# Patient Record
Sex: MALE | Race: WHITE | NOT HISPANIC OR LATINO | URBAN - METROPOLITAN AREA
[De-identification: names, ages, dates, MRNs, and addresses within clinical notes are randomized per-mention and may not be internally consistent; named-entity substitution may affect disease eponyms.]

---

## 2017-02-28 ENCOUNTER — INPATIENT (INPATIENT)
Facility: HOSPITAL | Age: 82
LOS: 11 days | Discharge: HOME HEALTH | DRG: 234 | End: 2017-03-12
Attending: THORACIC SURGERY (CARDIOTHORACIC VASCULAR SURGERY) | Admitting: THORACIC SURGERY (CARDIOTHORACIC VASCULAR SURGERY)
Payer: MEDICARE

## 2017-02-28 VITALS
HEART RATE: 82 BPM | OXYGEN SATURATION: 92 % | RESPIRATION RATE: 20 BRPM | DIASTOLIC BLOOD PRESSURE: 74 MMHG | TEMPERATURE: 98 F | WEIGHT: 199.96 LBS | SYSTOLIC BLOOD PRESSURE: 135 MMHG

## 2017-02-28 DIAGNOSIS — Z96.659 PRESENCE OF UNSPECIFIED ARTIFICIAL KNEE JOINT: Chronic | ICD-10-CM

## 2017-02-28 DIAGNOSIS — N18.3 CHRONIC KIDNEY DISEASE, STAGE 3 (MODERATE): ICD-10-CM

## 2017-02-28 DIAGNOSIS — J44.9 CHRONIC OBSTRUCTIVE PULMONARY DISEASE, UNSPECIFIED: ICD-10-CM

## 2017-02-28 DIAGNOSIS — I10 ESSENTIAL (PRIMARY) HYPERTENSION: ICD-10-CM

## 2017-02-28 DIAGNOSIS — E03.9 HYPOTHYROIDISM, UNSPECIFIED: ICD-10-CM

## 2017-02-28 DIAGNOSIS — Z98.49 CATARACT EXTRACTION STATUS, UNSPECIFIED EYE: Chronic | ICD-10-CM

## 2017-02-28 DIAGNOSIS — R55 SYNCOPE AND COLLAPSE: ICD-10-CM

## 2017-02-28 DIAGNOSIS — R07.9 CHEST PAIN, UNSPECIFIED: ICD-10-CM

## 2017-02-28 DIAGNOSIS — E11.9 TYPE 2 DIABETES MELLITUS WITHOUT COMPLICATIONS: ICD-10-CM

## 2017-02-28 LAB
ALBUMIN SERPL ELPH-MCNC: 3.3 G/DL — LOW (ref 3.4–5)
ALP SERPL-CCNC: 63 U/L — SIGNIFICANT CHANGE UP (ref 40–120)
ALT FLD-CCNC: 19 U/L — SIGNIFICANT CHANGE UP (ref 12–42)
ANION GAP SERPL CALC-SCNC: 8 MMOL/L — LOW (ref 9–16)
APPEARANCE UR: CLEAR — SIGNIFICANT CHANGE UP
AST SERPL-CCNC: 21 U/L — SIGNIFICANT CHANGE UP (ref 15–37)
BACTERIA # UR AUTO: SIGNIFICANT CHANGE UP /HPF
BASOPHILS NFR BLD AUTO: 0.6 % — SIGNIFICANT CHANGE UP (ref 0–2)
BILIRUB SERPL-MCNC: 0.6 MG/DL — SIGNIFICANT CHANGE UP (ref 0.2–1.2)
BILIRUB UR-MCNC: NEGATIVE — SIGNIFICANT CHANGE UP
BUN SERPL-MCNC: 20 MG/DL — SIGNIFICANT CHANGE UP (ref 7–23)
CALCIUM SERPL-MCNC: 8.1 MG/DL — LOW (ref 8.5–10.5)
CHLORIDE SERPL-SCNC: 106 MMOL/L — SIGNIFICANT CHANGE UP (ref 96–108)
CK MB CFR SERPL CALC: 1.3 NG/ML — SIGNIFICANT CHANGE UP (ref 0.5–3.6)
CK MB CFR SERPL CALC: 1.4 NG/ML — SIGNIFICANT CHANGE UP (ref 0.5–3.6)
CK MB CFR SERPL CALC: 1.8 NG/ML — SIGNIFICANT CHANGE UP (ref 0.5–3.6)
CK SERPL-CCNC: 80 U/L — SIGNIFICANT CHANGE UP (ref 39–308)
CK SERPL-CCNC: 81 U/L — SIGNIFICANT CHANGE UP (ref 39–308)
CO2 SERPL-SCNC: 24 MMOL/L — SIGNIFICANT CHANGE UP (ref 22–31)
COLOR SPEC: YELLOW — SIGNIFICANT CHANGE UP
CREAT SERPL-MCNC: 1.28 MG/DL — SIGNIFICANT CHANGE UP (ref 0.5–1.3)
D DIMER BLD IA.RAPID-MCNC: 266 NG/ML DDU — HIGH
DIFF PNL FLD: NEGATIVE — SIGNIFICANT CHANGE UP
EOSINOPHIL NFR BLD AUTO: 0.6 % — SIGNIFICANT CHANGE UP (ref 0–6)
EPI CELLS # UR: SIGNIFICANT CHANGE UP /HPF
GLUCOSE SERPL-MCNC: 134 MG/DL — HIGH (ref 70–99)
GLUCOSE UR QL: NEGATIVE — SIGNIFICANT CHANGE UP
HCT VFR BLD CALC: 39.6 % — SIGNIFICANT CHANGE UP (ref 39–50)
HGB BLD-MCNC: 13.3 G/DL — SIGNIFICANT CHANGE UP (ref 13–17)
KETONES UR-MCNC: NEGATIVE — SIGNIFICANT CHANGE UP
LEUKOCYTE ESTERASE UR-ACNC: NEGATIVE — SIGNIFICANT CHANGE UP
LYMPHOCYTES # BLD AUTO: 25.3 % — SIGNIFICANT CHANGE UP (ref 13–44)
MCHC RBC-ENTMCNC: 32.7 PG — SIGNIFICANT CHANGE UP (ref 27–34)
MCHC RBC-ENTMCNC: 33.6 G/DL — SIGNIFICANT CHANGE UP (ref 32–36)
MCV RBC AUTO: 97.3 FL — SIGNIFICANT CHANGE UP (ref 80–100)
MONOCYTES NFR BLD AUTO: 10.7 % — SIGNIFICANT CHANGE UP (ref 2–14)
NEUTROPHILS NFR BLD AUTO: 62.8 % — SIGNIFICANT CHANGE UP (ref 43–77)
NITRITE UR-MCNC: NEGATIVE — SIGNIFICANT CHANGE UP
PH UR: 6 — SIGNIFICANT CHANGE UP (ref 4–8)
PLATELET # BLD AUTO: 275 K/UL — SIGNIFICANT CHANGE UP (ref 150–400)
POTASSIUM SERPL-MCNC: 4 MMOL/L — SIGNIFICANT CHANGE UP (ref 3.5–5.3)
POTASSIUM SERPL-SCNC: 4 MMOL/L — SIGNIFICANT CHANGE UP (ref 3.5–5.3)
PROT SERPL-MCNC: 7.5 G/DL — SIGNIFICANT CHANGE UP (ref 6.4–8.2)
PROT UR-MCNC: (no result) MG/DL
RBC # BLD: 4.07 M/UL — LOW (ref 4.2–5.8)
RBC # FLD: 13.8 % — SIGNIFICANT CHANGE UP (ref 10.3–16.9)
RBC CASTS # UR COMP ASSIST: < 5 /HPF — SIGNIFICANT CHANGE UP
SODIUM SERPL-SCNC: 138 MMOL/L — SIGNIFICANT CHANGE UP (ref 135–145)
SP GR SPEC: 1.02 — SIGNIFICANT CHANGE UP (ref 1–1.03)
TROPONIN I SERPL-MCNC: <0.015 NG/ML — SIGNIFICANT CHANGE UP (ref 0.01–0.04)
UROBILINOGEN FLD QL: 1 E.U./DL — SIGNIFICANT CHANGE UP
WBC # BLD: 8.4 K/UL — SIGNIFICANT CHANGE UP (ref 3.8–10.5)
WBC # FLD AUTO: 8.4 K/UL — SIGNIFICANT CHANGE UP (ref 3.8–10.5)
WBC UR QL: < 5 /HPF — SIGNIFICANT CHANGE UP

## 2017-02-28 PROCEDURE — 33518 CABG ARTERY-VEIN TWO: CPT | Mod: AS

## 2017-02-28 PROCEDURE — 99285 EMERGENCY DEPT VISIT HI MDM: CPT | Mod: 25

## 2017-02-28 PROCEDURE — 93010 ELECTROCARDIOGRAM REPORT: CPT

## 2017-02-28 PROCEDURE — 99223 1ST HOSP IP/OBS HIGH 75: CPT

## 2017-02-28 PROCEDURE — 71020: CPT | Mod: 26

## 2017-02-28 PROCEDURE — 33533 CABG ARTERIAL SINGLE: CPT | Mod: AS

## 2017-02-28 PROCEDURE — 76998 US GUIDE INTRAOP: CPT | Mod: 26,AS,59

## 2017-02-28 RX ORDER — FLUTICASONE PROPIONATE AND SALMETEROL 50; 250 UG/1; UG/1
1 POWDER ORAL; RESPIRATORY (INHALATION)
Qty: 0 | Refills: 0 | Status: DISCONTINUED | OUTPATIENT
Start: 2017-02-28 | End: 2017-03-06

## 2017-02-28 RX ORDER — HEPARIN SODIUM 5000 [USP'U]/ML
5000 INJECTION INTRAVENOUS; SUBCUTANEOUS EVERY 8 HOURS
Qty: 0 | Refills: 0 | Status: DISCONTINUED | OUTPATIENT
Start: 2017-02-28 | End: 2017-03-06

## 2017-02-28 RX ORDER — ACETAMINOPHEN 500 MG
650 TABLET ORAL ONCE
Qty: 0 | Refills: 0 | Status: COMPLETED | OUTPATIENT
Start: 2017-02-28 | End: 2017-02-28

## 2017-02-28 RX ORDER — LISINOPRIL 2.5 MG/1
40 TABLET ORAL DAILY
Qty: 0 | Refills: 0 | Status: DISCONTINUED | OUTPATIENT
Start: 2017-02-28 | End: 2017-03-05

## 2017-02-28 RX ORDER — SIMVASTATIN 20 MG/1
40 TABLET, FILM COATED ORAL AT BEDTIME
Qty: 0 | Refills: 0 | Status: DISCONTINUED | OUTPATIENT
Start: 2017-02-28 | End: 2017-03-06

## 2017-02-28 RX ORDER — ASPIRIN/CALCIUM CARB/MAGNESIUM 324 MG
81 TABLET ORAL DAILY
Qty: 0 | Refills: 0 | Status: DISCONTINUED | OUTPATIENT
Start: 2017-02-28 | End: 2017-03-06

## 2017-02-28 RX ORDER — LEVALBUTEROL 1.25 MG/.5ML
0.63 SOLUTION, CONCENTRATE RESPIRATORY (INHALATION) EVERY 6 HOURS
Qty: 0 | Refills: 0 | Status: DISCONTINUED | OUTPATIENT
Start: 2017-02-28 | End: 2017-03-06

## 2017-02-28 RX ORDER — SODIUM CHLORIDE 9 MG/ML
1000 INJECTION, SOLUTION INTRAVENOUS
Qty: 0 | Refills: 0 | Status: DISCONTINUED | OUTPATIENT
Start: 2017-02-28 | End: 2017-03-06

## 2017-02-28 RX ORDER — DEXTROSE 50 % IN WATER 50 %
25 SYRINGE (ML) INTRAVENOUS ONCE
Qty: 0 | Refills: 0 | Status: DISCONTINUED | OUTPATIENT
Start: 2017-02-28 | End: 2017-03-06

## 2017-02-28 RX ORDER — LEVOTHYROXINE SODIUM 125 MCG
50 TABLET ORAL DAILY
Qty: 0 | Refills: 0 | Status: DISCONTINUED | OUTPATIENT
Start: 2017-02-28 | End: 2017-03-06

## 2017-02-28 RX ORDER — DEXTROSE 50 % IN WATER 50 %
1 SYRINGE (ML) INTRAVENOUS ONCE
Qty: 0 | Refills: 0 | Status: DISCONTINUED | OUTPATIENT
Start: 2017-02-28 | End: 2017-03-06

## 2017-02-28 RX ORDER — FLUTICASONE PROPIONATE AND SALMETEROL 50; 250 UG/1; UG/1
2 POWDER ORAL; RESPIRATORY (INHALATION)
Qty: 0 | Refills: 0 | Status: DISCONTINUED | OUTPATIENT
Start: 2017-02-28 | End: 2017-02-28

## 2017-02-28 RX ORDER — GLUCAGON INJECTION, SOLUTION 0.5 MG/.1ML
1 INJECTION, SOLUTION SUBCUTANEOUS ONCE
Qty: 0 | Refills: 0 | Status: DISCONTINUED | OUTPATIENT
Start: 2017-02-28 | End: 2017-03-06

## 2017-02-28 RX ORDER — INSULIN LISPRO 100/ML
VIAL (ML) SUBCUTANEOUS
Qty: 0 | Refills: 0 | Status: DISCONTINUED | OUTPATIENT
Start: 2017-02-28 | End: 2017-03-06

## 2017-02-28 RX ORDER — DEXTROSE 50 % IN WATER 50 %
12.5 SYRINGE (ML) INTRAVENOUS ONCE
Qty: 0 | Refills: 0 | Status: DISCONTINUED | OUTPATIENT
Start: 2017-02-28 | End: 2017-03-06

## 2017-02-28 RX ADMIN — Medication 650 MILLIGRAM(S): at 17:22

## 2017-02-28 RX ADMIN — HEPARIN SODIUM 5000 UNIT(S): 5000 INJECTION INTRAVENOUS; SUBCUTANEOUS at 22:41

## 2017-02-28 RX ADMIN — Medication 81 MILLIGRAM(S): at 19:38

## 2017-02-28 RX ADMIN — LISINOPRIL 40 MILLIGRAM(S): 2.5 TABLET ORAL at 19:38

## 2017-02-28 RX ADMIN — SIMVASTATIN 40 MILLIGRAM(S): 20 TABLET, FILM COATED ORAL at 22:41

## 2017-02-28 NOTE — ED PROVIDER NOTE - MEDICAL DECISION MAKING DETAILS
82 y/o m hx HTN, DM, HLD, COPD with two episodes of dizziness, diaphoresis, nausea in the past week associated with exertion.  Had echo in 2015 which showed mild MR and AS with EF 55-60%.  Concern for possible anginal equivalent, ?run of PVCs (noted frequent PVCs on EKG in ED today), worsening AS.  Will admit to cardiology for further evaluation.

## 2017-02-28 NOTE — ED CLERICAL - NS ED CLERK NOTE PRE-ARRIVAL INFORMATION; ADDITIONAL PRE-ARRIVAL INFORMATION
80 Y/O M GINGER COLLINS BEING SENT IN BY DR CARLI CEE FROM [facility] FOR NEAR SYNCOPE NAUSEA HX OF HYPERTENSION DIABETES HYPERLIPODEMIA R/OMPNEUMONIA AND MI (LL)

## 2017-02-28 NOTE — H&P ADULT - PROBLEM SELECTOR PLAN 7
Cr. 1.28. Cont. ACEI. Monitor Cr.     DVT PPX: Heparin Sub Cut    Case discussed w/ Dr. Vieyra    Dispo: f/u Echo, NPO after MN. f/u EP recs. Monitor tele

## 2017-02-28 NOTE — ED ADULT NURSE NOTE - CHPI ED SYMPTOMS NEG
no syncope/no chills/no cough/no back pain/no diaphoresis/no shortness of breath/no fever/no vomiting

## 2017-02-28 NOTE — H&P ADULT - PROBLEM SELECTOR PLAN 2
+RBBB, +LAFB, +PVCs noted on tele. At bedside, 3 beats of consecutive bigeminy noted. Monitor Tele. F/u Echo. F/u CE.   HR currently . No BB at this time as per Dr. Vieyra due to h/o COPD and near syncope.   F/u Orthostatic VS. Neuro exam normal. F/u Mg level.   Consult EP in AM

## 2017-02-28 NOTE — ED PROVIDER NOTE - OBJECTIVE STATEMENT
80 y/o m hx HTN, DM, HLD, COPD presents sent from pmd office for evaluation after pt had 2 episodes in the past 2 weeks where he was walking, and became lightheaded, diaphoretic, nauseous and having substernal discomfort.  Pt stating last episode was last night around 2AM.  Pt stating in ED, feeling ok, but reporting both episodes very scary for him.  Pt denies SOB, leg swelling, vomiting, recent URI, cough, all other ROS negative.

## 2017-02-28 NOTE — H&P ADULT - NSHPREVIEWOFSYSTEMS_GEN_ALL_CORE
GEN	              Denies fever, chills,  Skin/Breast	Denies rash  HEENT	              Denies HA, vertigo, vision ?  Lung		+ Dry Cough, H/o wheeze, + Dyspnea. Denies hemoptysis, pleurisy  CV		+ Chest pain, + Palpitations, +CASON  GI	              Denies abdominal pain. Denies vomiting/diarrhea. +Nausea    Urinary		Denies dysuria, hematuria, hesitancy, dribbling, incontinence. 		  MSK 		+ Hip/Knee joint pains  Neurology              Denies fainting, seizures, weakness, numbness/tingling, tremor, speech ?  Psych	              +Depression and Anxiety GEN Denies fever, chills,  Skin/Breast Denies rash  HEENT Denies HA, vertigo, vision   Lung + Dry Cough, H/o wheeze, + Dyspnea. Denies hemoptysis, pleurisy  CV + Chest pain, + Palpitations, +CASON  GI Denies abdominal pain. Denies vomiting/diarrhea. +Nausea    Urinary Denies dysuria, hematuria, hesitancy, dribbling, incontinence. 		  MSK + Hip/Knee joint pains  Neurology  Denies fainting, seizures, weakness, numbness/tingling, tremor, speech   Psych +Depression and Anxiety

## 2017-02-28 NOTE — H&P ADULT - NSHPLABSRESULTS_GEN_ALL_CORE
13.3   8.4   )-----------( 275      ( 28 Feb 2017 14:41 )             39.6   28 Feb 2017 14:41    138    |  106    |  20     ----------------------------<  134    4.0     |  24     |  1.28     Ca    8.1        28 Feb 2017 14:41    TPro  7.5    /  Alb  3.3    /  TBili  0.6    /  DBili  x      /  AST  21     /  ALT  19     /  AlkPhos  63     28 Feb 2017 14:41    CARDIAC MARKERS ( 28 Feb 2017 14:41 )  <0.015 ng/mL / x     / 96 U/L / x     / 1.8 ng/mL

## 2017-02-28 NOTE — H&P ADULT - HISTORY OF PRESENT ILLNESS
82 y/o obese M [employer] HISTORIAN Ex-Smoker w/PMHX HTN, HLD, DM (Non-Compliant w/ Metformin), Aflutter s/p Ablation in , COPD (denies h/o intubation, last completed Prednisone Taper in 2017), Hypothyroidism, h/o Prostate CA s/p seed implant in  who originally presented to PMD office today c/o 2 episodes of near syncope in the setting of C/P within the past 2 weeks.  Pt reports dizziness, nausea, palpitations, fatigue and 5-6/10 chest tightness across the top of the chest. C/P did not radiate to the jaw/neck/back/arm. The last episode occurred at 2AM this morning while ambulating to the restroom. Pt presented from PMD office to [facility] for further evaluation. Pt reports h/o chronic CASON requiring him to stop twice when walking 1 block. Pt reports chronic ankle LE edema. Pt admits to 1 remote possible episode of syncope, timing unknown. Pt denies any new vision changes, vertigo, numbness/tingling in extremities, amaurosis fugax, focal extremity weakness. In ER /74, HR 82, RR 20, T 98.3, O2 92-93% on RA. Labs significant for CE neg. x 1. K 4.0. EKG shows Sinus Rhythm at 100bpm w/ PVCs, + RBBB, LAFB, 4 PVCs, no clear ST depressions, no TWI. In ED pt noted to have frequent PVCs. CXR showed clear lungs. Pt currently Assx. Echo from MD office 2016 showed basal anterior, mid anterior and mid anterolateral hypokinesis, EF 50-55%, LA mild dilated, grade 1 diastolic dysfunction, borderline AS. UA pending.  Pt reports h/o stress test 1 year ago, results unknown. Note, pts wife  within the last month and son  within the past 2 months. Pt denies fever/chills, dysuria, abdominal pain, diarrhea. + Dry cough.    Pt admitted to [facility] for further management. 80 y/o obese M [employer] HISTORIAN Ex-Smoker w/PMHX HTN, HLD, DM (Non-Compliant w/ Metformin), Aflutter s/p Ablation in  at [facility], COPD (denies h/o intubation, last completed Prednisone Taper in 2017), Hypothyroidism, h/o Prostate CA s/p seed implant in  who originally presented to PMD office today c/o 2 episodes of near syncope in the setting of C/P within the past 2 weeks.  Pt reports dizziness, nausea, palpitations, fatigue and 5-6/10 chest tightness across the top of the chest. C/P did not radiate to the jaw/neck/back/arm. The last episode occurred at 2AM this morning while ambulating to the restroom. Pt presented from PMD office to [facility] for further evaluation. Pt reports h/o chronic CASON requiring him to stop twice when walking 1 block. Pt reports chronic ankle LE edema. Pt admits to 1 remote possible episode of syncope, timing unknown. Pt denies any new vision changes, vertigo, numbness/tingling in extremities, amaurosis fugax, focal extremity weakness. In ER /74, HR 82, RR 20, T 98.3, O2 92-93% on RA. Labs significant for CE neg. x 1. K 4.0. EKG shows Sinus Rhythm at 100bpm w/ PVCs, + RBBB, LAFB, 4 PVCs, no clear ST depressions, no TWI. In ED pt noted to have frequent PVCs. CXR showed clear lungs. Pt currently Assx. Echo from MD office 2016 showed basal anterior, mid anterior and mid anterolateral hypokinesis, EF 50-55%, LA mild dilated, grade 1 diastolic dysfunction, borderline AS. UA pending.  Pt reports h/o stress test 1 year ago, results unknown. Note, pts wife  within the last month and son  within the past 2 months. Pt denies fever/chills, dysuria, abdominal pain, diarrhea. + Dry cough.    Pt admitted to [facility] for further management. 82 y/o obese M [employer] HISTORIAN Ex-Smoker w/PMHX HTN, HLD, DM (Non-Compliant w/ Metformin), Aflutter s/p Ablation in  at [facility], COPD (denies h/o intubation, last completed Prednisone Taper in 2017), Hypothyroidism, h/o Prostate CA s/p seed implant in  who originally presented to PMD office today c/o 2 episodes of near syncope in the setting of C/P within the past 2 weeks.  Pt reports dizziness, nausea, palpitations, fatigue and 5-6/10 chest tightness across the top of the chest. C/P did not radiate to the jaw/neck/back/arm. The last episode occurred at 2AM this morning while ambulating to the restroom. Pt presented from PMD office to [facility] for further evaluation. Pt reports h/o chronic CASON requiring him to stop twice when walking 1 block. Pt reports chronic ankle LE edema. Pt admits to 1 remote possible episode of syncope, timing unknown. Pt denies any new vision changes, vertigo, numbness/tingling in extremities, amaurosis fugax, focal extremity weakness. In ER /74, HR 82, RR 20, T 98.3, O2 92-93% on RA. Labs significant for CE neg. x 1. K 4.0. EKG shows Sinus Rhythm at 100bpm w/ 4 PVCs, + RBBB, LAFB, no clear ST depressions, no TWI. In ED pt noted to have frequent PVCs. CXR showed clear lungs. Pt currently Assx. Echo from MD office 2016 showed basal anterior, mid anterior and mid anterolateral hypokinesis, EF 50-55%, LA mild dilated, grade 1 diastolic dysfunction, borderline AS. UA pending.  Pt reports h/o stress test 1 year ago, results unknown. Note, pts wife  within the last month and son  within the past 2 months. Pt denies fever/chills, dysuria, abdominal pain, diarrhea. + Dry cough.    Pt admitted to [facility] for further management.

## 2017-02-28 NOTE — PATIENT PROFILE ADULT. - ABILITY TO HEAR (WITH HEARING AID OR HEARING APPLIANCE IF NORMALLY USED):
Mildly to Moderately Impaired: difficulty hearing in some environments or speaker may need to increase volume or speak distinctly/Lime R ear

## 2017-02-28 NOTE — ED ADULT NURSE NOTE - PMH
COPD (chronic obstructive pulmonary disease)    Diabetes    High cholesterol    Hypertension    Prostate cancer  in remission

## 2017-02-28 NOTE — PATIENT PROFILE ADULT. - VISION (WITH CORRECTIVE LENSES IF THE PATIENT USUALLY WEARS THEM):
glasses/Severely impaired: cannot locate objects without hearing or touching them or patient nonresponsive.

## 2017-02-28 NOTE — ED ADULT NURSE NOTE - OBJECTIVE STATEMENT
Pt came to ED complaining of fatigue, dizziness, nausea.  Pt reports strong episode of dizziness, nausea last night.  Pt denies chest pain, SOB, abd pain,  symptoms.  Positive PMS x4 extremities.  Pt is alert and oriented x3.  Lung sounds clear bilaterally, even chest rise.

## 2017-02-28 NOTE — H&P ADULT - NSHPPHYSICALEXAM_GEN_ALL_CORE
V/S 	BP:  150s/70s	HR: 94	  RR: 17	     T: 98	  O2: 94% on RA   	  GEN: NAD  PULM:  CTA B/L  CARD: No JVD B/L, RRR, S1 and S2   ABD: +BS, NT, soft/ND, obese	  EXT: Trace Edema B/L juan luis  GROIN: soft, no hematoma, no bleeding, no femoral bruit  WRIST: soft, no hematoma, no bleeding, radial pulse 2+  NEURO: A+Ox3, no focal deficit

## 2017-02-28 NOTE — ED PROVIDER NOTE - ATTENDING CONTRIBUTION TO CARE
Pt presents from PCP's office for evaluation as he has had 2 episodes of lightheadedness, diaphoresis, nausea and substernal CP in the past 2 weeks. No sxs currently. No other complaints. Pt AAO, NAD, RRR, CTA b/l, Abd: soft/NT. Labs/ studies noted. Pt admitted for cardiac w/up.

## 2017-02-28 NOTE — H&P ADULT - ASSESSMENT
82 y/o obese M [employer] HISTORIAN Ex-Smoker w/PMHX HTN, HLD, DM, Aflutter s/p Ablation, COPD, Hypothyroidism,  h/o Prostate CA who originally presented to PMD office today c/o 2 near syncope in the setting of C/P within the past 2 weeks.  Noted to have PVCs on tele/EKG. +DIEGO on exam.     Pt admitted to [facility] for further management.

## 2017-02-28 NOTE — H&P ADULT - PROBLEM SELECTOR PLAN 1
f/u CE at 6PM and 10PM. Cont. ASA, Zocor. F/u Lipid Panel. F/u Echo to evaluate for AS. If no AS on Echo and after EP consult consider Pharm NST per Dr. Vieyra. NPO after MN.   HR . Cont. Advair. O2 92-94% on RA. Pt has h/o COPD. f/u D-Dimer f/u CE at 6PM and 10PM. Cont. ASA, Zocor. F/u Lipid Panel. F/u Echo to evaluate for AS. If no AS on Echo and after EP consult consider Pharm NST per Dr. Vieyra. NPO after MN.   HR . O2 92-94% on RA. Pt has h/o COPD. f/u D-Dimer

## 2017-02-28 NOTE — ED ADULT TRIAGE NOTE - CHIEF COMPLAINT QUOTE
dizziness, nausea last night that lasted for 15 minutes.  Hx: HTN, DM, HLD, COPD.  Sent in by Dr. Loza for evaluation.  Denies chest pain / pressure / tightness, fever, chills, falls / injury.

## 2017-02-28 NOTE — H&P ADULT - PROBLEM SELECTOR PLAN 4
Stable. Change Albuterol inhaler to Xopenex neb due to intermittent Sinus Tach, HR . Cont. Advair. O2 92-94% on RA. Test O2 on ambulation. F/u D-Dimer.   In reference to intermittent Sinus Tach to . Pt denies infx sx. CXR clear. F/u UA. Stable. Change Albuterol inhaler to Xopenex neb due to intermittent Sinus Tach, HR . Cont. Advair. O2 92-94% on RA. Test O2 on ambulation.     In reference to intermittent Sinus Tach to . Pt denies infx sx. CXR clear. F/u UA.

## 2017-03-01 DIAGNOSIS — I24.9 ACUTE ISCHEMIC HEART DISEASE, UNSPECIFIED: ICD-10-CM

## 2017-03-01 DIAGNOSIS — I10 ESSENTIAL (PRIMARY) HYPERTENSION: ICD-10-CM

## 2017-03-01 DIAGNOSIS — I49.3 VENTRICULAR PREMATURE DEPOLARIZATION: ICD-10-CM

## 2017-03-01 DIAGNOSIS — E78.00 PURE HYPERCHOLESTEROLEMIA, UNSPECIFIED: ICD-10-CM

## 2017-03-01 LAB
ANION GAP SERPL CALC-SCNC: 8 MMOL/L — LOW (ref 9–16)
BUN SERPL-MCNC: 19 MG/DL — SIGNIFICANT CHANGE UP (ref 7–23)
CALCIUM SERPL-MCNC: 8.6 MG/DL — SIGNIFICANT CHANGE UP (ref 8.5–10.5)
CHLORIDE SERPL-SCNC: 109 MMOL/L — HIGH (ref 96–108)
CO2 SERPL-SCNC: 23 MMOL/L — SIGNIFICANT CHANGE UP (ref 22–31)
CREAT SERPL-MCNC: 1.02 MG/DL — SIGNIFICANT CHANGE UP (ref 0.5–1.3)
GLUCOSE SERPL-MCNC: 84 MG/DL — SIGNIFICANT CHANGE UP (ref 70–99)
HBA1C BLD-MCNC: 6.8 % — HIGH (ref 4.8–5.6)
HCT VFR BLD CALC: 37.5 % — LOW (ref 39–50)
HGB BLD-MCNC: 12.3 G/DL — LOW (ref 13–17)
MAGNESIUM SERPL-MCNC: 2.3 MG/DL — SIGNIFICANT CHANGE UP (ref 1.6–2.4)
MCHC RBC-ENTMCNC: 32 PG — SIGNIFICANT CHANGE UP (ref 27–34)
MCHC RBC-ENTMCNC: 32.8 G/DL — SIGNIFICANT CHANGE UP (ref 32–36)
MCV RBC AUTO: 97.7 FL — SIGNIFICANT CHANGE UP (ref 80–100)
PLATELET # BLD AUTO: 255 K/UL — SIGNIFICANT CHANGE UP (ref 150–400)
POTASSIUM SERPL-MCNC: 4 MMOL/L — SIGNIFICANT CHANGE UP (ref 3.5–5.3)
POTASSIUM SERPL-SCNC: 4 MMOL/L — SIGNIFICANT CHANGE UP (ref 3.5–5.3)
RBC # BLD: 3.84 M/UL — LOW (ref 4.2–5.8)
RBC # FLD: 14 % — SIGNIFICANT CHANGE UP (ref 10.3–16.9)
SODIUM SERPL-SCNC: 140 MMOL/L — SIGNIFICANT CHANGE UP (ref 135–145)
T3 SERPL-MCNC: 88 NG/DL — SIGNIFICANT CHANGE UP (ref 80–200)
T4 AB SER-ACNC: 5.47 UG/DL — SIGNIFICANT CHANGE UP (ref 3.17–11.72)
TSH SERPL-MCNC: 2.52 UIU/ML — SIGNIFICANT CHANGE UP (ref 0.35–4.94)
WBC # BLD: 7.5 K/UL — SIGNIFICANT CHANGE UP (ref 3.8–10.5)
WBC # FLD AUTO: 7.5 K/UL — SIGNIFICANT CHANGE UP (ref 3.8–10.5)

## 2017-03-01 PROCEDURE — 99222 1ST HOSP IP/OBS MODERATE 55: CPT

## 2017-03-01 PROCEDURE — 93970 EXTREMITY STUDY: CPT | Mod: 26

## 2017-03-01 RX ORDER — VERAPAMIL HCL 240 MG
180 CAPSULE, EXTENDED RELEASE PELLETS 24 HR ORAL DAILY
Qty: 0 | Refills: 0 | Status: DISCONTINUED | OUTPATIENT
Start: 2017-03-01 | End: 2017-03-06

## 2017-03-01 RX ADMIN — HEPARIN SODIUM 5000 UNIT(S): 5000 INJECTION INTRAVENOUS; SUBCUTANEOUS at 16:37

## 2017-03-01 RX ADMIN — Medication 81 MILLIGRAM(S): at 11:07

## 2017-03-01 RX ADMIN — FLUTICASONE PROPIONATE AND SALMETEROL 1 DOSE(S): 50; 250 POWDER ORAL; RESPIRATORY (INHALATION) at 06:25

## 2017-03-01 RX ADMIN — Medication 50 MICROGRAM(S): at 06:26

## 2017-03-01 RX ADMIN — Medication 180 MILLIGRAM(S): at 19:12

## 2017-03-01 RX ADMIN — LISINOPRIL 40 MILLIGRAM(S): 2.5 TABLET ORAL at 06:26

## 2017-03-01 RX ADMIN — FLUTICASONE PROPIONATE AND SALMETEROL 1 DOSE(S): 50; 250 POWDER ORAL; RESPIRATORY (INHALATION) at 17:22

## 2017-03-01 RX ADMIN — HEPARIN SODIUM 5000 UNIT(S): 5000 INJECTION INTRAVENOUS; SUBCUTANEOUS at 06:26

## 2017-03-01 RX ADMIN — HEPARIN SODIUM 5000 UNIT(S): 5000 INJECTION INTRAVENOUS; SUBCUTANEOUS at 22:37

## 2017-03-01 RX ADMIN — SIMVASTATIN 40 MILLIGRAM(S): 20 TABLET, FILM COATED ORAL at 22:37

## 2017-03-01 NOTE — PROGRESS NOTE ADULT - PROBLEM SELECTOR PLAN 2
- pt with frequent PVCs on tele, bigeminy, h/o aflutter and ablation  - EP consult for recommendations  - following EP recommendations will decide whether to pursue ischemic work up

## 2017-03-01 NOTE — CONSULT NOTE ADULT - SUBJECTIVE AND OBJECTIVE BOX
HPI:  80 y/o obese M POOR HISTORIAN Ex-Smoker w/ HTN, HLD, DM, Aflutter s/p Ablation in  at [facility], COPD (denies h/o intubation, last completed Prednisone Taper in 2017), Hypothyroidism, h/o Prostate CA s/p seed implant in  who originally presented to PMD office today c/o 2 episodes of near syncope in the setting of C/P within the past 2 weeks.  Pt reports dizziness, nausea, palpitations, fatigue and 5-6/10 chest tightness across the top of the chest. C/P did not radiate to the jaw/neck/back/arm. The last episode occurred at 2AM this morning while ambulating to the restroom. Pt presented from PMD office to [facility] for further evaluation. Pt reports h/o chronic CASON requiring him to stop twice when walking 1 block. Pt reports chronic ankle LE edema. Pt admits to 1 remote possible episode of syncope, timing unknown. Pt denies any new vision changes, vertigo, numbness/tingling in extremities, amaurosis fugax, focal extremity weakness. In ER /74, HR 82, RR 20, T 98.3, O2 92-93% on RA. Labs significant for CE neg. x 1. K 4.0. EKG shows Sinus Rhythm at 100bpm w/ 4 PVCs, + RBBB, LAFB, no clear ST depressions, no TWI. In ED pt noted to have frequent PVCs. CXR showed clear lungs. Pt currently Assx. Echo from MD office 2016 showed basal anterior, mid anterior and mid anterolateral hypokinesis, EF 50-55%, LA mild dilated, grade 1 diastolic dysfunction, borderline AS. UA pending.  Pt reports h/o stress test 1 year ago, results unknown. Note, pts wife  within the last month and son  within the past 2 months. Pt denies fever/chills, dysuria, abdominal pain, diarrhea. + Dry cough.      PAST MEDICAL & SURGICAL HISTORY:  High cholesterol  Diabetes  Prostate cancer: in remission  Hypertension  COPD (chronic obstructive pulmonary disease)  History of cataract surgery: b/l  History of knee replacement: b/l  aflutter s/p ablation    Family history of acute myocardial infarction (Father)      Social History:  Smoking  Drugs  ETOH    pertinent home medications:    Inpatient Medications:   insulin lispro (HumaLOG) corrective regimen sliding scale  SubCutaneous Before meals and at bedtime  dextrose 5%. 1000milliLiter(s) IV Continuous <Continuous>  dextrose Gel 1Dose(s) Oral once PRN  dextrose 50% Injectable 12.5Gram(s) IV Push once  dextrose 50% Injectable 25Gram(s) IV Push once  dextrose 50% Injectable 25Gram(s) IV Push once  glucagon  Injectable 1milliGRAM(s) IntraMuscular once PRN  heparin  Injectable 5000Unit(s) SubCutaneous every 8 hours  aspirin enteric coated 81milliGRAM(s) Oral daily  lisinopril 40milliGRAM(s) Oral daily  simvastatin 40milliGRAM(s) Oral at bedtime  levothyroxine 50MICROGram(s) Oral daily  levalbuterol Inhalation 0.63milliGRAM(s) Inhalation every 6 hours PRN  fluticasone / salmeterol 250-50 MICROgram(s) Diskus 1Dose(s) Inhalation two times a day      narcotic analgesics (Nausea; Vomiting)  No Known Allergies      ROS:   CONSTITUTIONAL: No fever, weight loss + fatigue  EYES: Pt denies  RESPIRATORY: No cough, wheezing, chills or hemoptysis; No Shortness of Breath  CARDIOVASCULAR: see HPI  GASTROINTESTINAL: Pt denies  NEUROLOGICAL: Pt denies  SKIN: Pt denies   PSYCHIATRIC: Pt denies  HEME/LYMPH: Pt denies    PHYSICAL:  T(C): 36.7, Max: 37.1 (02-28 @ 21:14)  HR: 87 (81 - 97)  BP: 133/79 (107/53 - 158/78)  RR: 16 (16 - 16)  SpO2: 96% (95% - 98%)  Wt(kg): --  Appearance: NAD  Cardiovascular: Normal S1 S2, No JVD, No murmurs, No edema  Respiratory: Lungs clear to auscultation	bilaterally.  No wheeze, rhonchi, rales  Gastrointestinal:  Soft, NT/ND, + BS	  Neurologic: A&O x 3, No deficit noted  Extremities: No edema, pulses intact      LABS:                        12.3   7.5   )-----------( 255      ( 01 Mar 2017 07:00 )             37.5     01 Mar 2017 07:00    140    |  109    |  19     ----------------------------<  84     4.0     |  23     |  1.02     Ca    8.6        01 Mar 2017 07:00  Mg     2.3       01 Mar 2017 07:00    TPro  7.5    /  Alb  3.3    /  TBili  0.6    /  DBili  x      /  AST  21     /  ALT  19     /  AlkPhos  63     2017 14:41      TSH  TroponinTroponin I, Serum: <0.015 ng/mL ( @ 23:08)  Troponin I, Serum: <0.015 ng/mL ( @ 18:30)  Troponin I, Serum: <0.015 ng/mL ( @ 14:41)     LIVER FUNCTIONS - ( 2017 14:41 )  Alb: 3.3 g/dL / Pro: 7.5 g/dL / ALK PHOS: 63 U/L / ALT: 19 U/L / AST: 21 U/L / GGT: x             EKG:    Telemetry:    ECHO:    Prior EP procedures:    Cath / stress / Cardiac CTa:    Physician Assistant Assessment Plan: HPI:  80 y/o obese M POOR HISTORIAN Ex-Smoker w/ HTN, HLD, DM, Aflutter s/p Ablation in 2003 at [facility], COPD, Hypothyroidism, h/o Prostate CA s/p seed implant in 2002 who presented to [facility] yesterday c/o 2 episodes of near syncope and chest pain, found to have PVCs - EP called for further recommendations.   Pt presented to his PMD complaining of nausea, palpitation, fatigue, dizziness and 6/10 chest non radiating tightness across the top of the chest. He was referred to [facility] ER.  Pt reports h/o chronic CASON requiring him to stop twice when walking 1 block. Pt reports chronic b/l ankle edema. Of note he has been extremely stressed out lately as his wife and son passed away within the past few months.    Echo from MD office 2/2016 showed basal anterior, mid anterior and mid anterolateral hypokinesis, EF 50-55%,      PAST MEDICAL & SURGICAL HISTORY:  High cholesterol  Diabetes  Prostate cancer: in remission  Hypertension  COPD (chronic obstructive pulmonary disease)  History of cataract surgery: b/l  History of knee replacement: b/l  aflutter s/p ablation    Family history of acute myocardial infarction (Father)    Social History: no sig drinking, former smoker, denies drugs      Inpatient Medications:   insulin lispro (HumaLOG) corrective regimen sliding scale  SubCutaneous Before meals and at bedtime  heparin  Injectable 5000Unit(s) SubCutaneous every 8 hours  aspirin enteric coated 81milliGRAM(s) Oral daily  lisinopril 40milliGRAM(s) Oral daily  simvastatin 40milliGRAM(s) Oral at bedtime  levothyroxine 50MICROGram(s) Oral daily  levalbuterol Inhalation 0.63milliGRAM(s) Inhalation every 6 hours PRN  fluticasone / salmeterol 250-50 MICROgram(s) Diskus 1Dose(s) Inhalation two times a day    ALL  narcotic analgesics (Nausea; Vomiting)  No Known Allergies      ROS:   CONSTITUTIONAL: No fever, weight loss + fatigue  EYES: Pt denies  RESPIRATORY: No cough, wheezing, chills or hemoptysis; No Shortness of Breath  CARDIOVASCULAR: see HPI  GASTROINTESTINAL: Pt denies  NEUROLOGICAL: Pt denies  SKIN: Pt denies   PSYCHIATRIC: Pt denies  HEME/LYMPH: Pt denies    PHYSICAL:  T(C): 36.7, Max: 37.1 (02-28 @ 21:14)  HR: 87 (81 - 97)  BP: 133/79 (107/53 - 158/78)  RR: 16 (16 - 16)  SpO2: 96% (95% - 98%)  Wt(kg): --  Appearance: NAD  Cardiovascular: Normal S1 S2, No JVD, No murmurs, No edema  Respiratory: Lungs clear to auscultation	bilaterally.  No wheeze, rhonchi, rales  Gastrointestinal:  Soft, NT/ND, + BS	  Neurologic: A&O x 3, No deficit noted  Extremities: No edema, pulses intact      LABS:                        12.3   7.5   )-----------( 255      ( 01 Mar 2017 07:00 )             37.5     01 Mar 2017 07:00    140    |  109    |  19     ----------------------------<  84     4.0     |  23     |  1.02     Ca    8.6        01 Mar 2017 07:00  Mg     2.3       01 Mar 2017 07:00    TPro  7.5    /  Alb  3.3    /  TBili  0.6    /  DBili  x      /  AST  21     /  ALT  19     /  AlkPhos  63     28 Feb 2017 14:41      TSH  TroponinTroponin I, Serum: <0.015 ng/mL (02-28 @ 23:08)  Troponin I, Serum: <0.015 ng/mL (02-28 @ 18:30)  Troponin I, Serum: <0.015 ng/mL (02-28 @ 14:41)     LIVER FUNCTIONS - ( 28 Feb 2017 14:41 )  Alb: 3.3 g/dL / Pro: 7.5 g/dL / ALK PHOS: 63 U/L / ALT: 19 U/L / AST: 21 U/L / GGT: x             EKG:    Telemetry:    ECHO:    Prior EP procedures:    Cath / stress / Cardiac CTa:    Physician Assistant Assessment Plan: HPI:  80 y/o M w/ HTN, HLD, DM, Aflutter s/p Ablation in 2003 at [facility], COPD, Hypothyroidism, h/o Prostate CA s/p seed implant in 2002 who presented to [facility] yesterday c/o 2 episodes of near syncope and chest pain, found to have PVCs - EP called for further recommendations.   Pt presented to his PMD complaining of nausea, fatigue, dizziness and 6/10 chest non radiating tightness across the top of the chest. He was referred to [facility] ER.  Pt reports h/o chronic CASON requiring him to stop twice when walking 1 block. Pt reports chronic b/l ankle edema.  He states he has worn a monitor and was told he had "extra beats" in the past but no intervention was needed.  He notes two episodes of near syncope both in the setting of getting up in the middle of the night to urinate.  No syncope.  He does not feel palpitations but states when he checks his pulse he feels "skipped beats".  On telemetry he has had PVCs and EP called for further reccs.  Of note he has been extremely stressed out lately as his wife and son passed away within the past few months.  Echo from MD office 2/2016 showed basal anterior, mid anterior and mid anterolateral hypokinesis, EF 50-55%,      PAST MEDICAL & SURGICAL HISTORY:  High cholesterol  Diabetes  Prostate cancer: in remission  Hypertension  COPD (chronic obstructive pulmonary disease)  History of cataract surgery: b/l  History of knee replacement: b/l  aflutter s/p ablation    Family history of acute myocardial infarction (Father)    Social History: no sig drinking, former smoker, denies drugs      Inpatient Medications:   insulin lispro (HumaLOG) corrective regimen sliding scale  SubCutaneous Before meals and at bedtime  heparin  Injectable 5000Unit(s) SubCutaneous every 8 hours  aspirin enteric coated 81milliGRAM(s) Oral daily  lisinopril 40milliGRAM(s) Oral daily  simvastatin 40milliGRAM(s) Oral at bedtime  levothyroxine 50MICROGram(s) Oral daily  levalbuterol Inhalation 0.63milliGRAM(s) Inhalation every 6 hours PRN  fluticasone / salmeterol 250-50 MICROgram(s) Diskus 1Dose(s) Inhalation two times a day    ALL  narcotic analgesics (Nausea; Vomiting)  No Known Allergies      ROS:   CONSTITUTIONAL: No fever, weight loss + fatigue  EYES: Pt denies  RESPIRATORY: No cough, wheezing, chills or hemoptysis; No Shortness of Breath  CARDIOVASCULAR: see HPI  GASTROINTESTINAL: Pt denies  NEUROLOGICAL: Pt denies  SKIN: Pt denies   PSYCHIATRIC: Pt denies  HEME/LYMPH: Pt denies    PHYSICAL:  T(C): 36.7, Max: 37.1 (02-28 @ 21:14)  HR: 87 (81 - 97)  BP: 133/79 (107/53 - 158/78)  RR: 16 (16 - 16)  SpO2: 96% (95% - 98%)     Appearance: NAD  Cardiovascular: Normal S1 S2, No JVD, No murmurs, No edema  Respiratory: Lungs clear to auscultation	bilaterally.  No wheeze, rhonchi, rales  Gastrointestinal:  Soft, NT/ND,  Neurologic: A&O x 3, No deficit noted         LABS:  01 Mar 2017 07:00                        12.3   7.5   )-----------( 255                    37.5     140    |  109    |  19     ----------------------------<  84     4.0     |  23     |  1.02     Ca    8.6        Mg     2.3        TPro  7.5    /  Alb  3.3    /  TBili  0.6    /  DBili  x      /  AST  21     /  ALT  19     /  AlkPhos  63        TSH 2.5  TroponinTroponin I, Serum: <0.015 ng/mL (02-28 @ 23:08)  Troponin I, Serum: <0.015 ng/mL (02-28 @ 18:30)  Troponin I, Serum: <0.015 ng/mL (02-28 @ 14:41)           EKG: NSR, PVC (likely outflow tract), RBBB /LAFB    Telemetry: NSR with PVC and bigeminy      ECHO: EF 55-60%. moderate AS,     Prior EP procedures:denies    Cath / stress / Cardiac CTa: denies    Physician Assistant Assessment Plan:  80 y/o obese M POOR HISTORIAN Ex-Smoker w/ HTN, HLD, DM, Aflutter s/p Ablation in 2003 at [facility], COPD, Hypothyroidism, h/o Prostate CA s/p seed implant in 2002 who presented to [facility] yesterday c/o 2 episodes of near syncope and chest pain, found to have PVCs - EP called for further recommendations. PVC likely outflow tract.  Patient asymptomatic from these.  Plan for R/LHC tomorrow to eval coronaries and AS.  We discussed reasons to treat PVCs such as depressed EF, PVC burden >20%  and symptoms.  He does not have any of these factors.  It is difficult to quantify his PVC burden on telmetry but less then 20% from my scan of his monitor.  WE will arrange a 24 hour holter monitor on discharge to better understand his PVC burden.  For now would start him on low dose Verapamil to help suppress PVC.  Will arrange monitor and continue to follow his telemetry while he is in house.  Please call [phone] with any questions or concerns.

## 2017-03-01 NOTE — PROGRESS NOTE ADULT - ASSESSMENT
80 yo M former smoker PMHx HTN, HLD, DM, aflutter s/p ablation (2003), COPD, hypothyroid, prostate ca, originally presented to PMD office c/o 2 episodes of near syncope with intermittent chest tightness and was sent to [facility] ED. Patient VSS, EKG revealing sinus with freq PVCs, RBBB, LAFB, no acute ischemic changes. Pt will be admitted to [facility] for r/o ACS and further cardiac work up.

## 2017-03-01 NOTE — PROGRESS NOTE ADULT - PROBLEM SELECTOR PLAN 1
- troponins negative x3, no active chest pain  - continue Aspirin 81 mg orally daily  - echocardiogram, f/u, consider pharm NST, eval for ?AS beforehand  - holding BB for possible stress test/COPD

## 2017-03-01 NOTE — PROGRESS NOTE ADULT - SUBJECTIVE AND OBJECTIVE BOX
Interventional Cardiology PA Adult Progress Note    Subjective Assessment: Pt seen and examined at bedside. Denies any complaints overnight. Denies headache, chest pain, dizziness, shortness of breath, LE edema, n/v/d.  	  MEDICATIONS:  lisinopril 40milliGRAM(s) Oral daily  levalbuterol Inhalation 0.63milliGRAM(s) Inhalation every 6 hours PRN  fluticasone / salmeterol 250-50 MICROgram(s) Diskus 1Dose(s) Inhalation two times a day  insulin lispro (HumaLOG) corrective regimen sliding scale  SubCutaneous Before meals and at bedtime  dextrose Gel 1Dose(s) Oral once PRN  dextrose 50% Injectable 12.5Gram(s) IV Push once  dextrose 50% Injectable 25Gram(s) IV Push once  dextrose 50% Injectable 25Gram(s) IV Push once  glucagon  Injectable 1milliGRAM(s) IntraMuscular once PRN  simvastatin 40milliGRAM(s) Oral at bedtime  levothyroxine 50MICROGram(s) Oral daily  dextrose 5%. 1000milliLiter(s) IV Continuous <Continuous>  heparin  Injectable 5000Unit(s) SubCutaneous every 8 hours  aspirin enteric coated 81milliGRAM(s) Oral daily  	    PHYSICAL EXAM:  TELEMETRY:  T(C): 36.4, Max: 37.1 (02-28 @ 21:14)  HR: 87 (81 - 97)  BP: 133/79 (107/53 - 158/78)  RR: 16 (16 - 20)  SpO2: 96% (92% - 98%)  Wt(kg): --  I&O's Summary  I & Os for 24h ending 01 Mar 2017 07:00  =============================================  IN: 400 ml / OUT: 0 ml / NET: 400 ml    I & Os for current day (as of 01 Mar 2017 12:37)  =============================================  IN: 120 ml / OUT: 500 ml / NET: -380 ml    Height (cm): 167.6 (02-28 @ 17:00)  Weight (kg): 90.7 (02-28 @ 14:11)  BMI (kg/m2): 32.3 (02-28 @ 17:00)  BSA (m2): 2 (02-28 @ 17:00)  Randle:  Central/PICC/Mid Line:                                         Appearance: Normal	  Neck: Supple, - JVD  Cardiovascular: Normal S1 S2, No JVD, + DIEGO, freq PVCs  Respiratory: Lungs clear to auscultation c/l no wheezes rales rhonchi  Gastrointestinal:  Soft, Non-tender	  Skin: No rashes, No ecchymoses, No cyanosis  Extremities: Normal range of motion, No clubbing, cyanosis or edema  Vascular: Peripheral pulses palpable 2+ bilaterally  Neurologic: Non-focal  Psychiatry: A & O x 3, Mood & affect appropriate      	    ECG:  	  Diagnosis Line Sinus rhythm with frequent premature ventricular complexes  Possible Left atrial enlargement  Right bundle branch block  Left anterior fascicular block  RADIOLOGY:   DIAGNOSTIC TESTING:  [ ] Echocardiogram: pending  [ ]  Catheterization:  [ ] Stress Test:  pending  [ ] MOHINI  OTHER: 	    LABS:	 	  CARDIAC MARKERS:  Troponin I, Serum: <0.015 ng/mL (02-28 @ 23:08)  Troponin I, Serum: <0.015 ng/mL (02-28 @ 18:30)  Troponin I, Serum: <0.015 ng/mL (02-28 @ 14:41)                          12.3   7.5   )-----------( 255      ( 01 Mar 2017 07:00 )             37.5     01 Mar 2017 07:00    140    |  109    |  19     ----------------------------<  84     4.0     |  23     |  1.02     Ca    8.6        01 Mar 2017 07:00  Mg     2.3       01 Mar 2017 07:00    TPro  7.5    /  Alb  3.3    /  TBili  0.6    /  DBili  x      /  AST  21     /  ALT  19     /  AlkPhos  63     28 Feb 2017 14:41  proBNP:   Lipid Profile:   HgA1c: Hemoglobin A1C, Whole Blood: 6.8 % (03-01 @ 07:00)  TSH: Thyroid Stimulating Hormone, Serum: 2.524 uIU/mL (03-01 @ 07:00)

## 2017-03-02 ENCOUNTER — TRANSCRIPTION ENCOUNTER (OUTPATIENT)
Age: 82
End: 2017-03-02

## 2017-03-02 PROBLEM — E11.9 TYPE 2 DIABETES MELLITUS WITHOUT COMPLICATIONS: Chronic | Status: ACTIVE | Noted: 2017-02-28

## 2017-03-02 PROBLEM — C61 MALIGNANT NEOPLASM OF PROSTATE: Chronic | Status: ACTIVE | Noted: 2017-02-28

## 2017-03-02 PROBLEM — I10 ESSENTIAL (PRIMARY) HYPERTENSION: Chronic | Status: ACTIVE | Noted: 2017-02-28

## 2017-03-02 PROBLEM — J44.9 CHRONIC OBSTRUCTIVE PULMONARY DISEASE, UNSPECIFIED: Chronic | Status: ACTIVE | Noted: 2017-02-28

## 2017-03-02 PROBLEM — E78.00 PURE HYPERCHOLESTEROLEMIA, UNSPECIFIED: Chronic | Status: ACTIVE | Noted: 2017-02-28

## 2017-03-02 LAB
ANION GAP SERPL CALC-SCNC: 8 MMOL/L — LOW (ref 9–16)
BUN SERPL-MCNC: 16 MG/DL — SIGNIFICANT CHANGE UP (ref 7–23)
CALCIUM SERPL-MCNC: 8.7 MG/DL — SIGNIFICANT CHANGE UP (ref 8.5–10.5)
CHLORIDE SERPL-SCNC: 106 MMOL/L — SIGNIFICANT CHANGE UP (ref 96–108)
CO2 SERPL-SCNC: 24 MMOL/L — SIGNIFICANT CHANGE UP (ref 22–31)
CREAT SERPL-MCNC: 0.84 MG/DL — SIGNIFICANT CHANGE UP (ref 0.5–1.3)
GLUCOSE SERPL-MCNC: 112 MG/DL — HIGH (ref 70–99)
HCT VFR BLD CALC: 36.5 % — LOW (ref 39–50)
HGB BLD-MCNC: 12.3 G/DL — LOW (ref 13–17)
MAGNESIUM SERPL-MCNC: 2.2 MG/DL — SIGNIFICANT CHANGE UP (ref 1.6–2.4)
MCHC RBC-ENTMCNC: 32.9 PG — SIGNIFICANT CHANGE UP (ref 27–34)
MCHC RBC-ENTMCNC: 33.7 G/DL — SIGNIFICANT CHANGE UP (ref 32–36)
MCV RBC AUTO: 97.6 FL — SIGNIFICANT CHANGE UP (ref 80–100)
PLATELET # BLD AUTO: 261 K/UL — SIGNIFICANT CHANGE UP (ref 150–400)
POTASSIUM SERPL-MCNC: 3.7 MMOL/L — SIGNIFICANT CHANGE UP (ref 3.5–5.3)
POTASSIUM SERPL-SCNC: 3.7 MMOL/L — SIGNIFICANT CHANGE UP (ref 3.5–5.3)
RBC # BLD: 3.74 M/UL — LOW (ref 4.2–5.8)
RBC # FLD: 14.3 % — SIGNIFICANT CHANGE UP (ref 10.3–16.9)
SODIUM SERPL-SCNC: 138 MMOL/L — SIGNIFICANT CHANGE UP (ref 135–145)
WBC # BLD: 9 K/UL — SIGNIFICANT CHANGE UP (ref 3.8–10.5)
WBC # FLD AUTO: 9 K/UL — SIGNIFICANT CHANGE UP (ref 3.8–10.5)

## 2017-03-02 PROCEDURE — 93460 R&L HRT ART/VENTRICLE ANGIO: CPT | Mod: 26

## 2017-03-02 RX ORDER — POTASSIUM CHLORIDE 20 MEQ
40 PACKET (EA) ORAL ONCE
Qty: 0 | Refills: 0 | Status: COMPLETED | OUTPATIENT
Start: 2017-03-02 | End: 2017-03-02

## 2017-03-02 RX ADMIN — FLUTICASONE PROPIONATE AND SALMETEROL 1 DOSE(S): 50; 250 POWDER ORAL; RESPIRATORY (INHALATION) at 05:45

## 2017-03-02 RX ADMIN — HEPARIN SODIUM 5000 UNIT(S): 5000 INJECTION INTRAVENOUS; SUBCUTANEOUS at 21:45

## 2017-03-02 RX ADMIN — FLUTICASONE PROPIONATE AND SALMETEROL 1 DOSE(S): 50; 250 POWDER ORAL; RESPIRATORY (INHALATION) at 19:37

## 2017-03-02 RX ADMIN — Medication 81 MILLIGRAM(S): at 11:14

## 2017-03-02 RX ADMIN — HEPARIN SODIUM 5000 UNIT(S): 5000 INJECTION INTRAVENOUS; SUBCUTANEOUS at 05:45

## 2017-03-02 RX ADMIN — LISINOPRIL 40 MILLIGRAM(S): 2.5 TABLET ORAL at 05:44

## 2017-03-02 RX ADMIN — Medication 50 MICROGRAM(S): at 05:44

## 2017-03-02 RX ADMIN — Medication 40 MILLIEQUIVALENT(S): at 16:36

## 2017-03-02 RX ADMIN — Medication 180 MILLIGRAM(S): at 05:44

## 2017-03-02 RX ADMIN — SIMVASTATIN 40 MILLIGRAM(S): 20 TABLET, FILM COATED ORAL at 21:45

## 2017-03-02 NOTE — PROGRESS NOTE ADULT - SUBJECTIVE AND OBJECTIVE BOX
82 y/o obese M POOR HISTORIAN Ex-Smoker w/ HTN, HLD, DM, Aflutter s/p Ablation in 2003 at [facility], COPD, Hypothyroidism, h/o Prostate CA s/p seed implant in 2002 who presented to [facility] yesterday c/o 2 episodes of near syncope and chest pain, found to have PVCs - EP called for further recommendations. PVC likely outflow tract.  Patient asymptomatic from these.    Today, PVC burden significantly less.  No further intervention.  Follow up as outpatient.  Appointment made.

## 2017-03-02 NOTE — DISCHARGE NOTE ADULT - ADDITIONAL INSTRUCTIONS
You are signing out AGAINST MEDICAL ADVICE. Please be aware that by doing so you are at risk for worsening of your condition. We wanted to monitor you post catheterization and evaluate you for your aortic stenosis. Risks of signing out are including but not limited to heart attack, bleeding, stroke, passing out, death. Please be aware of these risks and follow up with your cardiologist as soon as possible.    Please make sure you see Dr. Borrego in the office next week. Please make sure that you also follow up with Dr. Gonzales for the Holter/Heart rate monitor. Please return to the emergency room if experiencing chest pain/dizziness/passing out.

## 2017-03-02 NOTE — PROGRESS NOTE ADULT - PROBLEM SELECTOR PLAN 8
- moderate AS by echo RONAN 1.1 mean gradient 36 mmHg  - by right heart cath 3/02 RHC: RA: 9, RV: 32/6 (10), PAP:32/6 (19), PCWP: 11 CO/CI (Nam): 6.63/3.32   Ao/LV gradient: 23.1mm Hg , RONAN: 1.67cm2.   - will continue to follow, mild-moderate AS by cath

## 2017-03-02 NOTE — PROGRESS NOTE ADULT - PROBLEM SELECTOR PLAN 1
- Troponins negative x3, no active chest pain  - continue Aspirin 81 mg orally daily  - echo revealing moderate AS RONAN 1.1, mean gradient 36mm Hg, LV function normal EF 55-60%  - R&L heart cath 3/2 revealing LM: Distal 60% lesion, pLAD: calcified 75%, Ramus: 50%, LCx: Mild luminal irregularities, RCA: Dominant, ostial 95% lesion, proximal 80-90 calcified lesion, LVEF: 60%, LVEDP: 21, No significant MR.    -CABG recommended, Dr. Andino on Monday, pre-op work up ordered (CT head, chest, carotid, PFTs)

## 2017-03-02 NOTE — DISCHARGE NOTE ADULT - INSTRUCTIONS
Continue a heart healthy diet low in sodium, cholesterol, and fat.    You underwent a coronary/periphal angiogram and should wait 3 days before returning to ordinary activities. Do not drive for 2 days. Consult your doctor before returning to vigorous activity. You may return to work in 3-5 days. The catheter from your groin/wrist was removed and you should remove the dressing in 24 hours. You may shower once the dressing is removed, but avoid baths, hot tubs, or swimming for 5 days to prevent infection. If you notice bleeding from the site, hardening and pain at the site, drainage or redness from the site, coolness/paleness of the extremity, swelling, or fever, please call [phone]. Please continue your aspirin and plavix/brilinta/effient as prescribed unless otherwise indicated by your cardiologist. All of your prescriptions have been sent to the pharmacy. Please follow up with  __ in 1-2 weeks. All of your needed prescriptions have been sent electronically to your pharmacy.

## 2017-03-02 NOTE — CONSULT NOTE ADULT - SUBJECTIVE AND OBJECTIVE BOX
Surgeon: Dr. Andino     Requesting Physician: Dr. Vieyra    HISTORY OF PRESENT ILLNESS:  82 y/o obese M POOR HISTORIAN Ex-Smoker w/PMHX HTN, HLD, DM (Non-Compliant), Aflutter s/p Ablation in  at [facility], COPD, Hypothyroidism, h/o Prostate CA s/p seed implant in  who originally presented to PMD office on 17 c/o 2 episodes of near syncope in the setting of C/P within the past 2 weeks.  Pt reports dizziness, nausea, palpitations, fatigue and 5-6/10 chest tightness across the top of the chest. C/P did not radiate to the jaw/neck/back/arm.  Pt reports h/o chronic CASON requiring him to stop twice when walking 1 block and LE edema.  Pt denies any new vision changes, vertigo, numbness/tingling in extremities, amaurosis fugax, focal extremity weakness, fever/chills, dysuria, abdominal pain, diarrhea.. Echo from  2016 showed basal anterior, mid anterior and mid anterolateral hypokinesis, EF 50-55%, LA mild dilated, grade 1 diastolic dysfunction. Pt underwent cardiac cath on 3/2/17 which revealed LM: Distal 60% lesion, pLAD: calcified 75%, Ramus: 50%, LCx: Mild luminal irregularities, RCA: Dominant, ostial 95% lesion, proximal 80-90 calcified lesion, LVEF: 60%, LVEDP: 21, No significant MR.  RHC: RA: 9, RV: 32/6 (10), PAP:32/6 (19), PCWP: 11. CO/CI (Nam): 6.63/3.32   Ao/LV gradient: 23.1mm Hg , RONAN: 1.67cm2. CT Surgery was consulted for surgical evaluation for CABG.     PAST MEDICAL & SURGICAL HISTORY:  High cholesterol  Diabetes  Prostate cancer: in remission  Hypertension  COPD (chronic obstructive pulmonary disease)  History of cataract surgery: b/l  History of knee replacement: b/l      MEDICATIONS  (STANDING):  insulin lispro (HumaLOG) corrective regimen sliding scale  SubCutaneous Before meals and at bedtime  heparin  Injectable 5000Unit(s) SubCutaneous every 8 hours  aspirin enteric coated 81milliGRAM(s) Oral daily  lisinopril 40milliGRAM(s) Oral daily  simvastatin 40milliGRAM(s) Oral at bedtime  levothyroxine 50MICROGram(s) Oral daily  fluticasone / salmeterol 250-50 MICROgram(s) Diskus 1Dose(s) Inhalation two times a day  verapamil SR 180milliGRAM(s) Oral daily  potassium chloride    Tablet ER 40milliEquivalent(s) Oral once    MEDICATIONS  (PRN):  dextrose Gel 1Dose(s) Oral once PRN Blood Glucose LESS THAN 70 milliGRAM(s)/deciliter  glucagon  Injectable 1milliGRAM(s) IntraMuscular once PRN Glucose LESS THAN 70 milligrams/deciliter  levalbuterol Inhalation 0.63milliGRAM(s) Inhalation every 6 hours PRN shortness of breathe, wheezing      Allergies: No Known Allergies    Intolerances    narcotic analgesics (Nausea; Vomiting)      SOCIAL HISTORY:  Smoker:  Former Smoker quit 32 yrs ago   ETOH use:  YES socially Ilicit Drug use:  YES / NO  Denies ilicit drug use     FAMILY HISTORY:  Family history of acute myocardial infarction (Father)    Review of Systems  CONSTITUTIONAL:  +fatigue  NEURO:  +near syncope, +dizziness   EYES:   NEGATIVE  ENMT:   NEGATIVE  CV:  +Chest pain, +CASON,  +palpitations, +CASON  RESPIRATORY:  +SOB, +cough, +LE edema   GI:  +Nausea,   :   NEGATIVE  MUSKULOSKELETAL:   NEGATIVE  SKIN/BREAST:   NEGATIVE  PSYCH:  NEGATIVE  HEME/LYMPH:   NEGATIVE  ENDOCRINE:   NEGATIVE    PHYSICAL EXAM  Vital Signs Last 24 Hrs  T(C): 36.8, Max: 36.8 ( @ 14:46)  T(F): 98.2, Max: 98.2 ( @ 14:46)  HR: 72 (68 - 88)  BP: 117/58 (106/51 - 158/91)  BP(mean): --  RR: 16 (16 - 16)  SpO2: 94% (94% - 97%)    CONSTITUTIONAL:    NEURO:                        NEECK  CV:    RESPIRATORY:      GI:    : ALLEN + / -     MUSKULOSKELETAL:      SKIN / BREAST:                                                            LABS:                        12.3   9.0   )-----------( 261      ( 02 Mar 2017 07:44 )             36.5     02 Mar 2017 07:44    138    |  106    |  16     ----------------------------<  112    3.7     |  24     |  0.84     Ca    8.7        02 Mar 2017 07:44  Mg     2.2       02 Mar 2017 07:44        Urinalysis Basic - ( 2017 18:03 )    Color: Yellow / Appearance: Clear / S.025 / pH: x  Gluc: x / Ketone: NEGATIVE  / Bili: NEGATIVE / Urobili: 1.0 E.U./dL   Blood: x / Protein: Trace mg/dL / Nitrite: NEGATIVE   Leuk Esterase: NEGATIVE / RBC: < 5 /HPF / WBC < 5 /HPF   Sq Epi: x / Non Sq Epi: Rare /HPF / Bacteria: None Seen /HPF      CARDIAC MARKERS ( 2017 23:08 )  <0.015 ng/mL / x     / 81 U/L / x     / 1.3 ng/mL  CARDIAC MARKERS ( 2017 18:30 )  <0.015 ng/mL / x     / 80 U/L / x     / 1.4 ng/mL    RADIOLOGY & ADDITIONAL STUDIES:  CAROTID U/S: PENDING     CXR: 17: no acute pathology     CT Scan: non contrast CHEST CT PENDING  Head CT PENDING    EKG: Sinus rhythm with frequent premature ventricular complexes  Left atrial enlargement  Right bundle branch block  Left anterior fascicular block  *** Bifascicular block ***    TTE / MOHINI: PENDING    Cardiac Cath:  3/2/17 which revealed LM: Distal 60% lesion, pLAD: calcified 75%, Ramus: 50%, LCx: Mild luminal irregularities, RCA: Dominant, ostial 95% lesion, proximal 80-90 calcified lesion, LVEF: 60%, LVEDP: 21, No significant MR.  RHC: RA: 9, RV: 32/6 (10), PAP:32/6 (19), PCWP: 11      CO/CI (Nam): 6.63/3.32   Ao/LV gradient: 23.1mm Hg , RONAN: 1.67cm2.    Assessment: 82 y/o obese M POOR HISTORIAN Ex-Smoker w/PMHX HTN, HLD, DM (Non-Compliant), Aflutter s/p Ablation in  at [facility], COPD, Hypothyroidism, h/o Prostate CA s/p seed implant in  who originally presented to PMD office on 2/28/17 c/o 2 episodes of near syncope in the setting of C/P within the past 2 weeks. Cardiac cath was positive for CAD.   -Case discussed with Dr. Andino   -Please order: echo, non contrast chest CT, EKG, CXR, T&S, TSH, A1c, PFTs, non contrast head CT, and carotid U/S  -Continue medical management as per primary team   -Pre-op for CABG on Monday 3/6/17 Surgeon: Dr. Andino     Requesting Physician: Dr. Vieyra    HISTORY OF PRESENT ILLNESS:  80 y/o obese M POOR HISTORIAN Ex-Smoker w/PMHX HTN, HLD, DM (Non-Compliant), Aflutter s/p Ablation in  at [facility], COPD, Hypothyroidism, h/o Prostate CA s/p seed implant in  who originally presented to PMD office on 17 c/o 2 episodes of near syncope in the setting of C/P within the past 2 weeks.  Pt reports dizziness, nausea, palpitations, fatigue and 5-6/10 chest tightness across the top of the chest. C/P did not radiate to the jaw/neck/back/arm.  Pt reports h/o chronic CASON requiring him to stop twice when walking 1 block and LE edema.  Pt denies any new vision changes, vertigo, numbness/tingling in extremities, amaurosis fugax, focal extremity weakness, fever/chills, dysuria, abdominal pain, diarrhea.. Echo from  2016 showed basal anterior, mid anterior and mid anterolateral hypokinesis, EF 50-55%, LA mild dilated, grade 1 diastolic dysfunction. Pt underwent cardiac cath on 3/2/17 which revealed LM: Distal 60% lesion, pLAD: calcified 75%, Ramus: 50%, LCx: Mild luminal irregularities, RCA: Dominant, ostial 95% lesion, proximal 80-90 calcified lesion, LVEF: 60%, LVEDP: 21, No significant MR.  RHC: RA: 9, RV: 32/6 (10), PAP:32/6 (19), PCWP: 11. CO/CI (Nam): 6.63/3.32   Ao/LV gradient: 23.1mm Hg , RONAN: 1.67cm2. CT Surgery was consulted for surgical evaluation for CABG.     PAST MEDICAL & SURGICAL HISTORY:  High cholesterol  Diabetes  Prostate cancer: in remission  Hypertension  COPD (chronic obstructive pulmonary disease)  History of cataract surgery: b/l  History of knee replacement: b/l      MEDICATIONS  (STANDING):  insulin lispro (HumaLOG) corrective regimen sliding scale  SubCutaneous Before meals and at bedtime  heparin  Injectable 5000Unit(s) SubCutaneous every 8 hours  aspirin enteric coated 81milliGRAM(s) Oral daily  lisinopril 40milliGRAM(s) Oral daily  simvastatin 40milliGRAM(s) Oral at bedtime  levothyroxine 50MICROGram(s) Oral daily  fluticasone / salmeterol 250-50 MICROgram(s) Diskus 1Dose(s) Inhalation two times a day  verapamil SR 180milliGRAM(s) Oral daily  potassium chloride    Tablet ER 40milliEquivalent(s) Oral once    MEDICATIONS  (PRN):  dextrose Gel 1Dose(s) Oral once PRN Blood Glucose LESS THAN 70 milliGRAM(s)/deciliter  glucagon  Injectable 1milliGRAM(s) IntraMuscular once PRN Glucose LESS THAN 70 milligrams/deciliter  levalbuterol Inhalation 0.63milliGRAM(s) Inhalation every 6 hours PRN shortness of breathe, wheezing      Allergies: No Known Allergies    Intolerances    narcotic analgesics (Nausea; Vomiting)      SOCIAL HISTORY:  Smoker:  Former Smoker quit 32 yrs ago   ETOH use:  YES socially Ilicit Drug use:  YES / NO  Denies ilicit drug use     FAMILY HISTORY:  Family history of acute myocardial infarction (Father)    Review of Systems  CONSTITUTIONAL:  +fatigue  NEURO:  +near syncope, +dizziness   EYES:   NEGATIVE  ENMT:   NEGATIVE  CV:  +Chest pain, +CASON,  +palpitations, +CASON  RESPIRATORY:  +SOB, +cough, +LE edema   GI:  +Nausea,   :   NEGATIVE  MUSKULOSKELETAL:   NEGATIVE  SKIN/BREAST:   NEGATIVE  PSYCH:  NEGATIVE  HEME/LYMPH:   NEGATIVE  ENDOCRINE:   NEGATIVE    PHYSICAL EXAM  Vital Signs Last 24 Hrs  T(C): 36.8, Max: 36.8 ( @ 14:46)  T(F): 98.2, Max: 98.2 ( @ 14:46)  HR: 72 (68 - 88)  BP: 117/58 (106/51 - 158/91)  BP(mean): --  RR: 16 (16 - 16)  SpO2: 94% (94% - 97%)    CONSTITUTIONAL:    NEURO:                        NEECK  CV:    RESPIRATORY:      GI:    : ALLEN + / -     MUSKULOSKELETAL:      SKIN / BREAST:                                                            LABS:                        12.3   9.0   )-----------( 261      ( 02 Mar 2017 07:44 )             36.5     02 Mar 2017 07:44    138    |  106    |  16     ----------------------------<  112    3.7     |  24     |  0.84     Ca    8.7        02 Mar 2017 07:44  Mg     2.2       02 Mar 2017 07:44        Urinalysis Basic - ( 2017 18:03 )    Color: Yellow / Appearance: Clear / S.025 / pH: x  Gluc: x / Ketone: NEGATIVE  / Bili: NEGATIVE / Urobili: 1.0 E.U./dL   Blood: x / Protein: Trace mg/dL / Nitrite: NEGATIVE   Leuk Esterase: NEGATIVE / RBC: < 5 /HPF / WBC < 5 /HPF   Sq Epi: x / Non Sq Epi: Rare /HPF / Bacteria: None Seen /HPF      CARDIAC MARKERS ( 2017 23:08 )  <0.015 ng/mL / x     / 81 U/L / x     / 1.3 ng/mL  CARDIAC MARKERS ( 2017 18:30 )  <0.015 ng/mL / x     / 80 U/L / x     / 1.4 ng/mL    RADIOLOGY & ADDITIONAL STUDIES:  CAROTID U/S: PENDING     CXR: 17: no acute pathology     CT Scan: non contrast CHEST CT PENDING  Head CT PENDING    EKG: Sinus rhythm with frequent premature ventricular complexes  Left atrial enlargement  Right bundle branch block  Left anterior fascicular block  *** Bifascicular block ***    TTE:  left ventricular ejection fraction is estimated to be 55-60%The left atrial size is normal.Right atrial size is normal.The right ventricle is normal in size and function.Calcified aortic valve.No aortic regurgitation noted.There is Moderate aortic stenosis.The mean pressure gradient is 36 mmHg.The calculated aortic valve area using the continuity equation is 1.1 cm2.There is moderate mitral annular calcification.No mitral regurgitation noted.Structurally normal tricuspid valve.There is trace tricuspid regurgitation.The pulmonary artery systolic pressure is estimated to be 32 mmHg.Structurally normal pulmonic valve.No pulmonic regurgitation   noted.Borderline aortic root dilatation.The aortic root measures 3.8 cm at sinuses (normal less than 4 cm for men, less than 3.6 cm for women).  The inferior vena cava is normal in size (<2.1 cm) with normal inspiratory collapse (>50%) consistent with normal rightatrial pressure.  There is no pericardial effusion.    Cardiac Cath:  3/2/17 which revealed LM: Distal 60% lesion, pLAD: calcified 75%, Ramus: 50%, LCx: Mild luminal irregularities, RCA: Dominant, ostial 95% lesion, proximal 80-90 calcified lesion, LVEF: 60%, LVEDP: 21, No significant MR.  RHC: RA: 9, RV: 32/6 (10), PAP:32/6 (19), PCWP: 11      CO/CI (Nam): 6.63/3.32   Ao/LV gradient: 23.1mm Hg , RONAN: 1.67cm2.    Assessment: 80 y/o obese M POOR HISTORIAN Ex-Smoker w/PMHX HTN, HLD, DM (Non-Compliant), Aflutter s/p Ablation in  at [facility], COPD, Hypothyroidism, h/o Prostate CA s/p seed implant in  who originally presented to PMD office on 17 c/o 2 episodes of near syncope in the setting of C/P within the past 2 weeks. Cardiac cath was positive for CAD.   -Case discussed with Dr. Andino   -Please order: echo, non contrast chest CT, EKG, CXR, T&S, TSH, A1c, PFTs, non contrast head CT, and carotid U/S  -Continue medical management as per primary team   -Pre-op for CABG on Monday 3/6/17 Surgeon: Dr. Andino     Requesting Physician: Dr. Vieyra    HISTORY OF PRESENT ILLNESS:  82 y/o obese M POOR HISTORIAN Ex-Smoker w/PMHX HTN, HLD, DM (Non-Compliant), Aflutter s/p Ablation in  at [facility], COPD, Hypothyroidism, h/o Prostate CA s/p seed implant in  who originally presented to PMD office on 17 c/o 2 episodes of near syncope in the setting of C/P within the past 2 weeks.  Pt reports dizziness, nausea, palpitations, fatigue and 5-6/10 chest tightness across the top of the chest. C/P did not radiate to the jaw/neck/back/arm.  Pt reports h/o chronic CASON requiring him to stop twice when walking 1 block and LE edema.  Pt denies any new vision changes, vertigo, numbness/tingling in extremities, amaurosis fugax, focal extremity weakness, fever/chills, dysuria, abdominal pain, diarrhea.. Echo from  2016 showed basal anterior, mid anterior and mid anterolateral hypokinesis, EF 50-55%, LA mild dilated, grade 1 diastolic dysfunction. Pt underwent cardiac cath on 3/2/17 which revealed LM: Distal 60% lesion, pLAD: calcified 75%, Ramus: 50%, LCx: Mild luminal irregularities, RCA: Dominant, ostial 95% lesion, proximal 80-90 calcified lesion, LVEF: 60%, LVEDP: 21, No significant MR.  RHC: RA: 9, RV: 32/6 (10), PAP:32/6 (19), PCWP: 11. CO/CI (Nam): 6.63/3.32   Ao/LV gradient: 23.1mm Hg , RONAN: 1.67cm2. CT Surgery was consulted for surgical evaluation for CABG.     PAST MEDICAL & SURGICAL HISTORY:  High cholesterol  Diabetes  Prostate cancer: in remission  Hypertension  COPD (chronic obstructive pulmonary disease)  History of cataract surgery: b/l  History of knee replacement: b/l      MEDICATIONS  (STANDING):  insulin lispro (HumaLOG) corrective regimen sliding scale  SubCutaneous Before meals and at bedtime  heparin  Injectable 5000Unit(s) SubCutaneous every 8 hours  aspirin enteric coated 81milliGRAM(s) Oral daily  lisinopril 40milliGRAM(s) Oral daily  simvastatin 40milliGRAM(s) Oral at bedtime  levothyroxine 50MICROGram(s) Oral daily  fluticasone / salmeterol 250-50 MICROgram(s) Diskus 1Dose(s) Inhalation two times a day  verapamil SR 180milliGRAM(s) Oral daily  potassium chloride    Tablet ER 40milliEquivalent(s) Oral once    MEDICATIONS  (PRN):  dextrose Gel 1Dose(s) Oral once PRN Blood Glucose LESS THAN 70 milliGRAM(s)/deciliter  glucagon  Injectable 1milliGRAM(s) IntraMuscular once PRN Glucose LESS THAN 70 milligrams/deciliter  levalbuterol Inhalation 0.63milliGRAM(s) Inhalation every 6 hours PRN shortness of breathe, wheezing      Allergies: No Known Allergies    Intolerances    narcotic analgesics (Nausea; Vomiting)      SOCIAL HISTORY:  Smoker:  Former Smoker quit 32 yrs ago   ETOH use:  YES socially Ilicit Drug use:  YES / NO  Denies ilicit drug use     FAMILY HISTORY:  Family history of acute myocardial infarction (Father)    Review of Systems  CONSTITUTIONAL:  +fatigue  NEURO:  +near syncope, +dizziness   EYES:   NEGATIVE  ENMT:   NEGATIVE  CV:  +Chest pain, +CASON,  +palpitations, +CASON  RESPIRATORY:  +SOB, +cough, +LE edema   GI:  +Nausea,   :   NEGATIVE  MUSKULOSKELETAL:   NEGATIVE  SKIN/BREAST:   NEGATIVE  PSYCH:  NEGATIVE  HEME/LYMPH:   NEGATIVE  ENDOCRINE:   NEGATIVE    PHYSICAL EXAM  Vital Signs Last 24 Hrs  T(C): 36.8, Max: 36.8 ( @ 14:46)  T(F): 98.2, Max: 98.2 (- @ 14:46)  HR: 72 (68 - 88)  BP: 117/58 (106/51 - 158/91)  BP(mean): --  RR: 16 (16 - 16)  SpO2: 94% (94% - 97%)    CONSTITUTIONAL: NAD, sitting upright during exam    NEURO: moving all extremities, no focal deficits                       NEECK: no radiation of murmurs to carotids b/l   CV:  RRR, +murmur apprciated  RESPIRATORY:    CTA b/l   GI:  obese abdomen, NT-ND  : ALLEN -  Ext: +trace pitting edema at the ankles b/l, no calf tenderness. +varicose veins b/l. +2 DP pulses b/l. +radial band on right UE. +2 radial pulse on left extremity                                                              LABS:                        12.3   9.0   )-----------( 261      ( 02 Mar 2017 07:44 )             36.5     02 Mar 2017 07:44    138    |  106    |  16     ----------------------------<  112    3.7     |  24     |  0.84     Ca    8.7        02 Mar 2017 07:44  Mg     2.2       02 Mar 2017 07:44        Urinalysis Basic - ( 2017 18:03 )    Color: Yellow / Appearance: Clear / S.025 / pH: x  Gluc: x / Ketone: NEGATIVE  / Bili: NEGATIVE / Urobili: 1.0 E.U./dL   Blood: x / Protein: Trace mg/dL / Nitrite: NEGATIVE   Leuk Esterase: NEGATIVE / RBC: < 5 /HPF / WBC < 5 /HPF   Sq Epi: x / Non Sq Epi: Rare /HPF / Bacteria: None Seen /HPF      CARDIAC MARKERS ( 2017 23:08 )  <0.015 ng/mL / x     / 81 U/L / x     / 1.3 ng/mL  CARDIAC MARKERS ( 2017 18:30 )  <0.015 ng/mL / x     / 80 U/L / x     / 1.4 ng/mL    RADIOLOGY & ADDITIONAL STUDIES:  CAROTID U/S: PENDING     CXR: 17: no acute pathology     CT Scan: non contrast CHEST CT PENDING  Head CT PENDING    EKG: Sinus rhythm with frequent premature ventricular complexes  Left atrial enlargement  Right bundle branch block  Left anterior fascicular block  *** Bifascicular block ***    TTE:  left ventricular ejection fraction is estimated to be 55-60%The left atrial size is normal.Right atrial size is normal.The right ventricle is normal in size and function.Calcified aortic valve.No aortic regurgitation noted.There is Moderate aortic stenosis.The mean pressure gradient is 36 mmHg.The calculated aortic valve area using the continuity equation is 1.1 cm2.There is moderate mitral annular calcification.No mitral regurgitation noted.Structurally normal tricuspid valve.There is trace tricuspid regurgitation.The pulmonary artery systolic pressure is estimated to be 32 mmHg.Structurally normal pulmonic valve.No pulmonic regurgitation   noted.Borderline aortic root dilatation.The aortic root measures 3.8 cm at sinuses (normal less than 4 cm for men, less than 3.6 cm for women).  The inferior vena cava is normal in size (<2.1 cm) with normal inspiratory collapse (>50%) consistent with normal rightatrial pressure.  There is no pericardial effusion.    Cardiac Cath:  3/2/17 which revealed LM: Distal 60% lesion, pLAD: calcified 75%, Ramus: 50%, LCx: Mild luminal irregularities, RCA: Dominant, ostial 95% lesion, proximal 80-90 calcified lesion, LVEF: 60%, LVEDP: 21, No significant MR.  RHC: RA: 9, RV: 32/6 (10), PAP:32/6 (19), PCWP: 11      CO/CI (Nam): 6.63/3.32   Ao/LV gradient: 23.1mm Hg , RONAN: 1.67cm2.    Assessment: 82 y/o obese M POOR HISTORIAN Ex-Smoker w/PMHX HTN, HLD, DM (Non-Compliant), Aflutter s/p Ablation in  at [facility], COPD, Hypothyroidism, h/o Prostate CA s/p seed implant in  who originally presented to PMD office on 17 c/o 2 episodes of near syncope in the setting of C/P within the past 2 weeks. Cardiac cath was positive for CAD.   -Case discussed with Dr. Andino   -Please order: echo, non contrast chest CT, EKG, CXR, T&S, TSH, A1c, PFTs, non contrast head CT, and carotid U/S  -Continue medical management as per primary team   -Pre-op for CABG on Monday 3/6/17

## 2017-03-02 NOTE — DISCHARGE NOTE ADULT - SECONDARY DIAGNOSIS.
Aortic stenosis Diabetes High cholesterol COPD (chronic obstructive pulmonary disease) PVCs (premature ventricular contractions)

## 2017-03-02 NOTE — PROGRESS NOTE ADULT - ASSESSMENT
82 yo M former smoker PMHx HTN, HLD, DM, aflutter s/p ablation (2003), COPD, hypothyroid, prostate ca, originally presented to PMD office c/o 2 episodes of near syncope with intermittent chest tightness and was sent to [facility] ED. Patient VSS, EKG revealing sinus with freq PVCs, RBBB, LAFB, no acute ischemic changes. Pt will be admitted to [facility] for r/o ACS and further cardiac work up.

## 2017-03-02 NOTE — DISCHARGE NOTE ADULT - CARE PLAN
Principal Discharge DX:	Chest pain  Goal:	You were admitted to [facility] because you had chest pain/ lightheadedness. You were found to not be having an active heart attack, but you were recommended for further work up to make sure that you weren't at risk.  Instructions for follow-up, activity and diet:	You underwent a cardiac catheterization  Continue Aspirin 81 mg orally daily  Secondary Diagnosis:	Aortic stenosis  Goal:	You were found to have a heart murmur, confirmed by echocardiogram, called Aortic Stenosis. Yours is moderate  Instructions for follow-up, activity and diet:	With this type of murmur, you should avoid excessive fluids. You are also at risk for passing out. You should follow up with a cardiothoracic surgeon for possible surgery in the future. Please make sure that you follow up and return to the emergency room if you experience any chest pain/dizziness/passing out.  Secondary Diagnosis:	Diabetes  Secondary Diagnosis:	High cholesterol  Secondary Diagnosis:	COPD (chronic obstructive pulmonary disease)  Goal:	You have COPD and have recently had an exacerbation. Please continue all inhalers as prescribed and follow up with your primary care doctor.  Instructions for follow-up, activity and diet:	Please make sure that  Secondary Diagnosis:	PVCs (premature ventricular contractions) Principal Discharge DX:	Chest pain  Goal:	You were admitted to [facility] because you had chest pain/ lightheadedness. You were found to not be having an active heart attack, but you were recommended for further work up to make sure that you weren't at risk.  Instructions for follow-up, activity and diet:	You underwent a cardiac catheterization  Continue Aspirin 81 mg orally daily  Secondary Diagnosis:	Aortic stenosis  Goal:	You were found to have a heart murmur, confirmed by echocardiogram, called Aortic Stenosis. Yours is moderate  Instructions for follow-up, activity and diet:	With this type of murmur, you should avoid excessive fluids. You are also at risk for passing out. You should follow up with a cardiothoracic surgeon for possible surgery in the future. Please make sure that you follow up and return to the emergency room if you experience any chest pain/dizziness/passing out.  Secondary Diagnosis:	Diabetes  Secondary Diagnosis:	High cholesterol  Secondary Diagnosis:	COPD (chronic obstructive pulmonary disease)  Goal:	You have COPD and have recently had an exacerbation. Please continue all inhalers as prescribed and follow up with your primary care doctor.  Instructions for follow-up, activity and diet:	Please make sure that you follow up with your pulmonary doctor. Please make sure that you follow up with  Secondary Diagnosis:	PVCs (premature ventricular contractions)  Goal:	You were experiencing premature ventricular contractions or PVCs while on the monitor. Electrophysiology team saw you and  recommended verapamil 180 mg orally daily which can help control heart rates.  Instructions for follow-up, activity and diet:	Please make sure that you follow up with Dr. Gonzales electrophysiologist. They will be sending you a heart rate monitor or holter monitor to your house to better monitor your heart over 24 hours. Please follow up.

## 2017-03-02 NOTE — CHART NOTE - NSCHARTNOTEFT_GEN_A_CORE
PA EVENT NOTE    pt returns from cath lab--  LM: Distal 60% lesion, pLAD: calcified 75%, Ramus: 50%, LCx: Mild luminal irregularities, RCA: Dominant, ostial 95% lesion, proximal 80-90 calcified lesion, LVEF: 60%, LVEDP: 21, No significant MR.  RHC: RA: 9, RV: 32/6 (10), PAP:32/6 (19), PCWP: 11      CO/CI (Nam): 6.63/3.32   Ao/LV gradient: 23.1mm Hg , RONAN: 1.67cm2.   - CTS consult for CABG, plan Monday  - no heparin indicated at this time as discussed with Dr. Vieyra, will initiate if CP  - repeat echo in AM

## 2017-03-02 NOTE — CHART NOTE - NSCHARTNOTEFT_GEN_A_CORE
EVENT NOTE    prior to cath being performed, patient wanted to sign out AMA and was upset cath wasn't performed, explained to patient and had thorough conversation explaining risks of AMA. Dr. Vieyra alerted and spoke with patient and brought up to cath lab and now patient agreeable to procedure but states he will still sign out AMA post-cath.

## 2017-03-02 NOTE — DISCHARGE NOTE ADULT - HOSPITAL COURSE
82 y/o obese M POOR HISTORIAN Ex-Smoker w/PMHX HTN, HLD, DM (Non-Compliant w/ Metformin), Aflutter s/p Ablation in  at [facility], COPD (denies h/o intubation, last completed Prednisone Taper in 2017), Hypothyroidism, h/o Prostate CA s/p seed implant in  who originally presented to PMD office today c/o 2 episodes of near syncope in the setting of C/P within the past 2 weeks.  Pt reports dizziness, nausea, palpitations, fatigue and 5-6/10 chest tightness across the top of the chest. C/P did not radiate to the jaw/neck/back/arm. The last episode occurred at 2AM this morning while ambulating to the restroom. Pt presented from PMD office to [facility] for further evaluation. Pt reports h/o chronic CASON requiring him to stop twice when walking 1 block. Pt reports chronic ankle LE edema. Pt admits to 1 remote possible episode of syncope, timing unknown. Pt denies any new vision changes, vertigo, numbness/tingling in extremities, amaurosis fugax, focal extremity weakness. In ER /74, HR 82, RR 20, T 98.3, O2 92-93% on RA. Labs significant for CE neg. x 1. K 4.0. EKG shows Sinus Rhythm at 100bpm w/ 4 PVCs, + RBBB, LAFB, no clear ST depressions, no TWI. In ED pt noted to have frequent PVCs. CXR showed clear lungs. Pt currently Assx. Echo from MD office 2016 showed basal anterior, mid anterior and mid anterolateral hypokinesis, EF 50-55%, LA mild dilated, grade 1 diastolic dysfunction, borderline AS. UA pending.  Pt reports h/o stress test 1 year ago, results unknown. Note, pts wife  within the last month and son  within the past 2 months. Pt denies fever/chills, dysuria, abdominal pain, diarrhea. + Dry cough.    Upon admission to [facility], patient ruled out ACS. He was experiencing frequent PVCs on monitor so EP was consulted, initiated verapamil and will send a holter monitor to his home for follow up. Patient also underwent echocardiogram to assess murmur/ LV function. Echo revealed Normal left ventricular size and wall thickness. The left ventricular wall motion is normal. The left ventricular ejection fraction is estimated to be 55-60%The left atrial size is normal. Right atrial size is normal. The right ventricle is normal in size and function. Calcified aortic valve. No aortic regurgitation noted. There is Moderate aortic stenosis. The mean pressure gradient is 36 mmHg. The calculated aortic valve area using the continuity equation is 1.1 cm2.There is moderate mitral annular calcification. No mitral regurgitation noted. Structurally normal tricuspid valve. There is trace tricuspid regurgitation. The pulmonary artery systolic pressure is estimated to be 32 mmHg. Structurally normal pulmonic valve. No pulmonic regurgitation noted. Borderline aortic root dilatation. The aortic root measures 3.8 cm at sinuses (normal less than 4 cm for men, less than 3.6 cm for women).  The inferior vena cava is normal in size (<2.1 cm) with normal inspiratory collapse (>50%) consistent with normal right atrial pressure.  There is no pericardial effusion.    Given patient's RONAN 1.1 and gradient of 36 mmHg, recommended for right and left heart catheterization to evaluate the valve and also to evaluate the coronaries as further ischemic work up. (opted not to stress given patient's symptomatic AS and need for right heart evaluation as well). Patient underwent right and left heart catheterization 3/2/17 revealing

## 2017-03-02 NOTE — DISCHARGE NOTE ADULT - CARE PROVIDER_API CALL
Chente Borrego), Cardiovascular Disease; Internal Medicine  [address] [place] [address]  Phone: [phone]  Fax: [phone]    Claudio Vieyra (SHARON), Cardiovascular Disease; Internal Medicine  [address] [place] [address]  Phone: [phone]  Fax: [phone]

## 2017-03-02 NOTE — PROGRESS NOTE ADULT - SUBJECTIVE AND OBJECTIVE BOX
Interventional Cardiology [place] Adult Progress Note    Subjective Assessment: Pt seen and examined at bedside, denies chest pain, shortness of breath, n/v/d, fever, chills, LE edema  	  MEDICATIONS:  lisinopril 40milliGRAM(s) Oral daily  verapamil SR 180milliGRAM(s) Oral daily  levalbuterol Inhalation 0.63milliGRAM(s) Inhalation every 6 hours PRN  fluticasone / salmeterol 250-50 MICROgram(s) Diskus 1Dose(s) Inhalation two times a day  insulin lispro (HumaLOG) corrective regimen sliding scale  SubCutaneous Before meals and at bedtime  dextrose Gel 1Dose(s) Oral once PRN  dextrose 50% Injectable 12.5Gram(s) IV Push once  dextrose 50% Injectable 25Gram(s) IV Push once  dextrose 50% Injectable 25Gram(s) IV Push once  glucagon  Injectable 1milliGRAM(s) IntraMuscular once PRN  simvastatin 40milliGRAM(s) Oral at bedtime  levothyroxine 50MICROGram(s) Oral daily  dextrose 5%. 1000milliLiter(s) IV Continuous <Continuous>  heparin  Injectable 5000Unit(s) SubCutaneous every 8 hours  aspirin enteric coated 81milliGRAM(s) Oral daily      PHYSICAL EXAM:  TELEMETRY:  T(C): 36.8, Max: 36.8 (03-02 @ 14:46)  HR: 77 (68 - 88)  BP: 138/64 (106/51 - 138/64)  RR: 16 (16 - 16)  SpO2: 93% (93% - 97%)  Wt(kg): --  I&O's Summary  I & Os for 24h ending 02 Mar 2017 07:00  =============================================  IN: 480 ml / OUT: 870 ml / NET: -390 ml    I & Os for current day (as of 02 Mar 2017 17:47)  =============================================  IN: 120 ml / OUT: 0 ml / NET: 120 ml                                         Appearance: Normal	  HEENT:   Normal oral mucosa,  Neck: Supple,  - JVD; Carotid Bruit   Cardiovascular: Normal S1 S2, No JVD, No murmurs,   Respiratory: + expiratory wheezing	  Gastrointestinal:  Soft, Non-tender  Skin: No rashes, No ecchymoses, No cyanosis  Extremities: Normal range of motion, No clubbing, cyanosis or edema  Vascular: Peripheral pulses palpable 2+ bilaterally  Neurologic: Non-focal  Psychiatry: A & O x 3, Mood & affect appropriate      	    ECG:  	  RADIOLOGY:   DIAGNOSTIC TESTING:  [ ] Echocardiogram: Normal left ventricular size and wall thickness. The left ventricular wall motion is normal. The left ventricular ejection fraction is estimated to be 55-60%The left atrial size is normal. Right atrial size is normal. The right ventricle is normal in size and function. Calcified aortic valve. No aortic regurgitation noted. There is Moderate aortic stenosis. The mean pressure gradient is 36 mmHg. The calculated aortic valve area using the continuity equation is 1.1 cm2.There is moderate mitral annular calcification. No   mitral regurgitation noted. Structurally normal tricuspid valve .There is trace tricuspid regurgitation. The pulmonary artery systolic pressure is estimated to be 32 mmHg. Structurally normal pulmonic valve. No pulmonic regurgitation noted .Borderline aortic root dilatation. The aortic root measures 3.8 cm at sinuses (normal less than 4 cm for men, less than 3.6 cm for women).  The inferior vena cava is normal in size (<2.1 cm) with normal inspiratory collapse (>50%) consistent with normal right atrial pressure.  There is no pericardial effusion.  [ ]  Catheterization:  [ ] Stress Test:    [ ] MOHINI  OTHER: 	    LABS:	 	  CARDIAC MARKERS:                        12.3   9.0   )-----------( 261      ( 02 Mar 2017 07:44 )             36.5     02 Mar 2017 07:44    138    |  106    |  16     ----------------------------<  112    3.7     |  24     |  0.84     Ca    8.7        02 Mar 2017 07:44  Mg     2.2       02 Mar 2017 07:44      proBNP:   Lipid Profile:   HgA1c:   TSH:

## 2017-03-02 NOTE — DISCHARGE NOTE ADULT - PLAN OF CARE
You were admitted to [facility] because you had chest pain/ lightheadedness. You were found to not be having an active heart attack, but you were recommended for further work up to make sure that you weren't at risk. You underwent a cardiac catheterization  Continue Aspirin 81 mg orally daily You were found to have a heart murmur, confirmed by echocardiogram, called Aortic Stenosis. Yours is moderate With this type of murmur, you should avoid excessive fluids. You are also at risk for passing out. You should follow up with a cardiothoracic surgeon for possible surgery in the future. Please make sure that you follow up and return to the emergency room if you experience any chest pain/dizziness/passing out. You have COPD and have recently had an exacerbation. Please continue all inhalers as prescribed and follow up with your primary care doctor. Please make sure that You were experiencing premature ventricular contractions or PVCs while on the monitor. Electrophysiology team saw you and  recommended verapamil 180 mg orally daily which can help control heart rates. Please make sure that you follow up with Dr. Gonzales electrophysiologist. They will be sending you a heart rate monitor or holter monitor to your house to better monitor your heart over 24 hours. Please follow up. Please make sure that you follow up with your pulmonary doctor. Please make sure that you follow up with

## 2017-03-03 LAB
ANION GAP SERPL CALC-SCNC: 9 MMOL/L — SIGNIFICANT CHANGE UP (ref 9–16)
BUN SERPL-MCNC: 19 MG/DL — SIGNIFICANT CHANGE UP (ref 7–23)
CALCIUM SERPL-MCNC: 8.7 MG/DL — SIGNIFICANT CHANGE UP (ref 8.5–10.5)
CHLORIDE SERPL-SCNC: 105 MMOL/L — SIGNIFICANT CHANGE UP (ref 96–108)
CO2 SERPL-SCNC: 23 MMOL/L — SIGNIFICANT CHANGE UP (ref 22–31)
CREAT SERPL-MCNC: 0.94 MG/DL — SIGNIFICANT CHANGE UP (ref 0.5–1.3)
GLUCOSE SERPL-MCNC: 92 MG/DL — SIGNIFICANT CHANGE UP (ref 70–99)
HCT VFR BLD CALC: 37.1 % — LOW (ref 39–50)
HGB BLD-MCNC: 12.2 G/DL — LOW (ref 13–17)
MAGNESIUM SERPL-MCNC: 2.2 MG/DL — SIGNIFICANT CHANGE UP (ref 1.6–2.4)
MCHC RBC-ENTMCNC: 32.5 PG — SIGNIFICANT CHANGE UP (ref 27–34)
MCHC RBC-ENTMCNC: 32.9 G/DL — SIGNIFICANT CHANGE UP (ref 32–36)
MCV RBC AUTO: 98.9 FL — SIGNIFICANT CHANGE UP (ref 80–100)
PLATELET # BLD AUTO: 244 K/UL — SIGNIFICANT CHANGE UP (ref 150–400)
POTASSIUM SERPL-MCNC: 4.2 MMOL/L — SIGNIFICANT CHANGE UP (ref 3.5–5.3)
POTASSIUM SERPL-SCNC: 4.2 MMOL/L — SIGNIFICANT CHANGE UP (ref 3.5–5.3)
RBC # BLD: 3.75 M/UL — LOW (ref 4.2–5.8)
RBC # FLD: 14.2 % — SIGNIFICANT CHANGE UP (ref 10.3–16.9)
SODIUM SERPL-SCNC: 137 MMOL/L — SIGNIFICANT CHANGE UP (ref 135–145)
WBC # BLD: 7.4 K/UL — SIGNIFICANT CHANGE UP (ref 3.8–10.5)
WBC # FLD AUTO: 7.4 K/UL — SIGNIFICANT CHANGE UP (ref 3.8–10.5)

## 2017-03-03 PROCEDURE — 93880 EXTRACRANIAL BILAT STUDY: CPT | Mod: 26

## 2017-03-03 PROCEDURE — 71250 CT THORAX DX C-: CPT | Mod: 26

## 2017-03-03 PROCEDURE — 70450 CT HEAD/BRAIN W/O DYE: CPT | Mod: 26

## 2017-03-03 PROCEDURE — 94010 BREATHING CAPACITY TEST: CPT | Mod: 26

## 2017-03-03 PROCEDURE — 93306 TTE W/DOPPLER COMPLETE: CPT | Mod: 26

## 2017-03-03 RX ORDER — ACETAMINOPHEN 500 MG
650 TABLET ORAL ONCE
Qty: 0 | Refills: 0 | Status: COMPLETED | OUTPATIENT
Start: 2017-03-03 | End: 2017-03-03

## 2017-03-03 RX ORDER — ACETAMINOPHEN 500 MG
975 TABLET ORAL ONCE
Qty: 0 | Refills: 0 | Status: COMPLETED | OUTPATIENT
Start: 2017-03-03 | End: 2017-03-03

## 2017-03-03 RX ADMIN — HEPARIN SODIUM 5000 UNIT(S): 5000 INJECTION INTRAVENOUS; SUBCUTANEOUS at 15:55

## 2017-03-03 RX ADMIN — HEPARIN SODIUM 5000 UNIT(S): 5000 INJECTION INTRAVENOUS; SUBCUTANEOUS at 22:07

## 2017-03-03 RX ADMIN — Medication 650 MILLIGRAM(S): at 12:57

## 2017-03-03 RX ADMIN — SIMVASTATIN 40 MILLIGRAM(S): 20 TABLET, FILM COATED ORAL at 22:07

## 2017-03-03 RX ADMIN — Medication 180 MILLIGRAM(S): at 06:24

## 2017-03-03 RX ADMIN — FLUTICASONE PROPIONATE AND SALMETEROL 1 DOSE(S): 50; 250 POWDER ORAL; RESPIRATORY (INHALATION) at 06:24

## 2017-03-03 RX ADMIN — Medication 975 MILLIGRAM(S): at 22:37

## 2017-03-03 RX ADMIN — HEPARIN SODIUM 5000 UNIT(S): 5000 INJECTION INTRAVENOUS; SUBCUTANEOUS at 06:24

## 2017-03-03 RX ADMIN — LISINOPRIL 40 MILLIGRAM(S): 2.5 TABLET ORAL at 06:24

## 2017-03-03 RX ADMIN — Medication 50 MICROGRAM(S): at 06:24

## 2017-03-03 RX ADMIN — FLUTICASONE PROPIONATE AND SALMETEROL 1 DOSE(S): 50; 250 POWDER ORAL; RESPIRATORY (INHALATION) at 18:54

## 2017-03-03 RX ADMIN — Medication 650 MILLIGRAM(S): at 13:45

## 2017-03-03 RX ADMIN — Medication 81 MILLIGRAM(S): at 12:49

## 2017-03-03 RX ADMIN — Medication 975 MILLIGRAM(S): at 22:07

## 2017-03-03 NOTE — PROGRESS NOTE ADULT - SUBJECTIVE AND OBJECTIVE BOX
Interventional Cardiology [place] Adult Progress Note    Subjective Assessment: Pt seen and examined at bedside this AM, denies any complaints overnight. Denies chest pain, shortness of breath, n/v/d, LE edema.  Agreeable for CABG on Monday and pre-op testing.   	  MEDICATIONS:  lisinopril 40milliGRAM(s) Oral daily  verapamil SR 180milliGRAM(s) Oral daily  levalbuterol Inhalation 0.63milliGRAM(s) Inhalation every 6 hours PRN  fluticasone / salmeterol 250-50 MICROgram(s) Diskus 1Dose(s) Inhalation two times a day  insulin lispro (HumaLOG) corrective regimen sliding scale  SubCutaneous Before meals and at bedtime  dextrose Gel 1Dose(s) Oral once PRN  dextrose 50% Injectable 12.5Gram(s) IV Push once  dextrose 50% Injectable 25Gram(s) IV Push once  dextrose 50% Injectable 25Gram(s) IV Push once  glucagon  Injectable 1milliGRAM(s) IntraMuscular once PRN  simvastatin 40milliGRAM(s) Oral at bedtime  levothyroxine 50MICROGram(s) Oral daily  dextrose 5%. 1000milliLiter(s) IV Continuous <Continuous>  heparin  Injectable 5000Unit(s) SubCutaneous every 8 hours  aspirin enteric coated 81milliGRAM(s) Oral daily  	    PHYSICAL EXAM:  TELEMETRY:  T(C): 36.8, Max: 37.7 (03-03 @ 00:08)  HR: 78 (70 - 87)  BP: 101/52 (101/52 - 138/64)  RR: 16 (16 - 16)  SpO2: 96% (93% - 97%)  Wt(kg): --  I&O's Summary    I & Os for current day (as of 03 Mar 2017 11:47)  =============================================  IN: 120 ml / OUT: 0 ml / NET: 120 ml                                       Appearance: Normal		  Neck: Supple, - JVD; Carotid Bruit   Cardiovascular: +DIEGO  Respiratory: Lungs clear to auscultation  Gastrointestinal:  Soft, Non-tender	  Skin: No rashes, No ecchymoses, No cyanosis  Extremities: Normal range of motion, No clubbing, cyanosis or edema  Vascular: Peripheral pulses palpable 2+ bilaterally  Neurologic: Non-focal  Psychiatry: A & O x 3, Mood & affect appropriate  	    ECG:  	  RADIOLOGY:   DIAGNOSTIC TESTING:  [ ] Echocardiogram:  [ ]  Catheterization:  [ ] Stress Test:    [ ] MOHINI:  OTHER: 	    LABS:	 	  CARDIAC MARKERS:               12.2   7.4   )-----------( 244      ( 03 Mar 2017 06:21 )             37.1     03 Mar 2017 06:21    137    |  105    |  19     ----------------------------<  92     4.2     |  23     |  0.94     Ca    8.7        03 Mar 2017 06:21  Mg     2.2       03 Mar 2017 06:21      proBNP:   Lipid Profile:   HgA1c:   TSH:   PT/INR - ( 02 Mar 2017 18:02 )   PT: 12.7 sec;   INR: 1.14     PTT - ( 02 Mar 2017 18:02 )  PTT:28.7 sec Interventional Cardiology [place] Adult Progress Note    Subjective Assessment: Pt seen and examined at bedside this AM, denies any complaints overnight. Denies chest pain, shortness of breath, n/v/d, LE edema.     	  MEDICATIONS:  lisinopril 40milliGRAM(s) Oral daily  verapamil SR 180milliGRAM(s) Oral daily  levalbuterol Inhalation 0.63milliGRAM(s) Inhalation every 6 hours PRN  fluticasone / salmeterol 250-50 MICROgram(s) Diskus 1Dose(s) Inhalation two times a day  insulin lispro (HumaLOG) corrective regimen sliding scale  SubCutaneous Before meals and at bedtime  dextrose Gel 1Dose(s) Oral once PRN  dextrose 50% Injectable 12.5Gram(s) IV Push once  dextrose 50% Injectable 25Gram(s) IV Push once  dextrose 50% Injectable 25Gram(s) IV Push once  glucagon  Injectable 1milliGRAM(s) IntraMuscular once PRN  simvastatin 40milliGRAM(s) Oral at bedtime  levothyroxine 50MICROGram(s) Oral daily  dextrose 5%. 1000milliLiter(s) IV Continuous <Continuous>  heparin  Injectable 5000Unit(s) SubCutaneous every 8 hours  aspirin enteric coated 81milliGRAM(s) Oral daily  	    PHYSICAL EXAM:  TELEMETRY:  T(C): 36.8, Max: 37.7 (03-03 @ 00:08)  HR: 78 (70 - 87)  BP: 101/52 (101/52 - 138/64)  RR: 16 (16 - 16)  SpO2: 96% (93% - 97%)  Wt(kg): --  I&O's Summary    I & Os for current day (as of 03 Mar 2017 11:47)  =============================================  IN: 120 ml / OUT: 0 ml / NET: 120 ml                                       Appearance: Normal		  Neck: Supple, - JVD; Carotid Bruit   Cardiovascular: +DIEGO  Respiratory: Lungs clear to auscultation  Gastrointestinal:  Soft, Non-tender	  Skin: No rashes, No ecchymoses, No cyanosis  Extremities: Normal range of motion, No clubbing, cyanosis or edema  Vascular: Peripheral pulses palpable 2+ bilaterally  Neurologic: Non-focal  Psychiatry: A & O x 3, Mood & affect appropriate  	    ECG:  	  RADIOLOGY:   DIAGNOSTIC TESTING:  [ ] Echocardiogram:  [ ]  Catheterization:  [ ] Stress Test:    [ ] MOHINI:  OTHER: 	    LABS:	 	  CARDIAC MARKERS:               12.2   7.4   )-----------( 244      ( 03 Mar 2017 06:21 )             37.1     03 Mar 2017 06:21    137    |  105    |  19     ----------------------------<  92     4.2     |  23     |  0.94     Ca    8.7        03 Mar 2017 06:21  Mg     2.2       03 Mar 2017 06:21      proBNP:   Lipid Profile:   HgA1c:   TSH:   PT/INR - ( 02 Mar 2017 18:02 )   PT: 12.7 sec;   INR: 1.14     PTT - ( 02 Mar 2017 18:02 )  PTT:28.7 sec

## 2017-03-03 NOTE — PROGRESS NOTE ADULT - PROBLEM SELECTOR PLAN 1
- Troponins negative x3, no active chest pain  - continue Aspirin 81 mg orally daily  - echo revealing moderate AS RONAN 1.1, mean gradient 36mm Hg, LV function normal EF 55-60%  - R&L heart cath 3/2 revealing LM: Distal 60% lesion, pLAD: calcified 75%, Ramus: 50%, LCx: Mild luminal irregularities, RCA: Dominant, ostial 95% lesion, proximal 80-90 calcified lesion, LVEF: 60%, LVEDP: 21, No significant MR.    -CABG scheduled with Dr. Andino on Monday, awaiting pre-op work up ordered (CT head, chest, carotid, PFTs)  -if patient experiences chest pain, heparin gtt can be initiated.

## 2017-03-03 NOTE — PROGRESS NOTE ADULT - PROBLEM SELECTOR PLAN 8
- moderate AS by echo RONAN 1.1 mean gradient 36 mmHg  - by right heart cath 3/02 RHC: RA: 9, RV: 32/6 (10), PAP:32/6 (19), PCWP: 11 CO/CI (Nam): 6.63/3.32   Ao/LV gradient: 23.1mm Hg , RONAN: 1.67cm2.   - will continue to follow, mild-moderate AS by cath  -patient will likely need TAVR in the future, given follow up information.

## 2017-03-03 NOTE — DIETITIAN INITIAL EVALUATION ADULT. - NS AS NUTRI INTERV ED CONTENT3
Discussed ONS- encouraged pt to consume Glucerna >Ensure. Provided DM/DASH diet education. Encouraged pt to choose Healthier options @ takeout restaurants. pt stated understanding however question how well to follow upon d/c.

## 2017-03-03 NOTE — DIETITIAN INITIAL EVALUATION ADULT. - OTHER INFO
pt with hx of HLD, DM (noncompliant with metformin use), hx of CA in remission, COPD is now admitted for c/o of near syncope. pt is now s/p cardiac cath 3/2. noted CKD. pt is currently on DASH/CNSCHO no snack diet- pt reports consuming ~50-75% of meals @ this time & requesting ONS. RN is unsure of pt PO intake @ this time. pt reports UBW of 215 pounds- pt feels he has lost wt since admission 2/2 consuming less than he normally does. pt is unable to recall the last time he was this wt however feels it was at least 5 years ago- pt unable to recall more recent wt. pt is currently noted with wt of 199 pounds @ this time; this suggests wt loss of ~16 pounds x 5+ years (7% body wt loss). NKFA. Skin: no edema/pressure ulcers noted at this time.

## 2017-03-03 NOTE — PROGRESS NOTE ADULT - ASSESSMENT
82 yo M former smoker PMHx HTN, HLD, DM, aflutter s/p ablation (2003), COPD, hypothyroid, prostate ca, originally presented to PMD office c/o 2 episodes of near syncope with intermittent chest tightness and was sent to [facility] ED. Patient VSS, EKG revealing sinus with freq PVCs, RBBB, LAFB, no acute ischemic changes. Pt will be admitted to [facility] for r/o ACS and further cardiac work up.

## 2017-03-03 NOTE — DIETITIAN INITIAL EVALUATION ADULT. - ADHERENCE
pt reports he follows a regular diet for the most part. pt will consume [employer] bread most of the time. pt reports he does not check FS @ home. pt reports he will eat restaurant/takeout food everyday- pt will often get fried food. pt will drinks Ensure x2 per day.

## 2017-03-03 NOTE — DIETITIAN INITIAL EVALUATION ADULT. - ENERGY NEEDS
Height: 5 feet 6 inches, Weight (Current): 199 pounds,  pounds +/-10%, %%, BMI 32.3    IBW used to calculate energy needs due to pt's current body weight exceeding 120% of IBW  EER based on COPD, hx of CA, DM/CKD. fluids per team.

## 2017-03-04 LAB
ANION GAP SERPL CALC-SCNC: 9 MMOL/L — SIGNIFICANT CHANGE UP (ref 9–16)
BUN SERPL-MCNC: 25 MG/DL — HIGH (ref 7–23)
CALCIUM SERPL-MCNC: 8.7 MG/DL — SIGNIFICANT CHANGE UP (ref 8.5–10.5)
CHLORIDE SERPL-SCNC: 106 MMOL/L — SIGNIFICANT CHANGE UP (ref 96–108)
CO2 SERPL-SCNC: 24 MMOL/L — SIGNIFICANT CHANGE UP (ref 22–31)
CREAT SERPL-MCNC: 1.03 MG/DL — SIGNIFICANT CHANGE UP (ref 0.5–1.3)
GLUCOSE SERPL-MCNC: 87 MG/DL — SIGNIFICANT CHANGE UP (ref 70–99)
HCT VFR BLD CALC: 35.2 % — LOW (ref 39–50)
HGB BLD-MCNC: 11.8 G/DL — LOW (ref 13–17)
MAGNESIUM SERPL-MCNC: 2.3 MG/DL — SIGNIFICANT CHANGE UP (ref 1.6–2.4)
MCHC RBC-ENTMCNC: 32.8 PG — SIGNIFICANT CHANGE UP (ref 27–34)
MCHC RBC-ENTMCNC: 33.5 G/DL — SIGNIFICANT CHANGE UP (ref 32–36)
MCV RBC AUTO: 97.8 FL — SIGNIFICANT CHANGE UP (ref 80–100)
PLATELET # BLD AUTO: 253 K/UL — SIGNIFICANT CHANGE UP (ref 150–400)
POTASSIUM SERPL-MCNC: 4.1 MMOL/L — SIGNIFICANT CHANGE UP (ref 3.5–5.3)
POTASSIUM SERPL-SCNC: 4.1 MMOL/L — SIGNIFICANT CHANGE UP (ref 3.5–5.3)
RBC # BLD: 3.6 M/UL — LOW (ref 4.2–5.8)
RBC # FLD: 14.3 % — SIGNIFICANT CHANGE UP (ref 10.3–16.9)
SODIUM SERPL-SCNC: 139 MMOL/L — SIGNIFICANT CHANGE UP (ref 135–145)
WBC # BLD: 6.6 K/UL — SIGNIFICANT CHANGE UP (ref 3.8–10.5)
WBC # FLD AUTO: 6.6 K/UL — SIGNIFICANT CHANGE UP (ref 3.8–10.5)

## 2017-03-04 RX ADMIN — LISINOPRIL 40 MILLIGRAM(S): 2.5 TABLET ORAL at 07:44

## 2017-03-04 RX ADMIN — SIMVASTATIN 40 MILLIGRAM(S): 20 TABLET, FILM COATED ORAL at 22:11

## 2017-03-04 RX ADMIN — Medication 50 MICROGRAM(S): at 07:44

## 2017-03-04 RX ADMIN — HEPARIN SODIUM 5000 UNIT(S): 5000 INJECTION INTRAVENOUS; SUBCUTANEOUS at 15:00

## 2017-03-04 RX ADMIN — HEPARIN SODIUM 5000 UNIT(S): 5000 INJECTION INTRAVENOUS; SUBCUTANEOUS at 07:44

## 2017-03-04 RX ADMIN — HEPARIN SODIUM 5000 UNIT(S): 5000 INJECTION INTRAVENOUS; SUBCUTANEOUS at 22:11

## 2017-03-04 RX ADMIN — FLUTICASONE PROPIONATE AND SALMETEROL 1 DOSE(S): 50; 250 POWDER ORAL; RESPIRATORY (INHALATION) at 17:56

## 2017-03-04 RX ADMIN — FLUTICASONE PROPIONATE AND SALMETEROL 1 DOSE(S): 50; 250 POWDER ORAL; RESPIRATORY (INHALATION) at 07:44

## 2017-03-04 RX ADMIN — Medication 180 MILLIGRAM(S): at 07:44

## 2017-03-04 RX ADMIN — Medication 81 MILLIGRAM(S): at 12:03

## 2017-03-04 NOTE — PROGRESS NOTE ADULT - PROBLEM SELECTOR PLAN 1
- Troponins negative x3, no active chest pain  - continue Aspirin 81 mg orally daily  - echo revealing moderate AS RONAN 1.1, mean gradient 36mm Hg, LV function normal EF 55-60%  - R&L heart cath 3/2 revealing LM: Distal 60% lesion, pLAD: calcified 75%, Ramus: 50%, LCx: Mild luminal irregularities, RCA: Dominant, ostial 95% lesion, proximal 80-90 calcified lesion, LVEF: 60%, LVEDP: 21, No significant MR.    -CABG scheduled with Dr. Andino on Monday, awaiting pre-op work up ordered (CT head, chest, carotid, PFTs)  -if patient experiences chest pain, heparin gtt can be initiated. - CP free and HD stable.  R/O ACS with Trops negative x3.  Continue Aspirin 81 mg   orally daily  - ECHO 3/3: revealing moderate AS RONAN 1.1, mean gradient 36mm Hg, LV function     normal EF 55-60%  - R&L Heart Cath 3/2 revealing LM: Distal 60% lesion, pLAD: calcified 75%, Ramus:     50%, LCx: Mild luminal irregularities, RCA: Dominant, ostial 95% lesion, proximal     80-90 calcified lesion, LVEF: 60%, LVEDP: 21, No significant MR.    - CABG scheduled with Dr. Andino on Monday, awaiting pre-op work up ordered (CT     head, chest, carotid, PFTs).  If atient experiences chest pain, Heparin gtt can be     initiated.  -patient will likely need TAVR in the future, given follow up information.

## 2017-03-04 NOTE — PROGRESS NOTE ADULT - PROBLEM SELECTOR PLAN 6
- continue simvastatin 40 mg orally daily  - f/u lipid panel - continue simvastatin 40 mg orally daily

## 2017-03-04 NOTE — PROGRESS NOTE ADULT - PROBLEM SELECTOR PLAN 5
- TSH 2.524  - continue levothyroxine 50 mcg orally daily - Continue Advair  - nebs PRN  - holding BB 2/2 risk of bronchospasm  - PFTs ordered preoperatively.  F/u results

## 2017-03-04 NOTE — PROGRESS NOTE ADULT - PROBLEM SELECTOR PLAN 7
- Lisinopril 40 mg orally daily   - continue to monitor vitals - continue ALEXIA, finger sticks  - Restart Metformin upon discharge.    - Hgba1c 6.8

## 2017-03-04 NOTE — PROGRESS NOTE ADULT - PROBLEM SELECTOR PLAN 2
- pt with frequent PVCs on tele, bigeminy, h/o aflutter and ablation  - EP rec verapamil 180 -Controlled (SBP Low 100's).  Lisinopril 40 mg orally daily  -Continue to monitor vitals

## 2017-03-04 NOTE — PROGRESS NOTE ADULT - PROBLEM SELECTOR PLAN 4
- continue ALEXIA, finger sticks  - metformin at home, noncompliant  - Hgba1c 6.8 - pt with frequent PVCs on tele, bigeminy, h/o aflutter and ablation  - Continue Verapamil 180 daily

## 2017-03-04 NOTE — PROGRESS NOTE ADULT - SUBJECTIVE AND OBJECTIVE BOX
Interventional Cardiology [place] Adult Progress Note    Subjective Assessment:  Pt seen, examined and found NAD, VSS.  Pt denies c/o CP, SOB, Palpitations at this time.  	  MEDICATIONS:  lisinopril 40milliGRAM(s) Oral daily  verapamil SR 180milliGRAM(s) Oral daily  levalbuterol Inhalation 0.63milliGRAM(s) Inhalation every 6 hours PRN  fluticasone / salmeterol 250-50 MICROgram(s) Diskus 1Dose(s) Inhalation two times a day    insulin lispro (HumaLOG) corrective regimen sliding scale  SubCutaneous Before meals and at bedtime  dextrose Gel 1Dose(s) Oral once PRN  dextrose 50% Injectable 12.5Gram(s) IV Push once  dextrose 50% Injectable 25Gram(s) IV Push once  dextrose 50% Injectable 25Gram(s) IV Push once  glucagon  Injectable 1milliGRAM(s) IntraMuscular once PRN  simvastatin 40milliGRAM(s) Oral at bedtime  levothyroxine 50MICROGram(s) Oral daily    dextrose 5%. 1000milliLiter(s) IV Continuous <Continuous>  heparin  Injectable 5000Unit(s) SubCutaneous every 8 hours  aspirin enteric coated 81milliGRAM(s) Oral daily      	    [PHYSICAL EXAM:  TELEMETRY:  T(C): 36.6, Max: 37.2 (03-03 @ 17:03)  HR: 84 (69 - 85)  BP: 116/52 (100/57 - 118/57)  RR: 16 (16 - 16)  SpO2: 95% (95% - 98%)  Wt(kg): --  I&O's Summary    I & Os for current day (as of 04 Mar 2017 14:06)  =============================================  IN: 480 ml / OUT: 0 ml / NET: 480 ml    Randle: No  Central/PICC/Mid Line: No                                       GEN:  NAD  Neck: Supple, - JVD; Carotid Bruit   Cardiovascular: +DIEGO  Respiratory: Lungs clear to auscultation  Gastrointestinal:  Soft, Non-tender	  Skin: No rashes, No ecchymoses, No cyanosis  Extremities: Normal range of motion, No clubbing, cyanosis or edema  Vascular: Peripheral pulses palpable 2+ bilaterally  Neurologic: Non-focal  Psychiatry: A & O x 3, Mood & affect appropriate  	    LABS:	 	  CARDIAC MARKERS:                       11.8   6.6   )-----------( 253      ( 04 Mar 2017 07:07 )             35.2     04 Mar 2017 07:11    139    |  106    |  25     ----------------------------<  87     4.1     |  24     |  1.03     Ca    8.7        04 Mar 2017 07:11  Mg     2.3       04 Mar 2017 07:11      PT/INR - ( 02 Mar 2017 18:02 )   PT: 12.7 sec;   INR: 1.14        PTT - ( 02 Mar 2017 18:02 )  PTT:28.7 sec

## 2017-03-04 NOTE — PROGRESS NOTE ADULT - ASSESSMENT
82 yo M former smoker PMHx HTN, HLD, DM, aflutter s/p ablation (2003), COPD, hypothyroid, prostate ca, originally presented to PMD office c/o 2 episodes of near syncope with intermittent chest tightness and was sent to [facility] ED. Patient VSS, EKG revealing sinus with freq PVCs, RBBB, LAFB, no acute ischemic changes. Pt underwent Diagnostic Cardiac Cath 3/2 showing 3V CAD and is awaiting CABG on Monday.

## 2017-03-04 NOTE — PROGRESS NOTE ADULT - PROBLEM SELECTOR PLAN 3
- Continue Advair  - nebs PRN  - holding BB 2/2 risk of bronchospasm  - PFTs ordered preoperatively ECHO 3/3:  moderate AS, RONAN 1.1 mean gradient 36 mmHg

## 2017-03-05 LAB
ANION GAP SERPL CALC-SCNC: 7 MMOL/L — LOW (ref 9–16)
BLD GP AB SCN SERPL QL: NEGATIVE — SIGNIFICANT CHANGE UP
BUN SERPL-MCNC: 19 MG/DL — SIGNIFICANT CHANGE UP (ref 7–23)
CALCIUM SERPL-MCNC: 9.2 MG/DL — SIGNIFICANT CHANGE UP (ref 8.5–10.5)
CHLORIDE SERPL-SCNC: 104 MMOL/L — SIGNIFICANT CHANGE UP (ref 96–108)
CO2 SERPL-SCNC: 27 MMOL/L — SIGNIFICANT CHANGE UP (ref 22–31)
CREAT SERPL-MCNC: 0.99 MG/DL — SIGNIFICANT CHANGE UP (ref 0.5–1.3)
GLUCOSE SERPL-MCNC: 95 MG/DL — SIGNIFICANT CHANGE UP (ref 70–99)
HCT VFR BLD CALC: 36.1 % — LOW (ref 39–50)
HGB BLD-MCNC: 12.1 G/DL — LOW (ref 13–17)
MAGNESIUM SERPL-MCNC: 2.2 MG/DL — SIGNIFICANT CHANGE UP (ref 1.6–2.4)
MCHC RBC-ENTMCNC: 32.5 PG — SIGNIFICANT CHANGE UP (ref 27–34)
MCHC RBC-ENTMCNC: 33.5 G/DL — SIGNIFICANT CHANGE UP (ref 32–36)
MCV RBC AUTO: 97 FL — SIGNIFICANT CHANGE UP (ref 80–100)
PLATELET # BLD AUTO: 244 K/UL — SIGNIFICANT CHANGE UP (ref 150–400)
POTASSIUM SERPL-MCNC: 4.1 MMOL/L — SIGNIFICANT CHANGE UP (ref 3.5–5.3)
POTASSIUM SERPL-SCNC: 4.1 MMOL/L — SIGNIFICANT CHANGE UP (ref 3.5–5.3)
RBC # BLD: 3.72 M/UL — LOW (ref 4.2–5.8)
RBC # FLD: 13.8 % — SIGNIFICANT CHANGE UP (ref 10.3–16.9)
RH IG SCN BLD-IMP: NEGATIVE — SIGNIFICANT CHANGE UP
SODIUM SERPL-SCNC: 138 MMOL/L — SIGNIFICANT CHANGE UP (ref 135–145)
WBC # BLD: 6.4 K/UL — SIGNIFICANT CHANGE UP (ref 3.8–10.5)
WBC # FLD AUTO: 6.4 K/UL — SIGNIFICANT CHANGE UP (ref 3.8–10.5)

## 2017-03-05 PROCEDURE — 93010 ELECTROCARDIOGRAM REPORT: CPT

## 2017-03-05 RX ORDER — ACETAMINOPHEN 500 MG
650 TABLET ORAL ONCE
Qty: 0 | Refills: 0 | Status: COMPLETED | OUTPATIENT
Start: 2017-03-05 | End: 2017-03-05

## 2017-03-05 RX ORDER — CHLORHEXIDINE GLUCONATE 213 G/1000ML
15 SOLUTION TOPICAL ONCE
Qty: 0 | Refills: 0 | Status: COMPLETED | OUTPATIENT
Start: 2017-03-05 | End: 2017-03-06

## 2017-03-05 RX ORDER — CHLORHEXIDINE GLUCONATE 213 G/1000ML
1 SOLUTION TOPICAL ONCE
Qty: 0 | Refills: 0 | Status: COMPLETED | OUTPATIENT
Start: 2017-03-05 | End: 2017-03-05

## 2017-03-05 RX ADMIN — Medication 650 MILLIGRAM(S): at 22:01

## 2017-03-05 RX ADMIN — HEPARIN SODIUM 5000 UNIT(S): 5000 INJECTION INTRAVENOUS; SUBCUTANEOUS at 21:00

## 2017-03-05 RX ADMIN — CHLORHEXIDINE GLUCONATE 1 APPLICATION(S): 213 SOLUTION TOPICAL at 21:50

## 2017-03-05 RX ADMIN — LISINOPRIL 40 MILLIGRAM(S): 2.5 TABLET ORAL at 07:09

## 2017-03-05 RX ADMIN — Medication 650 MILLIGRAM(S): at 21:01

## 2017-03-05 RX ADMIN — SIMVASTATIN 40 MILLIGRAM(S): 20 TABLET, FILM COATED ORAL at 21:00

## 2017-03-05 RX ADMIN — Medication 180 MILLIGRAM(S): at 07:09

## 2017-03-05 RX ADMIN — FLUTICASONE PROPIONATE AND SALMETEROL 1 DOSE(S): 50; 250 POWDER ORAL; RESPIRATORY (INHALATION) at 07:08

## 2017-03-05 RX ADMIN — Medication 81 MILLIGRAM(S): at 14:06

## 2017-03-05 RX ADMIN — FLUTICASONE PROPIONATE AND SALMETEROL 1 DOSE(S): 50; 250 POWDER ORAL; RESPIRATORY (INHALATION) at 17:14

## 2017-03-05 RX ADMIN — HEPARIN SODIUM 5000 UNIT(S): 5000 INJECTION INTRAVENOUS; SUBCUTANEOUS at 07:08

## 2017-03-05 RX ADMIN — Medication 50 MICROGRAM(S): at 07:08

## 2017-03-05 RX ADMIN — HEPARIN SODIUM 5000 UNIT(S): 5000 INJECTION INTRAVENOUS; SUBCUTANEOUS at 14:06

## 2017-03-05 NOTE — PROGRESS NOTE ADULT - PROBLEM SELECTOR PLAN 8
- TSH 2.524. Continue Levothyroxine 50 mcg orally daily    DVT PPx: Heparin SC.     Dispo: NPO after midnight for CABG +/-TAVR Monday.

## 2017-03-05 NOTE — PROGRESS NOTE ADULT - PROBLEM SELECTOR PLAN 1
- CP free and HD stable.  R/O ACS with Trops negative x3.  Continue Aspirin 81 mg orally daily, Zocor 40 mg daily, Lisinopril 40 mg daily.   - R&L Heart Cath 3/2: dLM 60% lesion, pLAD 75% calcified lesion, 50% ramus, ostial RCA 95%, pRCA 80-90% calcified lesion. LVEF 60%, EDP 21 mmHG. RA: 9, RV: 32/6 (10), PAP:32/6 (19), PCWP: 11. NPO after Midnight for CABG +/-AVR tomorrow with Dr. Andino (3/6/17). If patient experiences chest pain, heparin gtt may be initiated.   -If AVR not done with CABG tomorrow; will likely need TAVR in Future.   -Echo (3/3/17): LVH, LVEF normal, moderate to severe AS (RONAN: 1.06 cm2, peak pressure gradient 46 mmHG).

## 2017-03-05 NOTE — PROGRESS NOTE ADULT - PROBLEM SELECTOR PLAN 4
-EP following (To follow up with Dr. Gonzales 4/10/17 @ 1:00 PM)   -Pt with frequent PVCs on tele, bigeminy, h/o aflutter and ablation, Continue Verapamil 180 mg daily

## 2017-03-05 NOTE — PROGRESS NOTE ADULT - SUBJECTIVE AND OBJECTIVE BOX
Interventional Cardiology [facility] Adult Progress Note    Subjective Assessment: Pt seen and examined at bedside this morning. Pt with no complaints. Denies chest pain, SOB, palpitations, dizziness, fever, chills.   	  MEDICATIONS:  lisinopril 40milliGRAM(s) Oral daily  verapamil SR 180milliGRAM(s) Oral daily  levalbuterol Inhalation 0.63milliGRAM(s) Inhalation every 6 hours PRN  fluticasone / salmeterol 250-50 MICROgram(s) Diskus 1Dose(s) Inhalation two times a day  insulin lispro (HumaLOG) corrective regimen sliding scale  SubCutaneous Before meals and at bedtime  dextrose Gel 1Dose(s) Oral once PRN  dextrose 50% Injectable 12.5Gram(s) IV Push once  dextrose 50% Injectable 25Gram(s) IV Push once  dextrose 50% Injectable 25Gram(s) IV Push once  glucagon  Injectable 1milliGRAM(s) IntraMuscular once PRN  simvastatin 40milliGRAM(s) Oral at bedtime  levothyroxine 50MICROGram(s) Oral daily  dextrose 5%. 1000milliLiter(s) IV Continuous <Continuous>  heparin  Injectable 5000Unit(s) SubCutaneous every 8 hours  aspirin enteric coated 81milliGRAM(s) Oral daily  chlorhexidine 4% Liquid 1Application(s) Topical once  chlorhexidine 0.12% Liquid 15milliLiter(s) Swish and Spit once    [PHYSICAL EXAM:  TELEMETRY: No events  T(C): 36.4, Max: 37.4 (03-04 @ 17:09)  HR: 80 (72 - 85)  BP: 119/57 (100/57 - 128/76)  RR: 16 (16 - 16)  SpO2: 91% (91% - 98%)  Wt(kg): --Not recorded yet today.   I&O's Summary    I & Os for current day (as of 05 Mar 2017 08:04)  =============================================  IN: 360 ml / OUT: 0 ml / NET: 360 ml    Gen: NAD, resting comfortable sitting upright in bed  Neck: No JVD b/l  Cardiac: +S1, S2, RRR, 3/6 DIEGO heard best @ RUSB  Pulm: CTA b/l  Abdomen: BS present, soft, NT, ND  Extremities: No edema b/l, Right radial access: stable, no hematoma, no bleed, 2+ radial pulse  Neuro: A+Ox3, pleasant and cooperative.   	     LABS:	 	                            12.1   6.4   )-----------( 244      ( 05 Mar 2017 06:42 )             36.1     05 Mar 2017 06:42    138    |  104    |  19     ----------------------------<  95     4.1     |  27     |  0.99     Ca    9.2        05 Mar 2017 06:42  Mg     2.2       05 Mar 2017 06:42

## 2017-03-05 NOTE — PROGRESS NOTE ADULT - PROBLEM SELECTOR PLAN 3
-ECHO 3/3:  moderate AS, RONAN 1.1 mean gradient 36 mmHg.  -Dr. Funez/Dr. Andino to review Echo with plan for +/- AVR Monday (3/6/2017).

## 2017-03-05 NOTE — PROGRESS NOTE ADULT - ASSESSMENT
82 yo M former smoker PMHx HTN, HLD, DM, aflutter s/p ablation (2003), COPD, hypothyroid, prostate ca, originally presented to [facility] ED (2/28/2017) with two  episodes of near syncope with intermittent chest tightness Pt underwent Diagnostic Cardiac Cath 3/2/17 revealing 3VCAD and is awaiting CABG +/-AVR on Monday 3/6/2017.

## 2017-03-05 NOTE — PROGRESS NOTE ADULT - PROBLEM SELECTOR PLAN 5
- Continue Advair INH BID and Xopenex every 6hrs prn.   - Holding BB 2/2 risk of bronchospasm  - PFTs ordered preoperatively

## 2017-03-05 NOTE — PROGRESS NOTE ADULT - SUBJECTIVE AND OBJECTIVE BOX
Planned Date of Surgery:  3/6/17                                                                                                           Surgeon: Dr. Funez     Procedure: CABG    HPI:  82 y/o obese M POOR HISTORIAN Ex-Smoker w/PMHX HTN, HLD, DM (Non-Compliant), Aflutter s/p Ablation in 2003 at [facility], COPD, Hypothyroidism, h/o Prostate CA s/p seed implant in 2002 who originally presented to PMD office on 2/28/17 c/o 2 episodes of near syncope in the setting of C/P within the past 2 weeks.  Pt reports dizziness, nausea, palpitations, fatigue and 5-6/10 chest tightness across the top of the chest. C/P did not radiate to the jaw/neck/back/arm.  Pt reports h/o chronic CASON requiring him to stop twice when walking 1 block and LE edema.  Pt denies any new vision changes, vertigo, numbness/tingling in extremities, amaurosis fugax, focal extremity weakness, fever/chills, dysuria, abdominal pain, diarrhea.. Echo from  2/2016 showed basal anterior, mid anterior and mid anterolateral hypokinesis, EF 50-55%, LA mild dilated, grade 1 diastolic dysfunction. Pt underwent cardiac cath on 3/2/17 which revealed LM: Distal 60% lesion, pLAD: calcified 75%, Ramus: 50%, LCx: Mild luminal irregularities, RCA: Dominant, ostial 95% lesion, proximal 80-90 calcified lesion, LVEF: 60%, LVEDP: 21, No significant MR.  RHC: RA: 9, RV: 32/6 (10), PAP:32/6 (19), PCWP: 11. CO/CI (Nam): 6.63/3.32   Ao/LV gradient: 23.1mm Hg , RONAN: 1.67cm2. Echo revealed mod-severe AS. CT Surgery was consulted for surgical evaluation for CABG, +/-AVR.     PAST MEDICAL & SURGICAL HISTORY:  High cholesterol  Diabetes  Prostate cancer: in remission  Hypertension  COPD (chronic obstructive pulmonary disease)  History of cataract surgery: b/l  History of knee replacement: b/l      narcotic analgesics (Nausea; Vomiting)  No Known Allergies      MEDICATIONS  (STANDING):  insulin lispro (HumaLOG) corrective regimen sliding scale  SubCutaneous Before meals and at bedtime  dextrose 5%. 1000milliLiter(s) IV Continuous <Continuous>  dextrose 50% Injectable 12.5Gram(s) IV Push once  dextrose 50% Injectable 25Gram(s) IV Push once  dextrose 50% Injectable 25Gram(s) IV Push once  heparin  Injectable 5000Unit(s) SubCutaneous every 8 hours  aspirin enteric coated 81milliGRAM(s) Oral daily  lisinopril 40milliGRAM(s) Oral daily  simvastatin 40milliGRAM(s) Oral at bedtime  levothyroxine 50MICROGram(s) Oral daily  fluticasone / salmeterol 250-50 MICROgram(s) Diskus 1Dose(s) Inhalation two times a day  verapamil SR 180milliGRAM(s) Oral daily    MEDICATIONS  (PRN):  dextrose Gel 1Dose(s) Oral once PRN Blood Glucose LESS THAN 70 milliGRAM(s)/deciliter  glucagon  Injectable 1milliGRAM(s) IntraMuscular once PRN Glucose LESS THAN 70 milligrams/deciliter  levalbuterol Inhalation 0.63milliGRAM(s) Inhalation every 6 hours PRN shortness of breathe, wheezing      On Beta Blocker? YES / NO / CONTRAINDICATED Reason_______________    Labs:                        11.8   6.6   )-----------( 253      ( 04 Mar 2017 07:07 )             35.2     04 Mar 2017 07:11    139    |  106    |  25     ----------------------------<  87     4.1     |  24     |  1.03     Ca    8.7        04 Mar 2017 07:11  Mg     2.3       04 Mar 2017 07:11    Hemoglobin A1C, Whole Blood: 6.8 % (03-01 @ 07:00)      EKG 2/28/17: Sinus rhythm with frequent premature ventricular complexes  Left atrial enlargement  Right bundle branch block  Left anterior fascicular block  *** Bifascicular block ***    CXR:  Stable heart size. No new consolidation. No large effusion or   pneumothorax. Unchanged 0.5 cm calcified granuloma right upper lobe.   Probably chronic compression deformity of a vertebral body at   thoracolumbar junction.    CT Scans: Chest: Patchy areas of thickening of the subpleural interlobular septae the   lungs probably due to mild fibrosis, less likely congestive heart   failure. No honeycombing.  Calcification at the level of the aortic valve.    CT Head: 1. No evidence of acute intracranial hemorrhage, mass effect or CT   evidence of recent transcortical infarction on this mildly motion   degraded exam.  2. Moderate microangiopathic ischemic change.    Cath Report: 3/2: LM: Distal 60% lesion, pLAD: calcified 75%, Ramus: 50%, LCx: Mild luminal irregularities, RCA: Dominant, ostial 95% lesion, proximal 80-90 calcified lesion, LVEF: 60%, LVEDP: 21, No significant MR.  RHC: RA: 9, RV: 32/6 (10), PAP:32/6 (19), PCWP: 11      CO/CI (Nam): 6.63/3.32   Ao/LV gradient: 23.1mm Hg , RONAN: 1.67cm2.      Echo: left ventricular ejection fraction is normal.The left atrial size is normal.Right atrial size is normal.The right ventricle is normal in size and function.Calcified aortic valve.No aortic regurgitation noted.There is Moderate to severe aortic stenosis.The calculated aortic valve area using thecontinuity equation is 1.06 cm2.The peak pressure gradient is 46 mmHg.The mean pressure gradient is 32 mmHg.The dimensionless index (ratio of LVOTvelocity to aortic velocity) was calculated to be 0.28.  The calculated stroke volume index is38 cc/m2 (normal >35cc/m2).Structurally normal mitral valve.No mitral regurgitation noted.Structurally normal tricuspidvalve.There is no echocardiographic evidence for pulmonary hypertension.No aortic root dilatation.There is no pericardial effusion.    PFT's:    Carotid Duplex: No hemodynamically significant stenosis seen.    Consult in Chart?  YES   Consent in Chart? YES   Pre-op Orders Placed? YES   Blood Products Ordered? YES   NPO ordered? YES Planned Date of Surgery:  3/6/17                                                                                                           Surgeon: Dr. Funez     Procedure: CABG +/- AVR    HPI:  80 y/o obese M PO[employer] HISTORIAN Ex-Smoker w/PMHX HTN, HLD, DM (Non-Compliant), Aflutter s/p Ablation in 2003 at [facility], COPD, Hypothyroidism, h/o Prostate CA s/p seed implant in 2002 who originally presented to PMD office on 2/28/17 c/o 2 episodes of near syncope in the setting of C/P within the past 2 weeks.  Pt reports dizziness, nausea, palpitations, fatigue and 5-6/10 chest tightness across the top of the chest. C/P did not radiate to the jaw/neck/back/arm.  Pt reports h/o chronic CASON requiring him to stop twice when walking 1 block and LE edema.  Pt denies any new vision changes, vertigo, numbness/tingling in extremities, amaurosis fugax, focal extremity weakness, fever/chills, dysuria, abdominal pain, diarrhea.. Echo from  2/2016 showed basal anterior, mid anterior and mid anterolateral hypokinesis, EF 50-55%, LA mild dilated, grade 1 diastolic dysfunction. Pt underwent cardiac cath on 3/2/17 which revealed LM: Distal 60% lesion, pLAD: calcified 75%, Ramus: 50%, LCx: Mild luminal irregularities, RCA: Dominant, ostial 95% lesion, proximal 80-90 calcified lesion, LVEF: 60%, LVEDP: 21, No significant MR.  RHC: RA: 9, RV: 32/6 (10), PAP:32/6 (19), PCWP: 11. CO/CI (Nam): 6.63/3.32   Ao/LV gradient: 23.1mm Hg , RONAN: 1.67cm2. Echo revealed mod-severe AS. CT Surgery was consulted for surgical evaluation for CABG, +/-AVR.     PAST MEDICAL & SURGICAL HISTORY:  High cholesterol  Diabetes  Prostate cancer: in remission  Hypertension  COPD (chronic obstructive pulmonary disease)  History of cataract surgery: b/l  History of knee replacement: b/l    narcotic analgesics (Nausea; Vomiting)  No Known Allergies    MEDICATIONS  (STANDING):  insulin lispro (HumaLOG) corrective regimen sliding scale  SubCutaneous Before meals and at bedtime  heparin  Injectable 5000Unit(s) SubCutaneous every 8 hours  aspirin enteric coated 81milliGRAM(s) Oral daily  lisinopril 40milliGRAM(s) Oral daily  simvastatin 40milliGRAM(s) Oral at bedtime  levothyroxine 50MICROGram(s) Oral daily  fluticasone / salmeterol 250-50 MICROgram(s) Diskus 1Dose(s) Inhalation two times a day  verapamil SR 180milliGRAM(s) Oral daily    MEDICATIONS  (PRN):  dextrose Gel 1Dose(s) Oral once PRN Blood Glucose LESS THAN 70 milliGRAM(s)/deciliter  glucagon  Injectable 1milliGRAM(s) IntraMuscular once PRN Glucose LESS THAN 70 milligrams/deciliter  levalbuterol Inhalation 0.63milliGRAM(s) Inhalation every 6 hours PRN shortness of breathe, wheezing      On Beta Blocker? No, held by primary team for risk of bronchospasm d/t COPD    Labs:                        11.8   6.6   )-----------( 253      ( 04 Mar 2017 07:07 )             35.2     04 Mar 2017 07:11    139    |  106    |  25     ----------------------------<  87     4.1     |  24     |  1.03     Ca    8.7        04 Mar 2017 07:11  Mg     2.3       04 Mar 2017 07:11    Hemoglobin A1C, Whole Blood: 6.8 % (03-01 @ 07:00)      EKG 2/28/17: Sinus rhythm with frequent premature ventricular complexes  Left atrial enlargement  Right bundle branch block  Left anterior fascicular block  *** Bifascicular block ***    CXR:  Stable heart size. No new consolidation. No large effusion or   pneumothorax. Unchanged 0.5 cm calcified granuloma right upper lobe.   Probably chronic compression deformity of a vertebral body at   thoracolumbar junction.    CT Scans: Chest: Patchy areas of thickening of the subpleural interlobular septae the   lungs probably due to mild fibrosis, less likely congestive heart   failure. No honeycombing.  Calcification at the level of the aortic valve.    CT Head: 1. No evidence of acute intracranial hemorrhage, mass effect or CT   evidence of recent transcortical infarction on this mildly motion   degraded exam.  2. Moderate microangiopathic ischemic change.    Cath Report: 3/2: LM: Distal 60% lesion, pLAD: calcified 75%, Ramus: 50%, LCx: Mild luminal irregularities, RCA: Dominant, ostial 95% lesion, proximal 80-90 calcified lesion, LVEF: 60%, LVEDP: 21, No significant MR.  RHC: RA: 9, RV: 32/6 (10), PAP:32/6 (19), PCWP: 11      CO/CI (Nam): 6.63/3.32   Ao/LV gradient: 23.1mm Hg , RONAN: 1.67cm2.      Echo: left ventricular ejection fraction is normal.The left atrial size is normal.Right atrial size is normal.The right ventricle is normal in size and function.Calcified aortic valve.No aortic regurgitation noted.There is Moderate to severe aortic stenosis.The calculated aortic valve area using thecontinuity equation is 1.06 cm2.The peak pressure gradient is 46 mmHg.The mean pressure gradient is 32 mmHg.The dimensionless index (ratio of LVOTvelocity to aortic velocity) was calculated to be 0.28.  The calculated stroke volume index is38 cc/m2 (normal >35cc/m2).Structurally normal mitral valve.No mitral regurgitation noted.Structurally normal tricuspidvalve.There is no echocardiographic evidence for pulmonary hypertension.No aortic root dilatation.There is no pericardial effusion.    PFT's:    Carotid Duplex: No hemodynamically significant stenosis seen.    Consult in Chart?  YES   Consent in Chart? YES   Pre-op Orders Placed? YES   Blood Products Ordered? YES   NPO ordered? YES Planned Date of Surgery:  3/6/17                                                                                                           Surgeon: Dr. Funez     Procedure: CABG +/- AVR    HPI:  82 y/o obese M POOR HISTORIAN Ex-Smoker w/PMHX HTN, HLD, DM (Non-Compliant), Aflutter s/p Ablation in 2003 at [facility], COPD, Hypothyroidism, h/o Prostate CA s/p seed implant in 2002 who originally presented to PMD office on 2/28/17 c/o 2 episodes of near syncope in the setting of C/P within the past 2 weeks.  Pt reports dizziness, nausea, palpitations, fatigue and 5-6/10 chest tightness across the top of the chest. C/P did not radiate to the jaw/neck/back/arm.  Pt reports h/o chronic CASON requiring him to stop twice when walking 1 block and LE edema.  Pt denies any new vision changes, vertigo, numbness/tingling in extremities, amaurosis fugax, focal extremity weakness, fever/chills, dysuria, abdominal pain, diarrhea.. Echo from  2/2016 showed basal anterior, mid anterior and mid anterolateral hypokinesis, EF 50-55%, LA mild dilated, grade 1 diastolic dysfunction. Pt underwent cardiac cath on 3/2/17 which revealed LM: Distal 60% lesion, pLAD: calcified 75%, Ramus: 50%, LCx: Mild luminal irregularities, RCA: Dominant, ostial 95% lesion, proximal 80-90 calcified lesion, LVEF: 60%, LVEDP: 21, No significant MR.  RHC: RA: 9, RV: 32/6 (10), PAP:32/6 (19), PCWP: 11. CO/CI (Nam): 6.63/3.32   Ao/LV gradient: 23.1mm Hg , RONAN: 1.67cm2. Echo revealed mod-severe AS. CT Surgery was consulted for surgical evaluation for CABG, +/-AVR.     PAST MEDICAL & SURGICAL HISTORY:  High cholesterol  Diabetes  Prostate cancer: in remission  Hypertension  COPD (chronic obstructive pulmonary disease)  History of cataract surgery: b/l  History of knee replacement: b/l    narcotic analgesics (Nausea; Vomiting)  No Known Allergies    MEDICATIONS  (STANDING):  insulin lispro (HumaLOG) corrective regimen sliding scale  SubCutaneous Before meals and at bedtime  heparin  Injectable 5000Unit(s) SubCutaneous every 8 hours  aspirin enteric coated 81milliGRAM(s) Oral daily  lisinopril 40milliGRAM(s) Oral daily  simvastatin 40milliGRAM(s) Oral at bedtime  levothyroxine 50MICROGram(s) Oral daily  fluticasone / salmeterol 250-50 MICROgram(s) Diskus 1Dose(s) Inhalation two times a day  verapamil SR 180milliGRAM(s) Oral daily    MEDICATIONS  (PRN):  dextrose Gel 1Dose(s) Oral once PRN Blood Glucose LESS THAN 70 milliGRAM(s)/deciliter  glucagon  Injectable 1milliGRAM(s) IntraMuscular once PRN Glucose LESS THAN 70 milligrams/deciliter  levalbuterol Inhalation 0.63milliGRAM(s) Inhalation every 6 hours PRN shortness of breathe, wheezing      On Beta Blocker? No, held by primary team for risk of bronchospasm d/t COPD    Labs:                        11.8   6.6   )-----------( 253      ( 04 Mar 2017 07:07 )             35.2     04 Mar 2017 07:11    139    |  106    |  25     ----------------------------<  87     4.1     |  24     |  1.03     Ca    8.7        04 Mar 2017 07:11  Mg     2.3       04 Mar 2017 07:11    Hemoglobin A1C, Whole Blood: 6.8 % (03-01 @ 07:00)      EKG: Normal sinus rhythm  Left axis deviation  Right bundle branch block    CXR:  Stable heart size. No new consolidation. No large effusion or   pneumothorax. Unchanged 0.5 cm calcified granuloma right upper lobe.   Probably chronic compression deformity of a vertebral body at   thoracolumbar junction.    CT Scans: Chest: Patchy areas of thickening of the subpleural interlobular septae the   lungs probably due to mild fibrosis, less likely congestive heart   failure. No honeycombing.  Calcification at the level of the aortic valve.    CT Head: 1. No evidence of acute intracranial hemorrhage, mass effect or CT   evidence of recent transcortical infarction on this mildly motion   degraded exam.  2. Moderate microangiopathic ischemic change.    Cath Report: 3/2: LM: Distal 60% lesion, pLAD: calcified 75%, Ramus: 50%, LCx: Mild luminal irregularities, RCA: Dominant, ostial 95% lesion, proximal 80-90 calcified lesion, LVEF: 60%, LVEDP: 21, No significant MR.  RHC: RA: 9, RV: 32/6 (10), PAP:32/6 (19), PCWP: 11      CO/CI (Nam): 6.63/3.32   Ao/LV gradient: 23.1mm Hg , RONAN: 1.67cm2.      Echo: left ventricular ejection fraction is normal.The left atrial size is normal.Right atrial size is normal.The right ventricle is normal in size and function.Calcified aortic valve.No aortic regurgitation noted.There is Moderate to severe aortic stenosis.The calculated aortic valve area using thecontinuity equation is 1.06 cm2.The peak pressure gradient is 46 mmHg.The mean pressure gradient is 32 mmHg.The dimensionless index (ratio of LVOTvelocity to aortic velocity) was calculated to be 0.28.  The calculated stroke volume index is38 cc/m2 (normal >35cc/m2).Structurally normal mitral valve.No mitral regurgitation noted.Structurally normal tricuspidvalve.There is no echocardiographic evidence for pulmonary hypertension.No aortic root dilatation.There is no pericardial effusion.    PFT's: FEV1: 73%    Carotid Duplex: No hemodynamically significant stenosis seen.    Consult in Chart?  YES   Consent in Chart? YES   Pre-op Orders Placed? YES   Blood Products Ordered? YES   NPO ordered? YES

## 2017-03-06 ENCOUNTER — APPOINTMENT (OUTPATIENT)
Dept: CARDIOTHORACIC SURGERY | Facility: HOSPITAL | Age: 82
End: 2017-03-06

## 2017-03-06 LAB
ALBUMIN SERPL ELPH-MCNC: 2.2 G/DL — LOW (ref 3.4–5)
ALBUMIN SERPL ELPH-MCNC: 2.7 G/DL — LOW (ref 3.4–5)
ALBUMIN SERPL ELPH-MCNC: 2.8 G/DL — LOW (ref 3.4–5)
ALP SERPL-CCNC: 47 U/L — SIGNIFICANT CHANGE UP (ref 40–120)
ALP SERPL-CCNC: 47 U/L — SIGNIFICANT CHANGE UP (ref 40–120)
ALP SERPL-CCNC: 51 U/L — SIGNIFICANT CHANGE UP (ref 40–120)
ALT FLD-CCNC: 23 U/L — SIGNIFICANT CHANGE UP (ref 12–42)
ALT FLD-CCNC: 24 U/L — SIGNIFICANT CHANGE UP (ref 12–42)
ALT FLD-CCNC: 25 U/L — SIGNIFICANT CHANGE UP (ref 12–42)
ANION GAP SERPL CALC-SCNC: 10 MMOL/L — SIGNIFICANT CHANGE UP (ref 9–16)
ANION GAP SERPL CALC-SCNC: 8 MMOL/L — LOW (ref 9–16)
ANION GAP SERPL CALC-SCNC: 8 MMOL/L — LOW (ref 9–16)
ANION GAP SERPL CALC-SCNC: 9 MMOL/L — SIGNIFICANT CHANGE UP (ref 9–16)
APTT BLD: 25.5 SEC — LOW (ref 27.5–37.4)
APTT BLD: 25.6 SEC — LOW (ref 27.5–37.4)
APTT BLD: 28.2 SEC — SIGNIFICANT CHANGE UP (ref 27.5–37.4)
APTT BLD: 31.1 SEC — SIGNIFICANT CHANGE UP (ref 27.5–37.4)
AST SERPL-CCNC: 22 U/L — SIGNIFICANT CHANGE UP (ref 15–37)
AST SERPL-CCNC: 23 U/L — SIGNIFICANT CHANGE UP (ref 15–37)
AST SERPL-CCNC: 26 U/L — SIGNIFICANT CHANGE UP (ref 15–37)
BASE EXCESS BLDA CALC-SCNC: -2.4 MMOL/L — LOW (ref -2–3)
BASOPHILS NFR BLD AUTO: 0.3 % — SIGNIFICANT CHANGE UP (ref 0–2)
BILIRUB SERPL-MCNC: 0.7 MG/DL — SIGNIFICANT CHANGE UP (ref 0.2–1.2)
BILIRUB SERPL-MCNC: 0.8 MG/DL — SIGNIFICANT CHANGE UP (ref 0.2–1.2)
BILIRUB SERPL-MCNC: 0.8 MG/DL — SIGNIFICANT CHANGE UP (ref 0.2–1.2)
BUN SERPL-MCNC: 16 MG/DL — SIGNIFICANT CHANGE UP (ref 7–23)
BUN SERPL-MCNC: 18 MG/DL — SIGNIFICANT CHANGE UP (ref 7–23)
CALCIUM SERPL-MCNC: 7.9 MG/DL — LOW (ref 8.5–10.5)
CALCIUM SERPL-MCNC: 8 MG/DL — LOW (ref 8.5–10.5)
CALCIUM SERPL-MCNC: 8.6 MG/DL — SIGNIFICANT CHANGE UP (ref 8.5–10.5)
CALCIUM SERPL-MCNC: 9.4 MG/DL — SIGNIFICANT CHANGE UP (ref 8.5–10.5)
CHLORIDE SERPL-SCNC: 103 MMOL/L — SIGNIFICANT CHANGE UP (ref 96–108)
CHLORIDE SERPL-SCNC: 105 MMOL/L — SIGNIFICANT CHANGE UP (ref 96–108)
CO2 SERPL-SCNC: 23 MMOL/L — SIGNIFICANT CHANGE UP (ref 22–31)
CO2 SERPL-SCNC: 24 MMOL/L — SIGNIFICANT CHANGE UP (ref 22–31)
CO2 SERPL-SCNC: 25 MMOL/L — SIGNIFICANT CHANGE UP (ref 22–31)
CO2 SERPL-SCNC: 26 MMOL/L — SIGNIFICANT CHANGE UP (ref 22–31)
CREAT SERPL-MCNC: 0.88 MG/DL — SIGNIFICANT CHANGE UP (ref 0.5–1.3)
CREAT SERPL-MCNC: 0.88 MG/DL — SIGNIFICANT CHANGE UP (ref 0.5–1.3)
CREAT SERPL-MCNC: 0.94 MG/DL — SIGNIFICANT CHANGE UP (ref 0.5–1.3)
CREAT SERPL-MCNC: 0.95 MG/DL — SIGNIFICANT CHANGE UP (ref 0.5–1.3)
EOSINOPHIL NFR BLD AUTO: 1.2 % — SIGNIFICANT CHANGE UP (ref 0–6)
GAS PNL BLDA: SIGNIFICANT CHANGE UP
GLUCOSE SERPL-MCNC: 124 MG/DL — HIGH (ref 70–99)
GLUCOSE SERPL-MCNC: 140 MG/DL — HIGH (ref 70–99)
GLUCOSE SERPL-MCNC: 152 MG/DL — HIGH (ref 70–99)
GLUCOSE SERPL-MCNC: 97 MG/DL — SIGNIFICANT CHANGE UP (ref 70–99)
HCO3 BLDA-SCNC: 24 MMOL/L — SIGNIFICANT CHANGE UP (ref 21–28)
HCT VFR BLD CALC: 31.6 % — LOW (ref 39–50)
HCT VFR BLD CALC: 32.4 % — LOW (ref 39–50)
HCT VFR BLD CALC: 34.2 % — LOW (ref 39–50)
HCT VFR BLD CALC: 39.3 % — SIGNIFICANT CHANGE UP (ref 39–50)
HGB BLD-MCNC: 10.7 G/DL — LOW (ref 13–17)
HGB BLD-MCNC: 10.8 G/DL — LOW (ref 13–17)
HGB BLD-MCNC: 11.4 G/DL — LOW (ref 13–17)
HGB BLD-MCNC: 13.1 G/DL — SIGNIFICANT CHANGE UP (ref 13–17)
INR BLD: 1.05 — SIGNIFICANT CHANGE UP (ref 0.88–1.16)
INR BLD: 1.26 — HIGH (ref 0.88–1.16)
INR BLD: 1.26 — HIGH (ref 0.88–1.16)
INR BLD: 1.4 — HIGH (ref 0.88–1.16)
LACTATE SERPL-SCNC: 1 MMOL/L — SIGNIFICANT CHANGE UP (ref 0.5–2)
LYMPHOCYTES # BLD AUTO: 18.9 % — SIGNIFICANT CHANGE UP (ref 13–44)
MAGNESIUM SERPL-MCNC: 1.7 MG/DL — SIGNIFICANT CHANGE UP (ref 1.6–2.4)
MAGNESIUM SERPL-MCNC: 2.3 MG/DL — SIGNIFICANT CHANGE UP (ref 1.6–2.4)
MAGNESIUM SERPL-MCNC: 2.5 MG/DL — HIGH (ref 1.6–2.4)
MAGNESIUM SERPL-MCNC: 2.6 MG/DL — HIGH (ref 1.6–2.4)
MCHC RBC-ENTMCNC: 32.3 PG — SIGNIFICANT CHANGE UP (ref 27–34)
MCHC RBC-ENTMCNC: 32.3 PG — SIGNIFICANT CHANGE UP (ref 27–34)
MCHC RBC-ENTMCNC: 32.6 PG — SIGNIFICANT CHANGE UP (ref 27–34)
MCHC RBC-ENTMCNC: 32.8 PG — SIGNIFICANT CHANGE UP (ref 27–34)
MCHC RBC-ENTMCNC: 33.3 G/DL — SIGNIFICANT CHANGE UP (ref 32–36)
MCHC RBC-ENTMCNC: 33.9 G/DL — SIGNIFICANT CHANGE UP (ref 32–36)
MCV RBC AUTO: 96.9 FL — SIGNIFICANT CHANGE UP (ref 80–100)
MCV RBC AUTO: 97 FL — SIGNIFICANT CHANGE UP (ref 80–100)
MCV RBC AUTO: 97 FL — SIGNIFICANT CHANGE UP (ref 80–100)
MCV RBC AUTO: 97.7 FL — SIGNIFICANT CHANGE UP (ref 80–100)
MONOCYTES NFR BLD AUTO: 8 % — SIGNIFICANT CHANGE UP (ref 2–14)
NEUTROPHILS NFR BLD AUTO: 71.6 % — SIGNIFICANT CHANGE UP (ref 43–77)
PCO2 BLDA: 45 MMHG — SIGNIFICANT CHANGE UP (ref 35–48)
PH BLDA: 7.34 — LOW (ref 7.35–7.45)
PHOSPHATE SERPL-MCNC: 3.1 MG/DL — SIGNIFICANT CHANGE UP (ref 2.5–4.5)
PHOSPHATE SERPL-MCNC: 3.6 MG/DL — SIGNIFICANT CHANGE UP (ref 2.5–4.5)
PLATELET # BLD AUTO: 212 K/UL — SIGNIFICANT CHANGE UP (ref 150–400)
PLATELET # BLD AUTO: 214 K/UL — SIGNIFICANT CHANGE UP (ref 150–400)
PLATELET # BLD AUTO: 232 K/UL — SIGNIFICANT CHANGE UP (ref 150–400)
PLATELET # BLD AUTO: 271 K/UL — SIGNIFICANT CHANGE UP (ref 150–400)
PO2 BLDA: 99 MMHG — SIGNIFICANT CHANGE UP (ref 83–108)
POTASSIUM SERPL-MCNC: 4.1 MMOL/L — SIGNIFICANT CHANGE UP (ref 3.5–5.3)
POTASSIUM SERPL-MCNC: 4.2 MMOL/L — SIGNIFICANT CHANGE UP (ref 3.5–5.3)
POTASSIUM SERPL-MCNC: 4.4 MMOL/L — SIGNIFICANT CHANGE UP (ref 3.5–5.3)
POTASSIUM SERPL-MCNC: 4.6 MMOL/L — SIGNIFICANT CHANGE UP (ref 3.5–5.3)
POTASSIUM SERPL-SCNC: 4.1 MMOL/L — SIGNIFICANT CHANGE UP (ref 3.5–5.3)
POTASSIUM SERPL-SCNC: 4.2 MMOL/L — SIGNIFICANT CHANGE UP (ref 3.5–5.3)
POTASSIUM SERPL-SCNC: 4.4 MMOL/L — SIGNIFICANT CHANGE UP (ref 3.5–5.3)
POTASSIUM SERPL-SCNC: 4.6 MMOL/L — SIGNIFICANT CHANGE UP (ref 3.5–5.3)
PROT SERPL-MCNC: 5.7 G/DL — LOW (ref 6.4–8.2)
PROT SERPL-MCNC: 6 G/DL — LOW (ref 6.4–8.2)
PROT SERPL-MCNC: 6.3 G/DL — LOW (ref 6.4–8.2)
PROTHROM AB SERPL-ACNC: 11.7 SEC — SIGNIFICANT CHANGE UP (ref 10–13.1)
PROTHROM AB SERPL-ACNC: 14 SEC — HIGH (ref 10–13.1)
PROTHROM AB SERPL-ACNC: 14 SEC — HIGH (ref 10–13.1)
PROTHROM AB SERPL-ACNC: 15.6 SEC — HIGH (ref 10–13.1)
RBC # BLD: 3.26 M/UL — LOW (ref 4.2–5.8)
RBC # BLD: 3.34 M/UL — LOW (ref 4.2–5.8)
RBC # BLD: 3.5 M/UL — LOW (ref 4.2–5.8)
RBC # BLD: 4.05 M/UL — LOW (ref 4.2–5.8)
RBC # FLD: 13.5 % — SIGNIFICANT CHANGE UP (ref 10.3–16.9)
RBC # FLD: 13.5 % — SIGNIFICANT CHANGE UP (ref 10.3–16.9)
RBC # FLD: 14 % — SIGNIFICANT CHANGE UP (ref 10.3–16.9)
RBC # FLD: 14.1 % — SIGNIFICANT CHANGE UP (ref 10.3–16.9)
SAO2 % BLDA: 97 % — SIGNIFICANT CHANGE UP (ref 95–100)
SODIUM SERPL-SCNC: 136 MMOL/L — SIGNIFICANT CHANGE UP (ref 135–145)
SODIUM SERPL-SCNC: 137 MMOL/L — SIGNIFICANT CHANGE UP (ref 135–145)
WBC # BLD: 12.7 K/UL — HIGH (ref 3.8–10.5)
WBC # BLD: 18.8 K/UL — HIGH (ref 3.8–10.5)
WBC # BLD: 6.4 K/UL — SIGNIFICANT CHANGE UP (ref 3.8–10.5)
WBC # BLD: 9.9 K/UL — SIGNIFICANT CHANGE UP (ref 3.8–10.5)
WBC # FLD AUTO: 12.7 K/UL — HIGH (ref 3.8–10.5)
WBC # FLD AUTO: 18.8 K/UL — HIGH (ref 3.8–10.5)
WBC # FLD AUTO: 6.4 K/UL — SIGNIFICANT CHANGE UP (ref 3.8–10.5)
WBC # FLD AUTO: 9.9 K/UL — SIGNIFICANT CHANGE UP (ref 3.8–10.5)

## 2017-03-06 PROCEDURE — 33533 CABG ARTERIAL SINGLE: CPT

## 2017-03-06 PROCEDURE — 33518 CABG ARTERY-VEIN TWO: CPT

## 2017-03-06 PROCEDURE — 99292 CRITICAL CARE ADDL 30 MIN: CPT

## 2017-03-06 PROCEDURE — 71010: CPT | Mod: 26

## 2017-03-06 PROCEDURE — 99291 CRITICAL CARE FIRST HOUR: CPT

## 2017-03-06 PROCEDURE — 93010 ELECTROCARDIOGRAM REPORT: CPT

## 2017-03-06 PROCEDURE — 76998 US GUIDE INTRAOP: CPT | Mod: 26,59

## 2017-03-06 RX ORDER — CLOPIDOGREL BISULFATE 75 MG/1
75 TABLET, FILM COATED ORAL DAILY
Qty: 0 | Refills: 0 | Status: DISCONTINUED | OUTPATIENT
Start: 2017-03-06 | End: 2017-03-10

## 2017-03-06 RX ORDER — HEPARIN SODIUM 5000 [USP'U]/ML
5000 INJECTION INTRAVENOUS; SUBCUTANEOUS EVERY 8 HOURS
Qty: 0 | Refills: 0 | Status: DISCONTINUED | OUTPATIENT
Start: 2017-03-06 | End: 2017-03-09

## 2017-03-06 RX ORDER — LEVOTHYROXINE SODIUM 125 MCG
25 TABLET ORAL DAILY
Qty: 0 | Refills: 0 | Status: DISCONTINUED | OUTPATIENT
Start: 2017-03-06 | End: 2017-03-08

## 2017-03-06 RX ORDER — ATORVASTATIN CALCIUM 80 MG/1
80 TABLET, FILM COATED ORAL AT BEDTIME
Qty: 0 | Refills: 0 | Status: DISCONTINUED | OUTPATIENT
Start: 2017-03-06 | End: 2017-03-06

## 2017-03-06 RX ORDER — SIMVASTATIN 20 MG/1
40 TABLET, FILM COATED ORAL AT BEDTIME
Qty: 0 | Refills: 0 | Status: DISCONTINUED | OUTPATIENT
Start: 2017-03-06 | End: 2017-03-12

## 2017-03-06 RX ORDER — FENTANYL CITRATE 50 UG/ML
25 INJECTION INTRAVENOUS ONCE
Qty: 0 | Refills: 0 | Status: DISCONTINUED | OUTPATIENT
Start: 2017-03-06 | End: 2017-03-06

## 2017-03-06 RX ORDER — NOREPINEPHRINE BITARTRATE/D5W 8 MG/250ML
0.05 PLASTIC BAG, INJECTION (ML) INTRAVENOUS
Qty: 8 | Refills: 0 | Status: DISCONTINUED | OUTPATIENT
Start: 2017-03-06 | End: 2017-03-07

## 2017-03-06 RX ORDER — FUROSEMIDE 40 MG
20 TABLET ORAL ONCE
Qty: 0 | Refills: 0 | Status: COMPLETED | OUTPATIENT
Start: 2017-03-06 | End: 2017-03-06

## 2017-03-06 RX ORDER — ALBUMIN HUMAN 25 %
250 VIAL (ML) INTRAVENOUS ONCE
Qty: 0 | Refills: 0 | Status: COMPLETED | OUTPATIENT
Start: 2017-03-06 | End: 2017-03-07

## 2017-03-06 RX ORDER — ASPIRIN/CALCIUM CARB/MAGNESIUM 324 MG
81 TABLET ORAL DAILY
Qty: 0 | Refills: 0 | Status: DISCONTINUED | OUTPATIENT
Start: 2017-03-06 | End: 2017-03-12

## 2017-03-06 RX ORDER — MAGNESIUM SULFATE 500 MG/ML
2 VIAL (ML) INJECTION ONCE
Qty: 0 | Refills: 0 | Status: DISCONTINUED | OUTPATIENT
Start: 2017-03-06 | End: 2017-03-06

## 2017-03-06 RX ORDER — ALBUMIN HUMAN 25 %
250 VIAL (ML) INTRAVENOUS
Qty: 0 | Refills: 0 | Status: COMPLETED | OUTPATIENT
Start: 2017-03-06 | End: 2017-03-06

## 2017-03-06 RX ORDER — IPRATROPIUM BROMIDE 0.2 MG/ML
500 SOLUTION, NON-ORAL INHALATION EVERY 6 HOURS
Qty: 0 | Refills: 0 | Status: DISCONTINUED | OUTPATIENT
Start: 2017-03-06 | End: 2017-03-08

## 2017-03-06 RX ORDER — MAGNESIUM SULFATE 500 MG/ML
2 VIAL (ML) INJECTION ONCE
Qty: 0 | Refills: 0 | Status: COMPLETED | OUTPATIENT
Start: 2017-03-06 | End: 2017-03-06

## 2017-03-06 RX ORDER — PROPOFOL 10 MG/ML
5 INJECTION, EMULSION INTRAVENOUS
Qty: 1000 | Refills: 0 | Status: DISCONTINUED | OUTPATIENT
Start: 2017-03-06 | End: 2017-03-07

## 2017-03-06 RX ORDER — SODIUM CHLORIDE 9 MG/ML
1000 INJECTION, SOLUTION INTRAVENOUS
Qty: 0 | Refills: 0 | Status: DISCONTINUED | OUTPATIENT
Start: 2017-03-06 | End: 2017-03-08

## 2017-03-06 RX ORDER — FAMOTIDINE 10 MG/ML
20 INJECTION INTRAVENOUS DAILY
Qty: 0 | Refills: 0 | Status: COMPLETED | OUTPATIENT
Start: 2017-03-06 | End: 2017-03-06

## 2017-03-06 RX ORDER — CALCIUM GLUCONATE 100 MG/ML
2 VIAL (ML) INTRAVENOUS ONCE
Qty: 0 | Refills: 0 | Status: COMPLETED | OUTPATIENT
Start: 2017-03-06 | End: 2017-03-06

## 2017-03-06 RX ORDER — BUDESONIDE, MICRONIZED 100 %
0.5 POWDER (GRAM) MISCELLANEOUS EVERY 12 HOURS
Qty: 0 | Refills: 0 | Status: DISCONTINUED | OUTPATIENT
Start: 2017-03-06 | End: 2017-03-08

## 2017-03-06 RX ORDER — FAMOTIDINE 10 MG/ML
20 INJECTION INTRAVENOUS DAILY
Qty: 0 | Refills: 0 | Status: DISCONTINUED | OUTPATIENT
Start: 2017-03-06 | End: 2017-03-12

## 2017-03-06 RX ORDER — INSULIN HUMAN 100 [IU]/ML
1 INJECTION, SOLUTION SUBCUTANEOUS
Qty: 100 | Refills: 0 | Status: DISCONTINUED | OUTPATIENT
Start: 2017-03-06 | End: 2017-03-07

## 2017-03-06 RX ORDER — CEFAZOLIN SODIUM 1 G
2000 VIAL (EA) INJECTION EVERY 8 HOURS
Qty: 0 | Refills: 0 | Status: COMPLETED | OUTPATIENT
Start: 2017-03-06 | End: 2017-03-08

## 2017-03-06 RX ADMIN — Medication 81 MILLIGRAM(S): at 22:36

## 2017-03-06 RX ADMIN — Medication 180 MILLIGRAM(S): at 06:10

## 2017-03-06 RX ADMIN — HEPARIN SODIUM 5000 UNIT(S): 5000 INJECTION INTRAVENOUS; SUBCUTANEOUS at 22:36

## 2017-03-06 RX ADMIN — FAMOTIDINE 20 MILLIGRAM(S): 10 INJECTION INTRAVENOUS at 22:36

## 2017-03-06 RX ADMIN — Medication 50 MICROGRAM(S): at 06:10

## 2017-03-06 RX ADMIN — INSULIN HUMAN 1 UNIT(S)/HR: 100 INJECTION, SOLUTION SUBCUTANEOUS at 14:43

## 2017-03-06 RX ADMIN — Medication 200 GRAM(S): at 16:00

## 2017-03-06 RX ADMIN — FLUTICASONE PROPIONATE AND SALMETEROL 1 DOSE(S): 50; 250 POWDER ORAL; RESPIRATORY (INHALATION) at 06:10

## 2017-03-06 RX ADMIN — Medication 0.5 MILLIGRAM(S): at 17:21

## 2017-03-06 RX ADMIN — FENTANYL CITRATE 25 MICROGRAM(S): 50 INJECTION INTRAVENOUS at 17:59

## 2017-03-06 RX ADMIN — PROPOFOL 2.92 MICROGRAM(S)/KG/MIN: 10 INJECTION, EMULSION INTRAVENOUS at 14:43

## 2017-03-06 RX ADMIN — CLOPIDOGREL BISULFATE 75 MILLIGRAM(S): 75 TABLET, FILM COATED ORAL at 22:36

## 2017-03-06 RX ADMIN — Medication 20 MILLIGRAM(S): at 15:25

## 2017-03-06 RX ADMIN — Medication 125 MILLILITER(S): at 15:23

## 2017-03-06 RX ADMIN — CHLORHEXIDINE GLUCONATE 15 MILLILITER(S): 213 SOLUTION TOPICAL at 08:27

## 2017-03-06 RX ADMIN — Medication 500 MICROGRAM(S): at 17:21

## 2017-03-06 RX ADMIN — FENTANYL CITRATE 25 MICROGRAM(S): 50 INJECTION INTRAVENOUS at 18:14

## 2017-03-06 RX ADMIN — SIMVASTATIN 40 MILLIGRAM(S): 20 TABLET, FILM COATED ORAL at 22:36

## 2017-03-06 RX ADMIN — Medication 100 MILLIGRAM(S): at 17:46

## 2017-03-06 RX ADMIN — Medication 50 GRAM(S): at 14:44

## 2017-03-06 RX ADMIN — FAMOTIDINE 20 MILLIGRAM(S): 10 INJECTION INTRAVENOUS at 16:04

## 2017-03-06 RX ADMIN — Medication 125 MILLILITER(S): at 15:22

## 2017-03-06 NOTE — PROGRESS NOTE ADULT - PROBLEM SELECTOR PROBLEM 6
High cholesterol

## 2017-03-06 NOTE — PROGRESS NOTE ADULT - ASSESSMENT
80 yo M former smoker PMHx HTN, HLD, DM, aflutter s/p ablation (2003), COPD, hypothyroid, prostate ca, originally presented to [facility] ED (2/28/2017) with two  episodes of near syncope with intermittent chest tightness. Pt underwent Diagnostic Cardiac Cath 3/2/17 revealing 3VCAD with plan for CABG +/-AVR today.

## 2017-03-06 NOTE — PROGRESS NOTE ADULT - SUBJECTIVE AND OBJECTIVE BOX
CTICU  CRITICAL  CARE  attending     Hand off received 					   Pertinent clinical, laboratory, radiographic, hemodynamic, echocardiographic, respiratory data, microbiologic data and chart were reviewed and analyzed frequently throughout the course of the day and night  Patient seen and examined with CTS/ SH attending at bedside  Pt is a 81y , Male,s/p OPCAB x 3V today    intraop:  uneventful; no blood/products  LR 3000ml  post Op:  Hypoxemia; A-a gradient ( pt with 80 pk yr smoking hx)  intubated    Hypertension: Hypertension  High cholesterol: High cholesterol  PVCs (premature ventricular contractions): PVCs (premature ventricular contractions)  ACS (acute coronary syndrome): ACS (acute coronary syndrome)  CKD (chronic kidney disease), stage III: CKD (chronic kidney disease), stage III  Hypothyroidism: Hypothyroidism  Diabetes: Diabetes  COPD (chronic obstructive pulmonary disease): COPD (chronic obstructive pulmonary disease)  HTN (hypertension): HTN (hypertension)  Near syncope: Near syncope  Chest pain: Chest pain      FAMILY HISTORY:  Family history of acute myocardial infarction (Father)  PAST MEDICAL & SURGICAL HISTORY:  High cholesterol  Diabetes  Prostate cancer: in remission  Hypertension  COPD (chronic obstructive pulmonary disease)  History of cataract surgery: b/l  History of knee replacement: b/l    Patient is a 81y old  Male who is s/p OBPAB x 3V  14 system review was unremarkable  acute changes include acute respiratory failure  Vital signs, hemodynamic and respiratory parameters were reviewed from the bedside nursing flowsheet.  ICU Vital Signs Last 24 Hrs  T(C): 36.1, Max: 37.2 (- @ 16:09)  T(F): 96.9, Max: 98.9 ( @ 16:09)  HR: 70 (64 - 85)  BP: 133/64 (120/63 - 148/70)  BP(mean): --  ABP: 136/50 (108/44 - 140/50)  ABP(mean): 76 (64 - 82)  RR: 14 (13 - 20)  SpO2: 100% (94% - 100%)    Adult Advanced Hemodynamics Last 24 Hrs  CVP(mm Hg): 8 (8 - 15)  CVP(cm H2O): --  CO: --  CI: --  PA: --  PA(mean): --  PCWP: --  SVR: --  SVRI: --  PVR: --  PVRI: --, ABG - ( 06 Mar 2017 14:38 )  pH: 7.38  /  pCO2: 38    /  pO2: 133   / HCO3: 22    / Base Excess: -3.0  /  SaO2: 99        Mode: AC/ CMV (Assist Control/ Continuous Mandatory Ventilation)  RR (machine): 14  TV (machine): 500  FiO2: 100  PEEP: 7  ITime: 1  MAP: 9.6  PIP: 24    Intake and output was reviewed and the fluid balance was calculated  Daily Height in cm: 167.64 (06 Mar 2017 08:35)    Daily Weight in k.3 (06 Mar 2017 06:34)  I&O's Summary  I & Os for 24h ending 06 Mar 2017 07:00  =============================================  IN: 720 ml / OUT: 0 ml / NET: 720 ml    I & Os for current day (as of 06 Mar 2017 16:01)  =============================================  IN: 7.4 ml / OUT: 155 ml / NET: -147.6 ml      All lines and drain sites were assessed  Glycemic trend was reviewedCAPILLARY BLOOD GLUCOSE  139 (06 Mar 2017 15:00)    No acute change in mental status  Auscultation of the chest reveals equal bs  Abdomen is soft  Extremities are warm and well perfused  Wounds appear clean and unremarkable  Antibiotics are periop    labs  CBC Full  -  ( 06 Mar 2017 13:47 )  WBC Count : 18.8 K/uL  Hemoglobin : 11.4 g/dL  Hematocrit : 34.2 %  Platelet Count - Automated : 232 K/uL  Mean Cell Volume : 97.7 fL  Mean Cell Hemoglobin : 32.6 pg  Mean Cell Hemoglobin Concentration : 33.3 g/dL  Auto Neutrophil # : x  Auto Lymphocyte # : x  Auto Monocyte # : x  Auto Eosinophil # : x  Auto Basophil # : x  Auto Neutrophil % : 71.6 %  Auto Lymphocyte % : 18.9 %  Auto Monocyte % : 8.0 %  Auto Eosinophil % : 1.2 %  Auto Basophil % : 0.3 %    06 Mar 2017 13:47    137    |  105    |  16     ----------------------------<  152    4.6     |  23     |  0.88     Ca    7.9        06 Mar 2017 13:47  Mg     1.7       06 Mar 2017 13:47    TPro  5.7    /  Alb  2.2    /  TBili  0.7    /  DBili  x      /  AST  26     /  ALT  24     /  AlkPhos  51     06 Mar 2017 13:47    PT/INR - ( 06 Mar 2017 13:47 )   PT: 15.6 sec;   INR: 1.40          PTT - ( 06 Mar 2017 13:47 )  PTT:25.5 sec  The current medications were reviewed   MEDICATIONS  (STANDING):  sodium chloride 0.45%. 1000milliLiter(s) IV Continuous <Continuous>  aspirin enteric coated 81milliGRAM(s) Oral daily  clopidogrel Tablet 75milliGRAM(s) Oral daily  propofol Infusion 5MICROgram(s)/kG/Min IV Continuous <Continuous>  heparin  Injectable 5000Unit(s) SubCutaneous every 8 hours  norepinephrine Infusion 0.05MICROgram(s)/kG/Min IV Continuous <Continuous>  famotidine Injectable 20milliGRAM(s) IV Push daily  ceFAZolin   IVPB 2000milliGRAM(s) IV Intermittent every 8 hours  insulin Infusion 1Unit(s)/Hr IV Continuous <Continuous>  simvastatin 40milliGRAM(s) Oral at bedtime  levothyroxine Injectable 25MICROGram(s) IV Push daily  buDESOnide   0.5 milliGRAM(s) Respule 0.5milliGRAM(s) Inhalation every 12 hours  ipratropium    for Nebulization 500MICROGram(s) Inhalation every 6 hours    MEDICATIONS  (PRN):       PROBLEM LIST/ ASSESSMENT:  HEALTH ISSUES - PROBLEM Dx:  Hypertension: Hypertension  High cholesterol: High cholesterol  PVCs (premature ventricular contractions): PVCs (premature ventricular contractions)  ACS (acute coronary syndrome): ACS (acute coronary syndrome)  CKD (chronic kidney disease), stage III: CKD (chronic kidney disease), stage III  Hypothyroidism: Hypothyroidism  Diabetes: Diabetes  COPD (chronic obstructive pulmonary disease): COPD (chronic obstructive pulmonary disease)  HTN (hypertension): HTN (hypertension)  Near syncope: Near syncope  Chest pain: Chest pain      ,   Patient is a 81y old  Male who is s/p OPCAB x3v today  My plan includes :  close hemodynamic, ventilatory and drain monitoring and management per post op routine  PEEP up to 7 cms  trend ABG/PO2; titrate Fio2 as tolerated  Wean and extubate when appropriate and A-a gradient normalized.  Diurese ; follow metabolic parameters  Monitor for arrhythmias and monitor parameters for organ perfusion  monitor neurologic status  Head of the bed should remain elevated to 45 deg .   chest PT and IS will be encouraged  monitor adequacy of oxygenation and ventilation and attempt to wean oxygen  Nutritional goals will be met using po eventually , ensure adequate caloric intake and montior the same  Stress ulcer and VTE prophylaxis will be achieved    Glycemic control is satisfactory  Electrolytes have been repleted as necessary and wound care has been carried out. Pain control has been achieved.   agressive physical therapy and early mobility and ambulation goals will be met   The family was updated about the course and plan  CRITICAL CARE TIME SPENT in evaluation and management, reassessments, review and interpretation of labs and x-rays, ventilator and hemodynamic management, formulating a plan and coordinating care: ___90____ MIN.  Time does not include procedural time.  CTICU ATTENDING     					    Aryan Eldridge MD

## 2017-03-06 NOTE — PROGRESS NOTE ADULT - SUBJECTIVE AND OBJECTIVE BOX
Interventional Cardiology PA Adult Progress Note    Subjective Assessment: Pt seen and examined at bedside this morning. Pt with no complaints; denies chest pain, SOB, palpitations, dizziness, fever, chills.   	  MEDICATIONS:  verapamil SR 180milliGRAM(s) Oral daily  levalbuterol Inhalation 0.63milliGRAM(s) Inhalation every 6 hours PRN  fluticasone / salmeterol 250-50 MICROgram(s) Diskus 1Dose(s) Inhalation two times a day  insulin lispro (HumaLOG) corrective regimen sliding scale  SubCutaneous Before meals and at bedtime  dextrose Gel 1Dose(s) Oral once PRN  dextrose 50% Injectable 12.5Gram(s) IV Push once  dextrose 50% Injectable 25Gram(s) IV Push once  dextrose 50% Injectable 25Gram(s) IV Push once  glucagon  Injectable 1milliGRAM(s) IntraMuscular once PRN  simvastatin 40milliGRAM(s) Oral at bedtime  levothyroxine 50MICROGram(s) Oral daily  dextrose 5%. 1000milliLiter(s) IV Continuous <Continuous>  heparin  Injectable 5000Unit(s) SubCutaneous every 8 hours  aspirin enteric coated 81milliGRAM(s) Oral daily  chlorhexidine 0.12% Liquid 15milliLiter(s) Swish and Spit once        [PHYSICAL EXAM:  TELEMETRY:  T(C): 36.2, Max: 37.2 (03-05 @ 16:09)  HR: 76 (74 - 85)  BP: 133/64 (114/58 - 148/70)  RR: 17 (15 - 17)  SpO2: 95% (94% - 95%)  Wt(kg): --  I&O's Summary  I & Os for current day (as of 06 Mar 2017 07:59)  =============================================  IN: 720 ml / OUT: 0 ml / NET: 720 ml  Gen: NAD, resting comfortable sitting upright in bed  Neck: No JVD b/l  Cardiac: +S1, S2, RRR, 3/6 DIEGO heard best @ RUSB  Pulm: CTA b/l  Abdomen: BS present, soft, NT, ND  Extremities: No edema b/l, Right radial access: stable, no hematoma, no bleed, 2+ radial pulse  Neuro: A+Ox3, pleasant and cooperative.   LABS:	 	                        13.1   6.4   )-----------( 271      ( 06 Mar 2017 06:19 )             39.3     06 Mar 2017 06:19    137    |  103    |  18     ----------------------------<  97     4.1     |  24     |  0.94     Ca    9.4        06 Mar 2017 06:19  Mg     2.3       06 Mar 2017 06:19  PT/INR - ( 06 Mar 2017 06:19 )   PT: 11.7 sec;   INR: 1.05     PTT - ( 06 Mar 2017 06:19 )  PTT:28.2 sec

## 2017-03-06 NOTE — PROGRESS NOTE ADULT - PROBLEM SELECTOR PLAN 8
- TSH 2.524. Continue Levothyroxine 50 mcg orally daily    DVT PPx: Heparin SC.     Dispo: NPO  for CABG +/-TAVR today.

## 2017-03-06 NOTE — PROGRESS NOTE ADULT - PROBLEM SELECTOR PLAN 3
-ECHO 3/3:  moderate AS, RONAN 1.1 mean gradient 36 mmHg.  -Possible +/- AVR today. If AVR not done with CABG; will likely need TAVR in Future.

## 2017-03-06 NOTE — PROGRESS NOTE ADULT - PROBLEM SELECTOR PLAN 1
- CP free and HD stable.  R/O ACS with Trops negative x3.  Continue Aspirin 81 mg orally daily, Zocor 40 mg daily. Holding ACEI in the setting of CTS today.  - R&L Heart Cath 3/2: dLM 60% lesion, pLAD 75% calcified lesion, 50% ramus, ostial RCA 95%, pRCA 80-90% calcified lesion. LVEF 60%, EDP 21 mmHG. RA: 9, RV: 32/6 (10), PAP:32/6 (19), PCWP: 11.  Currently NPO for CABG +/-AVR today.      -Echo (3/3/17): LVH, LVEF normal, moderate to severe AS (RONAN: 1.06 cm2, peak pressure gradient 46 mmHG).

## 2017-03-07 LAB
ALBUMIN SERPL ELPH-MCNC: 2.7 G/DL — LOW (ref 3.4–5)
ALBUMIN SERPL ELPH-MCNC: 2.8 G/DL — LOW (ref 3.4–5)
ALP SERPL-CCNC: 41 U/L — SIGNIFICANT CHANGE UP (ref 40–120)
ALP SERPL-CCNC: 44 U/L — SIGNIFICANT CHANGE UP (ref 40–120)
ALT FLD-CCNC: 18 U/L — SIGNIFICANT CHANGE UP (ref 12–42)
ALT FLD-CCNC: 22 U/L — SIGNIFICANT CHANGE UP (ref 12–42)
ANION GAP SERPL CALC-SCNC: 6 MMOL/L — LOW (ref 9–16)
ANION GAP SERPL CALC-SCNC: 8 MMOL/L — LOW (ref 9–16)
ANION GAP SERPL CALC-SCNC: 9 MMOL/L — SIGNIFICANT CHANGE UP (ref 9–16)
APTT BLD: 25.3 SEC — LOW (ref 27.5–37.4)
APTT BLD: 25.6 SEC — LOW (ref 27.5–37.4)
AST SERPL-CCNC: 19 U/L — SIGNIFICANT CHANGE UP (ref 15–37)
AST SERPL-CCNC: 20 U/L — SIGNIFICANT CHANGE UP (ref 15–37)
BASE EXCESS BLDA CALC-SCNC: 2.3 MMOL/L — SIGNIFICANT CHANGE UP (ref -2–3)
BILIRUB SERPL-MCNC: 0.3 MG/DL — SIGNIFICANT CHANGE UP (ref 0.2–1.2)
BILIRUB SERPL-MCNC: 0.8 MG/DL — SIGNIFICANT CHANGE UP (ref 0.2–1.2)
BUN SERPL-MCNC: 16 MG/DL — SIGNIFICANT CHANGE UP (ref 7–23)
BUN SERPL-MCNC: 18 MG/DL — SIGNIFICANT CHANGE UP (ref 7–23)
BUN SERPL-MCNC: 18 MG/DL — SIGNIFICANT CHANGE UP (ref 7–23)
CALCIUM SERPL-MCNC: 8.2 MG/DL — LOW (ref 8.5–10.5)
CALCIUM SERPL-MCNC: 8.3 MG/DL — LOW (ref 8.5–10.5)
CALCIUM SERPL-MCNC: 8.4 MG/DL — LOW (ref 8.5–10.5)
CHLORIDE SERPL-SCNC: 100 MMOL/L — SIGNIFICANT CHANGE UP (ref 96–108)
CHLORIDE SERPL-SCNC: 102 MMOL/L — SIGNIFICANT CHANGE UP (ref 96–108)
CHLORIDE SERPL-SCNC: 103 MMOL/L — SIGNIFICANT CHANGE UP (ref 96–108)
CO2 SERPL-SCNC: 23 MMOL/L — SIGNIFICANT CHANGE UP (ref 22–31)
CO2 SERPL-SCNC: 25 MMOL/L — SIGNIFICANT CHANGE UP (ref 22–31)
CO2 SERPL-SCNC: 29 MMOL/L — SIGNIFICANT CHANGE UP (ref 22–31)
CREAT SERPL-MCNC: 0.83 MG/DL — SIGNIFICANT CHANGE UP (ref 0.5–1.3)
CREAT SERPL-MCNC: 0.99 MG/DL — SIGNIFICANT CHANGE UP (ref 0.5–1.3)
CREAT SERPL-MCNC: 1.08 MG/DL — SIGNIFICANT CHANGE UP (ref 0.5–1.3)
GAS PNL BLDA: SIGNIFICANT CHANGE UP
GLUCOSE SERPL-MCNC: 120 MG/DL — HIGH (ref 70–99)
GLUCOSE SERPL-MCNC: 122 MG/DL — HIGH (ref 70–99)
GLUCOSE SERPL-MCNC: 128 MG/DL — HIGH (ref 70–99)
HCO3 BLDA-SCNC: 26 MMOL/L — SIGNIFICANT CHANGE UP (ref 21–28)
HCT VFR BLD CALC: 29.3 % — LOW (ref 39–50)
HCT VFR BLD CALC: 30.2 % — LOW (ref 39–50)
HCT VFR BLD CALC: 30.7 % — LOW (ref 39–50)
HGB BLD-MCNC: 10.2 G/DL — LOW (ref 13–17)
HGB BLD-MCNC: 10.3 G/DL — LOW (ref 13–17)
HGB BLD-MCNC: 9.6 G/DL — LOW (ref 13–17)
INR BLD: 1.23 — HIGH (ref 0.88–1.16)
INR BLD: 1.24 — HIGH (ref 0.88–1.16)
MAGNESIUM SERPL-MCNC: 2.1 MG/DL — SIGNIFICANT CHANGE UP (ref 1.6–2.4)
MAGNESIUM SERPL-MCNC: 2.3 MG/DL — SIGNIFICANT CHANGE UP (ref 1.6–2.4)
MCHC RBC-ENTMCNC: 32 PG — SIGNIFICANT CHANGE UP (ref 27–34)
MCHC RBC-ENTMCNC: 32.4 PG — SIGNIFICANT CHANGE UP (ref 27–34)
MCHC RBC-ENTMCNC: 32.5 PG — SIGNIFICANT CHANGE UP (ref 27–34)
MCHC RBC-ENTMCNC: 32.8 G/DL — SIGNIFICANT CHANGE UP (ref 32–36)
MCHC RBC-ENTMCNC: 33.6 G/DL — SIGNIFICANT CHANGE UP (ref 32–36)
MCHC RBC-ENTMCNC: 33.8 G/DL — SIGNIFICANT CHANGE UP (ref 32–36)
MCV RBC AUTO: 96.2 FL — SIGNIFICANT CHANGE UP (ref 80–100)
MCV RBC AUTO: 96.5 FL — SIGNIFICANT CHANGE UP (ref 80–100)
MCV RBC AUTO: 97.7 FL — SIGNIFICANT CHANGE UP (ref 80–100)
PCO2 BLDA: 37 MMHG — SIGNIFICANT CHANGE UP (ref 35–48)
PH BLDA: 7.47 — HIGH (ref 7.35–7.45)
PHOSPHATE SERPL-MCNC: 3.6 MG/DL — SIGNIFICANT CHANGE UP (ref 2.5–4.5)
PLATELET # BLD AUTO: 207 K/UL — SIGNIFICANT CHANGE UP (ref 150–400)
PLATELET # BLD AUTO: 220 K/UL — SIGNIFICANT CHANGE UP (ref 150–400)
PLATELET # BLD AUTO: 229 K/UL — SIGNIFICANT CHANGE UP (ref 150–400)
PO2 BLDA: 60 MMHG — LOW (ref 83–108)
POTASSIUM SERPL-MCNC: 4.1 MMOL/L — SIGNIFICANT CHANGE UP (ref 3.5–5.3)
POTASSIUM SERPL-MCNC: 4.3 MMOL/L — SIGNIFICANT CHANGE UP (ref 3.5–5.3)
POTASSIUM SERPL-MCNC: 4.6 MMOL/L — SIGNIFICANT CHANGE UP (ref 3.5–5.3)
POTASSIUM SERPL-SCNC: 4.1 MMOL/L — SIGNIFICANT CHANGE UP (ref 3.5–5.3)
POTASSIUM SERPL-SCNC: 4.3 MMOL/L — SIGNIFICANT CHANGE UP (ref 3.5–5.3)
POTASSIUM SERPL-SCNC: 4.6 MMOL/L — SIGNIFICANT CHANGE UP (ref 3.5–5.3)
PROT SERPL-MCNC: 6.3 G/DL — LOW (ref 6.4–8.2)
PROT SERPL-MCNC: 6.3 G/DL — LOW (ref 6.4–8.2)
PROTHROM AB SERPL-ACNC: 13.7 SEC — HIGH (ref 10–13.1)
PROTHROM AB SERPL-ACNC: 13.8 SEC — HIGH (ref 10–13.1)
RBC # BLD: 3 M/UL — LOW (ref 4.2–5.8)
RBC # BLD: 3.14 M/UL — LOW (ref 4.2–5.8)
RBC # BLD: 3.18 M/UL — LOW (ref 4.2–5.8)
RBC # FLD: 13.7 % — SIGNIFICANT CHANGE UP (ref 10.3–16.9)
RBC # FLD: 13.9 % — SIGNIFICANT CHANGE UP (ref 10.3–16.9)
RBC # FLD: 14.3 % — SIGNIFICANT CHANGE UP (ref 10.3–16.9)
SAO2 % BLDA: 92 % — LOW (ref 95–100)
SODIUM SERPL-SCNC: 134 MMOL/L — LOW (ref 135–145)
SODIUM SERPL-SCNC: 135 MMOL/L — SIGNIFICANT CHANGE UP (ref 135–145)
SODIUM SERPL-SCNC: 136 MMOL/L — SIGNIFICANT CHANGE UP (ref 135–145)
WBC # BLD: 10.4 K/UL — SIGNIFICANT CHANGE UP (ref 3.8–10.5)
WBC # BLD: 12 K/UL — HIGH (ref 3.8–10.5)
WBC # BLD: 9.9 K/UL — SIGNIFICANT CHANGE UP (ref 3.8–10.5)
WBC # FLD AUTO: 10.4 K/UL — SIGNIFICANT CHANGE UP (ref 3.8–10.5)
WBC # FLD AUTO: 12 K/UL — HIGH (ref 3.8–10.5)
WBC # FLD AUTO: 9.9 K/UL — SIGNIFICANT CHANGE UP (ref 3.8–10.5)

## 2017-03-07 PROCEDURE — 99291 CRITICAL CARE FIRST HOUR: CPT

## 2017-03-07 PROCEDURE — 71010: CPT | Mod: 26

## 2017-03-07 PROCEDURE — 99292 CRITICAL CARE ADDL 30 MIN: CPT

## 2017-03-07 PROCEDURE — 93010 ELECTROCARDIOGRAM REPORT: CPT

## 2017-03-07 PROCEDURE — 71010: CPT | Mod: 26,77

## 2017-03-07 RX ORDER — FENTANYL CITRATE 50 UG/ML
12.5 INJECTION INTRAVENOUS ONCE
Qty: 0 | Refills: 0 | Status: DISCONTINUED | OUTPATIENT
Start: 2017-03-07 | End: 2017-03-07

## 2017-03-07 RX ORDER — INSULIN GLARGINE 100 [IU]/ML
24 INJECTION, SOLUTION SUBCUTANEOUS EVERY MORNING
Qty: 0 | Refills: 0 | Status: DISCONTINUED | OUTPATIENT
Start: 2017-03-07 | End: 2017-03-11

## 2017-03-07 RX ORDER — FUROSEMIDE 40 MG
10 TABLET ORAL ONCE
Qty: 0 | Refills: 0 | Status: COMPLETED | OUTPATIENT
Start: 2017-03-07 | End: 2017-03-07

## 2017-03-07 RX ORDER — ACETAMINOPHEN 500 MG
1000 TABLET ORAL ONCE
Qty: 0 | Refills: 0 | Status: COMPLETED | OUTPATIENT
Start: 2017-03-07 | End: 2017-03-07

## 2017-03-07 RX ORDER — GLUCAGON INJECTION, SOLUTION 0.5 MG/.1ML
1 INJECTION, SOLUTION SUBCUTANEOUS ONCE
Qty: 0 | Refills: 0 | Status: DISCONTINUED | OUTPATIENT
Start: 2017-03-07 | End: 2017-03-12

## 2017-03-07 RX ORDER — DEXTROSE 50 % IN WATER 50 %
12.5 SYRINGE (ML) INTRAVENOUS ONCE
Qty: 0 | Refills: 0 | Status: DISCONTINUED | OUTPATIENT
Start: 2017-03-07 | End: 2017-03-12

## 2017-03-07 RX ORDER — SODIUM CHLORIDE 9 MG/ML
1000 INJECTION, SOLUTION INTRAVENOUS
Qty: 0 | Refills: 0 | Status: DISCONTINUED | OUTPATIENT
Start: 2017-03-07 | End: 2017-03-08

## 2017-03-07 RX ORDER — DEXTROSE 50 % IN WATER 50 %
25 SYRINGE (ML) INTRAVENOUS ONCE
Qty: 0 | Refills: 0 | Status: DISCONTINUED | OUTPATIENT
Start: 2017-03-07 | End: 2017-03-08

## 2017-03-07 RX ORDER — ALBUMIN HUMAN 25 %
250 VIAL (ML) INTRAVENOUS ONCE
Qty: 0 | Refills: 0 | Status: COMPLETED | OUTPATIENT
Start: 2017-03-07 | End: 2017-03-08

## 2017-03-07 RX ORDER — DEXTROSE 50 % IN WATER 50 %
1 SYRINGE (ML) INTRAVENOUS ONCE
Qty: 0 | Refills: 0 | Status: DISCONTINUED | OUTPATIENT
Start: 2017-03-07 | End: 2017-03-12

## 2017-03-07 RX ORDER — FUROSEMIDE 40 MG
20 TABLET ORAL ONCE
Qty: 0 | Refills: 0 | Status: COMPLETED | OUTPATIENT
Start: 2017-03-07 | End: 2017-03-07

## 2017-03-07 RX ORDER — POTASSIUM CHLORIDE 20 MEQ
20 PACKET (EA) ORAL ONCE
Qty: 0 | Refills: 0 | Status: COMPLETED | OUTPATIENT
Start: 2017-03-07 | End: 2017-03-07

## 2017-03-07 RX ORDER — HYDROMORPHONE HYDROCHLORIDE 2 MG/ML
0.5 INJECTION INTRAMUSCULAR; INTRAVENOUS; SUBCUTANEOUS ONCE
Qty: 0 | Refills: 0 | Status: DISCONTINUED | OUTPATIENT
Start: 2017-03-07 | End: 2017-03-07

## 2017-03-07 RX ORDER — AMIODARONE HYDROCHLORIDE 400 MG/1
150 TABLET ORAL ONCE
Qty: 0 | Refills: 0 | Status: COMPLETED | OUTPATIENT
Start: 2017-03-07 | End: 2017-03-08

## 2017-03-07 RX ORDER — INSULIN LISPRO 100/ML
4 VIAL (ML) SUBCUTANEOUS
Qty: 0 | Refills: 0 | Status: DISCONTINUED | OUTPATIENT
Start: 2017-03-07 | End: 2017-03-12

## 2017-03-07 RX ORDER — INSULIN LISPRO 100/ML
VIAL (ML) SUBCUTANEOUS
Qty: 0 | Refills: 0 | Status: DISCONTINUED | OUTPATIENT
Start: 2017-03-07 | End: 2017-03-12

## 2017-03-07 RX ADMIN — Medication 10 MILLIGRAM(S): at 12:25

## 2017-03-07 RX ADMIN — FAMOTIDINE 20 MILLIGRAM(S): 10 INJECTION INTRAVENOUS at 12:24

## 2017-03-07 RX ADMIN — Medication 125 MILLILITER(S): at 01:00

## 2017-03-07 RX ADMIN — Medication 125 MILLILITER(S): at 04:00

## 2017-03-07 RX ADMIN — HYDROMORPHONE HYDROCHLORIDE 0.5 MILLIGRAM(S): 2 INJECTION INTRAMUSCULAR; INTRAVENOUS; SUBCUTANEOUS at 16:05

## 2017-03-07 RX ADMIN — HYDROMORPHONE HYDROCHLORIDE 0.5 MILLIGRAM(S): 2 INJECTION INTRAMUSCULAR; INTRAVENOUS; SUBCUTANEOUS at 15:47

## 2017-03-07 RX ADMIN — Medication 400 MILLIGRAM(S): at 20:56

## 2017-03-07 RX ADMIN — Medication 0.5 MILLIGRAM(S): at 17:31

## 2017-03-07 RX ADMIN — HEPARIN SODIUM 5000 UNIT(S): 5000 INJECTION INTRAVENOUS; SUBCUTANEOUS at 14:28

## 2017-03-07 RX ADMIN — FENTANYL CITRATE 12.5 MICROGRAM(S): 50 INJECTION INTRAVENOUS at 21:09

## 2017-03-07 RX ADMIN — Medication 500 MICROGRAM(S): at 17:30

## 2017-03-07 RX ADMIN — Medication 81 MILLIGRAM(S): at 12:24

## 2017-03-07 RX ADMIN — SIMVASTATIN 40 MILLIGRAM(S): 20 TABLET, FILM COATED ORAL at 21:40

## 2017-03-07 RX ADMIN — Medication 500 MICROGRAM(S): at 00:10

## 2017-03-07 RX ADMIN — Medication 1000 MILLIGRAM(S): at 21:46

## 2017-03-07 RX ADMIN — Medication 100 MILLIGRAM(S): at 01:00

## 2017-03-07 RX ADMIN — Medication 500 MICROGRAM(S): at 04:35

## 2017-03-07 RX ADMIN — HEPARIN SODIUM 5000 UNIT(S): 5000 INJECTION INTRAVENOUS; SUBCUTANEOUS at 06:10

## 2017-03-07 RX ADMIN — Medication 500 MICROGRAM(S): at 11:52

## 2017-03-07 RX ADMIN — Medication 0.5 MILLIGRAM(S): at 04:35

## 2017-03-07 RX ADMIN — FENTANYL CITRATE 12.5 MICROGRAM(S): 50 INJECTION INTRAVENOUS at 20:49

## 2017-03-07 RX ADMIN — INSULIN GLARGINE 24 UNIT(S): 100 INJECTION, SOLUTION SUBCUTANEOUS at 11:54

## 2017-03-07 RX ADMIN — Medication 500 MICROGRAM(S): at 23:30

## 2017-03-07 RX ADMIN — Medication 100 MILLIEQUIVALENT(S): at 23:05

## 2017-03-07 RX ADMIN — Medication 25 MICROGRAM(S): at 06:10

## 2017-03-07 RX ADMIN — Medication 100 MILLIGRAM(S): at 09:04

## 2017-03-07 RX ADMIN — Medication 100 MILLIGRAM(S): at 17:10

## 2017-03-07 RX ADMIN — Medication 4 UNIT(S): at 17:09

## 2017-03-07 RX ADMIN — Medication 20 MILLIGRAM(S): at 23:05

## 2017-03-07 RX ADMIN — CLOPIDOGREL BISULFATE 75 MILLIGRAM(S): 75 TABLET, FILM COATED ORAL at 12:24

## 2017-03-07 RX ADMIN — HEPARIN SODIUM 5000 UNIT(S): 5000 INJECTION INTRAVENOUS; SUBCUTANEOUS at 21:40

## 2017-03-07 NOTE — PHYSICAL THERAPY INITIAL EVALUATION ADULT - PERTINENT HX OF CURRENT PROBLEM, REHAB EVAL
80 yo male with near syncopal episode sent from PMD's office found to have CAD now POD # 1 OPCABG x 3 via median sternotomy

## 2017-03-07 NOTE — PROGRESS NOTE ADULT - SUBJECTIVE AND OBJECTIVE BOX
Pt with 1 mediastinal and 1 Left PLeural CT.  Minimal output overnight and no appreciable air leak.  Per Dr. Funez request, both CTs removed at bedside without complaints.  Sutures secured and occlusive DSD placed.  Pt tolerated procedure well.  CXR after shows no obvious pneumothorax.

## 2017-03-07 NOTE — PHYSICAL THERAPY INITIAL EVALUATION ADULT - PLANNED THERAPY INTERVENTIONS, PT EVAL
strengthening/transfer training/balance training/postural re-education/bed mobility training/gait training

## 2017-03-07 NOTE — PROGRESS NOTE ADULT - SUBJECTIVE AND OBJECTIVE BOX
CTICU  CRITICAL  CARE  attending     Hand off received 					   Pertinent clinical, laboratory, radiographic, hemodynamic, echocardiographic, respiratory data, microbiologic data and chart were reviewed and analyzed frequently throughout the course of the day and night  Patient seen and examined with CTS/ SH attending at bedside  Pt is a 81y , Male, HEALTH ISSUES - PROBLEM Dx:  Hypertension: Hypertension  High cholesterol: High cholesterol  PVCs (premature ventricular contractions): PVCs (premature ventricular contractions)  ACS (acute coronary syndrome): ACS (acute coronary syndrome)  CKD (chronic kidney disease), stage III: CKD (chronic kidney disease), stage III  Hypothyroidism: Hypothyroidism  Diabetes: Diabetes  COPD (chronic obstructive pulmonary disease): COPD (chronic obstructive pulmonary disease)  HTN (hypertension): HTN (hypertension)  Near syncope: Near syncope  Chest pain: Chest pain      , FAMILY HISTORY:  Family history of acute myocardial infarction (Father)  PAST MEDICAL & SURGICAL HISTORY:  High cholesterol  Diabetes  Prostate cancer: in remission  Hypertension  COPD (chronic obstructive pulmonary disease)  History of cataract surgery: b/l  History of knee replacement: b/l    Patient is a 81y old  Male who presents with a chief complaint of Near Syncope and C/P (28 Feb 2017 17:56)      14 system review was unremarkable  acute changes include acute respiratory failure  Vital signs, hemodynamic and respiratory parameters were reviewed from the bedside nursing flowsheet.  ICU Vital Signs Last 24 Hrs  T(C): 38.6, Max: 38.6 (03-07 @ 21:00)  T(F): 101.4, Max: 101.4 (03-07 @ 21:00)  HR: 106 (62 - 108)  BP: 136/63 (104/72 - 136/63)  BP(mean): 92 (79 - 92)  ABP: 138/50 (108/42 - 163/50)  ABP(mean): 74 (58 - 776)  RR: 24 (12 - 28)  SpO2: 96% (92% - 100%)    Adult Advanced Hemodynamics Last 24 Hrs  CVP(mm Hg): 9 (1 - 19)  CVP(cm H2O): --  CO: --  CI: --  PA: --  PA(mean): --  PCWP: --  SVR: --  SVRI: --  PVR: --  PVRI: --, ABG - ( 07 Mar 2017 20:43 )  pH: 7.47  /  pCO2: 37    /  pO2: 60    / HCO3: 26    / Base Excess: 2.3   /  SaO2: 92                Mode: standby    Intake and output was reviewed and the fluid balance was calculated  Daily     Daily   I&O's Summary  I & Os for 24h ending 07 Mar 2017 07:00  =============================================  IN: 971.3 ml / OUT: 1592 ml / NET: -620.7 ml    I & Os for current day (as of 07 Mar 2017 21:44)  =============================================  IN: 290 ml / OUT: 1025 ml / NET: -735 ml      All lines and drain sites were assessed  Glycemic trend was reviewedCAPILLARY BLOOD GLUCOSE  122 (07 Mar 2017 16:00)    No acute change in mental status  Auscultation of the chest reveals equal bs  Abdomen is soft  Extremities are warm and well perfused  Wounds appear clean and unremarkable  Antibiotics are periop    labs  CBC Full  -  ( 07 Mar 2017 20:43 )  WBC Count : 10.4 K/uL  Hemoglobin : 9.6 g/dL  Hematocrit : 29.3 %  Platelet Count - Automated : 207 K/uL  Mean Cell Volume : 97.7 fL  Mean Cell Hemoglobin : 32.0 pg  Mean Cell Hemoglobin Concentration : 32.8 g/dL  Auto Neutrophil # : x  Auto Lymphocyte # : x  Auto Monocyte # : x  Auto Eosinophil # : x  Auto Basophil # : x  Auto Neutrophil % : x  Auto Lymphocyte % : x  Auto Monocyte % : x  Auto Eosinophil % : x  Auto Basophil % : x    07 Mar 2017 20:43    135    |  100    |  18     ----------------------------<  128    4.1     |  29     |  1.08     Ca    8.4        07 Mar 2017 20:43  Phos  3.6       07 Mar 2017 03:22  Mg     2.1       07 Mar 2017 11:52    TPro  6.3    /  Alb  2.7    /  TBili  0.3    /  DBili  x      /  AST  19     /  ALT  18     /  AlkPhos  41     07 Mar 2017 20:43    PT/INR - ( 07 Mar 2017 11:53 )   PT: 13.7 sec;   INR: 1.23          PTT - ( 07 Mar 2017 11:53 )  PTT:25.3 sec  The current medications were reviewed   MEDICATIONS  (STANDING):  sodium chloride 0.45%. 1000milliLiter(s) IV Continuous <Continuous>  aspirin enteric coated 81milliGRAM(s) Oral daily  clopidogrel Tablet 75milliGRAM(s) Oral daily  heparin  Injectable 5000Unit(s) SubCutaneous every 8 hours  ceFAZolin   IVPB 2000milliGRAM(s) IV Intermittent every 8 hours  simvastatin 40milliGRAM(s) Oral at bedtime  levothyroxine Injectable 25MICROGram(s) IV Push daily  buDESOnide   0.5 milliGRAM(s) Respule 0.5milliGRAM(s) Inhalation every 12 hours  ipratropium    for Nebulization 500MICROGram(s) Inhalation every 6 hours  famotidine    Tablet 20milliGRAM(s) Oral daily  insulin glargine Injectable (LANTUS) 24Unit(s) SubCutaneous every morning  insulin lispro Injectable (HumaLOG) 4Unit(s) SubCutaneous three times a day before meals  insulin lispro (HumaLOG) corrective regimen sliding scale  SubCutaneous Before meals and at bedtime  dextrose 5%. 1000milliLiter(s) IV Continuous <Continuous>  dextrose 50% Injectable 12.5Gram(s) IV Push once  dextrose 50% Injectable 25Gram(s) IV Push once  dextrose 50% Injectable 25Gram(s) IV Push once  diltiazem    Tablet 30milliGRAM(s) Oral every 6 hours    MEDICATIONS  (PRN):  dextrose Gel 1Dose(s) Oral once PRN Blood Glucose LESS THAN 70 milliGRAM(s)/deciliter  glucagon  Injectable 1milliGRAM(s) IntraMuscular once PRN Glucose LESS THAN 70 milligrams/deciliter       PROBLEM LIST/ ASSESSMENT:  HEALTH ISSUES - PROBLEM Dx:  Hypertension: Hypertension  High cholesterol: High cholesterol  PVCs (premature ventricular contractions): PVCs (premature ventricular contractions)  ACS (acute coronary syndrome): ACS (acute coronary syndrome)  CKD (chronic kidney disease), stage III: CKD (chronic kidney disease), stage III  Hypothyroidism: Hypothyroidism  Diabetes: Diabetes  COPD (chronic obstructive pulmonary disease): COPD (chronic obstructive pulmonary disease)  HTN (hypertension): HTN (hypertension)  Near syncope: Near syncope  Chest pain: Chest pain      ,   Patient is a 81y old  Male who presents with a chief complaint of Near Syncope and C/P (28 Feb 2017 17:56)     s/p opcab  acute changes include acute respiratory failure    My plan includes :  close hemodynamic, ventilatory and drain monitoring and management per post op routine    Monitor for arrhythmias and monitor parameters for organ perfusion  monitor neurologic status  Head of the bed should remain elevated to 45 deg .   chest PT and IS will be encouraged  monitor adequacy of oxygenation and ventilation and attempt to wean oxygen  Nutritional goals will be met using po eventually , ensure adequate caloric intake and montior the same  Stress ulcer and VTE prophylaxis will be achieved    Glycemic control is satisfactory  Electrolytes have been repleted as necessary and wound care has been carried out. Pain control has been achieved.   agressive physical therapy and early mobility and ambulation goals will be met   The family was updated about the course and plan  CRITICAL CARE TIME SPENT in evaluation and management, reassessments, review and interpretation of labs and x-rays, ventilator and hemodynamic management, formulating a plan and coordinating care: ___90____ MIN.  Time does not include procedural time.  CTICU ATTENDING     					    Alexsander Payne MD

## 2017-03-07 NOTE — PHYSICAL THERAPY INITIAL EVALUATION ADULT - GENERAL OBSERVATIONS, REHAB EVAL
Patient received semi fowlers (+) HFNC at Fi02 60%, TPM, EKG, Chest tube to walla suction, cordis, Left arterial line, cook, no apparent distress, patient ambulating on BiPAP peep 10

## 2017-03-07 NOTE — PHYSICAL THERAPY INITIAL EVALUATION ADULT - IMPAIRMENTS FOUND, PT EVAL
ventilation and respiration/gas exchange/gait, locomotion, and balance/posture/aerobic capacity/endurance

## 2017-03-07 NOTE — PHYSICAL THERAPY INITIAL EVALUATION ADULT - CRITERIA FOR SKILLED THERAPEUTIC INTERVENTIONS
rehab potential/impairments found/therapy frequency/anticipated discharge recommendation/functional limitations in following categories

## 2017-03-07 NOTE — PHYSICAL THERAPY INITIAL EVALUATION ADULT - RANGE OF MOTION EXAMINATION, REHAB EVAL
within UE sternal precautions/bilateral upper extremity ROM was WFL (within functional limits)/bilateral lower extremity ROM was WFL (within functional limits)

## 2017-03-07 NOTE — PHYSICAL THERAPY INITIAL EVALUATION ADULT - MODALITIES TREATMENT COMMENTS
Reviewed splinted coughing, incentive spirometer (discussed not performed), sternal precautions. Seated Home exercise program: hip/knee flexion, long arc quads and ankle pumps

## 2017-03-07 NOTE — PHYSICAL THERAPY INITIAL EVALUATION ADULT - ADDITIONAL COMMENTS
Patient lives alone (wife passed away 6 weeks ago), no DME use prior to admission has walker at home no steps upon entry and no reported falls, required 2 rests for 1 city block.

## 2017-03-07 NOTE — PHYSICAL THERAPY INITIAL EVALUATION ADULT - GAIT DEVIATIONS NOTED, PT EVAL
decreased step length/decreased stride length/decreased jazlyn/3 standing rest breaks of 45 second duration

## 2017-03-08 DIAGNOSIS — J44.9 CHRONIC OBSTRUCTIVE PULMONARY DISEASE, UNSPECIFIED: ICD-10-CM

## 2017-03-08 LAB
ALBUMIN SERPL ELPH-MCNC: 2.8 G/DL — LOW (ref 3.4–5)
ALP SERPL-CCNC: 39 U/L — LOW (ref 40–120)
ALT FLD-CCNC: 15 U/L — SIGNIFICANT CHANGE UP (ref 12–42)
ANION GAP SERPL CALC-SCNC: 9 MMOL/L — SIGNIFICANT CHANGE UP (ref 9–16)
APTT BLD: 28 SEC — SIGNIFICANT CHANGE UP (ref 27.5–37.4)
AST SERPL-CCNC: 22 U/L — SIGNIFICANT CHANGE UP (ref 15–37)
BASE EXCESS BLDA CALC-SCNC: 0.7 MMOL/L — SIGNIFICANT CHANGE UP (ref -2–3)
BILIRUB SERPL-MCNC: 0.5 MG/DL — SIGNIFICANT CHANGE UP (ref 0.2–1.2)
BUN SERPL-MCNC: 23 MG/DL — SIGNIFICANT CHANGE UP (ref 7–23)
CALCIUM SERPL-MCNC: 7.8 MG/DL — LOW (ref 8.5–10.5)
CHLORIDE SERPL-SCNC: 101 MMOL/L — SIGNIFICANT CHANGE UP (ref 96–108)
CO2 SERPL-SCNC: 24 MMOL/L — SIGNIFICANT CHANGE UP (ref 22–31)
CREAT SERPL-MCNC: 1.09 MG/DL — SIGNIFICANT CHANGE UP (ref 0.5–1.3)
GAS PNL BLDA: SIGNIFICANT CHANGE UP
GLUCOSE SERPL-MCNC: 107 MG/DL — HIGH (ref 70–99)
HCO3 BLDA-SCNC: 25 MMOL/L — SIGNIFICANT CHANGE UP (ref 21–28)
HCT VFR BLD CALC: 27.9 % — LOW (ref 39–50)
HGB BLD-MCNC: 9.3 G/DL — LOW (ref 13–17)
INR BLD: 1.27 — HIGH (ref 0.88–1.16)
MCHC RBC-ENTMCNC: 32.3 PG — SIGNIFICANT CHANGE UP (ref 27–34)
MCHC RBC-ENTMCNC: 33.3 G/DL — SIGNIFICANT CHANGE UP (ref 32–36)
MCV RBC AUTO: 96.9 FL — SIGNIFICANT CHANGE UP (ref 80–100)
PCO2 BLDA: 37 MMHG — SIGNIFICANT CHANGE UP (ref 35–48)
PH BLDA: 7.44 — SIGNIFICANT CHANGE UP (ref 7.35–7.45)
PLATELET # BLD AUTO: 206 K/UL — SIGNIFICANT CHANGE UP (ref 150–400)
PO2 BLDA: 76 MMHG — LOW (ref 83–108)
POTASSIUM SERPL-MCNC: 3.9 MMOL/L — SIGNIFICANT CHANGE UP (ref 3.5–5.3)
POTASSIUM SERPL-SCNC: 3.9 MMOL/L — SIGNIFICANT CHANGE UP (ref 3.5–5.3)
PROT SERPL-MCNC: 6.1 G/DL — LOW (ref 6.4–8.2)
PROTHROM AB SERPL-ACNC: 14.1 SEC — HIGH (ref 10–13.1)
RBC # BLD: 2.88 M/UL — LOW (ref 4.2–5.8)
RBC # FLD: 14.2 % — SIGNIFICANT CHANGE UP (ref 10.3–16.9)
SAO2 % BLDA: 96 % — SIGNIFICANT CHANGE UP (ref 95–100)
SODIUM SERPL-SCNC: 134 MMOL/L — LOW (ref 135–145)
WBC # BLD: 10.8 K/UL — HIGH (ref 3.8–10.5)
WBC # FLD AUTO: 10.8 K/UL — HIGH (ref 3.8–10.5)

## 2017-03-08 PROCEDURE — 71010: CPT | Mod: 26

## 2017-03-08 PROCEDURE — 99291 CRITICAL CARE FIRST HOUR: CPT

## 2017-03-08 RX ORDER — AMIODARONE HYDROCHLORIDE 400 MG/1
150 TABLET ORAL ONCE
Qty: 0 | Refills: 0 | Status: COMPLETED | OUTPATIENT
Start: 2017-03-08 | End: 2017-03-08

## 2017-03-08 RX ORDER — AMIODARONE HYDROCHLORIDE 400 MG/1
200 TABLET ORAL DAILY
Qty: 0 | Refills: 0 | Status: DISCONTINUED | OUTPATIENT
Start: 2017-03-12 | End: 2017-03-12

## 2017-03-08 RX ORDER — AMIODARONE HYDROCHLORIDE 400 MG/1
400 TABLET ORAL EVERY 8 HOURS
Qty: 0 | Refills: 0 | Status: COMPLETED | OUTPATIENT
Start: 2017-03-08 | End: 2017-03-11

## 2017-03-08 RX ORDER — FUROSEMIDE 40 MG
20 TABLET ORAL ONCE
Qty: 0 | Refills: 0 | Status: COMPLETED | OUTPATIENT
Start: 2017-03-08 | End: 2017-03-08

## 2017-03-08 RX ORDER — ACETAMINOPHEN 500 MG
650 TABLET ORAL EVERY 6 HOURS
Qty: 0 | Refills: 0 | Status: COMPLETED | OUTPATIENT
Start: 2017-03-08 | End: 2017-03-08

## 2017-03-08 RX ORDER — AMIODARONE HYDROCHLORIDE 400 MG/1
200 TABLET ORAL DAILY
Qty: 0 | Refills: 0 | Status: COMPLETED | OUTPATIENT
Start: 2017-03-12 | End: 2018-02-08

## 2017-03-08 RX ORDER — LEVOTHYROXINE SODIUM 125 MCG
50 TABLET ORAL DAILY
Qty: 0 | Refills: 0 | Status: DISCONTINUED | OUTPATIENT
Start: 2017-03-08 | End: 2017-03-12

## 2017-03-08 RX ORDER — FLUTICASONE PROPIONATE AND SALMETEROL 50; 250 UG/1; UG/1
1 POWDER ORAL; RESPIRATORY (INHALATION)
Qty: 0 | Refills: 0 | Status: DISCONTINUED | OUTPATIENT
Start: 2017-03-08 | End: 2017-03-12

## 2017-03-08 RX ADMIN — AMIODARONE HYDROCHLORIDE 400 MILLIGRAM(S): 400 TABLET ORAL at 14:12

## 2017-03-08 RX ADMIN — Medication 20 MILLIGRAM(S): at 11:57

## 2017-03-08 RX ADMIN — AMIODARONE HYDROCHLORIDE 400 MILLIGRAM(S): 400 TABLET ORAL at 22:31

## 2017-03-08 RX ADMIN — Medication 81 MILLIGRAM(S): at 11:52

## 2017-03-08 RX ADMIN — Medication 0.5 MILLIGRAM(S): at 06:01

## 2017-03-08 RX ADMIN — Medication 4 UNIT(S): at 08:33

## 2017-03-08 RX ADMIN — Medication 4 UNIT(S): at 17:07

## 2017-03-08 RX ADMIN — Medication 4 UNIT(S): at 11:35

## 2017-03-08 RX ADMIN — Medication 650 MILLIGRAM(S): at 22:31

## 2017-03-08 RX ADMIN — Medication 25 MICROGRAM(S): at 05:56

## 2017-03-08 RX ADMIN — AMIODARONE HYDROCHLORIDE 400 MILLIGRAM(S): 400 TABLET ORAL at 06:00

## 2017-03-08 RX ADMIN — Medication 100 MILLIGRAM(S): at 00:50

## 2017-03-08 RX ADMIN — CLOPIDOGREL BISULFATE 75 MILLIGRAM(S): 75 TABLET, FILM COATED ORAL at 11:52

## 2017-03-08 RX ADMIN — AMIODARONE HYDROCHLORIDE 400 MILLIGRAM(S): 400 TABLET ORAL at 01:37

## 2017-03-08 RX ADMIN — SIMVASTATIN 40 MILLIGRAM(S): 20 TABLET, FILM COATED ORAL at 22:31

## 2017-03-08 RX ADMIN — Medication 500 MICROGRAM(S): at 18:00

## 2017-03-08 RX ADMIN — AMIODARONE HYDROCHLORIDE 600 MILLIGRAM(S): 400 TABLET ORAL at 23:30

## 2017-03-08 RX ADMIN — Medication 500 MICROGRAM(S): at 06:02

## 2017-03-08 RX ADMIN — HEPARIN SODIUM 5000 UNIT(S): 5000 INJECTION INTRAVENOUS; SUBCUTANEOUS at 22:32

## 2017-03-08 RX ADMIN — INSULIN GLARGINE 24 UNIT(S): 100 INJECTION, SOLUTION SUBCUTANEOUS at 08:33

## 2017-03-08 RX ADMIN — Medication 0.5 MILLIGRAM(S): at 18:00

## 2017-03-08 RX ADMIN — FAMOTIDINE 20 MILLIGRAM(S): 10 INJECTION INTRAVENOUS at 11:52

## 2017-03-08 RX ADMIN — Medication 500 MICROGRAM(S): at 12:00

## 2017-03-08 RX ADMIN — FLUTICASONE PROPIONATE AND SALMETEROL 1 DOSE(S): 50; 250 POWDER ORAL; RESPIRATORY (INHALATION) at 22:35

## 2017-03-08 RX ADMIN — HEPARIN SODIUM 5000 UNIT(S): 5000 INJECTION INTRAVENOUS; SUBCUTANEOUS at 05:56

## 2017-03-08 RX ADMIN — Medication 125 MILLILITER(S): at 23:45

## 2017-03-08 RX ADMIN — HEPARIN SODIUM 5000 UNIT(S): 5000 INJECTION INTRAVENOUS; SUBCUTANEOUS at 14:11

## 2017-03-08 RX ADMIN — AMIODARONE HYDROCHLORIDE 600 MILLIGRAM(S): 400 TABLET ORAL at 01:33

## 2017-03-08 RX ADMIN — Medication 650 MILLIGRAM(S): at 23:00

## 2017-03-08 NOTE — CHART NOTE - NSCHARTNOTEFT_GEN_A_CORE
The nurse reported that patient was found sitting on floor by the toilet.  He denied falling, explaining that he slid to the floor on to his buttocks.  Patient was examined and no signs of trauma to his head was found.  There was a small abrasion along the crease of his left buttocks, but otherwise no other evidence of a traumatic fall. He was A&O x3.  This was reported to the ICU attending.

## 2017-03-08 NOTE — CONSULT NOTE ADULT - ATTENDING COMMENTS
82yo M with CAD and moderate AS.  Repeat echo shows aortic valve calcifications and moderate stenosis.  Indication for OPCAB and TAVR in the future.
Seen and examined by me - see note 3/9.

## 2017-03-08 NOTE — CONSULT NOTE ADULT - PROBLEM SELECTOR RECOMMENDATION 9
COPD w/ asthma overlap features on prior PFTS. Compliant w/ home advair BID and albuterol PRN (req. ~ 1x/day) w/ moderate control of symptoms and exacerbation of dyspnea sxs and exertional fatigue likely due to underlying cardiac dx.   Denies change in cough/sputum, no f/c to suggest acute infection, no clear evidence for COPD exacerbation   - would change pulmicort to ICS/Laba w/ Advair 250-50 BID  - c/w Ipratroprium q 6, and add albuterol PRN  - OOB w/ assistance (able to ambulate on 40% VM today w/ RR high 20s)  - wean O2 as tolerated  - C/w Incentive spirometry COPD w/ asthma overlap features on prior PFTS. Compliant w/ home advair BID and albuterol PRN (req. ~ 1x/day) w/ moderate control of symptoms and exacerbation of dyspnea sxs and exertional fatigue likely due to underlying cardiac dx.   Denies change in cough/sputum, no f/c to suggest acute infection, no clear evidence for COPD exacerbation   - would change pulmicort to ICS/Laba w/ Advair 250-50 BID; currently in NSR w/o tachycardia and could likely tolerate B-agonist  - c/w Ipratroprium q 6, and add albuterol PRN  - OOB w/ assistance (able to ambulate on 40% VM today w/ RR high 20s)  - wean O2 as tolerated  - C/w Incentive spirometry  - CXR improved after IV diuresis w/ lasix 20mg BID yesterday/this AM, would c/w lasix as needed as still some evidence of pulm edema  - DVT ppx    d/w attending Dr Yoo COPD w/ asthma overlap features on prior PFTs. Compliant w/ home advair BID and albuterol PRN (req. ~ 1x/day) w/ moderate control of symptoms and exacerbation of dyspnea sxs and exertional fatigue likely due to underlying cardiac dx.   Denies change in cough/sputum, no f/c to suggest acute infection, no clear evidence for COPD exacerbation   - would change pulmicort to ICS/Laba w/ Advair 250-50 BID; currently in NSR w/o tachycardia and could likely tolerate B-agonist  - c/w Ipratroprium q 6, and add albuterol PRN  - OOB w/ assistance (able to ambulate on 40% VM today w/ RR high 20s)  - wean O2 as tolerated  - C/w Incentive spirometry  - CXR improved after IV diuresis w/ lasix 20mg BID yesterday/this AM, would c/w lasix as needed as still some evidence of pulm edema  - DVT ppx    d/w attending Dr Yoo

## 2017-03-08 NOTE — CONSULT NOTE ADULT - ASSESSMENT
82 y/o male w/ h/o COPD/asthma overlap, CAD, A flutter, HTN, DM, Prostate CA, 45 Pack year former smoker admitted for syncope/chest pain s/p OPCAB x3v POD #2, seen for COPD.

## 2017-03-08 NOTE — CONSULT NOTE ADULT - SUBJECTIVE AND OBJECTIVE BOX
Patient is a 81y old  Male who presents with a chief complaint of Near Syncope and C/P (2017 17:56)      HPI:  80 y/o obese male former smoker (quit age 50, 1.5 ppd x ~30 years, with w/ PMHX HTN, HLD, DM (Non-Compliant w/ Metformin), Aflutter s/p Ablation in  at [facility], COPD (w/o h/o hospitalizations or intubation, rx'd w/ advair BID and albuterol PRN ~1x/day), Hypothyroidism, Prostate CA s/p seed implant in  who originally presented c/o 2 episodes of near syncope in the setting of C/P within the past 2 weeks. Pt reports dizziness, nausea, palpitations, fatigue and 5-6/10 chest tightness across the top of the chest. Pt reported h/o chronic CASON 1/2 block exercise capacity, and chronic ankle LE edema. Pt denies any new vision changes, vertigo, numbness/tingling in extremities, amaurosis fugax, focal extremity weakness. In ER /74, HR 82, RR 20, T 98.3, O2 92-93% on RA. Labs significant for CE neg. x 1. K 4.0. EKG shows Sinus Rhythm at 100bpm w/ 4 PVCs, + RBBB, LAFB, no clear ST depressions, no TWI. In ED pt noted to have frequent PVCs. CXR showed clear lungs. Pt currently Assx. Echo from MD office 2016 showed basal anterior, mid anterior and mid anterolateral hypokinesis, EF 50-55%, LA mild dilated, grade 1 diastolic dysfunction, borderline AS. UA pending.  Pt reports h/o stress test 1 year ago, results unknown. Note, pts wife  within the last month and son  within the past 2 months. Pt denies fever/chills, dysuria, abdominal pain, diarrhea. + Dry cough.    Interval Hospital Events: Patient       REVIEW OF SYSTEMS:  Constitutional: No fever, weight loss or fatigue  Eyes: No eye pain, visual disturbances, or discharge  ENMT:  No difficulty hearing, tinnitus, vertigo; No sinus or throat pain  Neck: No pain, stiffness or neck swelling  Respiratory:   Cardiovascular: No current CP - mild pain at site of sternotomy  Gastrointestinal: No abdominal or epigastric pain. No nausea, vomiting or hematemesis; No diarrhea or constipation. No melena or hematochezia.  Genitourinary: No dysuria, frequency, hematuria or incontinence  Neurological: No headaches, memory loss, loss of strength, numbness or tremors  Skin: No itching, burning, rashes or lesions   Lymph Nodes: No enlarged glands  Endocrine: No heat or cold intolerance; No hair loss  Musculoskeletal: + B/l knee pain and finger "stiffness"  Psychiatric: No depression, anxiety, mood swings or difficulty sleeping  Heme/Lymph: No easy bruising or bleeding gums  Allergy and Immunologic: No hives or eczema    PAST MEDICAL & SURGICAL HISTORY:  High cholesterol  Diabetes  Prostate cancer: in remission  Hypertension  COPD (chronic obstructive pulmonary disease)  History of cataract surgery: b/l  History of knee replacement: b/l      FAMILY HISTORY:  Family history of acute myocardial infarction (Father)      SOCIAL HISTORY:  Smoking Status: [ ] Current, [X ] Former, [ ] Never  Pack Years: 45    MEDICATIONS:  Pulmonary:  buDESOnide   0.5 milliGRAM(s) Respule 0.5milliGRAM(s) Inhalation every 12 hours  ipratropium    for Nebulization 500MICROGram(s) Inhalation every 6 hours    Antimicrobials:    Anticoagulants:  aspirin enteric coated 81milliGRAM(s) Oral daily  clopidogrel Tablet 75milliGRAM(s) Oral daily  heparin  Injectable 5000Unit(s) SubCutaneous every 8 hours    Onc:    GI/:  famotidine    Tablet 20milliGRAM(s) Oral daily    Endocrine:  simvastatin 40milliGRAM(s) Oral at bedtime  insulin glargine Injectable (LANTUS) 24Unit(s) SubCutaneous every morning  insulin lispro Injectable (HumaLOG) 4Unit(s) SubCutaneous three times a day before meals  insulin lispro (HumaLOG) corrective regimen sliding scale  SubCutaneous Before meals and at bedtime  dextrose Gel 1Dose(s) Oral once PRN  dextrose 50% Injectable 12.5Gram(s) IV Push once  dextrose 50% Injectable 25Gram(s) IV Push once  dextrose 50% Injectable 25Gram(s) IV Push once  glucagon  Injectable 1milliGRAM(s) IntraMuscular once PRN  levothyroxine 50MICROGram(s) Oral daily    Cardiac:  diltiazem    Tablet 30milliGRAM(s) Oral every 6 hours  amiodarone    Tablet 400milliGRAM(s) Oral every 8 hours    Other Medications:  sodium chloride 0.45%. 1000milliLiter(s) IV Continuous <Continuous>  dextrose 5%. 1000milliLiter(s) IV Continuous <Continuous>      Allergies    No Known Allergies    Intolerances    narcotic analgesics (Nausea; Vomiting)      Vital Signs Last 24 Hrs  T(C): 36.2, Max: 38.6 ( @ 21:00)  T(F): 97.2, Max: 101.4 ( @ 21:00)  HR: 89 (77 - 108)  BP: 119/58 (119/58 - 119/58)  BP(mean): 82 (82 - 82)  RR: 24 (15 - 33)  SpO2: 96% (95% - 100%)  I & Os for 24h ending  @ 07:00  =============================================  IN: 1000 ml / OUT: 2068 ml / NET: -1068 ml    I & Os for current day (as of  @ 15:07)  =============================================  IN: 80 ml / OUT: 594 ml / NET: -514 ml        PHYSICAL EXAM:    General: Well developed; well nourished; in no acute distress  Eyes: PERRL, EOM intact; conjunctiva and sclera clear  Head: Normocephalic; atraumatic  ENMT: No nasal discharge; airway clear  Neck: Supple; non tender; no masses  Respiratory: Clear to auscultation bilaterally without wheezing, rhonchi or rales  Cardiovascular: Regular rate and rhythm. S1 and S2 Normal; No murmurs, gallops or rubs  Gastrointestinal: Soft non-tender non-distended; Normal bowel sounds; No hepatosplenomegaly  Genitourinary: No costovertebral angle tenderness  Extremities: Normal range of motion, No clubbing, cyanosis or edema  Vascular: Peripheral pulses palpable 2+ bilaterally  Neurological: Alert and oriented x3  Skin: Warm and dry. No obvious rash  Lymph Nodes: No acute cervical or supraclavicular adenopathy  Musculoskeletal: Normal gait, tone, without deformities  Psychiatric: Cooperative and appropriate mood    LABS:  ABG - ( 08 Mar 2017 05:27 )  pH: 7.44  /  pCO2: 37    /  pO2: 76    / HCO3: 25    / Base Excess: 0.7   /  SaO2: 96                  CBC Full  -  ( 08 Mar 2017 05:42 )  WBC Count : 10.8 K/uL  Hemoglobin : 9.3 g/dL  Hematocrit : 27.9 %  Platelet Count - Automated : 206 K/uL  Mean Cell Volume : 96.9 fL  Mean Cell Hemoglobin : 32.3 pg  Mean Cell Hemoglobin Concentration : 33.3 g/dL  Auto Neutrophil # : x  Auto Lymphocyte # : x  Auto Monocyte # : x  Auto Eosinophil # : x  Auto Basophil # : x  Auto Neutrophil % : x  Auto Lymphocyte % : x  Auto Monocyte % : x  Auto Eosinophil % : x  Auto Basophil % : x    08 Mar 2017 05:41    134    |  101    |  23     ----------------------------<  107    3.9     |  24     |  1.09     Ca    7.8        08 Mar 2017 05:41  Phos  3.6       07 Mar 2017 03:22  Mg     2.1       07 Mar 2017 11:52    TPro  6.1    /  Alb  2.8    /  TBili  0.5    /  DBili  x      /  AST  22     /  ALT  15     /  AlkPhos  39     08 Mar 2017 05:41    PT/INR - ( 08 Mar 2017 05:42 )   PT: 14.1 sec;   INR: 1.27          PTT - ( 08 Mar 2017 05:42 )  PTT:28.0 sec                  RADIOLOGY & ADDITIONAL STUDIES (The following images were personally reviewed): Patient is a 81y old  Male who presents with a chief complaint of Near Syncope and C/P (2017 17:56)      HPI:  80 y/o obese male former smoker (quit age 50, 1.5 ppd x ~30 years, with w/ PMHX HTN, HLD, DM (Non-Compliant w/ Metformin), Aflutter s/p Ablation in  at [facility], COPD (w/o h/o hospitalizations or intubation, rx'd w/ advair BID and albuterol PRN ~1x/day), Hypothyroidism, Prostate CA s/p seed implant in  who originally presented c/o 2 episodes of near syncope in the setting of C/P within the past 2 weeks. Pt reports dizziness, nausea, palpitations, fatigue and 5-6/10 chest tightness across the top of the chest. Pt reported h/o chronic CASON 1/2 block exercise capacity, and chronic ankle LE edema. Pt denies any new vision changes, vertigo, numbness/tingling in extremities, amaurosis fugax, focal extremity weakness. In ER /74, HR 82, RR 20, T 98.3, O2 92-93% on RA. Labs significant for CE neg. x 1. K 4.0. EKG shows Sinus Rhythm at 100bpm w/ 4 PVCs, + RBBB, LAFB, no clear ST depressions, no TWI. In ED pt noted to have frequent PVCs. CXR showed clear lungs. Pt currently Assx. Echo from MD office 2016 showed basal anterior, mid anterior and mid anterolateral hypokinesis, EF 50-55%, LA mild dilated, grade 1 diastolic dysfunction, borderline AS. UA pending.  Pt reports h/o stress test 1 year ago, results unknown. Note, pts wife  within the last month and son  within the past 2 months. Pt denies fever/chills, dysuria, abdominal pain, diarrhea. + Dry cough.    Interval Hospital Events: Initially admitted to [facility], s/p cardiac cath (3/2/17) LM: Distal 60% lesion, pLAD: calcified 75%, Ramus: 50%, LCx: Mild luminal irregularities, RCA: Dominant, ostial 95% lesion, proximal 80-90 calcified lesion, LVEF: 60%, LVEDP: 21, No significant MR.  RHC: RA: 9, RV: 32/6 (10), PAP:32/6 (19), PCWP: 11. CO/CI (Nam): 6.63/3.32   Ao/LV gradient: 23.1mm Hg , RONAN: 1.67cm2. CT Surgery was consulted for surgical evaluation for CABG, w/ OR 3/6 for 3v bypass w/o blood products given. S/p extubation 3/7 to HFNC. S/p 1 mediastinal and 1 Left Pleural chest tube removed 3/7. Postop course complicated by atrial fib, cardizem started, w/ Afib w/ RVR overnight s/p amiodarone bolus x 2 given and patient currently in sinus rhythm at this time on PO amiodarone.         REVIEW OF SYSTEMS:  Constitutional: No fever, weight loss or fatigue  Eyes: No eye pain, visual disturbances, or discharge  ENMT:  No difficulty hearing, tinnitus, vertigo; No sinus or throat pain  Neck: No pain, stiffness or neck swelling  Respiratory: + Cough (frequency only slightly increased from baseline) w/ clear-yellow sputum.   Cardiovascular: No current CP - mild pain at site of sternotomy  Gastrointestinal: No abdominal or epigastric pain. No nausea, vomiting or hematemesis; No diarrhea or constipation. No melena or hematochezia.  Genitourinary: No dysuria, frequency, hematuria or incontinence  Neurological: No headaches, memory loss, loss of strength, numbness or tremors  Skin: No itching, burning, rashes or lesions   Lymph Nodes: No enlarged glands  Endocrine: No heat or cold intolerance; No hair loss  Musculoskeletal: + B/l knee pain and finger "stiffness"  Psychiatric: No depression, anxiety, mood swings or difficulty sleeping  Heme/Lymph: No easy bruising or bleeding gums  Allergy and Immunologic: No hives or eczema    PAST MEDICAL & SURGICAL HISTORY:  High cholesterol  Diabetes  Prostate cancer: in remission  Hypertension  COPD (chronic obstructive pulmonary disease)  History of cataract surgery: b/l  History of knee replacement: b/l      FAMILY HISTORY:  Family history of acute myocardial infarction (Father)      SOCIAL HISTORY:  Smoking Status: [ ] Current, [X ] Former, [ ] Never  Pack Years: 45    MEDICATIONS:  Pulmonary:  buDESOnide   0.5 milliGRAM(s) Respule 0.5milliGRAM(s) Inhalation every 12 hours  ipratropium    for Nebulization 500MICROGram(s) Inhalation every 6 hours    Antimicrobials:    Anticoagulants:  aspirin enteric coated 81milliGRAM(s) Oral daily  clopidogrel Tablet 75milliGRAM(s) Oral daily  heparin  Injectable 5000Unit(s) SubCutaneous every 8 hours    Onc:    GI/:  famotidine    Tablet 20milliGRAM(s) Oral daily    Endocrine:  simvastatin 40milliGRAM(s) Oral at bedtime  insulin glargine Injectable (LANTUS) 24Unit(s) SubCutaneous every morning  insulin lispro Injectable (HumaLOG) 4Unit(s) SubCutaneous three times a day before meals  insulin lispro (HumaLOG) corrective regimen sliding scale  SubCutaneous Before meals and at bedtime  dextrose Gel 1Dose(s) Oral once PRN  dextrose 50% Injectable 12.5Gram(s) IV Push once  dextrose 50% Injectable 25Gram(s) IV Push once  dextrose 50% Injectable 25Gram(s) IV Push once  glucagon  Injectable 1milliGRAM(s) IntraMuscular once PRN  levothyroxine 50MICROGram(s) Oral daily    Cardiac:  diltiazem    Tablet 30milliGRAM(s) Oral every 6 hours  amiodarone    Tablet 400milliGRAM(s) Oral every 8 hours    Other Medications:  sodium chloride 0.45%. 1000milliLiter(s) IV Continuous <Continuous>  dextrose 5%. 1000milliLiter(s) IV Continuous <Continuous>      Allergies    No Known Allergies    Intolerances    narcotic analgesics (Nausea; Vomiting)      Vital Signs Last 24 Hrs  T(C): 36.2, Max: 38.6 ( @ 21:00)  T(F): 97.2, Max: 101.4 ( @ 21:00)  HR: 89 (77 - 108)  BP: 119/58 (119/58 - 119/58)  BP(mean): 82 (82 - 82)  RR: 24 (15 - 33)  SpO2: 96% (95% - 100%)  I & Os for 24h ending  @ 07:00  =============================================  IN: 1000 ml / OUT: 2068 ml / NET: -1068 ml    I & Os for current day (as of  @ 15:07)  =============================================  IN: 80 ml / OUT: 594 ml / NET: -514 ml        PHYSICAL EXAM:    General: Well developed; well nourished; in no acute distress, obese   Eyes: PERRL, EOM intact; conjunctiva and sclera clear  Head: Normocephalic; atraumatic  ENMT: No nasal discharge; airway clear, R IJ   Neck: Supple; non tender; no masses  Respiratory: diffuse end expiratory wheezes, bibasilar rales, egophony R base  Cardiovascular: Regular rate and rhythm. S1 and S2 Normal; + III/VI crescendo decrescendo murmur apex, midline sternotomy scar dressing C/D/I   Gastrointestinal: Soft non-tender non-distended; Normal bowel sounds; No hepatosplenomegaly  Genitourinary: No costovertebral angle tenderness  Extremities: Normal range of motion, No clubbing, cyanosis or edema  Vascular: Peripheral pulses palpable 2+ bilaterally  Neurological: Alert and oriented x3  Skin: Warm and dry. No obvious rash  Lymph Nodes: No acute cervical or supraclavicular adenopathy  Musculoskeletal: Normal gait, tone, without deformities  Psychiatric: Cooperative and appropriate mood    LABS:  ABG - ( 08 Mar 2017 05:27 )  pH: 7.44  /  pCO2: 37    /  pO2: 76    / HCO3: 25    / Base Excess: 0.7   /  SaO2: 96        CBC Full  -  ( 08 Mar 2017 05:42 )  WBC Count : 10.8 K/uL  Hemoglobin : 9.3 g/dL  Hematocrit : 27.9 %  Platelet Count - Automated : 206 K/uL  Mean Cell Volume : 96.9 fL  Mean Cell Hemoglobin : 32.3 pg  Mean Cell Hemoglobin Concentration : 33.3 g/dL  Auto Neutrophil # : x  Auto Lymphocyte # : x  Auto Monocyte # : x  Auto Eosinophil # : x  Auto Basophil # : x  Auto Neutrophil % : x  Auto Lymphocyte % : x  Auto Monocyte % : x  Auto Eosinophil % : x  Auto Basophil % : x    08 Mar 2017 05:41    134    |  101    |  23     ----------------------------<  107    3.9     |  24     |  1.09     Ca    7.8        08 Mar 2017 05:41  Phos  3.6       07 Mar 2017 03:22  Mg     2.1       07 Mar 2017 11:52    TPro  6.1    /  Alb  2.8    /  TBili  0.5    /  DBili  x      /  AST  22     /  ALT  15     /  AlkPhos  39     08 Mar 2017 05:41    PT/INR - ( 08 Mar 2017 05:42 )   PT: 14.1 sec;   INR: 1.27          PTT - ( 08 Mar 2017 05:42 )  PTT:28.0 sec                  RADIOLOGY & ADDITIONAL STUDIES (The following images were personally reviewed):  CXR, CT chest Patient is a 81y old  Male who presents with a chief complaint of Near Syncope and C/P (2017 17:56)      HPI:  80 y/o obese male former smoker (quit age 50, 1.5 ppd x ~30 years, with w/ PMHX HTN, HLD, DM (Non-Compliant w/ Metformin), Aflutter s/p Ablation in  at [facility], COPD (w/o h/o hospitalizations or intubation, rx'd w/ advair BID and albuterol PRN ~1x/day), Hypothyroidism, Prostate CA s/p seed implant in  who originally presented c/o 2 episodes of near syncope in the setting of C/P within the past 2 weeks. Pt reports dizziness, nausea, palpitations, fatigue and 5-6/10 chest tightness across the top of the chest. Pt reported h/o chronic CASON 1/2 block exercise capacity, and chronic ankle LE edema. Pt denies any new vision changes, vertigo, numbness/tingling in extremities, amaurosis fugax, focal extremity weakness. In ER /74, HR 82, RR 20, T 98.3, O2 92-93% on RA. Labs significant for CE neg. x 1. K 4.0. EKG shows Sinus Rhythm at 100bpm w/ 4 PVCs, + RBBB, LAFB, no clear ST depressions, no TWI. In ED pt noted to have frequent PVCs. CXR showed clear lungs. Pt currently Assx. Echo from MD office 2016 showed basal anterior, mid anterior and mid anterolateral hypokinesis, EF 50-55%, LA mild dilated, grade 1 diastolic dysfunction, borderline AS. UA pending.  Pt reports h/o stress test 1 year ago, results unknown. Note, pts wife  within the last month and son  within the past 2 months. Pt denies fever/chills, dysuria, abdominal pain, diarrhea. + Dry cough.    Interval Hospital Events: Initially admitted to [facility], s/p cardiac cath (3/2/17) LM: Distal 60% lesion, pLAD: calcified 75%, Ramus: 50%, LCx: Mild luminal irregularities, RCA: Dominant, ostial 95% lesion, proximal 80-90 calcified lesion, LVEF: 60%, LVEDP: 21, No significant MR.  RHC: RA: 9, RV: 32/6 (10), PAP:32/6 (19), PCWP: 11. CO/CI (Nam): 6.63/3.32   Ao/LV gradient: 23.1mm Hg , RONAN: 1.67cm2. CT Surgery was consulted for surgical evaluation for CABG, w/ OR 3/6 for 3v bypass w/o blood products given. S/p extubation 3/7 to HFNC. S/p 1 mediastinal and 1 Left Pleural chest tube removed 3/7. Postop course complicated by atrial fib, cardizem started, w/ Afib w/ RVR overnight s/p amiodarone bolus x 2 given and patient currently in sinus rhythm at this time on PO amiodarone.         REVIEW OF SYSTEMS:  Constitutional: No fever, weight loss or fatigue  Eyes: No eye pain, visual disturbances, or discharge  ENMT:  No difficulty hearing, tinnitus, vertigo; No sinus or throat pain  Neck: No pain, stiffness or neck swelling  Respiratory: + Cough (frequency only slightly increased from baseline) w/ clear-yellow sputum.   Cardiovascular: No current CP - mild pain at site of sternotomy  Gastrointestinal: No abdominal or epigastric pain. No nausea, vomiting or hematemesis; No diarrhea or constipation. No melena or hematochezia.  Genitourinary: No dysuria, frequency, hematuria or incontinence  Neurological: No headaches, memory loss, loss of strength, numbness or tremors  Skin: No itching, burning, rashes or lesions   Lymph Nodes: No enlarged glands  Endocrine: No heat or cold intolerance; No hair loss  Musculoskeletal: + B/l knee pain and finger "stiffness"  Psychiatric: No depression, anxiety, mood swings or difficulty sleeping  Heme/Lymph: No easy bruising or bleeding gums  Allergy and Immunologic: No hives or eczema    PAST MEDICAL & SURGICAL HISTORY:  High cholesterol  Diabetes  Prostate cancer: in remission  Hypertension  COPD (chronic obstructive pulmonary disease)  History of cataract surgery: b/l  History of knee replacement: b/l      FAMILY HISTORY:  Family history of acute myocardial infarction (Father)      SOCIAL HISTORY:  Smoking Status: [ ] Current, [X ] Former, [ ] Never  Pack Years: 45    MEDICATIONS:  Pulmonary:  buDESOnide   0.5 milliGRAM(s) Respule 0.5milliGRAM(s) Inhalation every 12 hours  ipratropium    for Nebulization 500MICROGram(s) Inhalation every 6 hours    Antimicrobials:    Anticoagulants:  aspirin enteric coated 81milliGRAM(s) Oral daily  clopidogrel Tablet 75milliGRAM(s) Oral daily  heparin  Injectable 5000Unit(s) SubCutaneous every 8 hours    Onc:    GI/:  famotidine    Tablet 20milliGRAM(s) Oral daily    Endocrine:  simvastatin 40milliGRAM(s) Oral at bedtime  insulin glargine Injectable (LANTUS) 24Unit(s) SubCutaneous every morning  insulin lispro Injectable (HumaLOG) 4Unit(s) SubCutaneous three times a day before meals  insulin lispro (HumaLOG) corrective regimen sliding scale  SubCutaneous Before meals and at bedtime  dextrose Gel 1Dose(s) Oral once PRN  dextrose 50% Injectable 12.5Gram(s) IV Push once  dextrose 50% Injectable 25Gram(s) IV Push once  dextrose 50% Injectable 25Gram(s) IV Push once  glucagon  Injectable 1milliGRAM(s) IntraMuscular once PRN  levothyroxine 50MICROGram(s) Oral daily    Cardiac:  diltiazem    Tablet 30milliGRAM(s) Oral every 6 hours  amiodarone    Tablet 400milliGRAM(s) Oral every 8 hours    Other Medications:  sodium chloride 0.45%. 1000milliLiter(s) IV Continuous <Continuous>  dextrose 5%. 1000milliLiter(s) IV Continuous <Continuous>      Allergies    No Known Allergies    Intolerances    narcotic analgesics (Nausea; Vomiting)      Vital Signs Last 24 Hrs  T(C): 36.2, Max: 38.6 ( @ 21:00)  T(F): 97.2, Max: 101.4 ( @ 21:00)  HR: 89 (77 - 108)  BP: 119/58 (119/58 - 119/58)  BP(mean): 82 (82 - 82)  RR: 24 (15 - 33)  SpO2: 96% (95% - 100%)  I & Os for 24h ending  @ 07:00  =============================================  IN: 1000 ml / OUT: 2068 ml / NET: -1068 ml    I & Os for current day (as of  @ 15:07)  =============================================  IN: 80 ml / OUT: 594 ml / NET: -514 ml        PHYSICAL EXAM:    General: Well developed; well nourished; in no acute distress, obese   Eyes: PERRL, EOM intact; conjunctiva and sclera clear  Head: Normocephalic; atraumatic  ENMT: No nasal discharge; airway clear, R IJ   Neck: Supple; non tender; no masses  Respiratory: diffuse end expiratory wheezes, bibasilar rales, egophony R base  Cardiovascular: Regular rate and rhythm. S1 and S2 Normal; + III/VI crescendo decrescendo murmur apex, midline sternotomy scar dressing C/D/I   Gastrointestinal: Soft non-tender non-distended; Normal bowel sounds; No hepatosplenomegaly  Genitourinary: No costovertebral angle tenderness  Extremities: Normal range of motion, No clubbing, cyanosis or edema  Vascular: Peripheral pulses palpable 2+ bilaterally  Neurological: Alert and oriented x3  Skin: Warm and dry. No obvious rash  Lymph Nodes: No acute cervical or supraclavicular adenopathy  Musculoskeletal: Normal gait, tone, without deformities  Psychiatric: Cooperative and appropriate mood    LABS:  ABG - ( 08 Mar 2017 05:27 )  pH: 7.44  /  pCO2: 37    /  pO2: 76    / HCO3: 25    / Base Excess: 0.7   /  SaO2: 96        CBC Full  -  ( 08 Mar 2017 05:42 )  WBC Count : 10.8 K/uL  Hemoglobin : 9.3 g/dL  Hematocrit : 27.9 %  Platelet Count - Automated : 206 K/uL  Mean Cell Volume : 96.9 fL  Mean Cell Hemoglobin : 32.3 pg  Mean Cell Hemoglobin Concentration : 33.3 g/dL  Auto Neutrophil # : x  Auto Lymphocyte # : x  Auto Monocyte # : x  Auto Eosinophil # : x  Auto Basophil # : x  Auto Neutrophil % : x  Auto Lymphocyte % : x  Auto Monocyte % : x  Auto Eosinophil % : x  Auto Basophil % : x    PFTS 3/14/16: Mild obstructive dx. + Bronchodilator response. Normal Diffusion capacity. No restriction    08 Mar 2017 05:41    134    |  101    |  23     ----------------------------<  107    3.9     |  24     |  1.09     Ca    7.8        08 Mar 2017 05:41  Phos  3.6       07 Mar 2017 03:22  Mg     2.1       07 Mar 2017 11:52    TPro  6.1    /  Alb  2.8    /  TBili  0.5    /  DBili  x      /  AST  22     /  ALT  15     /  AlkPhos  39     08 Mar 2017 05:41    PT/INR - ( 08 Mar 2017 05:42 )   PT: 14.1 sec;   INR: 1.27          PTT - ( 08 Mar 2017 05:42 )  PTT:28.0 sec                  RADIOLOGY & ADDITIONAL STUDIES (The following images were personally reviewed):  CXR, CT chest Patient is a 81y old  Male who presents with a chief complaint of Near Syncope and C/P (2017 17:56)      HPI:  82 y/o obese male former smoker (quit age 50, 1.5 ppd x ~30 years, with w/ PMHX HTN, HLD, DM (Non-Compliant w/ Metformin), Aflutter s/p Ablation in  at [facility], COPD (w/o h/o hospitalizations or intubation, rx'd w/ advair BID and albuterol PRN ~1x/day), Hypothyroidism, Prostate CA s/p seed implant in  who originally presented c/o 2 episodes of near syncope in the setting of C/P within the past 2 weeks. Pt reports dizziness, nausea, palpitations, fatigue and 5-6/10 chest tightness across the top of the chest. Pt reported h/o chronic CASON 1/2 block exercise capacity, and chronic ankle LE edema. Pt denies any new vision changes, vertigo, numbness/tingling in extremities, amaurosis fugax, focal extremity weakness. In ER /74, HR 82, RR 20, T 98.3, O2 92-93% on RA. Labs significant for CE neg. x 1. K 4.0. EKG shows Sinus Rhythm at 100bpm w/ 4 PVCs, + RBBB, LAFB, no clear ST depressions, no TWI. In ED pt noted to have frequent PVCs. CXR showed clear lungs. Pt currently Assx. Echo from MD office 2016 showed basal anterior, mid anterior and mid anterolateral hypokinesis, EF 50-55%, LA mild dilated, grade 1 diastolic dysfunction, borderline AS. UA pending.  Pt reports h/o stress test 1 year ago, results unknown. Note, pts wife  within the last month and son  within the past 2 months. Pt denies fever/chills, dysuria, abdominal pain, diarrhea. + Dry cough.    Interval Hospital Events: Initially admitted to [facility], s/p cardiac cath (3/2/17) LM: Distal 60% lesion, pLAD: calcified 75%, Ramus: 50%, LCx: Mild luminal irregularities, RCA: Dominant, ostial 95% lesion, proximal 80-90 calcified lesion, LVEF: 60%, LVEDP: 21, No significant MR.  RHC: RA: 9, RV: 32/6 (10), PAP:32/6 (19), PCWP: 11. CO/CI (Nam): 6.63/3.32   Ao/LV gradient: 23.1mm Hg , RONAN: 1.67cm2. CT Surgery was consulted for surgical evaluation for CABG, w/ OR 3/6 for 3v bypass w/o blood products given. S/p extubation 3/7 to HFNC. S/p 1 mediastinal and 1 Left Pleural chest tube removed 3/7. Postop course complicated by atrial fib, cardizem started, w/ Afib w/ RVR overnight s/p amiodarone bolus x 2 given and patient currently in sinus rhythm at this time on PO amiodarone.         REVIEW OF SYSTEMS:  Constitutional: No fever, weight loss or fatigue  Eyes: No eye pain, visual disturbances, or discharge  ENMT:  No difficulty hearing, tinnitus, vertigo; No sinus or throat pain  Neck: No pain, stiffness or neck swelling  Respiratory: + Cough (frequency only slightly increased from baseline) w/ clear-yellow sputum.   Cardiovascular: No current CP - mild pain at site of sternotomy  Gastrointestinal: No abdominal or epigastric pain. No nausea, vomiting or hematemesis; No diarrhea or constipation. No melena or hematochezia.  Genitourinary: No dysuria, frequency, hematuria or incontinence  Neurological: No headaches, memory loss, loss of strength, numbness or tremors  Skin: No itching, burning, rashes or lesions   Lymph Nodes: No enlarged glands  Endocrine: No heat or cold intolerance; No hair loss  Musculoskeletal: + B/l knee pain and finger "stiffness"  Psychiatric: No depression, anxiety, mood swings or difficulty sleeping  Heme/Lymph: No easy bruising or bleeding gums  Allergy and Immunologic: No hives or eczema    PAST MEDICAL & SURGICAL HISTORY:  High cholesterol  Diabetes  Prostate cancer: in remission  Hypertension  COPD (chronic obstructive pulmonary disease)  History of cataract surgery: b/l  History of knee replacement: b/l      FAMILY HISTORY:  Family history of acute myocardial infarction (Father)      SOCIAL HISTORY:  Smoking Status: [ ] Current, [X ] Former, [ ] Never  Pack Years: 45    MEDICATIONS:  Pulmonary:  buDESOnide   0.5 milliGRAM(s) Respule 0.5milliGRAM(s) Inhalation every 12 hours  ipratropium    for Nebulization 500MICROGram(s) Inhalation every 6 hours    Antimicrobials:    Anticoagulants:  aspirin enteric coated 81milliGRAM(s) Oral daily  clopidogrel Tablet 75milliGRAM(s) Oral daily  heparin  Injectable 5000Unit(s) SubCutaneous every 8 hours    Onc:    GI/:  famotidine    Tablet 20milliGRAM(s) Oral daily    Endocrine:  simvastatin 40milliGRAM(s) Oral at bedtime  insulin glargine Injectable (LANTUS) 24Unit(s) SubCutaneous every morning  insulin lispro Injectable (HumaLOG) 4Unit(s) SubCutaneous three times a day before meals  insulin lispro (HumaLOG) corrective regimen sliding scale  SubCutaneous Before meals and at bedtime  dextrose Gel 1Dose(s) Oral once PRN  dextrose 50% Injectable 12.5Gram(s) IV Push once  dextrose 50% Injectable 25Gram(s) IV Push once  dextrose 50% Injectable 25Gram(s) IV Push once  glucagon  Injectable 1milliGRAM(s) IntraMuscular once PRN  levothyroxine 50MICROGram(s) Oral daily    Cardiac:  diltiazem    Tablet 30milliGRAM(s) Oral every 6 hours  amiodarone    Tablet 400milliGRAM(s) Oral every 8 hours    Other Medications:  sodium chloride 0.45%. 1000milliLiter(s) IV Continuous <Continuous>  dextrose 5%. 1000milliLiter(s) IV Continuous <Continuous>      Allergies    No Known Allergies    Intolerances    narcotic analgesics (Nausea; Vomiting)      Vital Signs Last 24 Hrs  T(C): 36.2, Max: 38.6 ( @ 21:00)  T(F): 97.2, Max: 101.4 ( @ 21:00)  HR: 89 (77 - 108)  BP: 119/58 (119/58 - 119/58)  BP(mean): 82 (82 - 82)  RR: 24 (15 - 33)  SpO2: 96% (95% - 100%)  I & Os for 24h ending  @ 07:00  =============================================  IN: 1000 ml / OUT: 2068 ml / NET: -1068 ml    I & Os for current day (as of  @ 15:07)  =============================================  IN: 80 ml / OUT: 594 ml / NET: -514 ml        PHYSICAL EXAM:    General: Well developed; well nourished; in no acute distress, obese   Eyes: PERRL, EOM intact; conjunctiva and sclera clear  Head: Normocephalic; atraumatic  ENMT: No nasal discharge; airway clear, R IJ   Neck: Supple; non tender; no masses  Respiratory: diffuse end expiratory wheezes, bibasilar rales, egophony R base  Cardiovascular: Regular rate and rhythm. S1 and S2 Normal; + III/VI crescendo decrescendo murmur apex, midline sternotomy scar dressing C/D/I   Gastrointestinal: Soft non-tender non-distended; Normal bowel sounds; No hepatosplenomegaly  Genitourinary: No costovertebral angle tenderness  Extremities: Normal range of motion, No clubbing, cyanosis or edema  Vascular: Peripheral pulses palpable 2+ bilaterally  Neurological: Alert and oriented x3  Skin: Warm and dry. No obvious rash  Lymph Nodes: No acute cervical or supraclavicular adenopathy  Musculoskeletal: Normal gait, tone, without deformities  Psychiatric: Cooperative and appropriate mood    LABS:  ABG - ( 08 Mar 2017 05:27 )  pH: 7.44  /  pCO2: 37    /  pO2: 76    / HCO3: 25    / Base Excess: 0.7   /  SaO2: 96        CBC Full  -  ( 08 Mar 2017 05:42 )  WBC Count : 10.8 K/uL  Hemoglobin : 9.3 g/dL  Hematocrit : 27.9 %  Platelet Count - Automated : 206 K/uL  Mean Cell Volume : 96.9 fL  Mean Cell Hemoglobin : 32.3 pg  Mean Cell Hemoglobin Concentration : 33.3 g/dL  Auto Neutrophil # : x  Auto Lymphocyte # : x  Auto Monocyte # : x  Auto Eosinophil # : x  Auto Basophil # : x  Auto Neutrophil % : x  Auto Lymphocyte % : x  Auto Monocyte % : x  Auto Eosinophil % : x  Auto Basophil % : x    PFTS 3/14/16: Mild obstructive dx. + Bronchodilator response. Normal Diffusion capacity. No restriction    08 Mar 2017 05:41    134    |  101    |  23     ----------------------------<  107    3.9     |  24     |  1.09     Ca    7.8        08 Mar 2017 05:41  Phos  3.6       07 Mar 2017 03:22  Mg     2.1       07 Mar 2017 11:52    TPro  6.1    /  Alb  2.8    /  TBili  0.5    /  DBili  x      /  AST  22     /  ALT  15     /  AlkPhos  39     08 Mar 2017 05:41    PT/INR - ( 08 Mar 2017 05:42 )   PT: 14.1 sec;   INR: 1.27          PTT - ( 08 Mar 2017 05:42 )  PTT:28.0 sec                  RADIOLOGY & ADDITIONAL STUDIES (The following images were personally reviewed):  CT Chest: Patchy areas of thickening of the subpleural interlobular septae the lungs probably due to mild fibrosis, less likely congestive heart failure. No honeycombing. Calcification at the level of the aortic valve. Evidence of old granulomatous dx w/ calcified lesion RUL. Mild bronchiectasis jeni RLL    CXR: 3/8: There are small bilateral pleural effusions and bibasilar subsegmental atelectasis, improved from 3/7 Patient is a 81y old  Male who presents with a chief complaint of Near Syncope and C/P (2017 17:56)      HPI:  82 y/o obese male former smoker (quit age 50, 1.5 ppd x ~30 years, with w/ PMHX HTN, HLD, DM (Non-Compliant w/ Metformin), Aflutter s/p Ablation in  at [facility], COPD (w/o h/o hospitalizations or intubation, rx'd w/ advair BID and albuterol PRN ~1x/day), Hypothyroidism, Prostate CA s/p seed implant in  who originally presented c/o 2 episodes of near syncope in the setting of C/P within the past 2 weeks. Pt reports dizziness, nausea, palpitations, fatigue and 5-6/10 chest tightness across the top of the chest. Pt reported h/o chronic CASON 1/2 block exercise capacity, and chronic ankle LE edema. Pt denies any new vision changes, vertigo, numbness/tingling in extremities, amaurosis fugax, focal extremity weakness. In ER /74, HR 82, RR 20, T 98.3, O2 92-93% on RA. Labs significant for CE neg. x 1. K 4.0. EKG shows Sinus Rhythm at 100bpm w/ 4 PVCs, + RBBB, LAFB, no clear ST depressions, no TWI. In ED pt noted to have frequent PVCs. CXR showed clear lungs. Pt currently Assx. Echo from MD office 2016 showed basal anterior, mid anterior and mid anterolateral hypokinesis, EF 50-55%, LA mild dilated, grade 1 diastolic dysfunction, borderline AS. UA pending.  Pt reports h/o stress test 1 year ago, results unknown. Note, pts wife  within the last month and son  within the past 2 months. Pt denies fever/chills, dysuria, abdominal pain, diarrhea. + Dry cough.    Interval Hospital Events: Initially admitted to [facility], s/p cardiac cath (3/2/17) LM: Distal 60% lesion, pLAD: calcified 75%, Ramus: 50%, LCx: Mild luminal irregularities, RCA: Dominant, ostial 95% lesion, proximal 80-90 calcified lesion, LVEF: 60%, LVEDP: 21, No significant MR.  RHC: RA: 9, RV: 32/6 (10), PAP:32/6 (19), PCWP: 11. CO/CI (Nam): 6.63/3.32   Ao/LV gradient: 23.1mm Hg , RONAN: 1.67cm2. CT Surgery was consulted for surgical evaluation for CABG, w/ OR 3/6 for 3v bypass w/o blood products given. S/p extubation 3/7 to HFNC. S/p 1 mediastinal and 1 Left Pleural chest tube removed 3/7. Postop course complicated by atrial fib, cardizem started, w/ Afib w/ RVR overnight s/p amiodarone bolus x 2 given and patient currently in sinus rhythm at this time on PO amiodarone.         REVIEW OF SYSTEMS:  Constitutional: No fever, weight loss or fatigue  Eyes: No eye pain, visual disturbances, or discharge  ENMT:  No difficulty hearing, tinnitus, vertigo; No sinus or throat pain  Neck: No pain, stiffness or neck swelling  Respiratory: + Cough (frequency only slightly increased from baseline) w/ clear-yellow sputum.   Cardiovascular: No current CP - mild pain at site of sternotomy  Gastrointestinal: No abdominal or epigastric pain. No nausea, vomiting or hematemesis; No diarrhea or constipation. No melena or hematochezia.  Genitourinary: No dysuria, frequency, hematuria or incontinence  Neurological: No headaches, memory loss, loss of strength, numbness or tremors  Skin: No itching, burning, rashes or lesions   Lymph Nodes: No enlarged glands  Endocrine: No heat or cold intolerance; No hair loss  Musculoskeletal: + B/l knee pain and finger "stiffness"  Psychiatric: No depression, anxiety, mood swings or difficulty sleeping  Heme/Lymph: No easy bruising or bleeding gums  Allergy and Immunologic: No hives or eczema    PAST MEDICAL & SURGICAL HISTORY:  High cholesterol  Diabetes  Prostate cancer: in remission  Hypertension  COPD (chronic obstructive pulmonary disease)  History of cataract surgery: b/l  History of knee replacement: b/l      FAMILY HISTORY:  Family history of acute myocardial infarction (Father)      SOCIAL HISTORY:  Smoking Status: [ ] Current, [X ] Former, [ ] Never  Pack Years: 45    MEDICATIONS:  Pulmonary:  buDESOnide   0.5 milliGRAM(s) Respule 0.5milliGRAM(s) Inhalation every 12 hours  ipratropium    for Nebulization 500MICROGram(s) Inhalation every 6 hours    Antimicrobials:    Anticoagulants:  aspirin enteric coated 81milliGRAM(s) Oral daily  clopidogrel Tablet 75milliGRAM(s) Oral daily  heparin  Injectable 5000Unit(s) SubCutaneous every 8 hours    Onc:    GI/:  famotidine    Tablet 20milliGRAM(s) Oral daily    Endocrine:  simvastatin 40milliGRAM(s) Oral at bedtime  insulin glargine Injectable (LANTUS) 24Unit(s) SubCutaneous every morning  insulin lispro Injectable (HumaLOG) 4Unit(s) SubCutaneous three times a day before meals  insulin lispro (HumaLOG) corrective regimen sliding scale  SubCutaneous Before meals and at bedtime  dextrose Gel 1Dose(s) Oral once PRN  dextrose 50% Injectable 12.5Gram(s) IV Push once  dextrose 50% Injectable 25Gram(s) IV Push once  dextrose 50% Injectable 25Gram(s) IV Push once  glucagon  Injectable 1milliGRAM(s) IntraMuscular once PRN  levothyroxine 50MICROGram(s) Oral daily    Cardiac:  diltiazem    Tablet 30milliGRAM(s) Oral every 6 hours  amiodarone    Tablet 400milliGRAM(s) Oral every 8 hours    Other Medications:  sodium chloride 0.45%. 1000milliLiter(s) IV Continuous <Continuous>  dextrose 5%. 1000milliLiter(s) IV Continuous <Continuous>      Allergies    No Known Allergies    Intolerances    narcotic analgesics (Nausea; Vomiting)      Vital Signs Last 24 Hrs  T(C): 36.2, Max: 38.6 ( @ 21:00)  T(F): 97.2, Max: 101.4 ( @ 21:00)  HR: 89 (77 - 108)  BP: 119/58 (119/58 - 119/58)  BP(mean): 82 (82 - 82)  RR: 24 (15 - 33)  SpO2: 96% (95% - 100%)  I & Os for 24h ending  @ 07:00  =============================================  IN: 1000 ml / OUT: 2068 ml / NET: -1068 ml    I & Os for current day (as of  @ 15:07)  =============================================  IN: 80 ml / OUT: 594 ml / NET: -514 ml        PHYSICAL EXAM:    General: Well developed; well nourished; in no acute distress, obese   Eyes: PERRL, EOM intact; conjunctiva and sclera clear  Head: Normocephalic; atraumatic  ENMT: No nasal discharge; airway clear, R IJ   Neck: Supple; non tender; no masses  Respiratory: diffuse end expiratory wheezes, bibasilar rales, egophony R base  Cardiovascular: Regular rate and rhythm. S1 and S2 Normal; + III/VI crescendo decrescendo murmur apex, midline sternotomy scar dressing C/D/I   Gastrointestinal: Soft non-tender non-distended; Normal bowel sounds; No hepatosplenomegaly  Genitourinary: No costovertebral angle tenderness  Extremities: Normal range of motion, No clubbing, cyanosis or edema  Vascular: Peripheral pulses palpable 2+ bilaterally  Neurological: Alert and oriented x3  Skin: Warm and dry. No obvious rash  Lymph Nodes: No acute cervical or supraclavicular adenopathy  Musculoskeletal: Normal gait, tone, without deformities  Psychiatric: Cooperative and appropriate mood    LABS:  ABG - ( 08 Mar 2017 05:27 )  pH: 7.44  /  pCO2: 37    /  pO2: 76    / HCO3: 25    / Base Excess: 0.7   /  SaO2: 96        CBC Full  -  ( 08 Mar 2017 05:42 )  WBC Count : 10.8 K/uL  Hemoglobin : 9.3 g/dL  Hematocrit : 27.9 %  Platelet Count - Automated : 206 K/uL  Mean Cell Volume : 96.9 fL  Mean Cell Hemoglobin : 32.3 pg  Mean Cell Hemoglobin Concentration : 33.3 g/dL  Auto Neutrophil # : x  Auto Lymphocyte # : x  Auto Monocyte # : x  Auto Eosinophil # : x  Auto Basophil # : x  Auto Neutrophil % : x  Auto Lymphocyte % : x  Auto Monocyte % : x  Auto Eosinophil % : x  Auto Basophil % : x    PFTS 3/14/16: Mild obstructive dx. + Bronchodilator response. Normal Diffusion capacity. No restriction    08 Mar 2017 05:41    134    |  101    |  23     ----------------------------<  107    3.9     |  24     |  1.09     Ca    7.8        08 Mar 2017 05:41  Phos  3.6       07 Mar 2017 03:22  Mg     2.1       07 Mar 2017 11:52    TPro  6.1    /  Alb  2.8    /  TBili  0.5    /  DBili  x      /  AST  22     /  ALT  15     /  AlkPhos  39     08 Mar 2017 05:41    PT/INR - ( 08 Mar 2017 05:42 )   PT: 14.1 sec;   INR: 1.27          PTT - ( 08 Mar 2017 05:42 )  PTT:28.0 sec            RADIOLOGY & ADDITIONAL STUDIES (The following images were personally reviewed):  CT Chest: Patchy areas of thickening of the subpleural interlobular septae the lungs probably due to mild fibrosis, less likely congestive heart failure. No honeycombing. Calcification at the level of the aortic valve. Evidence of old granulomatous dx w/ calcified lesion RUL. Mild bronchiectasis jeni RLL    CXR: 3/8: There are small bilateral pleural effusions and bibasilar subsegmental atelectasis, improved from 3/7

## 2017-03-08 NOTE — PROVIDER CONTACT NOTE (OTHER) - SITUATION
PETER Ontiveros and Dr. Manuel notified pt found on floor by commode. Pt denies falling. Pt told to call for nurse when ready. Pt did not call. Curtains by commode moving. Nurse opened curtain.pt on floor.

## 2017-03-08 NOTE — PROGRESS NOTE ADULT - SUBJECTIVE AND OBJECTIVE BOX
INTERVAL HPI/OVERNIGHT EVENTS:    POD#2 OPCAB x 3  EF 50%    80yo male with Hx obesity, Hx tobacco, HTN, HLD, DM, Known RBBB, LAFB, Aflutter (Hx Ablation '03), COPD, Hypothyroidism, Prostate Ca - s/p seed implant '02 presenting with near syncopal episodes in setting of CP/CASON    Echo (outpt) 2/2016: basal anterior, mid anterior and mid anterolateral hypokinesis, EF 50-55%, LA mild dilated, grade 1 diastolic dysfunction, borderline AS.     admitted initially to [facility]  Cath (3/2/17) LM: Distal 60% lesion, pLAD: calcified 75%, Ramus: 50%, LCx: Mild luminal irregularities, RCA: Dominant, ostial 95% lesion, proximal 80-90 calcified lesion, LVEF: 60%, LVEDP: 21, No significant MR.  RHC: RA: 9, RV: 32/6 (10), PAP:32/6 (19), PCWP: 11. CO/CI (Nam): 6.63/3.32   Ao/LV gradient: 23.1mm Hg , RONAN: 1.67cm2.     CT Surgery was consulted for surgical evaluation for CABG and to OR 3/6  no blood products given in OR; no infusions on arrival to ICU  Extubated 3/7 to HFNC  (+)Postop atrial fib - cardizem started  overnight - ongoing atrial fib with RVR - amiodarone bolus x 2 given and patient currently in sinus rhythm at this time    patient OOB sitting in chair at this time  breathing non-labored  no acute events reported overnight    PMHx includes but is not limited to:  High cholesterol  Diabetes  Prostate cancer s/p seeds  Hypertension  COPD  History of cataract surgery: b/l  History of knee replacement: b/l    ICU Vital Signs Last 24 Hrs  T(C): 37.6, Max: 38.6 (03-07 @ 21:00)  T(F): 99.7, Max: 101.4 (03-07 @ 21:00)  HR: 86 (77 - 108)  BP: 136/63 (116/64 - 136/63)  BP(mean): 92 (84 - 92)  ABP: 124/50 (94/48 - 163/50)  ABP(mean): 68 (56 - 776)  RR: 24 (15 - 27)  SpO2: 97% (95% - 100%) nHFNC (50 02 flow/40% concentration)    Qtts: None    I&O's Summary  I & Os for 24h ending 08 Mar 2017 07:00  =============================================  IN: 1000 ml / OUT: 2068 ml / NET: -1068 ml    I & Os for current day (as of 08 Mar 2017 10:49)  =============================================  IN: 10 ml / OUT: 86 ml / NET: -76 ml    Physical Exam    Heart  Lungs  Abd  Ext  Chest  Neuro  Skin    LABS:                        9.3    10.8  )-----------( 206      ( 08 Mar 2017 05:42 )             27.9     08 Mar 2017 05:41    134    |  101    |  23     ----------------------------<  107    3.9     |  24     |  1.09     Ca    7.8        08 Mar 2017 05:41  Phos  3.6       07 Mar 2017 03:22  Mg     2.1       07 Mar 2017 11:52    TPro  6.1    /  Alb  2.8    /  TBili  0.5    /  DBili  x      /  AST  22     /  ALT  15     /  AlkPhos  39     08 Mar 2017 05:41    PT/INR - ( 08 Mar 2017 05:42 )   PT: 14.1 sec;   INR: 1.27          PTT - ( 08 Mar 2017 05:42 )  PTT:28.0 sec    ABG - ( 08 Mar 2017 05:27 )  pH: 7.44  /  pCO2: 37    /  pO2: 76    / HCO3: 25    / Base Excess: 0.7   /  SaO2: 96                  RADIOLOGY & ADDITIONAL STUDIES:    CRITICAL CARE TIME SPENT: INTERVAL HPI/OVERNIGHT EVENTS:    POD#2 OPCAB x 3  EF 50%    82yo male with Hx obesity, Hx tobacco, HTN, HLD, DM, Known RBBB, LAFB, Aflutter (Hx Ablation '03), COPD, Hypothyroidism, Prostate Ca - s/p seed implant '02 presenting with near syncopal episodes in setting of CP/CASON    Echo (outpt) 2/2016: basal anterior, mid anterior and mid anterolateral hypokinesis, EF 50-55%, LA mild dilated, grade 1 diastolic dysfunction, borderline AS.     admitted initially to [facility]  Cath (3/2/17) LM: Distal 60% lesion, pLAD: calcified 75%, Ramus: 50%, LCx: Mild luminal irregularities, RCA: Dominant, ostial 95% lesion, proximal 80-90 calcified lesion, LVEF: 60%, LVEDP: 21, No significant MR.  RHC: RA: 9, RV: 32/6 (10), PAP:32/6 (19), PCWP: 11. CO/CI (Nam): 6.63/3.32   Ao/LV gradient: 23.1mm Hg , RONAN: 1.67cm2.     CT Surgery was consulted for surgical evaluation for CABG and to OR 3/6  no blood products given in OR; no infusions on arrival to ICU  Extubated 3/7 to HFNC  (+)Postop atrial fib - cardizem started  overnight - ongoing atrial fib with RVR - amiodarone bolus x 2 given and patient currently in sinus rhythm at this time    patient OOB sitting in chair at this time  breathing non-labored  no acute events reported overnight  no complaints when asked directly - reports breathing "much better" than yesterday    PMHx includes but is not limited to:  High cholesterol  Diabetes  Prostate cancer s/p seeds  Hypertension  COPD  History of cataract surgery: b/l  History of knee replacement: b/l    ICU Vital Signs Last 24 Hrs  T(C): 37.6, Max: 38.6 (03-07 @ 21:00)  T(F): 99.7, Max: 101.4 (03-07 @ 21:00)  HR: 86 (77 - 108)  BP: 136/63 (116/64 - 136/63)  BP(mean): 92 (84 - 92)  ABP: 124/50 (94/48 - 163/50)  ABP(mean): 68 (56 - 776)  RR: 24 (15 - 27)  SpO2: 97% (95% - 100%) nHFNC (50 02 flow/40% concentration)    Qtts: None    I&O's Summary  I & Os for 24h ending 08 Mar 2017 07:00  =============================================  IN: 1000 ml / OUT: 2068 ml / NET: -1068 ml    I & Os for current day (as of 08 Mar 2017 10:49)  =============================================  IN: 10 ml / OUT: 86 ml / NET: -76 ml    Physical Exam    Heart - regular (-)rub/gallop  Lungs - good air entry bilaterally; no wheeze/rhonchi  Abd - (+)BS obese/soft (-)r/r/g  Ext - warm to touch; no cyanosis/clubbing./edema  Chest - op bandage in place  Neuro - alert/oriented and interactive; moving all extremities; non-focal  Skin - no rash    LABS:                        9.3    10.8  )-----------( 206      ( 08 Mar 2017 05:42 )             27.9     08 Mar 2017 05:41    134    |  101    |  23     ----------------------------<  107    3.9     |  24     |  1.09     Ca    7.8        08 Mar 2017 05:41  Phos  3.6       07 Mar 2017 03:22  Mg     2.1       07 Mar 2017 11:52    TPro  6.1    /  Alb  2.8    /  TBili  0.5    /  DBili  x      /  AST  22     /  ALT  15     /  AlkPhos  39     08 Mar 2017 05:41    PT/INR - ( 08 Mar 2017 05:42 )   PT: 14.1 sec;   INR: 1.27     PTT - ( 08 Mar 2017 05:42 )  PTT:28.0 sec    ABG - ( 08 Mar 2017 05:27 ) 7.44/37/76/96    RADIOLOGY & ADDITIONAL STUDIES: reviewed    Patient now POD#2 OPCAB x 3 - doing well.    1. CV   Hemodynamically stable  Paroxysmal atrial fib - currently in sinus  ASA/Plavix/Zocor  ongoing po amiodarone load/Cardizem 30mg q6h  normal EF - will dose diuretic to assist with oxygenation - lasix 20mg IV once    2. Pulm   COPD - patient followed by a pulmonologist but unable to remember name of physician  pulm consultation (Art) to be called   Nebs  titrate HFNC down as clinical scenario allows    3. Endocrine  maintain glycemic control  HgA1c 6.8  Lantus/ISS/premeal  POC testing 114/125  supplemental synthyroid for hypothyroidism    GI and DVT prophylaxis    d/w patient/family present in room, staff and CTS        CRITICAL CARE TIME SPENT: 60 min

## 2017-03-09 ENCOUNTER — RESULT REVIEW (OUTPATIENT)
Age: 82
End: 2017-03-09

## 2017-03-09 LAB
ALBUMIN SERPL ELPH-MCNC: 2.7 G/DL — LOW (ref 3.4–5)
ALP SERPL-CCNC: 51 U/L — SIGNIFICANT CHANGE UP (ref 40–120)
ALT FLD-CCNC: 20 U/L — SIGNIFICANT CHANGE UP (ref 12–42)
ANION GAP SERPL CALC-SCNC: 8 MMOL/L — LOW (ref 9–16)
APPEARANCE UR: (no result)
APTT BLD: 26.1 SEC — LOW (ref 27.5–37.4)
APTT BLD: 29.9 SEC — SIGNIFICANT CHANGE UP (ref 27.5–37.4)
AST SERPL-CCNC: 26 U/L — SIGNIFICANT CHANGE UP (ref 15–37)
BILIRUB SERPL-MCNC: 0.7 MG/DL — SIGNIFICANT CHANGE UP (ref 0.2–1.2)
BILIRUB UR-MCNC: NEGATIVE — SIGNIFICANT CHANGE UP
BLD GP AB SCN SERPL QL: NEGATIVE — SIGNIFICANT CHANGE UP
BUN SERPL-MCNC: 25 MG/DL — HIGH (ref 7–23)
CALCIUM SERPL-MCNC: 8.8 MG/DL — SIGNIFICANT CHANGE UP (ref 8.5–10.5)
CHLORIDE SERPL-SCNC: 100 MMOL/L — SIGNIFICANT CHANGE UP (ref 96–108)
CO2 SERPL-SCNC: 27 MMOL/L — SIGNIFICANT CHANGE UP (ref 22–31)
COLOR SPEC: (no result)
CREAT SERPL-MCNC: 1.03 MG/DL — SIGNIFICANT CHANGE UP (ref 0.5–1.3)
DIFF PNL FLD: (no result)
GLUCOSE SERPL-MCNC: 92 MG/DL — SIGNIFICANT CHANGE UP (ref 70–99)
GLUCOSE UR QL: NEGATIVE — SIGNIFICANT CHANGE UP
HCT VFR BLD CALC: 28.1 % — LOW (ref 39–50)
HCT VFR BLD CALC: 28.6 % — LOW (ref 39–50)
HGB BLD-MCNC: 9.3 G/DL — LOW (ref 13–17)
HGB BLD-MCNC: 9.6 G/DL — LOW (ref 13–17)
INR BLD: 1.33 — HIGH (ref 0.88–1.16)
KETONES UR-MCNC: NEGATIVE — SIGNIFICANT CHANGE UP
LEUKOCYTE ESTERASE UR-ACNC: NEGATIVE — SIGNIFICANT CHANGE UP
MAGNESIUM SERPL-MCNC: 2.5 MG/DL — HIGH (ref 1.6–2.4)
MCHC RBC-ENTMCNC: 32.3 PG — SIGNIFICANT CHANGE UP (ref 27–34)
MCHC RBC-ENTMCNC: 32.9 PG — SIGNIFICANT CHANGE UP (ref 27–34)
MCHC RBC-ENTMCNC: 33.1 G/DL — SIGNIFICANT CHANGE UP (ref 32–36)
MCHC RBC-ENTMCNC: 33.6 G/DL — SIGNIFICANT CHANGE UP (ref 32–36)
MCV RBC AUTO: 97.6 FL — SIGNIFICANT CHANGE UP (ref 80–100)
MCV RBC AUTO: 97.9 FL — SIGNIFICANT CHANGE UP (ref 80–100)
NITRITE UR-MCNC: NEGATIVE — SIGNIFICANT CHANGE UP
PH UR: 6 — SIGNIFICANT CHANGE UP (ref 4–8)
PLATELET # BLD AUTO: 243 K/UL — SIGNIFICANT CHANGE UP (ref 150–400)
PLATELET # BLD AUTO: 257 K/UL — SIGNIFICANT CHANGE UP (ref 150–400)
POTASSIUM SERPL-MCNC: 3.9 MMOL/L — SIGNIFICANT CHANGE UP (ref 3.5–5.3)
POTASSIUM SERPL-SCNC: 3.9 MMOL/L — SIGNIFICANT CHANGE UP (ref 3.5–5.3)
PROT SERPL-MCNC: 6.9 G/DL — SIGNIFICANT CHANGE UP (ref 6.4–8.2)
PROT UR-MCNC: 100 MG/DL
PROTHROM AB SERPL-ACNC: 14.8 SEC — HIGH (ref 10–13.1)
RBC # BLD: 2.88 M/UL — LOW (ref 4.2–5.8)
RBC # BLD: 2.92 M/UL — LOW (ref 4.2–5.8)
RBC # FLD: 14.2 % — SIGNIFICANT CHANGE UP (ref 10.3–16.9)
RBC # FLD: 14.3 % — SIGNIFICANT CHANGE UP (ref 10.3–16.9)
RH IG SCN BLD-IMP: NEGATIVE — SIGNIFICANT CHANGE UP
SODIUM SERPL-SCNC: 135 MMOL/L — SIGNIFICANT CHANGE UP (ref 135–145)
SP GR SPEC: 1.02 — SIGNIFICANT CHANGE UP (ref 1–1.03)
UROBILINOGEN FLD QL: 0.2 E.U./DL — SIGNIFICANT CHANGE UP
WBC # BLD: 11.1 K/UL — HIGH (ref 3.8–10.5)
WBC # BLD: 12.2 K/UL — HIGH (ref 3.8–10.5)
WBC # FLD AUTO: 11.1 K/UL — HIGH (ref 3.8–10.5)
WBC # FLD AUTO: 12.2 K/UL — HIGH (ref 3.8–10.5)

## 2017-03-09 PROCEDURE — 71010: CPT | Mod: 26

## 2017-03-09 PROCEDURE — 99232 SBSQ HOSP IP/OBS MODERATE 35: CPT | Mod: GC

## 2017-03-09 PROCEDURE — 93306 TTE W/DOPPLER COMPLETE: CPT | Mod: 26

## 2017-03-09 RX ORDER — POTASSIUM CHLORIDE 20 MEQ
20 PACKET (EA) ORAL ONCE
Qty: 0 | Refills: 0 | Status: COMPLETED | OUTPATIENT
Start: 2017-03-09 | End: 2017-03-09

## 2017-03-09 RX ORDER — APIXABAN 2.5 MG/1
2.5 TABLET, FILM COATED ORAL
Qty: 0 | Refills: 0 | Status: DISCONTINUED | OUTPATIENT
Start: 2017-03-09 | End: 2017-03-09

## 2017-03-09 RX ORDER — AMIODARONE HYDROCHLORIDE 400 MG/1
150 TABLET ORAL ONCE
Qty: 0 | Refills: 0 | Status: COMPLETED | OUTPATIENT
Start: 2017-03-09 | End: 2017-03-09

## 2017-03-09 RX ORDER — POTASSIUM CHLORIDE 20 MEQ
40 PACKET (EA) ORAL ONCE
Qty: 0 | Refills: 0 | Status: DISCONTINUED | OUTPATIENT
Start: 2017-03-09 | End: 2017-03-09

## 2017-03-09 RX ORDER — IPRATROPIUM/ALBUTEROL SULFATE 18-103MCG
3 AEROSOL WITH ADAPTER (GRAM) INHALATION EVERY 6 HOURS
Qty: 0 | Refills: 0 | Status: DISCONTINUED | OUTPATIENT
Start: 2017-03-09 | End: 2017-03-12

## 2017-03-09 RX ORDER — SODIUM CHLORIDE 9 MG/ML
500 INJECTION INTRAMUSCULAR; INTRAVENOUS; SUBCUTANEOUS ONCE
Qty: 0 | Refills: 0 | Status: COMPLETED | OUTPATIENT
Start: 2017-03-09 | End: 2017-03-09

## 2017-03-09 RX ADMIN — AMIODARONE HYDROCHLORIDE 600 MILLIGRAM(S): 400 TABLET ORAL at 11:15

## 2017-03-09 RX ADMIN — AMIODARONE HYDROCHLORIDE 400 MILLIGRAM(S): 400 TABLET ORAL at 21:04

## 2017-03-09 RX ADMIN — Medication 50 MICROGRAM(S): at 05:59

## 2017-03-09 RX ADMIN — Medication 100 MILLIEQUIVALENT(S): at 09:37

## 2017-03-09 RX ADMIN — Medication 4 UNIT(S): at 12:04

## 2017-03-09 RX ADMIN — FLUTICASONE PROPIONATE AND SALMETEROL 1 DOSE(S): 50; 250 POWDER ORAL; RESPIRATORY (INHALATION) at 09:07

## 2017-03-09 RX ADMIN — HEPARIN SODIUM 5000 UNIT(S): 5000 INJECTION INTRAVENOUS; SUBCUTANEOUS at 05:59

## 2017-03-09 RX ADMIN — Medication 81 MILLIGRAM(S): at 12:05

## 2017-03-09 RX ADMIN — FLUTICASONE PROPIONATE AND SALMETEROL 1 DOSE(S): 50; 250 POWDER ORAL; RESPIRATORY (INHALATION) at 20:56

## 2017-03-09 RX ADMIN — INSULIN GLARGINE 24 UNIT(S): 100 INJECTION, SOLUTION SUBCUTANEOUS at 09:37

## 2017-03-09 RX ADMIN — AMIODARONE HYDROCHLORIDE 400 MILLIGRAM(S): 400 TABLET ORAL at 13:39

## 2017-03-09 RX ADMIN — SODIUM CHLORIDE 1000 MILLILITER(S): 9 INJECTION INTRAMUSCULAR; INTRAVENOUS; SUBCUTANEOUS at 09:38

## 2017-03-09 RX ADMIN — Medication 2: at 12:05

## 2017-03-09 RX ADMIN — CLOPIDOGREL BISULFATE 75 MILLIGRAM(S): 75 TABLET, FILM COATED ORAL at 12:05

## 2017-03-09 RX ADMIN — AMIODARONE HYDROCHLORIDE 400 MILLIGRAM(S): 400 TABLET ORAL at 05:59

## 2017-03-09 RX ADMIN — AMIODARONE HYDROCHLORIDE 600 MILLIGRAM(S): 400 TABLET ORAL at 13:28

## 2017-03-09 RX ADMIN — Medication 4 UNIT(S): at 08:40

## 2017-03-09 RX ADMIN — Medication 4 UNIT(S): at 18:13

## 2017-03-09 RX ADMIN — SIMVASTATIN 40 MILLIGRAM(S): 20 TABLET, FILM COATED ORAL at 21:04

## 2017-03-09 RX ADMIN — FAMOTIDINE 20 MILLIGRAM(S): 10 INJECTION INTRAVENOUS at 12:05

## 2017-03-09 NOTE — PROGRESS NOTE ADULT - PROBLEM SELECTOR PLAN 1
COPD w/ asthma overlap features:   - clinically improved today w/ less wheezing on exam, O2 titrated down to 3L NC.  - c/w advair BID, can add albuterol and/or ipratroprium PRN  - OOB w/ assistance  - wean O2 as tolerated  - C/w Incentive spirometry  - DVT ppx - on eliquis

## 2017-03-09 NOTE — PROGRESS NOTE ADULT - ATTENDING COMMENTS
For Dr Wilcox. Doing well after 3V CABG. Baseline PFTs show mild obstruction with significant bronchodilator response - Dr Wilcox characterizing him as asthma rather than COPD, so he is on Advair. Would treat with his usual Advair and albuterol prn, and he can follow up with us in the office.

## 2017-03-09 NOTE — PROGRESS NOTE ADULT - SUBJECTIVE AND OBJECTIVE BOX
Interval Events:  Patient seen and examined at bedside. Had episode last night where could not stand up from bedside toilet and sat down on floor. No trauma/pain or any antecedent symptoms. Patient reports feeling well today w/o acute complaints. Periodically in A fib vs NSR. OOB in chair this AM breathing comfortably on 3L NC.        MEDICATIONS:  Pulmonary:  fluticasone / salmeterol 250-50 MICROgram(s) Diskus 1Dose(s) Inhalation two times a day    Antimicrobials:    Anticoagulants:  aspirin enteric coated 81milliGRAM(s) Oral daily  clopidogrel Tablet 75milliGRAM(s) Oral daily  apixaban 2.5milliGRAM(s) Oral two times a day    Cardiac:  amiodarone    Tablet 400milliGRAM(s) Oral every 8 hours  diltiazem    Tablet 60milliGRAM(s) Oral every 6 hours      Allergies    No Known Allergies    Intolerances    narcotic analgesics (Nausea; Vomiting)      Vital Signs Last 24 Hrs  T(C): 37.2, Max: 37.2 (03-09 @ 05:00)  T(F): 98.9, Max: 98.9 (03-09 @ 05:00)  HR: 96 (76 - 112)  BP: 96/44 (96/44 - 119/60)  BP(mean): 62 (62 - 83)  RR: 25 (18 - 34)  SpO2: 93% (92% - 100%) 2-3 L NC    I & Os for current day (as of 03-09 @ 09:02)  =============================================  IN: 340 ml / OUT: 1424 ml / NET: -1084 ml        PHYSICAL EXAM:    General: Well developed; well nourished; in no acute distress, obese  Eyes: PERRL, EOM intact; conjunctiva and sclera clear  ENMT: No nasal discharge; airway clear  Neck: Supple; non tender; no masses  Respiratory: Clear to auscultation bilaterally, w/ rales LLL  Cardiovascular: Regular rate and rhythm. S1 and S2 Normal; Late peaking III/VI systolic creshendo-decreshendo murmur; Sternotomy site dressed C/D/I  Gastrointestinal: Soft non-tender non-distended; Normal bowel sounds  Extremities: Normal range of motion, No clubbing, cyanosis or edema  Neurological: Alert and oriented x3  Skin: Warm and dry. No obvious rash    LABS:  ABG - ( 08 Mar 2017 05:27 )  pH: 7.44  /  pCO2: 37    /  pO2: 76    / HCO3: 25    / Base Excess: 0.7   /  SaO2: 96            CBC Full  -  ( 09 Mar 2017 08:04 )  WBC Count : 12.2 K/uL  Hemoglobin : 9.6 g/dL  Hematocrit : 28.6 %  Platelet Count - Automated : 243 K/uL  Mean Cell Volume : 97.9 fL  Mean Cell Hemoglobin : 32.9 pg  Mean Cell Hemoglobin Concentration : 33.6 g/dL  Auto Neutrophil # : x  Auto Lymphocyte # : x  Auto Monocyte # : x  Auto Eosinophil # : x  Auto Basophil # : x  Auto Neutrophil % : x  Auto Lymphocyte % : x  Auto Monocyte % : x  Auto Eosinophil % : x  Auto Basophil % : x    09 Mar 2017 08:04    135    |  100    |  25     ----------------------------<  92     3.9     |  27     |  1.03     Ca    8.8        09 Mar 2017 08:04  Mg     2.5       09 Mar 2017 08:04    TPro  6.9    /  Alb  2.7    /  TBili  0.7    /  DBili  x      /  AST  26     /  ALT  20     /  AlkPhos  51     09 Mar 2017 08:04    PT/INR - ( 08 Mar 2017 05:42 )   PT: 14.1 sec;   INR: 1.27          PTT - ( 09 Mar 2017 08:04 )  PTT:29.9 sec                  RADIOLOGY & ADDITIONAL STUDIES (The following images were personally reviewed):  CXR 3/9: Decreased lung volumes w/ small effusion LLL, improved since 3/8. Interval Events:  Patient seen and examined at bedside. Had episode last night where could not stand up from bedside toilet and sat down on floor. No trauma/pain or any antecedent symptoms. Patient reports feeling well today w/o acute complaints. Periodically in A fib vs NSR. OOB in chair this AM breathing comfortably on 3L NC.        MEDICATIONS:  Pulmonary:  fluticasone / salmeterol 250-50 MICROgram(s) Diskus 1Dose(s) Inhalation two times a day    Antimicrobials:    Anticoagulants:  aspirin enteric coated 81milliGRAM(s) Oral daily  clopidogrel Tablet 75milliGRAM(s) Oral daily  apixaban 2.5milliGRAM(s) Oral two times a day    Cardiac:  amiodarone    Tablet 400milliGRAM(s) Oral every 8 hours  diltiazem    Tablet 60milliGRAM(s) Oral every 6 hours      Allergies    No Known Allergies    Intolerances    narcotic analgesics (Nausea; Vomiting)      Vital Signs Last 24 Hrs  T(C): 37.2, Max: 37.2 (03-09 @ 05:00)  T(F): 98.9, Max: 98.9 (03-09 @ 05:00)  HR: 96 (76 - 112)  BP: 96/44 (96/44 - 119/60)  BP(mean): 62 (62 - 83)  RR: 25 (18 - 34)  SpO2: 93% (92% - 100%) 2-3 L NC    I & Os for current day (as of 03-09 @ 09:02)  =============================================  IN: 340 ml / OUT: 1424 ml / NET: -1084 ml        PHYSICAL EXAM:    General: Well developed; well nourished; in no acute distress, obese  Eyes: PERRL, EOM intact; conjunctiva and sclera clear  ENMT: No nasal discharge; airway clear  Neck: Supple; non tender; no masses  Respiratory: Clear to auscultation bilaterally, w/ rales LLL  Cardiovascular: Regular rate and rhythm. S1 and S2 Normal; Late peaking III/VI systolic creshendo-decreshendo murmur; Sternotomy site dressed C/D/I  Gastrointestinal: Soft non-tender non-distended; Normal bowel sounds  Extremities: Normal range of motion, No clubbing, cyanosis or edema  Neurological: Alert and oriented x3  Skin: Warm and dry. No obvious rash    LABS:  ABG - ( 08 Mar 2017 05:27 )  pH: 7.44  /  pCO2: 37    /  pO2: 76    / HCO3: 25    / Base Excess: 0.7   /  SaO2: 96            CBC Full  -  ( 09 Mar 2017 08:04 )  WBC Count : 12.2 K/uL  Hemoglobin : 9.6 g/dL  Hematocrit : 28.6 %  Platelet Count - Automated : 243 K/uL  Mean Cell Volume : 97.9 fL  Mean Cell Hemoglobin : 32.9 pg  Mean Cell Hemoglobin Concentration : 33.6 g/dL  Auto Neutrophil # : x  Auto Lymphocyte # : x  Auto Monocyte # : x  Auto Eosinophil # : x  Auto Basophil # : x  Auto Neutrophil % : x  Auto Lymphocyte % : x  Auto Monocyte % : x  Auto Eosinophil % : x  Auto Basophil % : x    09 Mar 2017 08:04    135    |  100    |  25     ----------------------------<  92     3.9     |  27     |  1.03     Ca    8.8        09 Mar 2017 08:04  Mg     2.5       09 Mar 2017 08:04    TPro  6.9    /  Alb  2.7    /  TBili  0.7    /  DBili  x      /  AST  26     /  ALT  20     /  AlkPhos  51     09 Mar 2017 08:04    PT/INR - ( 08 Mar 2017 05:42 )   PT: 14.1 sec;   INR: 1.27          PTT - ( 09 Mar 2017 08:04 )  PTT:29.9 sec                  RADIOLOGY & ADDITIONAL STUDIES (The following images were personally reviewed):  CXR 3/9: moderate inspiratory effort, small effusion LLL, improved since 3/8. Interval Events:  Patient seen and examined at bedside. Had episode last night where could not stand up from bedside toilet and sat down on floor. No trauma/pain or any antecedent symptoms. Patient reports feeling well today w/o acute complaints. Periodically in A fib vs NSR. OOB in chair this AM breathing comfortably on 3L NC.        MEDICATIONS:  Pulmonary:  fluticasone / salmeterol 250-50 MICROgram(s) Diskus 1Dose(s) Inhalation two times a day    Antimicrobials:    Anticoagulants:  aspirin enteric coated 81milliGRAM(s) Oral daily  clopidogrel Tablet 75milliGRAM(s) Oral daily  apixaban 2.5milliGRAM(s) Oral two times a day    Cardiac:  amiodarone    Tablet 400milliGRAM(s) Oral every 8 hours  diltiazem    Tablet 60milliGRAM(s) Oral every 6 hours      Allergies    No Known Allergies    Intolerances    narcotic analgesics (Nausea; Vomiting)      Vital Signs Last 24 Hrs  T(C): 37.2, Max: 37.2 (03-09 @ 05:00)  T(F): 98.9, Max: 98.9 (03-09 @ 05:00)  HR: 96 (76 - 112)  BP: 96/44 (96/44 - 119/60)  BP(mean): 62 (62 - 83)  RR: 25 (18 - 34)  SpO2: 93% (92% - 100%) 2-3 L NC    I & Os for current day (as of 03-09 @ 09:02)  =============================================  IN: 340 ml / OUT: 1424 ml / NET: -1084 ml        PHYSICAL EXAM:    General: Well developed; well nourished; in no acute distress, obese  Eyes: PERRL, EOM intact; conjunctiva and sclera clear  ENMT: No nasal discharge; airway clear  Neck: Supple; non tender; no masses  Respiratory: Clear to auscultation bilaterally, w/ rales LLL  Cardiovascular: Regular rate and rhythm. S1 and S2 Normal; Late peaking III/VI systolic creshendo-decreshendo murmur; Sternotomy site dressed C/D/I  Gastrointestinal: Soft non-tender non-distended; Normal bowel sounds  Extremities: Normal range of motion, No clubbing, cyanosis or edema  Neurological: Alert and oriented x3  Skin: Warm and dry. No obvious rash    LABS:  ABG - ( 08 Mar 2017 05:27 )  pH: 7.44  /  pCO2: 37    /  pO2: 76    / HCO3: 25    / Base Excess: 0.7   /  SaO2: 96            CBC Full  -  ( 09 Mar 2017 08:04 )  WBC Count : 12.2 K/uL  Hemoglobin : 9.6 g/dL  Hematocrit : 28.6 %  Platelet Count - Automated : 243 K/uL  Mean Cell Volume : 97.9 fL  Mean Cell Hemoglobin : 32.9 pg  Mean Cell Hemoglobin Concentration : 33.6 g/dL  Auto Neutrophil # : x  Auto Lymphocyte # : x  Auto Monocyte # : x  Auto Eosinophil # : x  Auto Basophil # : x  Auto Neutrophil % : x  Auto Lymphocyte % : x  Auto Monocyte % : x  Auto Eosinophil % : x  Auto Basophil % : x    09 Mar 2017 08:04    135    |  100    |  25     ----------------------------<  92     3.9     |  27     |  1.03     Ca    8.8        09 Mar 2017 08:04  Mg     2.5       09 Mar 2017 08:04    TPro  6.9    /  Alb  2.7    /  TBili  0.7    /  DBili  x      /  AST  26     /  ALT  20     /  AlkPhos  51     09 Mar 2017 08:04    PT/INR - ( 08 Mar 2017 05:42 )   PT: 14.1 sec;   INR: 1.27          PTT - ( 09 Mar 2017 08:04 )  PTT:29.9 sec        RADIOLOGY & ADDITIONAL STUDIES (The following images were personally reviewed):  CXR 3/9: moderate inspiratory effort, small effusion LLL, improved since 3/8.

## 2017-03-09 NOTE — CHART NOTE - NSCHARTNOTEFT_GEN_A_CORE
3:30PM RN notified me that patient's pacing wires were found on patients bed. Went to see patient and patient's daughter states that a friend that was visiting tripped over the pacing wire box and the wires fell out. Patient denies any pain during the incidence, chest pain or palpitations. 3:30PM RN notified me that patient's pacing wires were found on patients bed. Went to see patient and patient's daughter states that a friend that was visiting tripped over the pacing wire box and the wires fell out. Patient denies any pain during the incident, chest pain or palpitations. Patient hemodynamically stable, HR 80s Atrial fibrillation, /64, O2 Sat 94% 3L NC. Bandage on patients abdomen noticed to be saturated with blood, removed and noticed skin bleeder at previous mediastinal ramandeep site. Pressure held with 4x4 gauze for 10 minutes, bleeding subsided and pressure dressing was placed. Stat bedside echo performed which showed no pericardial effusion. Patient remained hemodynamically stable and repeat /74. Dr. Andino made aware. Will continue to monitor patient for any signs of hemodynamic instability.

## 2017-03-09 NOTE — PROGRESS NOTE ADULT - ASSESSMENT
82 y/o obese male former 45 pack year smoker w/ h/o DM, Aflutter s/p Ablation in 2003, COPD/Asthma, Hypothyroidism, Prostate CA s/p seed implant, moderate AS, CAD, admitted w/ presyncope found to have 3vessel dx s/p OPCAB, POD #3, clinically improving.

## 2017-03-09 NOTE — PROGRESS NOTE ADULT - SUBJECTIVE AND OBJECTIVE BOX
Patient discussed on morning rounds with       Operation / Date:     SUBJECTIVE ASSESSMENT:  81y Male         Vital Signs Last 24 Hrs  T(C): 36.7, Max: 37.2 ( @ 05:00)  T(F): 98, Max: 98.9 ( @ 05:00)  HR: 90 (76 - 112)  BP: 113/65 (96/44 - 119/60)  BP(mean): 75 (62 - 83)  RR: 25 (20 - 34)  SpO2: 97% (92% - 99%)  I&O's Detail  I & Os for 24h ending 09 Mar 2017 07:00  =============================================  IN:    Oral Fluid: 180 ml    sodium chloride 0.45%: 160 ml    Total IN: 340 ml  ---------------------------------------------  OUT:    Indwelling Catheter - Urethral: 1424 ml    Total OUT: 1424 ml  ---------------------------------------------  Total NET: -1084 ml    I & Os for current day (as of 09 Mar 2017 14:11)  =============================================  IN:    sodium chloride 0.45%: 500 ml    IV PiggyBack: 100 ml    Total IN: 600 ml  ---------------------------------------------  OUT:    Indwelling Catheter - Urethral: 400 ml    Total OUT: 400 ml  ---------------------------------------------  Total NET: 200 ml      CHEST TUBE:  Yes/No. AIR LEAKS: Yes/No. Suction / H2O SEAL.   WILFRED DRAIN:  Yes/No.  EPICARDIAL WIRES: Yes/No.  TIE DOWNS: Yes/No.  ALLEN: Yes/No.    PHYSICAL EXAM:    General:     Neurological:    Cardiovascular:    Respiratory:    Gastrointestinal:    Extremities:    Vascular:    Incision Sites:    LABS:                        9.3    11.1  )-----------( 257      ( 09 Mar 2017 12:49 )             28.1       COUMADIN:  Yes/No. REASON: .    PT/INR - ( 09 Mar 2017 12:49 )   PT: 14.8 sec;   INR: 1.33          PTT - ( 09 Mar 2017 12:49 )  PTT:26.1 sec    09 Mar 2017 08:04    135    |  100    |  25     ----------------------------<  92     3.9     |  27     |  1.03     Ca    8.8        09 Mar 2017 08:04  Mg     2.5       09 Mar 2017 08:04    TPro  6.9    /  Alb  2.7    /  TBili  0.7    /  DBili  x      /  AST  26     /  ALT  20     /  AlkPhos  51     09 Mar 2017 08:04      Urinalysis Basic - ( 09 Mar 2017 13:41 )    Color: Red / Appearance: Cloudy / S.025 / pH: x  Gluc: x / Ketone: NEGATIVE  / Bili: NEGATIVE / Urobili: 0.2 E.U./dL   Blood: x / Protein: 100 mg/dL / Nitrite: NEGATIVE   Leuk Esterase: NEGATIVE / RBC: See Note /HPF / WBC > 10 /HPF   Sq Epi: x / Non Sq Epi: Rare /HPF / Bacteria: Present /HPF        MEDICATIONS  (STANDING):  aspirin enteric coated 81milliGRAM(s) Oral daily  clopidogrel Tablet 75milliGRAM(s) Oral daily  simvastatin 40milliGRAM(s) Oral at bedtime  famotidine    Tablet 20milliGRAM(s) Oral daily  insulin glargine Injectable (LANTUS) 24Unit(s) SubCutaneous every morning  insulin lispro Injectable (HumaLOG) 4Unit(s) SubCutaneous three times a day before meals  insulin lispro (HumaLOG) corrective regimen sliding scale  SubCutaneous Before meals and at bedtime  dextrose 50% Injectable 12.5Gram(s) IV Push once  amiodarone    Tablet 400milliGRAM(s) Oral every 8 hours  levothyroxine 50MICROGram(s) Oral daily  diltiazem    Tablet 60milliGRAM(s) Oral every 6 hours  fluticasone / salmeterol 250-50 MICROgram(s) Diskus 1Dose(s) Inhalation two times a day    MEDICATIONS  (PRN):  dextrose Gel 1Dose(s) Oral once PRN Blood Glucose LESS THAN 70 milliGRAM(s)/deciliter  glucagon  Injectable 1milliGRAM(s) IntraMuscular once PRN Glucose LESS THAN 70 milligrams/deciliter        RADIOLOGY & ADDITIONAL TESTS: Patient discussed on morning rounds with Dr. Funez     Operation / Date: 3/6/17 OPCAB x 3, EF 50%     SUBJECTIVE ASSESSMENT:  Patient seen this morning at bedside, patient doing well and not offering any complaints at this time. Denies any chest pain, shortness of breath, or palpitations. Patient experiencing hematuria this AM, denies any dysuria or abdominal pain at this time. States he has never experienced this before.     Vital Signs Last 24 Hrs  T(C): 36.7, Max: 37.2 ( @ 05:00)  T(F): 98, Max: 98.9 ( @ 05:00)  HR: 90 (76 - 112)  BP: 113/65 (96/44 - 119/60)  BP(mean): 75 (62 - 83)  RR: 25 (20 - 34)  SpO2: 97% (92% - 99%)  I&O's Detail  I & Os for 24h ending 09 Mar 2017 07:00  =============================================  IN:    Oral Fluid: 180 ml    sodium chloride 0.45%: 160 ml    Total IN: 340 ml  ---------------------------------------------  OUT:    Indwelling Catheter - Urethral: 1424 ml    Total OUT: 1424 ml  ---------------------------------------------  Total NET: -1084 ml    I & Os for current day (as of 09 Mar 2017 14:11)  =============================================  IN:    sodium chloride 0.45%: 500 ml    IV PiggyBack: 100 ml    Total IN: 600 ml  ---------------------------------------------  OUT:    Indwelling Catheter - Urethral: 400 ml    Total OUT: 400 ml  ---------------------------------------------  Total NET: 200 ml      CHEST TUBE:  No   WILFRED DRAIN:  No  EPICARDIAL WIRES: Yes  TIE DOWNS: Yes  ALLEN: Yes    PHYSICAL EXAM:    General: Patient lying comfortably in bed, no acute distress     Neurological: Alert and oriented. No focal neurological deficits     Cardiovascular: S1S2, RRR, no murmurs appreciated on exam     Respiratory: Decreased breath sounds at bases bilaterally     Gastrointestinal: Abdomen soft, non tender, non distended     Extremities: warm and well perfused. No edema or calf tenderness     Vascular: Peripheral pulses 2+ bilaterally     Incision Sites: Sternotomy incision C/D/I, no drainage, surrounding erythema or sternal click       LABS:                        9.3    11.1  )-----------( 257      ( 09 Mar 2017 12:49 )             28.1       COUMADIN:  Yes/No. REASON: .    PT/INR - ( 09 Mar 2017 12:49 )   PT: 14.8 sec;   INR: 1.33          PTT - ( 09 Mar 2017 12:49 )  PTT:26.1 sec    09 Mar 2017 08:04    135    |  100    |  25     ----------------------------<  92     3.9     |  27     |  1.03     Ca    8.8        09 Mar 2017 08:04  Mg     2.5       09 Mar 2017 08:04    TPro  6.9    /  Alb  2.7    /  TBili  0.7    /  DBili  x      /  AST  26     /  ALT  20     /  AlkPhos  51     09 Mar 2017 08:04      Urinalysis Basic - ( 09 Mar 2017 13:41 )    Color: Red / Appearance: Cloudy / S.025 / pH: x  Gluc: x / Ketone: NEGATIVE  / Bili: NEGATIVE / Urobili: 0.2 E.U./dL   Blood: x / Protein: 100 mg/dL / Nitrite: NEGATIVE   Leuk Esterase: NEGATIVE / RBC: See Note /HPF / WBC > 10 /HPF   Sq Epi: x / Non Sq Epi: Rare /HPF / Bacteria: Present /HPF        MEDICATIONS  (STANDING):  aspirin enteric coated 81milliGRAM(s) Oral daily  clopidogrel Tablet 75milliGRAM(s) Oral daily  simvastatin 40milliGRAM(s) Oral at bedtime  famotidine    Tablet 20milliGRAM(s) Oral daily  insulin glargine Injectable (LANTUS) 24Unit(s) SubCutaneous every morning  insulin lispro Injectable (HumaLOG) 4Unit(s) SubCutaneous three times a day before meals  insulin lispro (HumaLOG) corrective regimen sliding scale  SubCutaneous Before meals and at bedtime  dextrose 50% Injectable 12.5Gram(s) IV Push once  amiodarone    Tablet 400milliGRAM(s) Oral every 8 hours  levothyroxine 50MICROGram(s) Oral daily  diltiazem    Tablet 60milliGRAM(s) Oral every 6 hours  fluticasone / salmeterol 250-50 MICROgram(s) Diskus 1Dose(s) Inhalation two times a day    MEDICATIONS  (PRN):  dextrose Gel 1Dose(s) Oral once PRN Blood Glucose LESS THAN 70 milliGRAM(s)/deciliter  glucagon  Injectable 1milliGRAM(s) IntraMuscular once PRN Glucose LESS THAN 70 milligrams/deciliter        RADIOLOGY & ADDITIONAL TESTS: Patient discussed on morning rounds with Dr. Funez     Operation / Date: 3/6/17 OPCAB x 3, EF 50%     SUBJECTIVE ASSESSMENT:  Patient seen this morning at bedside, patient doing well and not offering any complaints at this time. Denies any chest pain, shortness of breath, or palpitations. Patient experiencing hematuria this AM, denies any dysuria or abdominal pain at this time. States he has never experienced this before.     Vital Signs Last 24 Hrs  T(C): 36.7, Max: 37.2 ( @ 05:00)  T(F): 98, Max: 98.9 ( @ 05:00)  HR: 90 (76 - 112)  BP: 113/65 (96/44 - 119/60)  BP(mean): 75 (62 - 83)  RR: 25 (20 - 34)  SpO2: 97% (92% - 99%)  I&O's Detail  I & Os for 24h ending 09 Mar 2017 07:00  =============================================  IN:    Oral Fluid: 180 ml    sodium chloride 0.45%: 160 ml    Total IN: 340 ml  ---------------------------------------------  OUT:    Indwelling Catheter - Urethral: 1424 ml    Total OUT: 1424 ml  ---------------------------------------------  Total NET: -1084 ml    I & Os for current day (as of 09 Mar 2017 14:11)  =============================================  IN:    sodium chloride 0.45%: 500 ml    IV PiggyBack: 100 ml    Total IN: 600 ml  ---------------------------------------------  OUT:    Indwelling Catheter - Urethral: 400 ml    Total OUT: 400 ml  ---------------------------------------------  Total NET: 200 ml      CHEST TUBE:  No   WILFRED DRAIN:  No  EPICARDIAL WIRES: Yes  TIE DOWNS: Yes  ALLEN: Yes    PHYSICAL EXAM:    General: Patient lying comfortably in bed, no acute distress     Neurological: Alert and oriented. No focal neurological deficits     Cardiovascular: S1S2, RRR, no murmurs appreciated on exam     Respiratory: Decreased breath sounds at bases bilaterally     Gastrointestinal: Abdomen soft, non tender, non distended     : Allen in place, + hematuria     Extremities: warm and well perfused. No edema or calf tenderness     Vascular: Peripheral pulses 2+ bilaterally     Incision Sites: Sternotomy incision C/D/I, no drainage, surrounding erythema or sternal click   Left EVH site C/D/I       LABS:                        9.3    11.1  )-----------( 257      ( 09 Mar 2017 12:49 )             28.1       COUMADIN:  Yes/No. REASON: .    PT/INR - ( 09 Mar 2017 12:49 )   PT: 14.8 sec;   INR: 1.33          PTT - ( 09 Mar 2017 12:49 )  PTT:26.1 sec    09 Mar 2017 08:04    135    |  100    |  25     ----------------------------<  92     3.9     |  27     |  1.03     Ca    8.8        09 Mar 2017 08:04  Mg     2.5       09 Mar 2017 08:04    TPro  6.9    /  Alb  2.7    /  TBili  0.7    /  DBili  x      /  AST  26     /  ALT  20     /  AlkPhos  51     09 Mar 2017 08:04      Urinalysis Basic - ( 09 Mar 2017 13:41 )    Color: Red / Appearance: Cloudy / S.025 / pH: x  Gluc: x / Ketone: NEGATIVE  / Bili: NEGATIVE / Urobili: 0.2 E.U./dL   Blood: x / Protein: 100 mg/dL / Nitrite: NEGATIVE   Leuk Esterase: NEGATIVE / RBC: See Note /HPF / WBC > 10 /HPF   Sq Epi: x / Non Sq Epi: Rare /HPF / Bacteria: Present /HPF        MEDICATIONS  (STANDING):  aspirin enteric coated 81milliGRAM(s) Oral daily  clopidogrel Tablet 75milliGRAM(s) Oral daily  simvastatin 40milliGRAM(s) Oral at bedtime  famotidine    Tablet 20milliGRAM(s) Oral daily  insulin glargine Injectable (LANTUS) 24Unit(s) SubCutaneous every morning  insulin lispro Injectable (HumaLOG) 4Unit(s) SubCutaneous three times a day before meals  insulin lispro (HumaLOG) corrective regimen sliding scale  SubCutaneous Before meals and at bedtime  dextrose 50% Injectable 12.5Gram(s) IV Push once  amiodarone    Tablet 400milliGRAM(s) Oral every 8 hours  levothyroxine 50MICROGram(s) Oral daily  diltiazem    Tablet 60milliGRAM(s) Oral every 6 hours  fluticasone / salmeterol 250-50 MICROgram(s) Diskus 1Dose(s) Inhalation two times a day    MEDICATIONS  (PRN):  dextrose Gel 1Dose(s) Oral once PRN Blood Glucose LESS THAN 70 milliGRAM(s)/deciliter  glucagon  Injectable 1milliGRAM(s) IntraMuscular once PRN Glucose LESS THAN 70 milligrams/deciliter    RADIOLOGY & ADDITIONAL TESTS:  3/9/17 CXR: Patient discussed on morning rounds with Dr. Funez     Operation / Date: 3/6/17 OPCAB x 3, EF 50%     SUBJECTIVE ASSESSMENT:  Patient seen this morning at bedside, patient doing well and not offering any complaints at this time. Denies any chest pain, shortness of breath, or palpitations. Patient experiencing hematuria this AM, denies any dysuria or abdominal pain at this time. States he has never experienced this before.     Vital Signs Last 24 Hrs  T(C): 36.7, Max: 37.2 ( @ 05:00)  T(F): 98, Max: 98.9 ( @ 05:00)  HR: 90 (76 - 112)  BP: 113/65 (96/44 - 119/60)  BP(mean): 75 (62 - 83)  RR: 25 (20 - 34)  SpO2: 97% (92% - 99%)  I&O's Detail  I & Os for 24h ending 09 Mar 2017 07:00  =============================================  IN:    Oral Fluid: 180 ml    sodium chloride 0.45%: 160 ml    Total IN: 340 ml  ---------------------------------------------  OUT:    Indwelling Catheter - Urethral: 1424 ml    Total OUT: 1424 ml  ---------------------------------------------  Total NET: -1084 ml    I & Os for current day (as of 09 Mar 2017 14:11)  =============================================  IN:    sodium chloride 0.45%: 500 ml    IV PiggyBack: 100 ml    Total IN: 600 ml  ---------------------------------------------  OUT:    Indwelling Catheter - Urethral: 400 ml    Total OUT: 400 ml  ---------------------------------------------  Total NET: 200 ml      CHEST TUBE:  No   WILFRED DRAIN:  No  EPICARDIAL WIRES: Yes  TIE DOWNS: Yes  ALLEN: Yes    PHYSICAL EXAM:    General: Patient lying comfortably in bed, no acute distress     Neurological: Alert and oriented. No focal neurological deficits     Cardiovascular: S1S2, RRR, no murmurs appreciated on exam     Respiratory: Decreased breath sounds at bases bilaterally     Gastrointestinal: Abdomen soft, non tender, non distended     : Allen in place, + hematuria     Extremities: warm and well perfused. No edema or calf tenderness     Vascular: Peripheral pulses 2+ bilaterally     Incision Sites: Sternotomy incision C/D/I, no drainage, surrounding erythema or sternal click   Left EVH site C/D/I       LABS:                        9.3    11.1  )-----------( 257      ( 09 Mar 2017 12:49 )             28.1       COUMADIN:  Yes/No. REASON: .    PT/INR - ( 09 Mar 2017 12:49 )   PT: 14.8 sec;   INR: 1.33          PTT - ( 09 Mar 2017 12:49 )  PTT:26.1 sec    09 Mar 2017 08:04    135    |  100    |  25     ----------------------------<  92     3.9     |  27     |  1.03     Ca    8.8        09 Mar 2017 08:04  Mg     2.5       09 Mar 2017 08:04    TPro  6.9    /  Alb  2.7    /  TBili  0.7    /  DBili  x      /  AST  26     /  ALT  20     /  AlkPhos  51     09 Mar 2017 08:04      Urinalysis Basic - ( 09 Mar 2017 13:41 )    Color: Red / Appearance: Cloudy / S.025 / pH: x  Gluc: x / Ketone: NEGATIVE  / Bili: NEGATIVE / Urobili: 0.2 E.U./dL   Blood: x / Protein: 100 mg/dL / Nitrite: NEGATIVE   Leuk Esterase: NEGATIVE / RBC: See Note /HPF / WBC > 10 /HPF   Sq Epi: x / Non Sq Epi: Rare /HPF / Bacteria: Present /HPF        MEDICATIONS  (STANDING):  aspirin enteric coated 81milliGRAM(s) Oral daily  clopidogrel Tablet 75milliGRAM(s) Oral daily  simvastatin 40milliGRAM(s) Oral at bedtime  famotidine    Tablet 20milliGRAM(s) Oral daily  insulin glargine Injectable (LANTUS) 24Unit(s) SubCutaneous every morning  insulin lispro Injectable (HumaLOG) 4Unit(s) SubCutaneous three times a day before meals  insulin lispro (HumaLOG) corrective regimen sliding scale  SubCutaneous Before meals and at bedtime  dextrose 50% Injectable 12.5Gram(s) IV Push once  amiodarone    Tablet 400milliGRAM(s) Oral every 8 hours  levothyroxine 50MICROGram(s) Oral daily  diltiazem    Tablet 60milliGRAM(s) Oral every 6 hours  fluticasone / salmeterol 250-50 MICROgram(s) Diskus 1Dose(s) Inhalation two times a day    MEDICATIONS  (PRN):  dextrose Gel 1Dose(s) Oral once PRN Blood Glucose LESS THAN 70 milliGRAM(s)/deciliter  glucagon  Injectable 1milliGRAM(s) IntraMuscular once PRN Glucose LESS THAN 70 milligrams/deciliter    RADIOLOGY & ADDITIONAL TESTS:  3/9/17 CXR: Pending official read, small left sided pleural effusion vs atelectasis     A/P: 81 year old former smoker PMHx HTN, HLD, DM, Aflutter s/p ablation , COPD, Hypothyroid, Hx of prostate Ca s/p seed implant , presented to [facility] ED c/o chest pain admitted for further workup 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 Now POD#3 s/p OPCAB x 3 with Dr. Funez. Post op course complicated by paroxysmal atrial fibrillation. Patient stable and transferred to 9 last night, no acute events overnight . This AM, patient went back into rate controlled atrial fibrillation, asymptomatic. Hematuria also noted in allen this AM, patient denies any pain.     Neurovascular: Stable   - no active issues     Cardiovascular: Rate controlled atrial fibrillation, soft BP this AM SBP 90s, given 500cc NS and SBP improved to 100-110s. Given amio bolus x2. Still rate controlled. on PO amiodarone load. Per Dr. Funez to start eliquis this AM but on hold due to hematuria.   - s/p OPCAB - continue asa 81mg, plavix 75mg, simvastatin 40mg, and cardizem 60mg q6   - K 3.9 this AM, gave 20meq IV through central line. Mg 2.5     Respiratory: 02 Sat = 93% on RA.  - Hx of COPD, Dr. Wilcox consulted, awaiting recs.   Continue inhalers   - Encourage ambulation   - Encourage C+DB and Use of IS 10x / hr while awake.  - CXR, small left sided pleural effusion f/u CXR in AM     GI: Stable.  - Continue PO Diet   - continue pepcid 20mg daily   - continue bowel regimen     Renal / : Cr 1.03,  + Allen with hematuria   - UA + WBC, bacteria and blood. No nitrates or leuk esterase.   consulted ,awaiting recs   - f/u urine culture and urine cytology   - Monitor renal function.  - Monitor I/O's.    Endocrine:  Hemoglobin a1c 6.8, Hypothyroid   - continue insulin sliding scale and lantus 24u in AM and 4u premeal   - monitor finger sticks   - continue synthroid 50mcg     Hematologic: + Hematura   - H/H stable, Coags stable.   - Type & Screen x 1     Prophylaxis:  -SCD's    Disposition: Home when medically ready

## 2017-03-09 NOTE — CONSULT NOTE ADULT - SUBJECTIVE AND OBJECTIVE BOX
HPI: (history obtained by primary team and chart, pt was a poor historian)   80 y/o obese M POOR HISTORIAN Ex-Smoker w/PMHX HTN, HLD, DM (Non-Compliant w/ Metformin), Aflutter s/p Ablation in  at [facility], COPD (denies h/o intubation, last completed Prednisone Taper in 2017), Hypothyroidism, h/o Prostate CA s/p seed implant in  who originally presented to PMD office today c/o 2 episodes of near syncope in the setting of C/P within the past 2 weeks.  Pt reports dizziness, nausea, palpitations, fatigue and 5-6/10 chest tightness across the top of the chest. C/P did not radiate to the jaw/neck/back/arm. The last episode occurred at 2AM this morning while ambulating to the restroom. Pt presented from PMD office to [facility] for further evaluation. Pt reports h/o chronic CASON requiring him to stop twice when walking 1 block. Pt reports chronic ankle LE edema. Pt admits to 1 remote possible episode of syncope, timing unknown. Pt denies any new vision changes, vertigo, numbness/tingling in extremities, amaurosis fugax, focal extremity weakness. In ER /74, HR 82, RR 20, T 98.3, O2 92-93% on RA. Labs significant for CE neg. x 1. K 4.0. EKG shows Sinus Rhythm at 100bpm w/ 4 PVCs, + RBBB, LAFB, no clear ST depressions, no TWI. In ED pt noted to have frequent PVCs. CXR showed clear lungs. Pt currently Assx. Echo from MD office 2016 showed basal anterior, mid anterior and mid anterolateral hypokinesis, EF 50-55%, LA mild dilated, grade 1 diastolic dysfunction, borderline AS. UA pending.  Pt reports h/o stress test 1 year ago, results unknown. Note, pts wife  within the last month and son  within the past 2 months. Pt denies fever/chills, dysuria, abdominal pain, diarrhea. + Dry cough.    Called to see patient for hematuria. He reports this is the first episode. Patient had prostate cancer with seeds . Hx hydrocele with 2 hydrocelectomies. Pt was supposed to followup with his urologist however he never did. He reports not seeing a urologist in the past 10 years. He reports incomplete bladder emptying, dribbling and hesitancy. Denies dysuria. No hx kidney stones.        Vital Signs Last 24 Hrs  T(C): 36.6, Max: 37.2 ( @ 05:00)  T(F): 97.8, Max: 98.9 ( @ 05:00)  HR: 108 (76 - 112)  BP: 146/80 (96/44 - 146/80)  BP(mean): 114 (62 - 114)  RR: 25 (20 - 34)  SpO2: 94% (92% - 99%)  I&O's Summary  I & Os for 24h ending 09 Mar 2017 07:00  =============================================  IN: 340 ml / OUT: 1424 ml / NET: -1084 ml    I & Os for current day (as of 09 Mar 2017 16:34)  =============================================  IN: 600 ml / OUT: 400 ml / NET: 200 ml      PE:  Gen: NAD  Abd: obese, no suprapubic tenderness  : cook draining pinkish, old scrotal hydrocele incision intact    LABS:                        9.3    11.1  )-----------( 257      ( 09 Mar 2017 12:49 )             28.1     09 Mar 2017 08:04    135    |  100    |  25     ----------------------------<  92     3.9     |  27     |  1.03     Ca    8.8        09 Mar 2017 08:04  Mg     2.5       09 Mar 2017 08:04    TPro  6.9    /  Alb  2.7    /  TBili  0.7    /  DBili  x      /  AST  26     /  ALT  20     /  AlkPhos  51     09 Mar 2017 08:04    PT/INR - ( 09 Mar 2017 12:49 )   PT: 14.8 sec;   INR: 1.33          PTT - ( 09 Mar 2017 12:49 )  PTT:26.1 sec  Cultures      A/P: 81M hx CAD s/p CABG x3 POD #3.  1- Maintain cook and monitor UOP. Do not remove unless confirmation from  team  2- Continue anticoagulants as per primary team- as long as urine is draining and urine is not dark (red wine) with clots may proceed  3- Fu Ua, Ucx  4- Needs Urine cytology  5- Further imaging may be done with Dr Johnson as an outpatient

## 2017-03-10 DIAGNOSIS — C61 MALIGNANT NEOPLASM OF PROSTATE: ICD-10-CM

## 2017-03-10 DIAGNOSIS — R31.9 HEMATURIA, UNSPECIFIED: ICD-10-CM

## 2017-03-10 LAB
ANION GAP SERPL CALC-SCNC: 8 MMOL/L — LOW (ref 9–16)
APTT BLD: 26.3 SEC — LOW (ref 27.5–37.4)
BUN SERPL-MCNC: 21 MG/DL — SIGNIFICANT CHANGE UP (ref 7–23)
CALCIUM SERPL-MCNC: 8.4 MG/DL — LOW (ref 8.5–10.5)
CHLORIDE SERPL-SCNC: 103 MMOL/L — SIGNIFICANT CHANGE UP (ref 96–108)
CO2 SERPL-SCNC: 26 MMOL/L — SIGNIFICANT CHANGE UP (ref 22–31)
CREAT SERPL-MCNC: 1.01 MG/DL — SIGNIFICANT CHANGE UP (ref 0.5–1.3)
GLUCOSE SERPL-MCNC: 88 MG/DL — SIGNIFICANT CHANGE UP (ref 70–99)
HCT VFR BLD CALC: 26.9 % — LOW (ref 39–50)
HGB BLD-MCNC: 8.9 G/DL — LOW (ref 13–17)
INR BLD: 1.2 — HIGH (ref 0.88–1.16)
MAGNESIUM SERPL-MCNC: 2.4 MG/DL — SIGNIFICANT CHANGE UP (ref 1.6–2.4)
MCHC RBC-ENTMCNC: 32.5 PG — SIGNIFICANT CHANGE UP (ref 27–34)
MCHC RBC-ENTMCNC: 33.1 G/DL — SIGNIFICANT CHANGE UP (ref 32–36)
MCV RBC AUTO: 98.2 FL — SIGNIFICANT CHANGE UP (ref 80–100)
PLATELET # BLD AUTO: 269 K/UL — SIGNIFICANT CHANGE UP (ref 150–400)
POTASSIUM SERPL-MCNC: 3.9 MMOL/L — SIGNIFICANT CHANGE UP (ref 3.5–5.3)
POTASSIUM SERPL-SCNC: 3.9 MMOL/L — SIGNIFICANT CHANGE UP (ref 3.5–5.3)
PROTHROM AB SERPL-ACNC: 13.3 SEC — HIGH (ref 10–13.1)
RBC # BLD: 2.74 M/UL — LOW (ref 4.2–5.8)
RBC # FLD: 13.7 % — SIGNIFICANT CHANGE UP (ref 10.3–16.9)
SODIUM SERPL-SCNC: 137 MMOL/L — SIGNIFICANT CHANGE UP (ref 135–145)
WBC # BLD: 8.7 K/UL — SIGNIFICANT CHANGE UP (ref 3.8–10.5)
WBC # FLD AUTO: 8.7 K/UL — SIGNIFICANT CHANGE UP (ref 3.8–10.5)

## 2017-03-10 PROCEDURE — 71020: CPT | Mod: 26

## 2017-03-10 PROCEDURE — 99221 1ST HOSP IP/OBS SF/LOW 40: CPT

## 2017-03-10 PROCEDURE — 74178 CT ABD&PLV WO CNTR FLWD CNTR: CPT | Mod: 26

## 2017-03-10 PROCEDURE — 93306 TTE W/DOPPLER COMPLETE: CPT | Mod: 26

## 2017-03-10 RX ORDER — FUROSEMIDE 40 MG
20 TABLET ORAL ONCE
Qty: 0 | Refills: 0 | Status: COMPLETED | OUTPATIENT
Start: 2017-03-10 | End: 2017-03-10

## 2017-03-10 RX ORDER — APIXABAN 2.5 MG/1
2.5 TABLET, FILM COATED ORAL
Qty: 0 | Refills: 0 | Status: DISCONTINUED | OUTPATIENT
Start: 2017-03-10 | End: 2017-03-12

## 2017-03-10 RX ORDER — POTASSIUM CHLORIDE 20 MEQ
20 PACKET (EA) ORAL ONCE
Qty: 0 | Refills: 0 | Status: COMPLETED | OUTPATIENT
Start: 2017-03-10 | End: 2017-03-10

## 2017-03-10 RX ADMIN — APIXABAN 2.5 MILLIGRAM(S): 2.5 TABLET, FILM COATED ORAL at 17:43

## 2017-03-10 RX ADMIN — Medication 4 UNIT(S): at 17:42

## 2017-03-10 RX ADMIN — AMIODARONE HYDROCHLORIDE 400 MILLIGRAM(S): 400 TABLET ORAL at 05:13

## 2017-03-10 RX ADMIN — Medication 20 MILLIEQUIVALENT(S): at 20:03

## 2017-03-10 RX ADMIN — Medication 4 UNIT(S): at 07:47

## 2017-03-10 RX ADMIN — FLUTICASONE PROPIONATE AND SALMETEROL 1 DOSE(S): 50; 250 POWDER ORAL; RESPIRATORY (INHALATION) at 13:06

## 2017-03-10 RX ADMIN — AMIODARONE HYDROCHLORIDE 400 MILLIGRAM(S): 400 TABLET ORAL at 13:11

## 2017-03-10 RX ADMIN — AMIODARONE HYDROCHLORIDE 400 MILLIGRAM(S): 400 TABLET ORAL at 21:38

## 2017-03-10 RX ADMIN — INSULIN GLARGINE 24 UNIT(S): 100 INJECTION, SOLUTION SUBCUTANEOUS at 07:47

## 2017-03-10 RX ADMIN — Medication 20 MILLIGRAM(S): at 18:27

## 2017-03-10 RX ADMIN — FAMOTIDINE 20 MILLIGRAM(S): 10 INJECTION INTRAVENOUS at 13:06

## 2017-03-10 RX ADMIN — Medication 3 MILLILITER(S): at 03:47

## 2017-03-10 RX ADMIN — Medication 4 UNIT(S): at 13:11

## 2017-03-10 RX ADMIN — Medication 3 MILLILITER(S): at 17:44

## 2017-03-10 RX ADMIN — SIMVASTATIN 40 MILLIGRAM(S): 20 TABLET, FILM COATED ORAL at 21:38

## 2017-03-10 RX ADMIN — Medication 50 MICROGRAM(S): at 05:13

## 2017-03-10 RX ADMIN — FLUTICASONE PROPIONATE AND SALMETEROL 1 DOSE(S): 50; 250 POWDER ORAL; RESPIRATORY (INHALATION) at 21:03

## 2017-03-10 RX ADMIN — Medication 81 MILLIGRAM(S): at 13:06

## 2017-03-10 NOTE — PROGRESS NOTE ADULT - SUBJECTIVE AND OBJECTIVE BOX
81M s/p CABG hx CaP s/p brachytherapy, bianca score unknown, last PSA and rodney unknown, called for hematuria, pt with multiple medical comorbidities and 60 pack-year smoking hx, never noted any blood in the urine before.    Vital Signs Last 24 Hrs  T(C): 37.1, Max: 37.7 ( @ 17:33)  T(F): 98.8, Max: 99.8 ( @ 17:33)  HR: 80 (78 - 108)  BP: 106/53 (84/52 - 146/80)  BP(mean): 76 (64 - 114)  RR: 18 (18 - 24)  SpO2: 90% (85% - 97%)    PE: NAD AAOx3  S/NT/ND  (-)SP Fullness/Tenderness, (-)CVAT  Randle clear yellow                          8.9    8.7   )-----------( 269      ( 10 Mar 2017 06:19 )             26.9     10 Mar 2017 06:19    137    |  103    |  21     ----------------------------<  88     3.9     |  26     |  1.01     Ca    8.4        10 Mar 2017 06:19  Mg     2.4       10 Mar 2017 06:19    TPro  6.9    /  Alb  2.7    /  TBili  0.7    /  DBili  x      /  AST  26     /  ALT  20     /  AlkPhos  51     09 Mar 2017 08:04    Urinalysis Basic - ( 09 Mar 2017 13:41 )    Color: Red / Appearance: Cloudy / S.025 / pH: x  Gluc: x / Ketone: NEGATIVE  / Bili: NEGATIVE / Urobili: 0.2 E.U./dL   Blood: x / Protein: 100 mg/dL / Nitrite: NEGATIVE   Leuk Esterase: NEGATIVE / RBC: See Note /HPF / WBC > 10 /HPF   Sq Epi: x / Non Sq Epi: Rare /HPF / Bacteria: Present /HPF        I & Os for current day (as of 03-10 @ 08:53)  =============================================  IN: 1470 ml / OUT: 1343 ml / NET: 127 ml

## 2017-03-10 NOTE — PROGRESS NOTE ADULT - SUBJECTIVE AND OBJECTIVE BOX
Patient discussed on morning rounds with Dr. Funez     Operation / Date: 3/6/17 OPCABx3    SUBJECTIVE ASSESSMENT:    Pt denies any chest pain or discomfort.  Denies any dyspnea or SOB.  He is pulling about 1 liter on IS and ambulated in hallway x 2 yesterday without difficulty.  He has agreed to ambulate x 5 today.  He is tolerating PO and has a good appetite denies N/V/Abd pain, fevers or chills.  He is followed by Dr. Wilcox as outpatient for COPD and reports baseline O2 sat in low 90's on room air, does not use oxygen at home.      Vital Signs Last 24 Hrs  T(C): 37.1, Max: 37.7 ( @ 17:33)  T(F): 98.8, Max: 99.8 ( @ 17:33)  HR: 80 (78 - 108)  BP: 106/53 (84/52 - 146/80)  BP(mean): 76 (64 - 114)  RR: 18 (18 - 24)  SpO2: 90% (85% - 97%)  I&O's Detail    I & Os for current day (as of 10 Mar 2017 08:24)  =============================================  IN:    Oral Fluid: 870 ml    sodium chloride 0.45%: 500 ml    IV PiggyBack: 100 ml    Total IN: 1470 ml  ---------------------------------------------  OUT:    Indwelling Catheter - Urethral: 1343 ml    Total OUT: 1343 ml  ---------------------------------------------  Total NET: 127 ml      CHEST TUBE:  No.  WILFRED DRAIN:  No.  EPICARDIAL WIRES:No.  TIE DOWNS: Yes.  COOK: Yes.  Followed by  for urinary retention and hematuria, now resolved, urine is presently clear in cook.    PHYSICAL EXAM:    General: NAD    Neurological: A&Ox3    Cardiovascular: S1S2 RRR    Respiratory: bibasilar crackles, clear through upper lung fields    Gastrointestinal: soft NT/ND +BS    Extremities: no edema, EVH C/D/I    Vascular: extrem warm and well perfused    Incision Sites: MSI C/D/I    LABS:                        8.9    8.7   )-----------( 269      ( 10 Mar 2017 06:19 )             26.9       COUMADIN:  No. REASON: Eliquis    PT/INR - ( 10 Mar 2017 06:19 )   PT: 13.3 sec;   INR: 1.20          PTT - ( 10 Mar 2017 06:19 )  PTT:26.3 sec    10 Mar 2017 06:19    137    |  103    |  21     ----------------------------<  88     3.9     |  26     |  1.01     Ca    8.4        10 Mar 2017 06:19  Mg     2.4       10 Mar 2017 06:19    TPro  6.9    /  Alb  2.7    /  TBili  0.7    /  DBili  x      /  AST  26     /  ALT  20     /  AlkPhos  51     09 Mar 2017 08:04      Urinalysis Basic - ( 09 Mar 2017 13:41 )    Color: Red / Appearance: Cloudy / S.025 / pH: x  Gluc: x / Ketone: NEGATIVE  / Bili: NEGATIVE / Urobili: 0.2 E.U./dL   Blood: x / Protein: 100 mg/dL / Nitrite: NEGATIVE   Leuk Esterase: NEGATIVE / RBC: See Note /HPF / WBC > 10 /HPF   Sq Epi: x / Non Sq Epi: Rare /HPF / Bacteria: Present /HPF        MEDICATIONS  (STANDING):  aspirin enteric coated 81milliGRAM(s) Oral daily  simvastatin 40milliGRAM(s) Oral at bedtime  famotidine    Tablet 20milliGRAM(s) Oral daily  insulin glargine Injectable (LANTUS) 24Unit(s) SubCutaneous every morning  insulin lispro Injectable (HumaLOG) 4Unit(s) SubCutaneous three times a day before meals  insulin lispro (HumaLOG) corrective regimen sliding scale  SubCutaneous Before meals and at bedtime  dextrose 50% Injectable 12.5Gram(s) IV Push once  amiodarone    Tablet 400milliGRAM(s) Oral every 8 hours  levothyroxine 50MICROGram(s) Oral daily  diltiazem    Tablet 60milliGRAM(s) Oral every 6 hours  fluticasone / salmeterol 250-50 MICROgram(s) Diskus 1Dose(s) Inhalation two times a day  apixaban 2.5milliGRAM(s) Oral two times a day    MEDICATIONS  (PRN):  dextrose Gel 1Dose(s) Oral once PRN Blood Glucose LESS THAN 70 milliGRAM(s)/deciliter  glucagon  Injectable 1milliGRAM(s) IntraMuscular once PRN Glucose LESS THAN 70 milligrams/deciliter  ALBUTerol/ipratropium for Nebulization 3milliLiter(s) Nebulizer every 6 hours PRN Wheezing

## 2017-03-10 NOTE — PROGRESS NOTE ADULT - ASSESSMENT
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 Now POD#3 s/p OPCAB x 3 with Dr. Funez. Post op course complicated by paroxysmal atrial fibrillation. Patient stable and transferred to 9L last night, no acute events overnight . This AM, patient went back into rate controlled atrial fibrillation, asymptomatic. Hematuria also noted in cook this AM, patient denies any pain.     Neurovascular: Stable   - no active issues     Cardiovascular: Rate controlled atrial fibrillation, soft BP this AM SBP 90s, given 500cc NS and SBP improved to 100-110s. Given amio bolus x2. Still rate controlled. on PO amiodarone load. Per Dr. Funez to start eliquis this AM but on hold due to hematuria.   - s/p OPCAB - continue asa 81mg, plavix 75mg, simvastatin 40mg, and cardizem 60mg q6   - K 3.9 this AM, gave 20meq IV through central line. Mg 2.5     Respiratory: 02 Sat = 91% on 6L.  - Hx of COPD, Dr. Wilcox consulted, awaiting recs. Continue inhalers   - Encourage ambulation x 5 today  - Encourage C+DB and Use of IS 10x / hr while awake.  - CXR, small left sided pleural effusion f/u CXR in AM     GI: Stable.  - Continue PO Diet   - continue pepcid 20mg daily   - continue bowel regimen     Renal / : Cr 1.03,  + Cook, hematuria now resolved  -  consult appreciated, transient hematuria likely 2/2 pulling on cook, now resolved   - f/u urine culture and urine cytology   - check PSA in AM  - Monitor renal function.  - Monitor I/O's.    Endocrine:  Hemoglobin a1c 6.8, Hypothyroid   - continue insulin sliding scale and lantus 24u in AM and 4u premeal   - monitor finger sticks   - continue synthroid 50mcg     Hematologic: transient hematuria now reolved, likely 2/2 pulling on cook  - d/w Dr. 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 Now POD#4 s/p OPCAB x 3 with Dr. Funez. Post op course complicated by paroxysmal atrial fibrillation and hematuria, now resolved    Neurovascular: Stable, pain well controlled   - no active issues     Cardiovascular: Rate controlled atrial fibrillation on PO amiodarone load and elequis.    - s/p OPCAB - continue asa 81mg, simvastatin 40mg, and cardizem 60mg q6   - Plavix dc'd, started elequis for Afib    Respiratory: 02 Sat = 91% on 6L.  - Hx of COPD, Dr. Wilcox consulted, awaiting recs. Continue inhalers   - Encourage ambulation x 5 today  - Encourage C+DB and Use of IS 10x / hr while awake.  - check post-op PA/lateral CXR tomorrow    GI: Stable.  - Continue PO Diet   - continue pepcid 20mg daily   - continue bowel regimen     Renal / : Cr 1.01,  + Cook, hematuria now resolved  -  consult appreciated, transient hematuria likely 2/2 pulling on cook, now resolved   - f/u urine culture and urine cytology   - check PSA in AM  - Monitor renal function.  - Monitor I/O's.    Endocrine:  Hemoglobin a1c 6.8, Hypothyroid   - continue insulin sliding scale and lantus 24u in AM and 4u premeal   - monitor finger sticks   - continue synthroid 50mcg     Hematologic: transient hematuria now reolved, likely 2/2 pulling on cook  - d/w Dr. Funez and ,  will hold plavix and resume eliquis  - H/H stable, will cont to monitor for hematuria  - Type & Screen x 1     Prophylaxis:  -SCD's

## 2017-03-10 NOTE — PROGRESS NOTE ADULT - PROBLEM SELECTOR PLAN 1
-Currently clear, TOV per primary team  -Check Urine cytology  -CT Urogram  -Will need to follow-up with Dr. Johnson for outpatient cystoscopy [phone]

## 2017-03-10 NOTE — PROGRESS NOTE ADULT - PROBLEM SELECTOR PROBLEM 1
ACS (acute coronary syndrome)
COPD (chronic obstructive pulmonary disease)
Hematuria

## 2017-03-10 NOTE — PROGRESS NOTE ADULT - ASSESSMENT
81M s/p CABG with hematuria, likely from cook trauma however developed days after placement, pt higher risk for bladder tumor so should undergo hematuria workup. I discussed this with the patient who demonstrated understanding.

## 2017-03-11 LAB
ANION GAP SERPL CALC-SCNC: 8 MMOL/L — LOW (ref 9–16)
BUN SERPL-MCNC: 24 MG/DL — HIGH (ref 7–23)
CALCIUM SERPL-MCNC: 8.4 MG/DL — LOW (ref 8.5–10.5)
CHLORIDE SERPL-SCNC: 103 MMOL/L — SIGNIFICANT CHANGE UP (ref 96–108)
CO2 SERPL-SCNC: 27 MMOL/L — SIGNIFICANT CHANGE UP (ref 22–31)
CREAT SERPL-MCNC: 1.07 MG/DL — SIGNIFICANT CHANGE UP (ref 0.5–1.3)
CULTURE RESULTS: NO GROWTH — SIGNIFICANT CHANGE UP
GLUCOSE SERPL-MCNC: 83 MG/DL — SIGNIFICANT CHANGE UP (ref 70–99)
HCT VFR BLD CALC: 26.7 % — LOW (ref 39–50)
HGB BLD-MCNC: 8.8 G/DL — LOW (ref 13–17)
MAGNESIUM SERPL-MCNC: 2.6 MG/DL — HIGH (ref 1.6–2.4)
MCHC RBC-ENTMCNC: 32.2 PG — SIGNIFICANT CHANGE UP (ref 27–34)
MCHC RBC-ENTMCNC: 33 G/DL — SIGNIFICANT CHANGE UP (ref 32–36)
MCV RBC AUTO: 97.8 FL — SIGNIFICANT CHANGE UP (ref 80–100)
PLATELET # BLD AUTO: 299 K/UL — SIGNIFICANT CHANGE UP (ref 150–400)
POTASSIUM SERPL-MCNC: 4.1 MMOL/L — SIGNIFICANT CHANGE UP (ref 3.5–5.3)
POTASSIUM SERPL-SCNC: 4.1 MMOL/L — SIGNIFICANT CHANGE UP (ref 3.5–5.3)
RBC # BLD: 2.73 M/UL — LOW (ref 4.2–5.8)
RBC # FLD: 14.5 % — SIGNIFICANT CHANGE UP (ref 10.3–16.9)
SODIUM SERPL-SCNC: 138 MMOL/L — SIGNIFICANT CHANGE UP (ref 135–145)
SPECIMEN SOURCE: SIGNIFICANT CHANGE UP
WBC # BLD: 8.5 K/UL — SIGNIFICANT CHANGE UP (ref 3.8–10.5)
WBC # FLD AUTO: 8.5 K/UL — SIGNIFICANT CHANGE UP (ref 3.8–10.5)

## 2017-03-11 RX ORDER — ACETAMINOPHEN 500 MG
650 TABLET ORAL EVERY 6 HOURS
Qty: 0 | Refills: 0 | Status: DISCONTINUED | OUTPATIENT
Start: 2017-03-11 | End: 2017-03-12

## 2017-03-11 RX ADMIN — FLUTICASONE PROPIONATE AND SALMETEROL 1 DOSE(S): 50; 250 POWDER ORAL; RESPIRATORY (INHALATION) at 09:15

## 2017-03-11 RX ADMIN — AMIODARONE HYDROCHLORIDE 400 MILLIGRAM(S): 400 TABLET ORAL at 13:51

## 2017-03-11 RX ADMIN — AMIODARONE HYDROCHLORIDE 400 MILLIGRAM(S): 400 TABLET ORAL at 06:34

## 2017-03-11 RX ADMIN — Medication 650 MILLIGRAM(S): at 01:30

## 2017-03-11 RX ADMIN — APIXABAN 2.5 MILLIGRAM(S): 2.5 TABLET, FILM COATED ORAL at 17:58

## 2017-03-11 RX ADMIN — Medication 81 MILLIGRAM(S): at 11:42

## 2017-03-11 RX ADMIN — Medication 650 MILLIGRAM(S): at 18:00

## 2017-03-11 RX ADMIN — Medication 4 UNIT(S): at 17:30

## 2017-03-11 RX ADMIN — FAMOTIDINE 20 MILLIGRAM(S): 10 INJECTION INTRAVENOUS at 11:42

## 2017-03-11 RX ADMIN — Medication 4 UNIT(S): at 11:45

## 2017-03-11 RX ADMIN — Medication 50 MICROGRAM(S): at 06:34

## 2017-03-11 RX ADMIN — Medication 2: at 11:44

## 2017-03-11 RX ADMIN — SIMVASTATIN 40 MILLIGRAM(S): 20 TABLET, FILM COATED ORAL at 21:05

## 2017-03-11 RX ADMIN — APIXABAN 2.5 MILLIGRAM(S): 2.5 TABLET, FILM COATED ORAL at 06:34

## 2017-03-11 RX ADMIN — Medication 650 MILLIGRAM(S): at 00:30

## 2017-03-11 RX ADMIN — FLUTICASONE PROPIONATE AND SALMETEROL 1 DOSE(S): 50; 250 POWDER ORAL; RESPIRATORY (INHALATION) at 21:05

## 2017-03-11 NOTE — PROGRESS NOTE ADULT - SUBJECTIVE AND OBJECTIVE BOX
Patient discussed on morning rounds with Dr. Nguyễn     Operation / Date: 3/6/17 OPCABx3    SUBJECTIVE ASSESSMENT:    Pt reports breathing comfortable at present and pain well controlled.  He has yet to ambulate today but ambulated in hallway x 3 yesterday without oxygen and denied any dyspnea/SOB.  He is tolerating PO and had a BM yesterday.    Vital Signs Last 24 Hrs  T(C): 36.7, Max: 37.2 (03-10 @ 14:12)  T(F): 98, Max: 99 (03-10 @ 17:06)  HR: 78 (70 - 92)  BP: 116/59 (94/56 - 131/66)  BP(mean): 78 (66 - 98)  RR: 18 (16 - 18)  SpO2: 91% (88% - 99%)  I&O's Detail  I & Os for 24h ending 11 Mar 2017 07:00  =============================================  IN:    Oral Fluid: 660 ml    Total IN: 660 ml  ---------------------------------------------  OUT:    Indwelling Catheter - Urethral: 1750 ml    Total OUT: 1750 ml  ---------------------------------------------  Total NET: -1090 ml    I & Os for current day (as of 11 Mar 2017 10:45)  =============================================  IN:    Oral Fluid: 240 ml    Total IN: 240 ml  ---------------------------------------------  OUT:    Indwelling Catheter - Urethral: 170 ml    Total OUT: 170 ml  ---------------------------------------------  Total NET: 70 ml      CHEST TUBE:  No.   WILFRED DRAIN:  No.  EPICARDIAL WIRES: No.  TIE DOWNS: Yes.  ALLEN: No.    PHYSICAL EXAM:    General: NAD    Neurological: A&Ox3    Cardiovascular: S1S2 RRR    Respiratory: CTA b/l    Gastrointestinal: soft NT/ND +BS    Extremities: warm, trace edema    Vascular: warm and well perfused bilaterally    Incision Sites: MSI and EVH sites C/D/I    LABS:                        8.8    8.5   )-----------( 299      ( 11 Mar 2017 05:43 )             26.7       COUMADIN:  No.    PT/INR - ( 10 Mar 2017 06:19 )   PT: 13.3 sec;   INR: 1.20          PTT - ( 10 Mar 2017 06:19 )  PTT:26.3 sec    11 Mar 2017 05:43    138    |  103    |  24     ----------------------------<  83     4.1     |  27     |  1.07     Ca    8.4        11 Mar 2017 05:43  Mg     2.6       11 Mar 2017 05:43        Urinalysis Basic - ( 09 Mar 2017 13:41 )    Color: Red / Appearance: Cloudy / S.025 / pH: x  Gluc: x / Ketone: NEGATIVE  / Bili: NEGATIVE / Urobili: 0.2 E.U./dL   Blood: x / Protein: 100 mg/dL / Nitrite: NEGATIVE   Leuk Esterase: NEGATIVE / RBC: See Note /HPF / WBC > 10 /HPF   Sq Epi: x / Non Sq Epi: Rare /HPF / Bacteria: Present /HPF        MEDICATIONS  (STANDING):  aspirin enteric coated 81milliGRAM(s) Oral daily  simvastatin 40milliGRAM(s) Oral at bedtime  famotidine    Tablet 20milliGRAM(s) Oral daily  insulin lispro Injectable (HumaLOG) 4Unit(s) SubCutaneous three times a day before meals  insulin lispro (HumaLOG) corrective regimen sliding scale  SubCutaneous Before meals and at bedtime  dextrose 50% Injectable 12.5Gram(s) IV Push once  amiodarone    Tablet 400milliGRAM(s) Oral every 8 hours  levothyroxine 50MICROGram(s) Oral daily  diltiazem    Tablet 60milliGRAM(s) Oral every 6 hours  fluticasone / salmeterol 250-50 MICROgram(s) Diskus 1Dose(s) Inhalation two times a day  apixaban 2.5milliGRAM(s) Oral two times a day    MEDICATIONS  (PRN):  dextrose Gel 1Dose(s) Oral once PRN Blood Glucose LESS THAN 70 milliGRAM(s)/deciliter  glucagon  Injectable 1milliGRAM(s) IntraMuscular once PRN Glucose LESS THAN 70 milligrams/deciliter  ALBUTerol/ipratropium for Nebulization 3milliLiter(s) Nebulizer every 6 hours PRN Wheezing  acetaminophen   Tablet. 650milliGRAM(s) Oral every 6 hours PRN Mild Pain (1 - 3)

## 2017-03-11 NOTE — PROGRESS NOTE ADULT - ASSESSMENT
A/P: 81 year old former smoker PMHx HTN, HLD, DM, Aflutter s/p ablation 2003, COPD, Hypothyroid, Hx of prostate Ca s/p seed implant 2002, presented to 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 Now POD#5 s/p OPCAB x 3 with Dr. Funez. Post op course complicated by paroxysmal atrial fibrillation and hematuria, now resolved    Neurovascular: Stable, pain well controlled   - no active issues     Cardiovascular: Rate controlled atrial fibrillation on PO amiodarone load and elequis.    - s/p OPCAB - continue asa 81mg, simvastatin 40mg, and cardizem 60mg q6   - c/w elequis for Afib    Respiratory: 02 Sat = 95 on RA  - Hx of COPD, Dr. Wilcox following. Continue inhalers   - Encourage ambulation x 5 today  - Encourage C+DB and Use of IS 10x / hr while awake.    GI: Stable.  - Continue PO Diet   - continue pepcid 20mg daily   - continue bowel regimen     Renal / : Cr 1.07,  + Cook, hematuria now resolved  -  consult appreciated, transient hematuria likely 2/2 pulling on cook, now resolved   -cook dc'd  - Monitor renal function.  - Monitor I/O's.    Endocrine:  Hemoglobin a1c 6.8, Hypothyroid   - FS well controlled, on metformin at home, lantus had been held x 2 doses, will dc insulin  - monitor finger sticks   - continue synthroid 50mcg     Hematologic: transient hematuria now reolved, likely 2/2 pulling on cook  - H/H stable    Prophylaxis:  -SCD's    Dispo: Home tomorrow, will be staying with daughter

## 2017-03-12 ENCOUNTER — TRANSCRIPTION ENCOUNTER (OUTPATIENT)
Age: 82
End: 2017-03-12

## 2017-03-12 VITALS
RESPIRATION RATE: 18 BRPM | HEART RATE: 78 BPM | SYSTOLIC BLOOD PRESSURE: 118 MMHG | DIASTOLIC BLOOD PRESSURE: 58 MMHG | OXYGEN SATURATION: 90 %

## 2017-03-12 LAB
ANION GAP SERPL CALC-SCNC: 10 MMOL/L — SIGNIFICANT CHANGE UP (ref 9–16)
BUN SERPL-MCNC: 27 MG/DL — HIGH (ref 7–23)
CALCIUM SERPL-MCNC: 8.6 MG/DL — SIGNIFICANT CHANGE UP (ref 8.5–10.5)
CHLORIDE SERPL-SCNC: 101 MMOL/L — SIGNIFICANT CHANGE UP (ref 96–108)
CO2 SERPL-SCNC: 25 MMOL/L — SIGNIFICANT CHANGE UP (ref 22–31)
CREAT SERPL-MCNC: 1.28 MG/DL — SIGNIFICANT CHANGE UP (ref 0.5–1.3)
GLUCOSE SERPL-MCNC: 101 MG/DL — HIGH (ref 70–99)
HCT VFR BLD CALC: 28.4 % — LOW (ref 39–50)
HGB BLD-MCNC: 9.3 G/DL — LOW (ref 13–17)
MAGNESIUM SERPL-MCNC: 2.5 MG/DL — HIGH (ref 1.6–2.4)
MCHC RBC-ENTMCNC: 32.2 PG — SIGNIFICANT CHANGE UP (ref 27–34)
MCHC RBC-ENTMCNC: 32.7 G/DL — SIGNIFICANT CHANGE UP (ref 32–36)
MCV RBC AUTO: 98.3 FL — SIGNIFICANT CHANGE UP (ref 80–100)
PLATELET # BLD AUTO: 392 K/UL — SIGNIFICANT CHANGE UP (ref 150–400)
POTASSIUM SERPL-MCNC: 4.3 MMOL/L — SIGNIFICANT CHANGE UP (ref 3.5–5.3)
POTASSIUM SERPL-SCNC: 4.3 MMOL/L — SIGNIFICANT CHANGE UP (ref 3.5–5.3)
PSA FLD-MCNC: <0.01 NG/ML — SIGNIFICANT CHANGE UP (ref 0–4)
RBC # BLD: 2.89 M/UL — LOW (ref 4.2–5.8)
RBC # FLD: 14.4 % — SIGNIFICANT CHANGE UP (ref 10.3–16.9)
SODIUM SERPL-SCNC: 136 MMOL/L — SIGNIFICANT CHANGE UP (ref 135–145)
WBC # BLD: 10.2 K/UL — SIGNIFICANT CHANGE UP (ref 3.8–10.5)
WBC # FLD AUTO: 10.2 K/UL — SIGNIFICANT CHANGE UP (ref 3.8–10.5)

## 2017-03-12 RX ORDER — ALBUTEROL 90 UG/1
2 AEROSOL, METERED ORAL
Qty: 1 | Refills: 0
Start: 2017-03-12

## 2017-03-12 RX ORDER — APIXABAN 2.5 MG/1
1 TABLET, FILM COATED ORAL
Qty: 60 | Refills: 0 | OUTPATIENT
Start: 2017-03-12 | End: 2017-04-11

## 2017-03-12 RX ORDER — LISINOPRIL 2.5 MG/1
1 TABLET ORAL
Qty: 0 | Refills: 0 | COMMUNITY

## 2017-03-12 RX ORDER — ALBUTEROL 90 UG/1
2 AEROSOL, METERED ORAL
Qty: 0 | Refills: 0 | COMMUNITY

## 2017-03-12 RX ORDER — FLUTICASONE PROPIONATE AND SALMETEROL 50; 250 UG/1; UG/1
1 POWDER ORAL; RESPIRATORY (INHALATION)
Qty: 1 | Refills: 0 | OUTPATIENT
Start: 2017-03-12

## 2017-03-12 RX ORDER — METFORMIN HYDROCHLORIDE 850 MG/1
1 TABLET ORAL
Qty: 60 | Refills: 0 | OUTPATIENT
Start: 2017-03-12 | End: 2017-04-11

## 2017-03-12 RX ORDER — SIMVASTATIN 20 MG/1
1 TABLET, FILM COATED ORAL
Qty: 0 | Refills: 0 | COMMUNITY

## 2017-03-12 RX ORDER — ASPIRIN/CALCIUM CARB/MAGNESIUM 324 MG
1 TABLET ORAL
Qty: 0 | Refills: 0 | COMMUNITY

## 2017-03-12 RX ORDER — ACETAMINOPHEN 500 MG
2 TABLET ORAL
Qty: 60 | Refills: 0 | OUTPATIENT
Start: 2017-03-12

## 2017-03-12 RX ORDER — LEVOTHYROXINE SODIUM 125 MCG
1 TABLET ORAL
Qty: 0 | Refills: 0 | COMMUNITY

## 2017-03-12 RX ORDER — ASPIRIN/CALCIUM CARB/MAGNESIUM 324 MG
1 TABLET ORAL
Qty: 30 | Refills: 0 | OUTPATIENT
Start: 2017-03-12 | End: 2017-04-11

## 2017-03-12 RX ORDER — METFORMIN HYDROCHLORIDE 850 MG/1
1 TABLET ORAL
Qty: 0 | Refills: 0 | COMMUNITY

## 2017-03-12 RX ORDER — FLUTICASONE PROPIONATE AND SALMETEROL 50; 250 UG/1; UG/1
1 POWDER ORAL; RESPIRATORY (INHALATION)
Qty: 0 | Refills: 0 | COMMUNITY

## 2017-03-12 RX ORDER — AMIODARONE HYDROCHLORIDE 400 MG/1
1 TABLET ORAL
Qty: 30 | Refills: 0 | OUTPATIENT
Start: 2017-03-12 | End: 2017-04-11

## 2017-03-12 RX ORDER — LEVOTHYROXINE SODIUM 125 MCG
1 TABLET ORAL
Qty: 30 | Refills: 0 | OUTPATIENT
Start: 2017-03-12 | End: 2017-04-11

## 2017-03-12 RX ORDER — SIMVASTATIN 20 MG/1
1 TABLET, FILM COATED ORAL
Qty: 30 | Refills: 0 | OUTPATIENT
Start: 2017-03-12 | End: 2017-04-11

## 2017-03-12 RX ADMIN — APIXABAN 2.5 MILLIGRAM(S): 2.5 TABLET, FILM COATED ORAL at 06:22

## 2017-03-12 RX ADMIN — Medication 81 MILLIGRAM(S): at 10:38

## 2017-03-12 RX ADMIN — Medication 50 MICROGRAM(S): at 06:21

## 2017-03-12 RX ADMIN — FAMOTIDINE 20 MILLIGRAM(S): 10 INJECTION INTRAVENOUS at 10:38

## 2017-03-12 RX ADMIN — Medication 4 UNIT(S): at 07:58

## 2017-03-12 RX ADMIN — AMIODARONE HYDROCHLORIDE 200 MILLIGRAM(S): 400 TABLET ORAL at 06:23

## 2017-03-12 NOTE — DISCHARGE NOTE ADULT - NS MD DC FALL RISK RISK
For information on Fall & Injury Prevention, visit [email or web address]
For information on Fall & Injury Prevention, visit [email or web address]

## 2017-03-12 NOTE — DISCHARGE NOTE ADULT - PLAN OF CARE
see below -Please follow up with Dr. Funez on 3/21 at 1130.  The office is located at [facility], [facility] 4th Floor.  Call us with any questions #[phone].  -No driving or strenuous activity for 6 weeks, or until cleared by your surgeon.  Avoid lifting >10lbs.   -Continue to walk daily as tolerated and use your incentive spirometer every hour.  -Gently clean your incision(s) with anti-bacterial soap and water, pat dry and leave open to air.  We recommend liquid Dial.    -Call your doctor if you have shortness of breath, chest pain not relieved by pain medication, dizziness, fever >101.5, or increased redness/drainage from your incision.

## 2017-03-12 NOTE — DISCHARGE NOTE ADULT - VISION (WITH CORRECTIVE LENSES IF THE PATIENT USUALLY WEARS THEM):
glasses/Severely impaired: cannot locate objects without hearing or touching them or patient nonresponsive.
Severely impaired: cannot locate objects without hearing or touching them or patient nonresponsive.

## 2017-03-12 NOTE — DISCHARGE NOTE ADULT - NSTOBACCOHOTLINE_GEN_A_NCS
[facility] Smokers Quitline ([phone]-[email or web address]-[phone])
[facility] Smokers Quitline ([phone])

## 2017-03-12 NOTE — PROGRESS NOTE ADULT - SUBJECTIVE AND OBJECTIVE BOX
Patient discussed on morning rounds with Dr. Nguyễn     Operation / Date: 3/8/17    Surgeon: Dr. Funez    Referring Physician: Dr. Vieyra    SUBJECTIVE ASSESSMENT:    Pain well controlled, denies dyspnea/SOB, fevers/chills, chest pain, dyspnea or SOB    Hospital Course:    82 y/o obese M POOR HISTORIAN Ex-Smoker w/PMHX HTN, HLD, DM (Non-Compliant w/ Metformin), Aflutter s/p Ablation in 2003 at [facility], COPD (denies h/o intubation, last completed Prednisone Taper in 1/2017), Hypothyroidism, h/o Prostate CA s/p seed implant in 2002 who originally presented to PMD office with 2 episodes of near syncope and C/P.  He was [place] [facility] [place] [facility] ED where he was admitted to cardiology and had a cath revealing 3VCAD including 60% LM disease.  He was referred to Dr. Funez and brought to the OR on 3/6/17 for OPCABx3.  He had an uncomplicated operative course and was brought to CTICU post-op.  He was extubated by pod#2, he was evaluated by Dr. Cordova for hx of COPD, and he was continued on amiodarone for hx of paroxysmal afib.  He was started on elequis on pod#3, but he developed hematuria and elequis was held.   was consulted and hematuria resolved within 24 hours.  His pacing wires were also accidentally pulled out on pod#3 but he has remained HD stable and a TTE that day and the following day did not show any evidence of pericardial effusion.  He resumed low dose elequis the following day, and the cook was removed on pod#5.  He was weaned off O2 and has been breathing comfortably and ambulating on room air.  Pain is well controlled and he has no other present concerns and is presently stable for dc home.    Vital Signs Last 24 Hrs  T(C): 37.1, Max: 37.6 (03-11 @ 17:09)  T(F): 98.8, Max: 99.6 (03-11 @ 17:09)  HR: 78 (74 - 86)  BP: 118/58 (102/51 - 130/57)  BP(mean): 84 (71 - 84)  RR: 18 (17 - 18)  SpO2: 90% (90% - 95%)  I&O's Detail  I & Os for 24h ending 12 Mar 2017 07:00  =============================================  IN:    Oral Fluid: 480 ml    Total IN: 480 ml  ---------------------------------------------  OUT:    Voided: 775 ml    Indwelling Catheter - Urethral: 170 ml    Total OUT: 945 ml  ---------------------------------------------  Total NET: -465 ml    I & Os for current day (as of 12 Mar 2017 14:52)  =============================================  IN:    Oral Fluid: 240 ml    Total IN: 240 ml  ---------------------------------------------  OUT:    Voided: 475 ml    Total OUT: 475 ml  ---------------------------------------------  Total NET: -235 ml      EPICARDIAL WIRES REMOVED: Yes.  TIE DOWNS REMOVED: Yes.    PHYSICAL EXAM:    General: NAD    Neurological: A&Ox3    Cardiovascular: S1S2 RRR    Respiratory: CTA b/l, no W/R/R    Gastrointestinal: soft NT/ND +BS    Extremities: warm, no edema    Vascular: warm and well perfused bilaterally    Incision Sites: MSI C/D/I, EVH C/D/I    LABS:                        9.3    10.2  )-----------( 392      ( 12 Mar 2017 07:17 )             28.4       COUMADIN:  No.          12 Mar 2017 07:17    136    |  101    |  27     ----------------------------<  101    4.3     |  25     |  1.28     Ca    8.6        12 Mar 2017 07:17  Mg     2.5       12 Mar 2017 07:17            MEDICATIONS  (STANDING):  aspirin enteric coated 81milliGRAM(s) Oral daily  simvastatin 40milliGRAM(s) Oral at bedtime  famotidine    Tablet 20milliGRAM(s) Oral daily  insulin lispro Injectable (HumaLOG) 4Unit(s) SubCutaneous three times a day before meals  insulin lispro (HumaLOG) corrective regimen sliding scale  SubCutaneous Before meals and at bedtime  dextrose 50% Injectable 12.5Gram(s) IV Push once  levothyroxine 50MICROGram(s) Oral daily  diltiazem    Tablet 60milliGRAM(s) Oral every 6 hours  amiodarone    Tablet 200milliGRAM(s) Oral daily  fluticasone / salmeterol 250-50 MICROgram(s) Diskus 1Dose(s) Inhalation two times a day  apixaban 2.5milliGRAM(s) Oral two times a day      Discharge CXR: no signif effusions/infiltrates

## 2017-03-12 NOTE — DISCHARGE NOTE ADULT - CARE PLAN
Goal:	see below  Instructions for follow-up, activity and diet:	-Please follow up with Dr. Funez on 3/21 at 1130.  The office is located at [facility], [facility] 4th Floor.  Call us with any questions #[phone].  -No driving or strenuous activity for 6 weeks, or until cleared by your surgeon.  Avoid lifting >10lbs.   -Continue to walk daily as tolerated and use your incentive spirometer every hour.  -Gently clean your incision(s) with anti-bacterial soap and water, pat dry and leave open to air.  We recommend liquid Dial.    -Call your doctor if you have shortness of breath, chest pain not relieved by pain medication, dizziness, fever >101.5, or increased redness/drainage from your incision. Goal:	see below  Instructions for follow-up, activity and diet:	-Please follow up with Dr. Funez on 3/21 at 1130.  The office is located at [facility], [facility] 4th Floor.  Call us with any questions #[phone].  -No driving or strenuous activity for 6 weeks, or until cleared by your surgeon.  Avoid lifting >10lbs.   -Continue to walk daily as tolerated and use your incentive spirometer every hour.  -Gently clean your incision(s) with anti-bacterial soap and water, pat dry and leave open to air.  We recommend liquid Dial.    -Call your doctor if you have shortness of breath, chest pain not relieved by pain medication, dizziness, fever >101.5, or increased redness/drainage from your incision. Goal:	see below  Instructions for follow-up, activity and diet:	-Please follow up with Dr. Funez on 3/21 at 1130.  The office is located at [facility], [facility] 4th Floor.  Call us with any questions #[phone].  -No driving or strenuous activity for 6 weeks, or until cleared by your surgeon.  Avoid lifting >10lbs.   -Continue to walk daily as tolerated and use your incentive spirometer every hour.  -Gently clean your incision(s) with anti-bacterial soap and water, pat dry and leave open to air.  We recommend liquid Dial.    -Call your doctor if you have shortness of breath, chest pain not relieved by pain medication, dizziness, fever >101.5, or increased redness/drainage from your incision. Goal:	see below  Instructions for follow-up, activity and diet:	-Please follow up with Dr. Funez on 3/21 at 1130.  The office is located at [facility], [facility] 4th Floor.  Call us with any questions #[phone].  -No driving or strenuous activity for 6 weeks, or until cleared by your surgeon.  Avoid lifting >10lbs.   -Continue to walk daily as tolerated and use your incentive spirometer every hour.  -Gently clean your incision(s) with anti-bacterial soap and water, pat dry and leave open to air.  We recommend liquid Dial.    -Call your doctor if you have shortness of breath, chest pain not relieved by pain medication, dizziness, fever >101.5, or increased redness/drainage from your incision.

## 2017-03-12 NOTE — DISCHARGE NOTE ADULT - MEDICATION SUMMARY - MEDICATIONS TO STOP TAKING
I will STOP taking the medications listed below when I get home from the hospital:    lisinopril 40 mg oral tablet  -- 1 tab(s) by mouth once a day

## 2017-03-12 NOTE — DISCHARGE NOTE ADULT - PATIENT PORTAL LINK FT
“You can access the [employer] Patient Portal, offered by [facility], by registering with the following website: [email or web address]”
“You can access the Follow[employer] Patient Portal, offered by [facility], by registering with the following website: [email or web address]”

## 2017-03-12 NOTE — DISCHARGE NOTE ADULT - NSTOBACCOWEBSITE_GEN_A_NCS
[place] website --- [email or web address] Website --- [email or web address]
[place] Website --- [email or web address] website --- [email or web address]

## 2017-03-12 NOTE — DISCHARGE NOTE ADULT - MEDICATION SUMMARY - MEDICATIONS TO TAKE
I will START or STAY ON the medications listed below when I get home from the hospital:    acetaminophen 325 mg oral tablet  -- 2 tab(s) by mouth every 6 hours, As needed, Mild Pain (1 - 3)  -- Indication: For Pain    Aspirin Enteric Coated 81 mg oral delayed release tablet  -- 1 tab(s) by mouth once a day  -- Indication: For CAD    diltiaZEM 240 mg/24 hours oral tablet, extended release  -- 1 tab(s) by mouth once a day  -- Indication: For Afib    amiodarone 200 mg oral tablet  -- 1 tab(s) by mouth once a day  -- Indication: For Afib    apixaban 2.5 mg oral tablet  -- 1 tab(s) by mouth 2 times a day  -- Indication: For Afib    metFORMIN 500 mg oral tablet  -- 1 tab(s) by mouth 2 times a day  -- Indication: For Diabetes    Zocor 40 mg oral tablet  -- 1 tab(s) by mouth once a day (at bedtime)  -- Indication: For CAD    albuterol with CFC 90 mcg/inh inhalation aerosol (obsolete)  -- 2 puff(s) inhaled every 6 hours, As Needed  -- Indication: For COPD (chronic obstructive pulmonary disease)    Advair Diskus 500 mcg-50 mcg inhalation powder  -- 1 puff(s) inhaled 2 times a day  -- Indication: For COPD (chronic obstructive pulmonary disease)    levothyroxine 50 mcg (0.05 mg) oral capsule  -- 1 cap(s) by mouth once a day  -- Indication: For Hypothyroidism

## 2017-03-12 NOTE — DISCHARGE NOTE ADULT - ABILITY TO HEAR (WITH HEARING AID OR HEARING APPLIANCE IF NORMALLY USED):
Mildly to Moderately Impaired: difficulty hearing in some environments or speaker may need to increase volume or speak distinctly/[place] R ear
Mildly to Moderately Impaired: difficulty hearing in some environments or speaker may need to increase volume or speak distinctly

## 2017-03-12 NOTE — DISCHARGE NOTE ADULT - HOSPITAL COURSE
82 y/o obese M PO[employer] HISTORIAN Ex-Smoker w/PMHX HTN, HLD, DM (Non-Compliant w/ Metformin), Aflutter s/p Ablation in  at [facility], COPD (denies h/o intubation, last completed Prednisone Taper in 2017), Hypothyroidism, h/o Prostate CA s/p seed implant in  who originally presented to PMD office with 2 episodes of near syncope in the setting of C/P within the past 2 weeks.  Pt reports dizziness, nausea, palpitations, fatigue and 5-6/10 chest tightness across the top of the chest. C/P did not radiate to the jaw/neck/back/arm. The last episode occurred at 2AM this morning while ambulating to the restroom. Pt presented from PMD office to [facility] for further evaluation. Pt reports h/o chronic CASON requiring him to stop twice when walking 1 block. Pt reports chronic ankle LE edema. Pt admits to 1 remote possible episode of syncope, timing unknown. Pt denies any new vision changes, vertigo, numbness/tingling in extremities, amaurosis fugax, focal extremity weakness. In ER /74, HR 82, RR 20, T 98.3, O2 92-93% on RA. Labs significant for CE neg. x 1. K 4.0. EKG shows Sinus Rhythm at 100bpm w/ 4 PVCs, + RBBB, LAFB, no clear ST depressions, no TWI. In ED pt noted to have frequent PVCs. CXR showed clear lungs. Pt currently Assx. Echo from MD office 2016 showed basal anterior, mid anterior and mid anterolateral hypokinesis, EF 50-55%, LA mild dilated, grade 1 diastolic dysfunction, borderline AS. UA pending.  Pt reports h/o stress test 1 year ago, results unknown. Note, pts wife  within the last month and son  within the past 2 months. Pt denies fever/chills, dysuria, abdominal pain, diarrhea. + Dry cough. 80 y/o obese M PO[employer] HISTORIAN Ex-Smoker w/PMHX HTN, HLD, DM (Non-Compliant w/ Metformin), Aflutter s/p Ablation in 2003 at [facility], COPD (denies h/o intubation, last completed Prednisone Taper in 1/2017), Hypothyroidism, h/o Prostate CA s/p seed implant in 2002 who originally presented to PMD office with 2 episodes of near syncope and C/P.  He was sent from [facility] to [facility] ED where he was admitted to cardiology and had a cath revealing 3VCAD including 60% LM disease.  He was referred to Dr. Funez and brought to the OR on 3/6/17 for OPCABx3.  He had an uncomplicated operative course and was brought to CTICU post-op.  He was extubated by pod#2, he was evaluated by Dr. Cordova for hx of COPD, and he was continued on amiodarone for hx of paroxysmal afib.  He was started on elequis on pod#3, but he developed hematuria and elequis was held.   was consulted and hematuria resolved within 24 hours.  His pacing wires were also accidentally pulled out on pod#3 but he has remained HD stable and a TTE that day and the following day did not show any evidence of pericardial effusion.  He resumed low dose elequis the following day, and the cook was removed on pod#5.  He was weaned off O2 and has been breathing comfortably and ambulating on room air.  Pain is well controlled and he has no other present concerns and is presently stable for dc home.

## 2017-03-12 NOTE — PROGRESS NOTE ADULT - PROVIDER SPECIALTY LIST ADULT
CT Surgery
Cardiology
Critical Care
Electrophysiology
Pulmonology
Urology
CT Surgery

## 2017-03-14 DIAGNOSIS — T38.3X6A UNDERDOSING OF INSULIN AND ORAL HYPOGLYCEMIC [ANTIDIABETIC] DRUGS, INITIAL ENCOUNTER: ICD-10-CM

## 2017-03-14 DIAGNOSIS — I83.93 ASYMPTOMATIC VARICOSE VEINS OF BILATERAL LOWER EXTREMITIES: ICD-10-CM

## 2017-03-14 DIAGNOSIS — R31.9 HEMATURIA, UNSPECIFIED: ICD-10-CM

## 2017-03-14 DIAGNOSIS — I48.0 PAROXYSMAL ATRIAL FIBRILLATION: ICD-10-CM

## 2017-03-14 DIAGNOSIS — I35.0 NONRHEUMATIC AORTIC (VALVE) STENOSIS: ICD-10-CM

## 2017-03-14 DIAGNOSIS — I48.91 UNSPECIFIED ATRIAL FIBRILLATION: ICD-10-CM

## 2017-03-14 DIAGNOSIS — I50.32 CHRONIC DIASTOLIC (CONGESTIVE) HEART FAILURE: ICD-10-CM

## 2017-03-14 DIAGNOSIS — Z85.46 PERSONAL HISTORY OF MALIGNANT NEOPLASM OF PROSTATE: ICD-10-CM

## 2017-03-14 DIAGNOSIS — J84.10 PULMONARY FIBROSIS, UNSPECIFIED: ICD-10-CM

## 2017-03-14 DIAGNOSIS — Z79.51 LONG TERM (CURRENT) USE OF INHALED STEROIDS: ICD-10-CM

## 2017-03-14 DIAGNOSIS — I25.10 ATHEROSCLEROTIC HEART DISEASE OF NATIVE CORONARY ARTERY WITHOUT ANGINA PECTORIS: ICD-10-CM

## 2017-03-14 DIAGNOSIS — I24.9 ACUTE ISCHEMIC HEART DISEASE, UNSPECIFIED: ICD-10-CM

## 2017-03-14 DIAGNOSIS — N18.3 CHRONIC KIDNEY DISEASE, STAGE 3 (MODERATE): ICD-10-CM

## 2017-03-14 DIAGNOSIS — J45.909 UNSPECIFIED ASTHMA, UNCOMPLICATED: ICD-10-CM

## 2017-03-14 DIAGNOSIS — Z91.14 PATIENT'S OTHER NONCOMPLIANCE WITH MEDICATION REGIMEN: ICD-10-CM

## 2017-03-14 DIAGNOSIS — Z87.891 PERSONAL HISTORY OF NICOTINE DEPENDENCE: ICD-10-CM

## 2017-03-14 DIAGNOSIS — Z79.82 LONG TERM (CURRENT) USE OF ASPIRIN: ICD-10-CM

## 2017-03-14 DIAGNOSIS — I45.2 BIFASCICULAR BLOCK: ICD-10-CM

## 2017-03-14 DIAGNOSIS — I13.0 HYPERTENSIVE HEART AND CHRONIC KIDNEY DISEASE WITH HEART FAILURE AND STAGE 1 THROUGH STAGE 4 CHRONIC KIDNEY DISEASE, OR UNSPECIFIED CHRONIC KIDNEY DISEASE: ICD-10-CM

## 2017-03-14 DIAGNOSIS — I97.89 OTHER POSTPROCEDURAL COMPLICATIONS AND DISORDERS OF THE CIRCULATORY SYSTEM, NOT ELSEWHERE CLASSIFIED: ICD-10-CM

## 2017-03-14 DIAGNOSIS — I49.3 VENTRICULAR PREMATURE DEPOLARIZATION: ICD-10-CM

## 2017-03-14 DIAGNOSIS — J44.9 CHRONIC OBSTRUCTIVE PULMONARY DISEASE, UNSPECIFIED: ICD-10-CM

## 2017-03-14 DIAGNOSIS — Z79.84 LONG TERM (CURRENT) USE OF ORAL HYPOGLYCEMIC DRUGS: ICD-10-CM

## 2017-03-14 DIAGNOSIS — R09.02 HYPOXEMIA: ICD-10-CM

## 2017-03-14 DIAGNOSIS — Z82.49 FAMILY HISTORY OF ISCHEMIC HEART DISEASE AND OTHER DISEASES OF THE CIRCULATORY SYSTEM: ICD-10-CM

## 2017-03-14 DIAGNOSIS — I70.0 ATHEROSCLEROSIS OF AORTA: ICD-10-CM

## 2017-03-14 DIAGNOSIS — E03.9 HYPOTHYROIDISM, UNSPECIFIED: ICD-10-CM

## 2017-03-14 DIAGNOSIS — R33.9 RETENTION OF URINE, UNSPECIFIED: ICD-10-CM

## 2017-03-14 DIAGNOSIS — E78.00 PURE HYPERCHOLESTEROLEMIA, UNSPECIFIED: ICD-10-CM

## 2017-03-14 DIAGNOSIS — Z88.5 ALLERGY STATUS TO NARCOTIC AGENT: ICD-10-CM
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
## 2017-03-14 DIAGNOSIS — F32.9 MAJOR DEPRESSIVE DISORDER, SINGLE EPISODE, UNSPECIFIED: ICD-10-CM

## 2017-03-14 DIAGNOSIS — E11.9 TYPE 2 DIABETES MELLITUS WITHOUT COMPLICATIONS: ICD-10-CM

## 2017-03-14 DIAGNOSIS — Z92.3 PERSONAL HISTORY OF IRRADIATION: ICD-10-CM

## 2017-03-14 DIAGNOSIS — F41.9 ANXIETY DISORDER, UNSPECIFIED: ICD-10-CM

## 2017-03-14 DIAGNOSIS — Z96.653 PRESENCE OF ARTIFICIAL KNEE JOINT, BILATERAL: ICD-10-CM

## 2017-03-14 DIAGNOSIS — R07.9 CHEST PAIN, UNSPECIFIED: ICD-10-CM

## 2017-03-14 DIAGNOSIS — E78.5 HYPERLIPIDEMIA, UNSPECIFIED: ICD-10-CM

## 2017-03-14 DIAGNOSIS — E66.9 OBESITY, UNSPECIFIED: ICD-10-CM

## 2017-03-14 DIAGNOSIS — E11.22 TYPE 2 DIABETES MELLITUS WITH DIABETIC CHRONIC KIDNEY DISEASE: ICD-10-CM

## 2017-03-14 LAB — NON-GYNECOLOGICAL CYTOLOGY STUDY: SIGNIFICANT CHANGE UP

## 2017-03-15 DIAGNOSIS — I25.110 ATHEROSCLEROTIC HEART DISEASE OF NATIVE CORONARY ARTERY WITH UNSTABLE ANGINA PECTORIS: ICD-10-CM

## 2017-03-17 ENCOUNTER — MED ADMIN CHARGE (OUTPATIENT)
Age: 82
End: 2017-03-17

## 2017-03-17 DIAGNOSIS — Z09 ENCOUNTER FOR FOLLOW-UP EXAMINATION AFTER COMPLETED TREATMENT FOR CONDITIONS OTHER THAN MALIGNANT NEOPLASM: ICD-10-CM

## 2017-03-17 RX ORDER — ASPIRIN 81 MG
81 TABLET, DELAYED RELEASE (ENTERIC COATED) ORAL
Refills: 0 | Status: ACTIVE | COMMUNITY

## 2017-03-21 ENCOUNTER — APPOINTMENT (OUTPATIENT)
Dept: CARDIOTHORACIC SURGERY | Facility: CLINIC | Age: 82
End: 2017-03-21

## 2017-03-21 VITALS
BODY MASS INDEX: 35.35 KG/M2 | HEART RATE: 91 BPM | TEMPERATURE: 97.1 F | WEIGHT: 219 LBS | SYSTOLIC BLOOD PRESSURE: 141 MMHG | OXYGEN SATURATION: 95 % | DIASTOLIC BLOOD PRESSURE: 66 MMHG | RESPIRATION RATE: 18 BRPM

## 2017-03-23 RX ORDER — AMIODARONE HYDROCHLORIDE 200 MG/1
200 TABLET ORAL DAILY
Qty: 30 | Refills: 0 | Status: DISCONTINUED | COMMUNITY
End: 2017-03-23

## 2017-04-05 ENCOUNTER — APPOINTMENT (OUTPATIENT)
Dept: HEART AND VASCULAR | Facility: CLINIC | Age: 82
End: 2017-04-05

## 2017-04-10 ENCOUNTER — APPOINTMENT (OUTPATIENT)
Dept: HEART AND VASCULAR | Facility: CLINIC | Age: 82
End: 2017-04-10

## 2017-04-10 VITALS — HEART RATE: 91 BPM | SYSTOLIC BLOOD PRESSURE: 136 MMHG | DIASTOLIC BLOOD PRESSURE: 71 MMHG

## 2017-04-11 ENCOUNTER — APPOINTMENT (OUTPATIENT)
Dept: CARDIOTHORACIC SURGERY | Facility: CLINIC | Age: 82
End: 2017-04-11

## 2017-04-11 VITALS
OXYGEN SATURATION: 95 % | WEIGHT: 214 LBS | TEMPERATURE: 97.3 F | DIASTOLIC BLOOD PRESSURE: 70 MMHG | SYSTOLIC BLOOD PRESSURE: 129 MMHG | BODY MASS INDEX: 34.54 KG/M2 | HEART RATE: 78 BPM | RESPIRATION RATE: 18 BRPM

## 2017-04-18 ENCOUNTER — APPOINTMENT (OUTPATIENT)
Dept: HEART AND VASCULAR | Facility: CLINIC | Age: 82
End: 2017-04-18

## 2017-04-18 VITALS
HEIGHT: 66 IN | WEIGHT: 210 LBS | HEART RATE: 83 BPM | BODY MASS INDEX: 33.75 KG/M2 | DIASTOLIC BLOOD PRESSURE: 79 MMHG | SYSTOLIC BLOOD PRESSURE: 130 MMHG

## 2017-04-18 DIAGNOSIS — Z85.46 PERSONAL HISTORY OF MALIGNANT NEOPLASM OF PROSTATE: ICD-10-CM

## 2017-04-18 DIAGNOSIS — Z86.39 PERSONAL HISTORY OF OTHER ENDOCRINE, NUTRITIONAL AND METABOLIC DISEASE: ICD-10-CM

## 2017-04-18 DIAGNOSIS — Z86.79 PERSONAL HISTORY OF OTHER DISEASES OF THE CIRCULATORY SYSTEM: ICD-10-CM

## 2017-04-18 RX ORDER — ACETAMINOPHEN 325 MG/1
325 TABLET ORAL
Refills: 0 | Status: DISCONTINUED | COMMUNITY
End: 2017-04-18

## 2017-04-18 RX ORDER — DILTIAZEM HYDROCHLORIDE 240 MG/1
240 CAPSULE, COATED, EXTENDED RELEASE ORAL DAILY
Refills: 0 | Status: DISCONTINUED | COMMUNITY
End: 2017-04-18

## 2017-04-19 ENCOUNTER — RX RENEWAL (OUTPATIENT)
Age: 82
End: 2017-04-19

## 2017-04-24 ENCOUNTER — APPOINTMENT (OUTPATIENT)
Dept: CARDIOTHORACIC SURGERY | Facility: CLINIC | Age: 82
End: 2017-04-24

## 2017-04-24 VITALS
OXYGEN SATURATION: 93 % | WEIGHT: 215 LBS | TEMPERATURE: 98 F | BODY MASS INDEX: 34.55 KG/M2 | SYSTOLIC BLOOD PRESSURE: 152 MMHG | RESPIRATION RATE: 19 BRPM | HEIGHT: 66 IN | HEART RATE: 90 BPM | DIASTOLIC BLOOD PRESSURE: 70 MMHG

## 2017-04-25 ENCOUNTER — APPOINTMENT (OUTPATIENT)
Dept: HEART AND VASCULAR | Facility: CLINIC | Age: 82
End: 2017-04-25

## 2017-04-25 VITALS
BODY MASS INDEX: 34.07 KG/M2 | HEART RATE: 93 BPM | SYSTOLIC BLOOD PRESSURE: 115 MMHG | WEIGHT: 212 LBS | HEIGHT: 66 IN | DIASTOLIC BLOOD PRESSURE: 70 MMHG

## 2017-05-05 ENCOUNTER — APPOINTMENT (OUTPATIENT)
Dept: HEART AND VASCULAR | Facility: CLINIC | Age: 82
End: 2017-05-05

## 2017-05-30 ENCOUNTER — APPOINTMENT (OUTPATIENT)
Dept: HEART AND VASCULAR | Facility: CLINIC | Age: 82
End: 2017-05-30

## 2017-05-30 VITALS
BODY MASS INDEX: 32.14 KG/M2 | DIASTOLIC BLOOD PRESSURE: 77 MMHG | WEIGHT: 200 LBS | HEIGHT: 66 IN | HEART RATE: 75 BPM | SYSTOLIC BLOOD PRESSURE: 122 MMHG

## 2017-05-30 RX ORDER — SIMVASTATIN 40 MG/1
40 TABLET, FILM COATED ORAL AT BEDTIME
Qty: 90 | Refills: 3 | Status: DISCONTINUED | COMMUNITY
End: 2017-05-30

## 2017-06-04 ENCOUNTER — FORM ENCOUNTER (OUTPATIENT)
Age: 82
End: 2017-06-04

## 2017-06-05 ENCOUNTER — APPOINTMENT (OUTPATIENT)
Dept: CARDIOTHORACIC SURGERY | Facility: CLINIC | Age: 82
End: 2017-06-05

## 2017-06-05 ENCOUNTER — OUTPATIENT (OUTPATIENT)
Dept: OUTPATIENT SERVICES | Facility: HOSPITAL | Age: 82
LOS: 1 days | End: 2017-06-05
Payer: MEDICARE

## 2017-06-05 VITALS
RESPIRATION RATE: 18 BRPM | HEART RATE: 75 BPM | SYSTOLIC BLOOD PRESSURE: 132 MMHG | BODY MASS INDEX: 32.6 KG/M2 | WEIGHT: 202 LBS | OXYGEN SATURATION: 95 % | DIASTOLIC BLOOD PRESSURE: 62 MMHG

## 2017-06-05 DIAGNOSIS — Z96.659 PRESENCE OF UNSPECIFIED ARTIFICIAL KNEE JOINT: Chronic | ICD-10-CM

## 2017-06-05 DIAGNOSIS — Z98.49 CATARACT EXTRACTION STATUS, UNSPECIFIED EYE: Chronic | ICD-10-CM

## 2017-06-05 DIAGNOSIS — I35.9 NONRHEUMATIC AORTIC VALVE DISORDER, UNSPECIFIED: ICD-10-CM

## 2017-06-05 PROCEDURE — 93306 TTE W/DOPPLER COMPLETE: CPT

## 2017-06-05 PROCEDURE — 93306 TTE W/DOPPLER COMPLETE: CPT | Mod: 26

## 2017-06-27 ENCOUNTER — APPOINTMENT (OUTPATIENT)
Dept: HEART AND VASCULAR | Facility: CLINIC | Age: 82
End: 2017-06-27

## 2017-06-27 VITALS
WEIGHT: 201 LBS | DIASTOLIC BLOOD PRESSURE: 69 MMHG | BODY MASS INDEX: 32.3 KG/M2 | HEIGHT: 66 IN | SYSTOLIC BLOOD PRESSURE: 108 MMHG | HEART RATE: 82 BPM

## 2017-08-08 ENCOUNTER — APPOINTMENT (OUTPATIENT)
Dept: HEART AND VASCULAR | Facility: CLINIC | Age: 82
End: 2017-08-08
Payer: MEDICARE

## 2017-08-08 VITALS
DIASTOLIC BLOOD PRESSURE: 79 MMHG | WEIGHT: 206 LBS | BODY MASS INDEX: 33.11 KG/M2 | SYSTOLIC BLOOD PRESSURE: 135 MMHG | OXYGEN SATURATION: 94 % | HEART RATE: 78 BPM | HEIGHT: 66 IN

## 2017-08-08 PROCEDURE — 99214 OFFICE O/P EST MOD 30 MIN: CPT

## 2017-09-05 ENCOUNTER — APPOINTMENT (OUTPATIENT)
Dept: HEART AND VASCULAR | Facility: CLINIC | Age: 82
End: 2017-09-05

## 2017-11-28 ENCOUNTER — APPOINTMENT (OUTPATIENT)
Dept: HEART AND VASCULAR | Facility: CLINIC | Age: 82
End: 2017-11-28
Payer: MEDICARE

## 2017-11-28 VITALS
HEART RATE: 81 BPM | SYSTOLIC BLOOD PRESSURE: 130 MMHG | BODY MASS INDEX: 30.53 KG/M2 | DIASTOLIC BLOOD PRESSURE: 70 MMHG | WEIGHT: 190 LBS | HEIGHT: 66 IN

## 2017-11-28 PROCEDURE — 93000 ELECTROCARDIOGRAM COMPLETE: CPT

## 2017-11-28 PROCEDURE — 99214 OFFICE O/P EST MOD 30 MIN: CPT | Mod: 25

## 2017-11-28 RX ORDER — ATORVASTATIN CALCIUM 40 MG/1
40 TABLET, FILM COATED ORAL
Qty: 30 | Refills: 5 | Status: DISCONTINUED | COMMUNITY
Start: 2017-05-30 | End: 2017-11-28

## 2017-11-28 RX ORDER — ALBUTEROL 90 MCG
90 AEROSOL (GRAM) INHALATION
Refills: 0 | Status: DISCONTINUED | COMMUNITY
End: 2017-11-28

## 2017-11-29 LAB
AMYLASE/CREAT SERPL: 53 U/L
BASOPHILS # BLD AUTO: 0.03 K/UL
CHOLEST SERPL-MCNC: 136 MG/DL
CHOLEST/HDLC SERPL: 2.3 RATIO
CRP SERPL HS-MCNC: 13.5 MG/L
EOSINOPHIL # BLD AUTO: 0.09 K/UL
EOSINOPHIL NFR BLD AUTO: 1.3 %
HBA1C MFR BLD HPLC: 5.5 %
HCT VFR BLD CALC: 40.1 %
HDLC SERPL-MCNC: 58 MG/DL
HGB BLD-MCNC: 12.7 G/DL
IMM GRANULOCYTES NFR BLD AUTO: 0 %
LDLC SERPL CALC-MCNC: 59 MG/DL
LPL SERPL-CCNC: 33 U/L
LYMPHOCYTES # BLD AUTO: 1.75 K/UL
LYMPHOCYTES NFR BLD AUTO: 25.3 %
MAN DIFF?: NORMAL
MCHC RBC-ENTMCNC: 31.7 GM/DL
MCV RBC AUTO: 103.4 FL
MONOCYTES # BLD AUTO: 0.65 K/UL
MONOCYTES NFR BLD AUTO: 9.4 %
NEUTROPHILS # BLD AUTO: 4.4 K/UL
NEUTROPHILS NFR BLD AUTO: 63.6 %
PLATELET # BLD AUTO: 280 K/UL
RBC # BLD: 3.88 M/UL
TRIGL SERPL-MCNC: 96 MG/DL
TSH SERPL-ACNC: 5.8 UIU/ML
WBC # FLD AUTO: 6.92 K/UL

## 2017-12-04 ENCOUNTER — APPOINTMENT (OUTPATIENT)
Dept: HEART AND VASCULAR | Facility: CLINIC | Age: 82
End: 2017-12-04
Payer: MEDICARE

## 2017-12-04 ENCOUNTER — RESULT REVIEW (OUTPATIENT)
Age: 82
End: 2017-12-04

## 2017-12-04 VITALS
HEIGHT: 66 IN | BODY MASS INDEX: 31.5 KG/M2 | SYSTOLIC BLOOD PRESSURE: 140 MMHG | WEIGHT: 196 LBS | HEART RATE: 80 BPM | DIASTOLIC BLOOD PRESSURE: 80 MMHG

## 2017-12-04 PROCEDURE — 99215 OFFICE O/P EST HI 40 MIN: CPT

## 2017-12-04 PROCEDURE — 93000 ELECTROCARDIOGRAM COMPLETE: CPT

## 2017-12-05 LAB
ALBUMIN SERPL ELPH-MCNC: 4.4 G/DL
ALP BLD-CCNC: 70 U/L
ALT SERPL-CCNC: 16 U/L
ANION GAP SERPL CALC-SCNC: 18 MMOL/L
AST SERPL-CCNC: 22 U/L
BILIRUB SERPL-MCNC: 0.4 MG/DL
BUN SERPL-MCNC: 17 MG/DL
CALCIUM SERPL-MCNC: 9.8 MG/DL
CHLORIDE SERPL-SCNC: 102 MMOL/L
CO2 SERPL-SCNC: 24 MMOL/L
CREAT SERPL-MCNC: 1.51 MG/DL
GLUCOSE SERPL-MCNC: 112 MG/DL
POTASSIUM SERPL-SCNC: 4.5 MMOL/L
PROT SERPL-MCNC: 7.7 G/DL
SODIUM SERPL-SCNC: 144 MMOL/L

## 2017-12-10 ENCOUNTER — FORM ENCOUNTER (OUTPATIENT)
Age: 82
End: 2017-12-10

## 2017-12-11 ENCOUNTER — APPOINTMENT (OUTPATIENT)
Dept: CARDIOTHORACIC SURGERY | Facility: CLINIC | Age: 82
End: 2017-12-11
Payer: MEDICARE

## 2017-12-11 ENCOUNTER — OUTPATIENT (OUTPATIENT)
Dept: OUTPATIENT SERVICES | Facility: HOSPITAL | Age: 82
LOS: 1 days | End: 2017-12-11
Payer: MEDICARE

## 2017-12-11 VITALS
OXYGEN SATURATION: 93 % | SYSTOLIC BLOOD PRESSURE: 156 MMHG | TEMPERATURE: 97.6 F | WEIGHT: 194 LBS | RESPIRATION RATE: 19 BRPM | HEART RATE: 80 BPM | DIASTOLIC BLOOD PRESSURE: 70 MMHG | BODY MASS INDEX: 31.31 KG/M2

## 2017-12-11 DIAGNOSIS — Z98.49 CATARACT EXTRACTION STATUS, UNSPECIFIED EYE: Chronic | ICD-10-CM

## 2017-12-11 DIAGNOSIS — I35.9 NONRHEUMATIC AORTIC VALVE DISORDER, UNSPECIFIED: ICD-10-CM

## 2017-12-11 DIAGNOSIS — Z96.659 PRESENCE OF UNSPECIFIED ARTIFICIAL KNEE JOINT: Chronic | ICD-10-CM

## 2017-12-11 PROCEDURE — 99214 OFFICE O/P EST MOD 30 MIN: CPT

## 2017-12-11 PROCEDURE — 93306 TTE W/DOPPLER COMPLETE: CPT | Mod: 26

## 2017-12-11 PROCEDURE — 93306 TTE W/DOPPLER COMPLETE: CPT

## 2017-12-14 ENCOUNTER — RX RENEWAL (OUTPATIENT)
Age: 82
End: 2017-12-14

## 2018-01-02 NOTE — ED PROVIDER NOTE - SKIN, MLM
Skin normal color for race, warm, dry and intact. No evidence of rash. r/o MDD v ARIANNA v Borderline PD

## 2018-01-08 ENCOUNTER — APPOINTMENT (OUTPATIENT)
Dept: HEART AND VASCULAR | Facility: CLINIC | Age: 83
End: 2018-01-08

## 2018-01-16 ENCOUNTER — OUTPATIENT (OUTPATIENT)
Dept: OUTPATIENT SERVICES | Facility: HOSPITAL | Age: 83
LOS: 1 days | End: 2018-01-16
Payer: MEDICARE

## 2018-01-16 DIAGNOSIS — Z96.659 PRESENCE OF UNSPECIFIED ARTIFICIAL KNEE JOINT: Chronic | ICD-10-CM

## 2018-01-16 DIAGNOSIS — Z98.49 CATARACT EXTRACTION STATUS, UNSPECIFIED EYE: Chronic | ICD-10-CM

## 2018-01-16 LAB
ALBUMIN SERPL ELPH-MCNC: 4 G/DL — SIGNIFICANT CHANGE UP (ref 3.3–5)
ALP SERPL-CCNC: 61 U/L — SIGNIFICANT CHANGE UP (ref 40–120)
ALT FLD-CCNC: 10 U/L — SIGNIFICANT CHANGE UP (ref 10–45)
ANION GAP SERPL CALC-SCNC: 16 MMOL/L — SIGNIFICANT CHANGE UP (ref 5–17)
APTT BLD: 35.7 SEC — SIGNIFICANT CHANGE UP (ref 27.5–37.4)
AST SERPL-CCNC: 21 U/L — SIGNIFICANT CHANGE UP (ref 10–40)
BASOPHILS NFR BLD AUTO: 1.1 % — SIGNIFICANT CHANGE UP (ref 0–2)
BILIRUB SERPL-MCNC: 0.6 MG/DL — SIGNIFICANT CHANGE UP (ref 0.2–1.2)
BUN SERPL-MCNC: 22 MG/DL — SIGNIFICANT CHANGE UP (ref 7–23)
CALCIUM SERPL-MCNC: 9.4 MG/DL — SIGNIFICANT CHANGE UP (ref 8.4–10.5)
CHLORIDE SERPL-SCNC: 100 MMOL/L — SIGNIFICANT CHANGE UP (ref 96–108)
CO2 SERPL-SCNC: 25 MMOL/L — SIGNIFICANT CHANGE UP (ref 22–31)
CREAT SERPL-MCNC: 1.19 MG/DL — SIGNIFICANT CHANGE UP (ref 0.5–1.3)
EOSINOPHIL NFR BLD AUTO: 2.1 % — SIGNIFICANT CHANGE UP (ref 0–6)
GLUCOSE SERPL-MCNC: 66 MG/DL — LOW (ref 70–99)
HCT VFR BLD CALC: 39 % — SIGNIFICANT CHANGE UP (ref 39–50)
HGB BLD-MCNC: 13 G/DL — SIGNIFICANT CHANGE UP (ref 13–17)
INR BLD: 1.13 — SIGNIFICANT CHANGE UP (ref 0.88–1.16)
LYMPHOCYTES # BLD AUTO: 43.9 % — SIGNIFICANT CHANGE UP (ref 13–44)
MCHC RBC-ENTMCNC: 33.3 G/DL — SIGNIFICANT CHANGE UP (ref 32–36)
MCHC RBC-ENTMCNC: 33.9 PG — SIGNIFICANT CHANGE UP (ref 27–34)
MCV RBC AUTO: 101.6 FL — HIGH (ref 80–100)
MONOCYTES NFR BLD AUTO: 7.7 % — SIGNIFICANT CHANGE UP (ref 2–14)
NEUTROPHILS NFR BLD AUTO: 45.2 % — SIGNIFICANT CHANGE UP (ref 43–77)
PLATELET # BLD AUTO: 242 K/UL — SIGNIFICANT CHANGE UP (ref 150–400)
POTASSIUM SERPL-MCNC: 3.9 MMOL/L — SIGNIFICANT CHANGE UP (ref 3.5–5.3)
POTASSIUM SERPL-SCNC: 3.9 MMOL/L — SIGNIFICANT CHANGE UP (ref 3.5–5.3)
PROT SERPL-MCNC: 7.8 G/DL — SIGNIFICANT CHANGE UP (ref 6–8.3)
PROTHROM AB SERPL-ACNC: 12.6 SEC — SIGNIFICANT CHANGE UP (ref 9.8–12.7)
RBC # BLD: 3.84 M/UL — LOW (ref 4.2–5.8)
RBC # FLD: 14.6 % — SIGNIFICANT CHANGE UP (ref 10.3–16.9)
SODIUM SERPL-SCNC: 141 MMOL/L — SIGNIFICANT CHANGE UP (ref 135–145)
WBC # BLD: 7.1 K/UL — SIGNIFICANT CHANGE UP (ref 3.8–10.5)
WBC # FLD AUTO: 7.1 K/UL — SIGNIFICANT CHANGE UP (ref 3.8–10.5)

## 2018-01-16 PROCEDURE — G0103: CPT

## 2018-01-16 PROCEDURE — 85730 THROMBOPLASTIN TIME PARTIAL: CPT

## 2018-01-16 PROCEDURE — 99285 EMERGENCY DEPT VISIT HI MDM: CPT | Mod: 25

## 2018-01-16 PROCEDURE — 85025 COMPLETE CBC W/AUTO DIFF WBC: CPT

## 2018-01-16 PROCEDURE — 84484 ASSAY OF TROPONIN QUANT: CPT

## 2018-01-16 PROCEDURE — 74178 CT ABD&PLV WO CNTR FLWD CNTR: CPT

## 2018-01-16 PROCEDURE — 83036 HEMOGLOBIN GLYCOSYLATED A1C: CPT

## 2018-01-16 PROCEDURE — 97116 GAIT TRAINING THERAPY: CPT

## 2018-01-16 PROCEDURE — 82330 ASSAY OF CALCIUM: CPT

## 2018-01-16 PROCEDURE — 88112 CYTOPATH CELL ENHANCE TECH: CPT

## 2018-01-16 PROCEDURE — 82803 BLOOD GASES ANY COMBINATION: CPT

## 2018-01-16 PROCEDURE — 93005 ELECTROCARDIOGRAM TRACING: CPT

## 2018-01-16 PROCEDURE — 71046 X-RAY EXAM CHEST 2 VIEWS: CPT

## 2018-01-16 PROCEDURE — 94002 VENT MGMT INPAT INIT DAY: CPT

## 2018-01-16 PROCEDURE — P9045: CPT

## 2018-01-16 PROCEDURE — 97162 PT EVAL MOD COMPLEX 30 MIN: CPT

## 2018-01-16 PROCEDURE — 82550 ASSAY OF CK (CPK): CPT

## 2018-01-16 PROCEDURE — 94150 VITAL CAPACITY TEST: CPT

## 2018-01-16 PROCEDURE — 93880 EXTRACRANIAL BILAT STUDY: CPT

## 2018-01-16 PROCEDURE — 81003 URINALYSIS AUTO W/O SCOPE: CPT

## 2018-01-16 PROCEDURE — C1887: CPT

## 2018-01-16 PROCEDURE — 84100 ASSAY OF PHOSPHORUS: CPT

## 2018-01-16 PROCEDURE — 84295 ASSAY OF SERUM SODIUM: CPT

## 2018-01-16 PROCEDURE — 70450 CT HEAD/BRAIN W/O DYE: CPT

## 2018-01-16 PROCEDURE — 84480 ASSAY TRIIODOTHYRONINE (T3): CPT

## 2018-01-16 PROCEDURE — 87086 URINE CULTURE/COLONY COUNT: CPT

## 2018-01-16 PROCEDURE — 84443 ASSAY THYROID STIM HORMONE: CPT

## 2018-01-16 PROCEDURE — 71045 X-RAY EXAM CHEST 1 VIEW: CPT

## 2018-01-16 PROCEDURE — 85379 FIBRIN DEGRADATION QUANT: CPT

## 2018-01-16 PROCEDURE — 97161 PT EVAL LOW COMPLEX 20 MIN: CPT

## 2018-01-16 PROCEDURE — C1894: CPT

## 2018-01-16 PROCEDURE — 83735 ASSAY OF MAGNESIUM: CPT

## 2018-01-16 PROCEDURE — 80048 BASIC METABOLIC PNL TOTAL CA: CPT

## 2018-01-16 PROCEDURE — C1889: CPT

## 2018-01-16 PROCEDURE — 85610 PROTHROMBIN TIME: CPT

## 2018-01-16 PROCEDURE — 86850 RBC ANTIBODY SCREEN: CPT

## 2018-01-16 PROCEDURE — C1769: CPT

## 2018-01-16 PROCEDURE — 93970 EXTREMITY STUDY: CPT

## 2018-01-16 PROCEDURE — 93306 TTE W/DOPPLER COMPLETE: CPT

## 2018-01-16 PROCEDURE — 36415 COLL VENOUS BLD VENIPUNCTURE: CPT

## 2018-01-16 PROCEDURE — 81001 URINALYSIS AUTO W/SCOPE: CPT

## 2018-01-16 PROCEDURE — 84132 ASSAY OF SERUM POTASSIUM: CPT

## 2018-01-16 PROCEDURE — 83605 ASSAY OF LACTIC ACID: CPT

## 2018-01-16 PROCEDURE — 85018 HEMOGLOBIN: CPT

## 2018-01-16 PROCEDURE — 94640 AIRWAY INHALATION TREATMENT: CPT

## 2018-01-16 PROCEDURE — 86923 COMPATIBILITY TEST ELECTRIC: CPT

## 2018-01-16 PROCEDURE — 86900 BLOOD TYPING SEROLOGIC ABO: CPT

## 2018-01-16 PROCEDURE — 82553 CREATINE MB FRACTION: CPT

## 2018-01-16 PROCEDURE — 71250 CT THORAX DX C-: CPT

## 2018-01-16 PROCEDURE — 84436 ASSAY OF TOTAL THYROXINE: CPT

## 2018-01-16 PROCEDURE — 86901 BLOOD TYPING SEROLOGIC RH(D): CPT

## 2018-01-16 PROCEDURE — 85027 COMPLETE CBC AUTOMATED: CPT

## 2018-01-16 PROCEDURE — 80053 COMPREHEN METABOLIC PANEL: CPT

## 2018-01-19 DIAGNOSIS — I35.0 NONRHEUMATIC AORTIC (VALVE) STENOSIS: ICD-10-CM

## 2018-01-19 RX ORDER — CHLORHEXIDINE GLUCONATE 213 G/1000ML
1 SOLUTION TOPICAL ONCE
Qty: 0 | Refills: 0 | Status: DISCONTINUED | OUTPATIENT
Start: 2018-01-23 | End: 2018-02-06

## 2018-01-22 ENCOUNTER — FORM ENCOUNTER (OUTPATIENT)
Age: 83
End: 2018-01-22

## 2018-01-23 ENCOUNTER — APPOINTMENT (OUTPATIENT)
Dept: CARDIOTHORACIC SURGERY | Facility: HOSPITAL | Age: 83
End: 2018-01-23

## 2018-01-23 ENCOUNTER — OUTPATIENT (OUTPATIENT)
Dept: OUTPATIENT SERVICES | Facility: HOSPITAL | Age: 83
LOS: 1 days | End: 2018-01-23
Payer: MEDICARE

## 2018-01-23 DIAGNOSIS — I48.92 UNSPECIFIED ATRIAL FLUTTER: ICD-10-CM

## 2018-01-23 DIAGNOSIS — Z96.659 PRESENCE OF UNSPECIFIED ARTIFICIAL KNEE JOINT: Chronic | ICD-10-CM

## 2018-01-23 DIAGNOSIS — E78.5 HYPERLIPIDEMIA, UNSPECIFIED: ICD-10-CM

## 2018-01-23 DIAGNOSIS — I25.10 ATHEROSCLEROTIC HEART DISEASE OF NATIVE CORONARY ARTERY WITHOUT ANGINA PECTORIS: ICD-10-CM

## 2018-01-23 DIAGNOSIS — Z98.49 CATARACT EXTRACTION STATUS, UNSPECIFIED EYE: Chronic | ICD-10-CM

## 2018-01-23 DIAGNOSIS — I11.0 HYPERTENSIVE HEART DISEASE WITH HEART FAILURE: ICD-10-CM

## 2018-01-23 LAB
ALBUMIN SERPL ELPH-MCNC: 3.8 G/DL — SIGNIFICANT CHANGE UP (ref 3.3–5)
ALP SERPL-CCNC: 62 U/L — SIGNIFICANT CHANGE UP (ref 40–120)
ALT FLD-CCNC: 10 U/L — SIGNIFICANT CHANGE UP (ref 10–45)
ANION GAP SERPL CALC-SCNC: 13 MMOL/L — SIGNIFICANT CHANGE UP (ref 5–17)
APTT BLD: 27.8 SEC — SIGNIFICANT CHANGE UP (ref 27.5–37.4)
AST SERPL-CCNC: 19 U/L — SIGNIFICANT CHANGE UP (ref 10–40)
BASOPHILS NFR BLD AUTO: 0.8 % — SIGNIFICANT CHANGE UP (ref 0–2)
BILIRUB SERPL-MCNC: 0.4 MG/DL — SIGNIFICANT CHANGE UP (ref 0.2–1.2)
BUN SERPL-MCNC: 18 MG/DL — SIGNIFICANT CHANGE UP (ref 7–23)
CALCIUM SERPL-MCNC: 9.2 MG/DL — SIGNIFICANT CHANGE UP (ref 8.4–10.5)
CHLORIDE SERPL-SCNC: 98 MMOL/L — SIGNIFICANT CHANGE UP (ref 96–108)
CO2 SERPL-SCNC: 26 MMOL/L — SIGNIFICANT CHANGE UP (ref 22–31)
CREAT SERPL-MCNC: 1.19 MG/DL — SIGNIFICANT CHANGE UP (ref 0.5–1.3)
EOSINOPHIL NFR BLD AUTO: 2.3 % — SIGNIFICANT CHANGE UP (ref 0–6)
GLUCOSE SERPL-MCNC: 88 MG/DL — SIGNIFICANT CHANGE UP (ref 70–99)
HCT VFR BLD CALC: 36.5 % — LOW (ref 39–50)
HGB BLD-MCNC: 12.1 G/DL — LOW (ref 13–17)
INR BLD: 1.06 — SIGNIFICANT CHANGE UP (ref 0.88–1.16)
LYMPHOCYTES # BLD AUTO: 39.5 % — SIGNIFICANT CHANGE UP (ref 13–44)
MCHC RBC-ENTMCNC: 33.2 G/DL — SIGNIFICANT CHANGE UP (ref 32–36)
MCHC RBC-ENTMCNC: 33.2 PG — SIGNIFICANT CHANGE UP (ref 27–34)
MCV RBC AUTO: 100 FL — SIGNIFICANT CHANGE UP (ref 80–100)
MONOCYTES NFR BLD AUTO: 7.6 % — SIGNIFICANT CHANGE UP (ref 2–14)
NEUTROPHILS NFR BLD AUTO: 49.8 % — SIGNIFICANT CHANGE UP (ref 43–77)
PLATELET # BLD AUTO: 221 K/UL — SIGNIFICANT CHANGE UP (ref 150–400)
POTASSIUM SERPL-MCNC: 3.9 MMOL/L — SIGNIFICANT CHANGE UP (ref 3.5–5.3)
POTASSIUM SERPL-SCNC: 3.9 MMOL/L — SIGNIFICANT CHANGE UP (ref 3.5–5.3)
PROT SERPL-MCNC: 7.3 G/DL — SIGNIFICANT CHANGE UP (ref 6–8.3)
PROTHROM AB SERPL-ACNC: 11.8 SEC — SIGNIFICANT CHANGE UP (ref 9.8–12.7)
RBC # BLD: 3.65 M/UL — LOW (ref 4.2–5.8)
RBC # FLD: 14.1 % — SIGNIFICANT CHANGE UP (ref 10.3–16.9)
SODIUM SERPL-SCNC: 137 MMOL/L — SIGNIFICANT CHANGE UP (ref 135–145)
WBC # BLD: 6.2 K/UL — SIGNIFICANT CHANGE UP (ref 3.8–10.5)
WBC # FLD AUTO: 6.2 K/UL — SIGNIFICANT CHANGE UP (ref 3.8–10.5)

## 2018-01-23 PROCEDURE — 75573 CT HRT C+ STRUX CGEN HRT DS: CPT | Mod: 26

## 2018-01-23 PROCEDURE — 70450 CT HEAD/BRAIN W/O DYE: CPT | Mod: 26

## 2018-01-23 PROCEDURE — 74174 CTA ABD&PLVS W/CONTRAST: CPT | Mod: 26

## 2018-01-23 PROCEDURE — 99233 SBSQ HOSP IP/OBS HIGH 50: CPT

## 2018-01-23 PROCEDURE — 93306 TTE W/DOPPLER COMPLETE: CPT | Mod: 26

## 2018-01-23 PROCEDURE — 93010 ELECTROCARDIOGRAM REPORT: CPT

## 2018-01-23 PROCEDURE — 93880 EXTRACRANIAL BILAT STUDY: CPT | Mod: 26

## 2018-01-23 NOTE — H&P ADULT - HISTORY OF PRESENT ILLNESS
[facility] Interventional Cardiology Addendum Note to Structural Heart AMBI H&P:     Attending MD: Dr Dennys Barnes        S: Pt presents for Right/Right and Left/Left Heart Catheterization (see office H&P for detailed History).  Pt reports that today he/she feels ...............        Allergies    No Known Allergies    Intolerances    narcotic analgesics (Nausea; Vomiting)      Current Medications:       PHYSICAL EXAM    V/S		BP:		        HR:	           RR: 		  TEMP:     General:   HEENT: NCAT, EOMI, PERRLA  NECK: No JVD, No carotid bruits B/L, +2 Carotid pulses B/L  PULM:  CTA B/L No W/R/R  CARD: RRR/Irreg, +S1 +S2,  M/R/G  ABD: ND, +BS, NT, no masses  EXT: Warm, No pedal edema  NEURO: A & O x 3, no focal neurologic deficits  PULSES:	     B	          R	      	  FEM          		           DP        PT       Right              			  No/Yes	        Bruit	          Left       	         		  No/Yes	        Bruit	   		                                                              LABS:                        12.1   6.2   )-----------( 221      ( 23 Jan 2018 11:47 )             36.5     01-23    137  |  98  |  18  ----------------------------<  88  3.9   |  26  |  1.19    Ca    9.2      23 Jan 2018 11:47    TPro  7.3  /  Alb  3.8  /  TBili  0.4  /  DBili  x   /  AST  19  /  ALT  10  /  AlkPhos  62  01-23    PT/INR - ( 23 Jan 2018 11:47 )   PT: 11.8 sec;   INR: 1.06          PTT - ( 23 Jan 2018 11:47 )  PTT:27.8 sec              EKG:    ASA _____				Mallampati class: _________	    A/P:          Sedation Plan:   ? None   ? Moderate    ?  Deep    ?  General Anesthesia   Patient Is Suitable Candidate For Sedation?     ? Yes   ? No   ? Not Applicable     Risks & benefits of procedure and sedation and risks and benefits for the alternative therapy have been explained to the patient in layman’s terms including but not limited to: allergic reaction, bleeding, infection, arrhythmia, respiratory compromise, renal and vascular compromise, limb damage, MI, CVA, emergent CABG/Vascular Surgery and death. Informed consent obtained and in chart. [facility] Interventional Cardiology Addendum Note to Structural Heart AMBI H&P:     Attending MD: Dr Dennys Barnes    S: Pt presents for Right and Left Heart Catheterization (see office H&P for detailed History).  Pt denies current symptoms other than left knee pain        Allergies    No Known Allergies    Intolerances    narcotic analgesics (Nausea; Vomiting)      Current Medications:       PHYSICAL EXAM    V/S		BP:		        HR:	           RR: 		  TEMP:     General:   HEENT: NCAT, EOMI, PERRLA  NECK: No JVD, No carotid bruits B/L, +2 Carotid pulses B/L  PULM:  CTA B/L No W/R/R  CARD: RRR/Irreg, +S1 +S2,  M/R/G  ABD: ND, +BS, NT, no masses  EXT: Warm, No pedal edema  NEURO: A & O x 3, no focal neurologic deficits  PULSES:	     B	          R	      	  FEM          		           DP        PT       Right              	+2 		  No/Yes      Bruit	              Left       	         	+2	 	 No/Yes	        Bruit	   		                                                              LABS:                        12.1   6.2   )-----------( 221      ( 23 Jan 2018 11:47 )             36.5     01-23    137  |  98  |  18  ----------------------------<  88  3.9   |  26  |  1.19    Ca    9.2      23 Jan 2018 11:47    TPro  7.3  /  Alb  3.8  /  TBili  0.4  /  DBili  x   /  AST  19  /  ALT  10  /  AlkPhos  62  01-23    PT/INR - ( 23 Jan 2018 11:47 )   PT: 11.8 sec;   INR: 1.06          PTT - ( 23 Jan 2018 11:47 )  PTT:27.8 sec              EKG: NSR with RBBB. TWI VI, V2.    ASA ___III__				Mallampati class: _____III____	    A/P:      81 yo male, PMHx HTN, HLD, Afib on Eliquis (last dose 1/20/18), severe AS with severe osteoarthritis in left knee presents for recommended right and left heart cath as preop for clearance for total left knee replacement scheduled for 2/6/18. Labs stable and pt asymptomatic. Pt underwent limited TTE prior to procedure.     Sedation Plan:   ? None   x Moderate    ?  Deep    ?  General Anesthesia   Patient Is Suitable Candidate For Sedation?     x Yes   ? No   ? Not Applicable     Risks & benefits of procedure and sedation and risks and benefits for the alternative therapy have been explained to the patient in layman’s terms including but not limited to: allergic reaction, bleeding, infection, arrhythmia, respiratory compromise, renal and vascular compromise, limb damage, MI, CVA, emergent CABG/Vascular Surgery and death. Informed consent obtained and in chart. [facility] Interventional Cardiology Addendum Note to Structural Heart AMBI H&P:     Attending MD: Dr Dennys Barnes    S: Pt presents for Right and Left Heart Catheterization (see office H&P for detailed History).  Pt denies current symptoms other than left knee pain        Allergies    No Known Allergies    Intolerances    narcotic analgesics (Nausea; Vomiting)      Current Medications:       PHYSICAL EXAM    V/S		BP:		        HR:	           RR: 		  TEMP:     General:   HEENT: NCAT, EOMI, PERRLA  NECK: No JVD, No carotid bruits B/L, +2 Carotid pulses B/L  PULM:  CTA B/L No W/R/R  CARD: RRR/Irreg, +S1 +S2,  M/R/G  ABD: ND, +BS, NT, no masses  EXT: Warm, No pedal edema  NEURO: A & O x 3, no focal neurologic deficits  PULSES:	     B	          R	      	  FEM          		           DP        PT       Right              	+2 		  No/Yes      Bruit	              Left       	         	+2	 	 No/Yes	        Bruit	   		                                                              LABS:                        12.1   6.2   )-----------( 221      ( 23 Jan 2018 11:47 )             36.5     01-23    137  |  98  |  18  ----------------------------<  88  3.9   |  26  |  1.19    Ca    9.2      23 Jan 2018 11:47    TPro  7.3  /  Alb  3.8  /  TBili  0.4  /  DBili  x   /  AST  19  /  ALT  10  /  AlkPhos  62  01-23    PT/INR - ( 23 Jan 2018 11:47 )   PT: 11.8 sec;   INR: 1.06          PTT - ( 23 Jan 2018 11:47 )  PTT:27.8 sec              EKG: NSR with RBBB. TWI VI, V2.    ASA ___III__				Mallampati class: _____III____	    A/P:      83 yo male, PMHx HTN, HLD, Afib on Eliquis (last dose 1/20/18), severe AS with severe osteoarthritis in left knee presents for recommended right and left heart cath as preop for clearance for total left knee replacement scheduled for 2/6/18. Labs stable and pt asymptomatic. Pt underwent limited TTE prior to procedure.     Sedation Plan:   ? None   x Moderate    ?  Deep    ?  General Anesthesia   Patient Is Suitable Candidate For Sedation?     x Yes   ? No   ? Not Applicable     Risks & benefits of procedure and sedation and risks and benefits for the alternative therapy have been explained to the patient in layman’s terms including but not limited to: allergic reaction, bleeding, infection, arrhythmia, respiratory compromise, renal and vascular compromise, limb damage, MI, CVA, emergent CABG/Vascular Surgery and death. Informed consent obtained and in chart.	    As per Dr. Barnes the Right and Left cardiac catheterization was cancelled.  Patient underwent pre op testing prior to TAVR procedure (including CTA Abd/Pelvis, CT Heart, Carotid, CT Head, Echocardiogram).  Patient deemed stable for discharge home as per Dr. Barnes. [facility] Interventional Cardiology Addendum Note to Structural Heart AMBI H&P:     Attending MD: Dr Dennys Barnes    S: Pt presents for Right and Left Heart Catheterization (see office H&P for detailed History).  Pt denies current symptoms other than left knee pain        Allergies    No Known Allergies    Intolerances    narcotic analgesics (Nausea; Vomiting)      Current Medications:       PHYSICAL EXAM    V/S		BP:		        HR:	           RR: 		  TEMP:     General:   HEENT: NCAT, EOMI, PERRLA  NECK: No JVD, No carotid bruits B/L, +2 Carotid pulses B/L  PULM:  CTA B/L No W/R/R  CARD: RRR/Irreg, +S1 +S2,  M/R/G  ABD: ND, +BS, NT, no masses  EXT: Warm, No pedal edema  NEURO: A & O x 3, no focal neurologic deficits  PULSES:	     B	          R	      	  FEM          		           DP        PT       Right              	+2 		  No/Yes      Bruit	              Left       	         	+2	 	 No/Yes	        Bruit	   		                                                              LABS:                        12.1   6.2   )-----------( 221      ( 23 Jan 2018 11:47 )             36.5     01-23    137  |  98  |  18  ----------------------------<  88  3.9   |  26  |  1.19    Ca    9.2      23 Jan 2018 11:47    TPro  7.3  /  Alb  3.8  /  TBili  0.4  /  DBili  x   /  AST  19  /  ALT  10  /  AlkPhos  62  01-23    PT/INR - ( 23 Jan 2018 11:47 )   PT: 11.8 sec;   INR: 1.06          PTT - ( 23 Jan 2018 11:47 )  PTT:27.8 sec              EKG: NSR with RBBB. TWI VI, V2.    ASA ___III__				Mallampati class: _____III____	    A/P:      83 yo male, PMHx HTN, HLD, Afib on Eliquis (last dose 1/20/18), CAD s/p #3VCABG, severe AS with severe osteoarthritis in left knee presents for recommended right and left heart cath as preop for clearance for total left knee replacement scheduled for 2/6/18. Labs stable and pt asymptomatic. Pt underwent limited TTE prior to procedure.     Sedation Plan:   ? None   x Moderate    ?  Deep    ?  General Anesthesia   Patient Is Suitable Candidate For Sedation?     x Yes   ? No   ? Not Applicable     Risks & benefits of procedure and sedation and risks and benefits for the alternative therapy have been explained to the patient in layman’s terms including but not limited to: allergic reaction, bleeding, infection, arrhythmia, respiratory compromise, renal and vascular compromise, limb damage, MI, CVA, emergent CABG/Vascular Surgery and death. Informed consent obtained and in chart.	    As per Dr. Barnes the Right and Left cardiac catheterization was cancelled.  Patient underwent pre op testing for possible TAVR procedure (including CTA Abd/Pelvis, CT Heart, Carotid, CT Head, Echocardiogram) which will potentially be done prior to orthopedic surgery.  Patient deemed stable for discharge home as per Dr. Barnes. [facility] Interventional Cardiology Addendum Note to Structural Heart AMBI H&P:     Attending MD: Dr Dennys Barnes    S: Pt presents for Right and Left Heart Catheterization (see office H&P for detailed History).  Pt denies current symptoms other than left knee pain        Allergies    No Known Allergies    Intolerances    narcotic analgesics (Nausea; Vomiting)      Current Medications:       PHYSICAL EXAM    V/S		BP:		        HR:	           RR: 		  TEMP:     General:   HEENT: NCAT, EOMI, PERRLA  NECK: No JVD, No carotid bruits B/L, +2 Carotid pulses B/L  PULM:  CTA B/L No W/R/R  CARD: RRR/Irreg, +S1 +S2,  M/R/G  ABD: ND, +BS, NT, no masses  EXT: Warm, No pedal edema  NEURO: A & O x 3, no focal neurologic deficits  PULSES:	     B	          R	      	  FEM          		           DP        PT       Right              	+2 		  No/Yes      Bruit	              Left       	         	+2	 	 No/Yes	        Bruit	   		                                                              LABS:                        12.1   6.2   )-----------( 221      ( 23 Jan 2018 11:47 )             36.5     01-23    137  |  98  |  18  ----------------------------<  88  3.9   |  26  |  1.19    Ca    9.2      23 Jan 2018 11:47    TPro  7.3  /  Alb  3.8  /  TBili  0.4  /  DBili  x   /  AST  19  /  ALT  10  /  AlkPhos  62  01-23    PT/INR - ( 23 Jan 2018 11:47 )   PT: 11.8 sec;   INR: 1.06          PTT - ( 23 Jan 2018 11:47 )  PTT:27.8 sec              EKG: NSR with RBBB. TWI VI, V2.    ASA ___III__				Mallampati class: _____III____	    A/P:      81 yo male, PMHx HTN, HLD, Afib on Eliquis (last dose 1/20/18), CAD s/p #3VCABG, severe AS with severe osteoarthritis in left knee presents for recommended right and left heart cath as preop for clearance for total left knee replacement scheduled for 2/6/18. Labs stable and pt asymptomatic. Pt underwent limited TTE prior to procedure.     Sedation Plan:   ? None   x Moderate    ?  Deep    ?  General Anesthesia   Patient Is Suitable Candidate For Sedation?     x Yes   ? No   ? Not Applicable     Risks & benefits of procedure and sedation and risks and benefits for the alternative therapy have been explained to the patient in layman’s terms including but not limited to: allergic reaction, bleeding, infection, arrhythmia, respiratory compromise, renal and vascular compromise, limb damage, MI, CVA, emergent CABG/Vascular Surgery and death. Informed consent obtained and in chart.	    As per Dr. Barnes the Right and Left cardiac catheterization was cancelled.  Patient underwent pre op testing for TAVR procedure (including CTA Abd/Pelvis, CT Heart, Carotid, CT Head, Echocardiogram) confirming severe aortic stenosis. Plan for TAVR prior to any orthopedic intervention. Patient deemed stable for discharge home as per Dr. Barnes.

## 2018-01-29 ENCOUNTER — APPOINTMENT (OUTPATIENT)
Dept: CARDIOTHORACIC SURGERY | Facility: CLINIC | Age: 83
End: 2018-01-29
Payer: MEDICARE

## 2018-01-29 VITALS
OXYGEN SATURATION: 92 % | BODY MASS INDEX: 30.7 KG/M2 | DIASTOLIC BLOOD PRESSURE: 75 MMHG | SYSTOLIC BLOOD PRESSURE: 156 MMHG | TEMPERATURE: 97.4 F | RESPIRATION RATE: 19 BRPM | WEIGHT: 191 LBS | HEIGHT: 66 IN | HEART RATE: 92 BPM

## 2018-01-29 DIAGNOSIS — I50.32 CHRONIC DIASTOLIC (CONGESTIVE) HEART FAILURE: ICD-10-CM

## 2018-01-29 DIAGNOSIS — I35.0 NONRHEUMATIC AORTIC (VALVE) STENOSIS: ICD-10-CM

## 2018-01-29 PROCEDURE — 99214 OFFICE O/P EST MOD 30 MIN: CPT

## 2018-02-01 PROBLEM — I50.32 CHRONIC DIASTOLIC CONGESTIVE HEART FAILURE: Status: ACTIVE | Noted: 2018-02-01

## 2018-02-03 ENCOUNTER — APPOINTMENT (OUTPATIENT)
Dept: HEART AND VASCULAR | Facility: CLINIC | Age: 83
End: 2018-02-03
Payer: MEDICARE

## 2018-02-03 PROCEDURE — 93228 REMOTE 30 DAY ECG REV/REPORT: CPT

## 2018-02-05 ENCOUNTER — APPOINTMENT (OUTPATIENT)
Dept: HEART AND VASCULAR | Facility: CLINIC | Age: 83
End: 2018-02-05
Payer: MEDICARE

## 2018-02-05 VITALS
DIASTOLIC BLOOD PRESSURE: 65 MMHG | HEART RATE: 78 BPM | WEIGHT: 194 LBS | BODY MASS INDEX: 31.18 KG/M2 | SYSTOLIC BLOOD PRESSURE: 137 MMHG | HEIGHT: 66 IN

## 2018-02-05 PROCEDURE — 93000 ELECTROCARDIOGRAM COMPLETE: CPT

## 2018-02-05 PROCEDURE — 99215 OFFICE O/P EST HI 40 MIN: CPT | Mod: 25

## 2018-02-05 RX ORDER — AMIODARONE HYDROCHLORIDE 200 MG/1
200 TABLET ORAL
Refills: 0 | Status: DISCONTINUED | COMMUNITY
End: 2018-02-05

## 2018-02-06 ENCOUNTER — INPATIENT (INPATIENT)
Facility: HOSPITAL | Age: 83
LOS: 1 days | Discharge: HOME HEALTH | DRG: 267 | End: 2018-02-08
Attending: INTERNAL MEDICINE | Admitting: INTERNAL MEDICINE
Payer: MEDICARE

## 2018-02-06 ENCOUNTER — APPOINTMENT (OUTPATIENT)
Dept: CARDIOTHORACIC SURGERY | Facility: HOSPITAL | Age: 83
End: 2018-02-06
Payer: MEDICARE

## 2018-02-06 VITALS — HEART RATE: 62 BPM | RESPIRATION RATE: 19 BRPM | OXYGEN SATURATION: 98 %

## 2018-02-06 DIAGNOSIS — I35.0 NONRHEUMATIC AORTIC (VALVE) STENOSIS: ICD-10-CM

## 2018-02-06 DIAGNOSIS — Z98.49 CATARACT EXTRACTION STATUS, UNSPECIFIED EYE: Chronic | ICD-10-CM

## 2018-02-06 DIAGNOSIS — Z96.659 PRESENCE OF UNSPECIFIED ARTIFICIAL KNEE JOINT: Chronic | ICD-10-CM

## 2018-02-06 DIAGNOSIS — Z00.6 ENCOUNTER FOR EXAMINATION FOR NORMAL COMPARISON AND CONTROL IN CLINICAL RESEARCH PROGRAM: ICD-10-CM

## 2018-02-06 LAB
ALBUMIN SERPL ELPH-MCNC: 3 G/DL — LOW (ref 3.3–5)
ALBUMIN SERPL ELPH-MCNC: 4 G/DL — SIGNIFICANT CHANGE UP (ref 3.3–5)
ALP SERPL-CCNC: 47 U/L — SIGNIFICANT CHANGE UP (ref 40–120)
ALP SERPL-CCNC: 59 U/L — SIGNIFICANT CHANGE UP (ref 40–120)
ALT FLD-CCNC: 10 U/L — SIGNIFICANT CHANGE UP (ref 10–45)
ALT FLD-CCNC: 7 U/L — LOW (ref 10–45)
ANION GAP SERPL CALC-SCNC: 13 MMOL/L — SIGNIFICANT CHANGE UP (ref 5–17)
ANION GAP SERPL CALC-SCNC: 13 MMOL/L — SIGNIFICANT CHANGE UP (ref 5–17)
APTT BLD: 28.3 SEC — SIGNIFICANT CHANGE UP (ref 27.5–37.4)
APTT BLD: 30.9 SEC — SIGNIFICANT CHANGE UP (ref 27.5–37.4)
AST SERPL-CCNC: 16 U/L — SIGNIFICANT CHANGE UP (ref 10–40)
AST SERPL-CCNC: 22 U/L — SIGNIFICANT CHANGE UP (ref 10–40)
BILIRUB SERPL-MCNC: 0.4 MG/DL — SIGNIFICANT CHANGE UP (ref 0.2–1.2)
BILIRUB SERPL-MCNC: 0.5 MG/DL — SIGNIFICANT CHANGE UP (ref 0.2–1.2)
BUN SERPL-MCNC: 13 MG/DL — SIGNIFICANT CHANGE UP (ref 7–23)
BUN SERPL-MCNC: 15 MG/DL — SIGNIFICANT CHANGE UP (ref 7–23)
CALCIUM SERPL-MCNC: 8.1 MG/DL — LOW (ref 8.4–10.5)
CALCIUM SERPL-MCNC: 9.5 MG/DL — SIGNIFICANT CHANGE UP (ref 8.4–10.5)
CHLORIDE SERPL-SCNC: 101 MMOL/L — SIGNIFICANT CHANGE UP (ref 96–108)
CHLORIDE SERPL-SCNC: 104 MMOL/L — SIGNIFICANT CHANGE UP (ref 96–108)
CO2 SERPL-SCNC: 21 MMOL/L — LOW (ref 22–31)
CO2 SERPL-SCNC: 24 MMOL/L — SIGNIFICANT CHANGE UP (ref 22–31)
CREAT SERPL-MCNC: 0.89 MG/DL — SIGNIFICANT CHANGE UP (ref 0.5–1.3)
CREAT SERPL-MCNC: 1.04 MG/DL — SIGNIFICANT CHANGE UP (ref 0.5–1.3)
GAS PNL BLDA: SIGNIFICANT CHANGE UP
GLUCOSE BLDC GLUCOMTR-MCNC: 66 MG/DL — LOW (ref 70–99)
GLUCOSE BLDC GLUCOMTR-MCNC: 84 MG/DL — SIGNIFICANT CHANGE UP (ref 70–99)
GLUCOSE SERPL-MCNC: 87 MG/DL — SIGNIFICANT CHANGE UP (ref 70–99)
GLUCOSE SERPL-MCNC: 91 MG/DL — SIGNIFICANT CHANGE UP (ref 70–99)
HCT VFR BLD CALC: 33.2 % — LOW (ref 39–50)
HCT VFR BLD CALC: 38 % — LOW (ref 39–50)
HGB BLD-MCNC: 10.7 G/DL — LOW (ref 13–17)
HGB BLD-MCNC: 12.8 G/DL — LOW (ref 13–17)
INR BLD: 1.02 — SIGNIFICANT CHANGE UP (ref 0.88–1.16)
INR BLD: 1.2 — HIGH (ref 0.88–1.16)
LACTATE SERPL-SCNC: 0.7 MMOL/L — SIGNIFICANT CHANGE UP (ref 0.5–2)
MAGNESIUM SERPL-MCNC: 1.6 MG/DL — SIGNIFICANT CHANGE UP (ref 1.6–2.6)
MAGNESIUM SERPL-MCNC: 1.9 MG/DL — SIGNIFICANT CHANGE UP (ref 1.6–2.6)
MCHC RBC-ENTMCNC: 32.2 G/DL — SIGNIFICANT CHANGE UP (ref 32–36)
MCHC RBC-ENTMCNC: 32.5 PG — SIGNIFICANT CHANGE UP (ref 27–34)
MCHC RBC-ENTMCNC: 33.7 G/DL — SIGNIFICANT CHANGE UP (ref 32–36)
MCHC RBC-ENTMCNC: 33.7 PG — SIGNIFICANT CHANGE UP (ref 27–34)
MCV RBC AUTO: 100 FL — SIGNIFICANT CHANGE UP (ref 80–100)
MCV RBC AUTO: 100.9 FL — HIGH (ref 80–100)
PHOSPHATE SERPL-MCNC: 3.2 MG/DL — SIGNIFICANT CHANGE UP (ref 2.5–4.5)
PLATELET # BLD AUTO: 181 K/UL — SIGNIFICANT CHANGE UP (ref 150–400)
PLATELET # BLD AUTO: 233 K/UL — SIGNIFICANT CHANGE UP (ref 150–400)
POTASSIUM SERPL-MCNC: 3.7 MMOL/L — SIGNIFICANT CHANGE UP (ref 3.5–5.3)
POTASSIUM SERPL-MCNC: 4.1 MMOL/L — SIGNIFICANT CHANGE UP (ref 3.5–5.3)
POTASSIUM SERPL-SCNC: 3.7 MMOL/L — SIGNIFICANT CHANGE UP (ref 3.5–5.3)
POTASSIUM SERPL-SCNC: 4.1 MMOL/L — SIGNIFICANT CHANGE UP (ref 3.5–5.3)
PROT SERPL-MCNC: 5.9 G/DL — LOW (ref 6–8.3)
PROT SERPL-MCNC: 7.6 G/DL — SIGNIFICANT CHANGE UP (ref 6–8.3)
PROTHROM AB SERPL-ACNC: 11.3 SEC — SIGNIFICANT CHANGE UP (ref 9.8–12.7)
PROTHROM AB SERPL-ACNC: 13.4 SEC — HIGH (ref 9.8–12.7)
RBC # BLD: 3.29 M/UL — LOW (ref 4.2–5.8)
RBC # BLD: 3.8 M/UL — LOW (ref 4.2–5.8)
RBC # FLD: 13.7 % — SIGNIFICANT CHANGE UP (ref 10.3–16.9)
RBC # FLD: 14.3 % — SIGNIFICANT CHANGE UP (ref 10.3–16.9)
SODIUM SERPL-SCNC: 138 MMOL/L — SIGNIFICANT CHANGE UP (ref 135–145)
SODIUM SERPL-SCNC: 138 MMOL/L — SIGNIFICANT CHANGE UP (ref 135–145)
WBC # BLD: 6.8 K/UL — SIGNIFICANT CHANGE UP (ref 3.8–10.5)
WBC # BLD: 7.8 K/UL — SIGNIFICANT CHANGE UP (ref 3.8–10.5)
WBC # FLD AUTO: 6.8 K/UL — SIGNIFICANT CHANGE UP (ref 3.8–10.5)
WBC # FLD AUTO: 7.8 K/UL — SIGNIFICANT CHANGE UP (ref 3.8–10.5)

## 2018-02-06 PROCEDURE — 33361 REPLACE AORTIC VALVE PERQ: CPT | Mod: 62,Q0

## 2018-02-06 PROCEDURE — 80053 COMPREHEN METABOLIC PANEL: CPT

## 2018-02-06 PROCEDURE — 85610 PROTHROMBIN TIME: CPT

## 2018-02-06 PROCEDURE — 93880 EXTRACRANIAL BILAT STUDY: CPT

## 2018-02-06 PROCEDURE — 71045 X-RAY EXAM CHEST 1 VIEW: CPT | Mod: 26

## 2018-02-06 PROCEDURE — 85025 COMPLETE CBC W/AUTO DIFF WBC: CPT

## 2018-02-06 PROCEDURE — 75573 CT HRT C+ STRUX CGEN HRT DS: CPT

## 2018-02-06 PROCEDURE — 93005 ELECTROCARDIOGRAM TRACING: CPT

## 2018-02-06 PROCEDURE — 86850 RBC ANTIBODY SCREEN: CPT

## 2018-02-06 PROCEDURE — 36415 COLL VENOUS BLD VENIPUNCTURE: CPT

## 2018-02-06 PROCEDURE — 99233 SBSQ HOSP IP/OBS HIGH 50: CPT | Mod: 25

## 2018-02-06 PROCEDURE — 93010 ELECTROCARDIOGRAM REPORT: CPT

## 2018-02-06 PROCEDURE — 93308 TTE F-UP OR LMTD: CPT

## 2018-02-06 PROCEDURE — 85730 THROMBOPLASTIN TIME PARTIAL: CPT

## 2018-02-06 PROCEDURE — 86900 BLOOD TYPING SEROLOGIC ABO: CPT

## 2018-02-06 PROCEDURE — 93306 TTE W/DOPPLER COMPLETE: CPT | Mod: 26

## 2018-02-06 PROCEDURE — 99291 CRITICAL CARE FIRST HOUR: CPT

## 2018-02-06 PROCEDURE — 70450 CT HEAD/BRAIN W/O DYE: CPT

## 2018-02-06 PROCEDURE — 74174 CTA ABD&PLVS W/CONTRAST: CPT

## 2018-02-06 PROCEDURE — 86901 BLOOD TYPING SEROLOGIC RH(D): CPT

## 2018-02-06 RX ORDER — ALBUTEROL 90 UG/1
2 AEROSOL, METERED ORAL EVERY 6 HOURS
Qty: 0 | Refills: 0 | Status: DISCONTINUED | OUTPATIENT
Start: 2018-02-06 | End: 2018-02-08

## 2018-02-06 RX ORDER — SODIUM CHLORIDE 9 MG/ML
1000 INJECTION, SOLUTION INTRAVENOUS
Qty: 0 | Refills: 0 | Status: DISCONTINUED | OUTPATIENT
Start: 2018-02-06 | End: 2018-02-08

## 2018-02-06 RX ORDER — DEXTROSE 50 % IN WATER 50 %
12.5 SYRINGE (ML) INTRAVENOUS ONCE
Qty: 0 | Refills: 0 | Status: DISCONTINUED | OUTPATIENT
Start: 2018-02-06 | End: 2018-02-08

## 2018-02-06 RX ORDER — ASPIRIN/CALCIUM CARB/MAGNESIUM 324 MG
81 TABLET ORAL ONCE
Qty: 0 | Refills: 0 | Status: COMPLETED | OUTPATIENT
Start: 2018-02-06 | End: 2018-02-06

## 2018-02-06 RX ORDER — SIMVASTATIN 20 MG/1
40 TABLET, FILM COATED ORAL ONCE
Qty: 0 | Refills: 0 | Status: COMPLETED | OUTPATIENT
Start: 2018-02-06 | End: 2018-02-06

## 2018-02-06 RX ORDER — HEPARIN SODIUM 5000 [USP'U]/ML
5000 INJECTION INTRAVENOUS; SUBCUTANEOUS ONCE
Qty: 0 | Refills: 0 | Status: COMPLETED | OUTPATIENT
Start: 2018-02-06 | End: 2018-02-06

## 2018-02-06 RX ORDER — CALCIUM GLUCONATE 100 MG/ML
2 VIAL (ML) INTRAVENOUS ONCE
Qty: 0 | Refills: 0 | Status: COMPLETED | OUTPATIENT
Start: 2018-02-06 | End: 2018-02-07

## 2018-02-06 RX ORDER — LEVOTHYROXINE SODIUM 125 MCG
50 TABLET ORAL DAILY
Qty: 0 | Refills: 0 | Status: DISCONTINUED | OUTPATIENT
Start: 2018-02-06 | End: 2018-02-06

## 2018-02-06 RX ORDER — BUDESONIDE AND FORMOTEROL FUMARATE DIHYDRATE 160; 4.5 UG/1; UG/1
2 AEROSOL RESPIRATORY (INHALATION) ONCE
Qty: 0 | Refills: 0 | Status: DISCONTINUED | OUTPATIENT
Start: 2018-02-06 | End: 2018-02-08

## 2018-02-06 RX ORDER — GLUCAGON INJECTION, SOLUTION 0.5 MG/.1ML
1 INJECTION, SOLUTION SUBCUTANEOUS ONCE
Qty: 0 | Refills: 0 | Status: DISCONTINUED | OUTPATIENT
Start: 2018-02-06 | End: 2018-02-08

## 2018-02-06 RX ORDER — DEXTROSE 50 % IN WATER 50 %
25 SYRINGE (ML) INTRAVENOUS ONCE
Qty: 0 | Refills: 0 | Status: DISCONTINUED | OUTPATIENT
Start: 2018-02-06 | End: 2018-02-08

## 2018-02-06 RX ORDER — DEXTROSE 50 % IN WATER 50 %
50 SYRINGE (ML) INTRAVENOUS ONCE
Qty: 0 | Refills: 0 | Status: DISCONTINUED | OUTPATIENT
Start: 2018-02-06 | End: 2018-02-08

## 2018-02-06 RX ORDER — FAMOTIDINE 10 MG/ML
20 INJECTION INTRAVENOUS DAILY
Qty: 0 | Refills: 0 | Status: DISCONTINUED | OUTPATIENT
Start: 2018-02-06 | End: 2018-02-06

## 2018-02-06 RX ORDER — INSULIN LISPRO 100/ML
VIAL (ML) SUBCUTANEOUS
Qty: 0 | Refills: 0 | Status: DISCONTINUED | OUTPATIENT
Start: 2018-02-06 | End: 2018-02-08

## 2018-02-06 RX ORDER — LEVOTHYROXINE SODIUM 125 MCG
50 TABLET ORAL DAILY
Qty: 0 | Refills: 0 | Status: DISCONTINUED | OUTPATIENT
Start: 2018-02-06 | End: 2018-02-08

## 2018-02-06 RX ORDER — PANTOPRAZOLE SODIUM 20 MG/1
40 TABLET, DELAYED RELEASE ORAL
Qty: 0 | Refills: 0 | Status: DISCONTINUED | OUTPATIENT
Start: 2018-02-06 | End: 2018-02-08

## 2018-02-06 RX ORDER — MAGNESIUM SULFATE 500 MG/ML
2 VIAL (ML) INJECTION ONCE
Qty: 0 | Refills: 0 | Status: COMPLETED | OUTPATIENT
Start: 2018-02-06 | End: 2018-02-06

## 2018-02-06 RX ORDER — POTASSIUM CHLORIDE 20 MEQ
20 PACKET (EA) ORAL ONCE
Qty: 0 | Refills: 0 | Status: COMPLETED | OUTPATIENT
Start: 2018-02-06 | End: 2018-02-06

## 2018-02-06 RX ORDER — INSULIN HUMAN 100 [IU]/ML
2 INJECTION, SOLUTION SUBCUTANEOUS
Qty: 50 | Refills: 0 | Status: DISCONTINUED | OUTPATIENT
Start: 2018-02-06 | End: 2018-02-06

## 2018-02-06 RX ORDER — DEXTROSE 50 % IN WATER 50 %
1 SYRINGE (ML) INTRAVENOUS ONCE
Qty: 0 | Refills: 0 | Status: DISCONTINUED | OUTPATIENT
Start: 2018-02-06 | End: 2018-02-08

## 2018-02-06 RX ORDER — CEFAZOLIN SODIUM 1 G
2000 VIAL (EA) INJECTION ONCE
Qty: 0 | Refills: 0 | Status: COMPLETED | OUTPATIENT
Start: 2018-02-06 | End: 2018-02-07

## 2018-02-06 RX ORDER — ASPIRIN/CALCIUM CARB/MAGNESIUM 324 MG
81 TABLET ORAL ONCE
Qty: 0 | Refills: 0 | Status: COMPLETED | OUTPATIENT
Start: 2018-02-07 | End: 2018-02-06

## 2018-02-06 RX ORDER — ALBUTEROL 90 UG/1
2.5 AEROSOL, METERED ORAL EVERY 4 HOURS
Qty: 0 | Refills: 0 | Status: DISCONTINUED | OUTPATIENT
Start: 2018-02-06 | End: 2018-02-08

## 2018-02-06 RX ADMIN — Medication 50 GRAM(S): at 23:00

## 2018-02-06 RX ADMIN — Medication 81 MILLIGRAM(S): at 16:30

## 2018-02-06 RX ADMIN — Medication 100 MILLIEQUIVALENT(S): at 23:46

## 2018-02-06 RX ADMIN — Medication 81 MILLIGRAM(S): at 23:53

## 2018-02-06 RX ADMIN — HEPARIN SODIUM 5000 UNIT(S): 5000 INJECTION INTRAVENOUS; SUBCUTANEOUS at 23:53

## 2018-02-06 RX ADMIN — SIMVASTATIN 40 MILLIGRAM(S): 20 TABLET, FILM COATED ORAL at 23:47

## 2018-02-06 NOTE — PROGRESS NOTE ADULT - SUBJECTIVE AND OBJECTIVE BOX
CTICU  CRITICAL  CARE  attending   ADMIT NOTE (S/P TAVR).   Hand off received 					   Pertinent clinical, laboratory, radiographic, hemodynamic, echocardiographic, respiratory data, microbiologic data and chart were reviewed and analyzed frequently throughout the course of the day and night  Patient seen and examined with CTS/ SH attending at bedside    This is an 81 y/o male with PMHx former smoker, HTN, HLD, NIDDM, hypothyroidism, prostate CA s/p seeding, atrial flutter s/p ablation, on Eliquis (last dose 2/3/18), bifasicular heart block, COPD not on home O2, CAD s/p CABGx3 March 2017 with Dr. Funez, [facility], and chronic diastolic heart failure who presented out-patient for known AS that is now worsening.  Echo done showing severe AS, RONAN 0.86cm2, peak gradient 61mmHg.  He c/o decreased stamina and fatigue, CASON when climbing stairs NYHA class III symptoms.  Denies cough, hemoptysis, N/V/D, fever or chills.  Last dose of ASA 81mg was last night      HEALTH ISSUES - PROBLEM Dx:  Aortic stenosis: Aortic stenosis      FAMILY HISTORY:  Family history of acute myocardial infarction (Father)  PAST MEDICAL & SURGICAL HISTORY:  High cholesterol  Diabetes  Prostate cancer: in remission  Hypertension  COPD (chronic obstructive pulmonary disease)  History of cataract surgery: b/l  History of knee replacement: b/l    Patient is a 82y old  Male who presents with a chief complaint of Aortic stenosis (06 Feb 2018 09:52)      14 system review was unremarkable  acute changes include acute respiratory failure  Vital signs, hemodynamic and respiratory parameters were reviewed from the bedside nursing flow sheet.  ICU Vital Signs Last 24 Hrs  T(C): --  T(F): --  HR: 68 (06 Feb 2018 20:15) (62 - 72)  BP: --  BP(mean): --  ABP: 142/64 (06 Feb 2018 20:15) (126/60 - 142/64)  ABP(mean): 90 (06 Feb 2018 20:15) (80 - 90)  RR: 23 (06 Feb 2018 20:15) (16 - 23)  SpO2: 97% (06 Feb 2018 20:15) (94% - 99%)    Adult Advanced Hemodynamics Last 24 Hrs  CVP(mm Hg): --  CVP(cm H2O): --  CO: --  CI: --  PA: --  PA(mean): --  PCWP: --  SVR: --  SVRI: --  PVR: --  PVRI: --, ABG - ( 06 Feb 2018 20:11 )  pH: 7.42  /  pCO2: 37    /  pO2: 141   / HCO3: 23    / Base Excess: -1.0  /  SaO2: 99                  Intake and output was reviewed and the fluid balance was calculated  Daily     Daily   I&O's Summary      All lines and drain sites were assessed  Glycemic trend was reviewed CAPILLARY BLOOD GLUCOSE      POCT Blood Glucose.: 84 mg/dL (06 Feb 2018 20:06)    NEURO: No acute change in mental status.  CVS: S1, S2, No S3.  RESPIRATORY: Auscultation of the chest reveals equal breath sounds.  GI: Abdomen is soft. No tenderness. + bowel sounds.  Extremities are warm and well perfused. + Lower extremity edema.  VASCULAR: + Distal pulses.  Wounds appear clean and unremarkable  Antibiotics are perioperative cefazolin.    labs  CBC Full  -  ( 06 Feb 2018 20:11 )  WBC Count : 7.8 K/uL  Hemoglobin : 10.7 g/dL  Hematocrit : 33.2 %  Platelet Count - Automated : 181 K/uL  Mean Cell Volume : 100.9 fL  Mean Cell Hemoglobin : 32.5 pg  Mean Cell Hemoglobin Concentration : 32.2 g/dL  Auto Neutrophil # : x  Auto Lymphocyte # : x  Auto Monocyte # : x  Auto Eosinophil # : x  Auto Basophil # : x  Auto Neutrophil % : x  Auto Lymphocyte % : x  Auto Monocyte % : x  Auto Eosinophil % : x  Auto Basophil % : x    02-06    138  |  104  |  13  ----------------------------<  91  3.7   |  21<L>  |  0.89    Ca    8.1<L>      06 Feb 2018 20:11  Phos  3.2     02-06  Mg     1.6     02-06    TPro  5.9<L>  /  Alb  3.0<L>  /  TBili  0.5  /  DBili  x   /  AST  16  /  ALT  7<L>  /  AlkPhos  47  02-06    PT/INR - ( 06 Feb 2018 13:42 )   PT: 11.3 sec;   INR: 1.02          PTT - ( 06 Feb 2018 13:42 )  PTT:28.3 sec  The current medications were reviewed   MEDICATIONS  (STANDING):  buDESOnide 160 MICROgram(s)/formoterol 4.5 MICROgram(s) Inhaler 2 Puff(s) Inhalation Once  ceFAZolin   IVPB 2000 milliGRAM(s) IV Intermittent Once  dextrose 5%. 1000 milliLiter(s) (50 mL/Hr) IV Continuous <Continuous>  dextrose 50% Injectable 12.5 Gram(s) IV Push once  dextrose 50% Injectable 25 Gram(s) IV Push once  dextrose 50% Injectable 25 Gram(s) IV Push once  dextrose 50% Injectable 50 milliLiter(s) IV Push Once  dextrose 50% Injectable 25 milliLiter(s) IV Push Once  heparin  Injectable 5000 Unit(s) SubCutaneous Once  insulin lispro (HumaLOG) corrective regimen sliding scale   SubCutaneous Before meals and at bedtime  levothyroxine 50 MICROGram(s) Oral daily  pantoprazole    Tablet 40 milliGRAM(s) Oral before breakfast  simvastatin 40 milliGRAM(s) Oral Once  sodium chloride 0.45%. 1000 milliLiter(s) (10 mL/Hr) IV Continuous <Continuous>    MEDICATIONS  (PRN):  ALBUTerol    0.083% 2.5 milliGRAM(s) Nebulizer every 4 hours PRN Shortness of Breath and/or Wheezing  ALBUTerol    90 MICROgram(s) HFA Inhaler 2 Puff(s) Inhalation every 6 hours PRN Shortness of Breath and/or Wheezing  dextrose Gel 1 Dose(s) Oral once PRN Blood Glucose LESS THAN 70 milliGRAM(s)/deciliter  glucagon  Injectable 1 milliGRAM(s) IntraMuscular once PRN Glucose LESS THAN 70 milligrams/deciliter       PROBLEM LIST/ ASSESSMENT:  HEALTH ISSUES - PROBLEM Dx:  Aortic stenosis: Aortic stenosis         Patient is a 82y old  Male who presents with Aortic stenosis (06 Feb 2018 09:52)    S/P TAVR  + complete heart block after the procedure.  VVI paced from temporary venous pacemaker.  Stable hemodynamics.  Mild metabolic acidosis.  Good urine output.  Oxygenating well.        My plan includes :  Close hemodynamic, ventilatory and drain monitoring and management.  Bronchodilator Rx.  Thyroid replacement Rx.  PO after load reduction.  EKG and ECHO in AM.  Monitor for arrhythmias and monitor parameters for organ perfusion  Monitor neurologic status  Head of the bed should remain elevated to 45 deg .   Chest PT and IS will be encouraged  Monitor adequacy of oxygenation and ventilation and attempt to wean oxygen  Nutritional goals will be met using po eventually , ensure adequate caloric intake and monitor  the same  Stress ulcer and VTE prophylaxis will be achieved    Glycemic control is satisfactory  Electrolytes have been repleted as necessary and wound care has been carried out. Pain control has been achieved.   Aggressive  physical therapy and early mobility and ambulation goals will be met   The family was updated about the course and plan  CRITICAL CARE TIME SPENT in evaluation and management, reassessments, review and interpretation of labs and x-rays, ventilator and hemodynamic management, formulating a plan and coordinating care: ___60____ MIN.  Time does not include procedural time.  CTICU ATTENDING     					    Michael Martinez MD

## 2018-02-06 NOTE — H&P ADULT - PROBLEM SELECTOR PLAN 1
Admit under Dr. Barnes via same day surgery for TAVR today. Consent signed, placed on chart.  Risks/benefits reviewed, patient understands and agrees. T&S ordered and blood products placed on hold for OR.  To [facility] post-op.

## 2018-02-06 NOTE — H&P ADULT - ASSESSMENT
83 y/o male with PMHx former smoker, HTN, HLD, NIDDM, hypothyroidism, prostate CA s/p seeding, atrial flutter s/p ablation, on Eliquis (last dose ), bifasicular heart block, COPD not on home O2, CAD s/p CABGx3 March 2017 with Ry Pete, and chronic diastolic heart failure who presented out-patient for known AS that is now worsening.  Here today for elective TAVR.

## 2018-02-06 NOTE — H&P ADULT - NSHPREVIEWOFSYSTEMS_GEN_ALL_CORE
Review of Systems  CONSTITUTIONAL:  Denies Fevers / chills, sweats, fatigue, weight loss, weight gain                                      NEURO:  Denies parethesias, seizures, syncope, confusion                                                                                EYES:  Denies Blurry vision, discharge, pain, loss of vision                                                                                    ENMT:  Denies Difficulty hearing, vertigo, dysphagia, epistaxis, recent dental work                                       CV:  Denies Chest pain, palpitations, CASON, orthopnea                                                                                          RESPIRATORY:  Denies Wheezing, SOB, cough / sputum, hemoptysis                                                                GI:  Denies Nausea, vomiting, diarrhea, constipation, melena, difficulty swallowing                                               : Denies Hematuria, dysuria, urgency, incontinence                                                                                         MUSCULOSKELETAL:  Denies arthritis, joint swelling, muscle weakness                                                             SKIN/BREAST:  Denies rash, itching, hair loss, masses                                                                                            PSYCH:  Denies depression, anxiety, suicidal ideation                                                                                               HEME/LYMPH:  Denies bruises easily, enlarged lymph nodes, tender lymph nodes                                        ENDOCRINE:  Denies cold intolerance, heat intolerance, polydipsia Review of Systems  CONSTITUTIONAL:  Denies Fevers / chills, sweats, +fatigue, weight loss, weight gain                                      NEURO:  Denies parethesias, seizures, syncope, confusion                                                                                EYES:  Denies Blurry vision, discharge, pain, loss of vision                                                                                    ENMT:  Denies Difficulty hearing, vertigo, dysphagia, epistaxis, recent dental work                                       CV:  Denies Chest pain, palpitations, +CASON, orthopnea                                                                                          RESPIRATORY:  Denies Wheezing, SOB, cough / sputum, hemoptysis                                                                GI:  Denies Nausea, vomiting, diarrhea, constipation, melena, difficulty swallowing                                               : Denies Hematuria, dysuria, urgency, incontinence                                                                                         MUSCULOSKELETAL:  Denies arthritis, joint swelling, muscle weakness                                                             SKIN/BREAST:  Denies rash, itching, hair loss, masses                                                                                            PSYCH:  Denies depression, anxiety, suicidal ideation                                                                                               HEME/LYMPH:  Denies bruises easily, enlarged lymph nodes, tender lymph nodes                                        ENDOCRINE:  Denies cold intolerance, heat intolerance, polydipsia

## 2018-02-06 NOTE — H&P ADULT - NSHPPHYSICALEXAM_GEN_ALL_CORE
Physical Exam  CONSTITUTIONAL:                                                            NAD; Sitting in chair, looks comfortable.   NEURO:                                                                    No focal deficits  EYES:                                                                                WNL  ENMT:                                                                               WNL  CV:                                                                                   S1S2 regular  RESPIRATORY:                                                                No wheezing or rales  GI:                                                                                    soft non tender  : COOK + / -                                                                 No cook  MUSKULOSKELETAL:                                                       WNL  SKIN / BREAST:                                                                  WNL  Ext: Warm and well perfused.  No pitting edema Physical Exam  CONSTITUTIONAL:                                                            NAD; Sitting in chair, looks comfortable.   NEURO:                                                                    No focal deficits  EYES:                                                                                WNL  ENMT:                                                                               WNL  CV:                                                                                   S1S2 regular; +systolic murmur over right heart border.   RESPIRATORY:                                                                No wheezing or rales  GI:                                                                                    soft non tender  : COOK + / -                                                                 No cook  MUSKULOSKELETAL:                                                       WNL  SKIN / BREAST:                                                                  WNL  Ext: Warm and well perfused.  No pitting edema Physical Exam  CONSTITUTIONAL:                                                            NAD; Sitting in chair, looks comfortable. Pleasant male.    NEURO:                                                                    No focal deficits  EYES:                                                                                WNL  ENMT:                                                                               WNL  CV:                                                                                   S1S2 regular; +systolic murmur over right heart border.   RESPIRATORY:                                                                No wheezing or rales  GI:                                                                                    soft non tender  : COOK + / -                                                                 No cook  MUSKULOSKELETAL:                                                       WNL  SKIN / BREAST:                                                                  WNL  Ext: Warm and well perfused.  No pitting edema. Some generalized edema of LE.

## 2018-02-06 NOTE — H&P ADULT - HISTORY OF PRESENT ILLNESS
Patient seen in same day holding area; Reports no changes to PMHx or medications since last seen by our team. Denies acute or current SOB, chest pain, palpitation, N/V/D, fever/chills, recent illness, or any other concerning symptoms. This is an 83 y/o male with PMHx former smoker, HTN, HLD, NIDDM, hypothyroidism, prostate CA s/p seeding, atrial flutter s/p ablation, on Eliquis (last dose ), bifasicular heart block, COPD not on home O2, CAD s/p CABGx3 March 2017 with Ry Pete, and chronic diastolic heart failure who presented out-patient for known AS that is now worsening.  Echo done showing severe AS, RONAN 0.86cm2, peak gradient 61mmHg.  He c/o decreased stamina and fatigue, CASON when climbing stairs NYHA class III symptoms.  Denies cough, hemoptysis, N/V/D, fever or chills.      Patient seen in same day holding area; Reports no changes to PMHx or medications since last seen by our team. Denies acute or current SOB, chest pain, palpitation, N/V/D, fever/chills, recent illness, or any other concerning symptoms. This is an 81 y/o male with PMHx former smoker, HTN, HLD, NIDDM, hypothyroidism, prostate CA s/p seeding, atrial flutter s/p ablation, on Eliquis (last dose 2/3/18), bifasicular heart block, COPD not on home O2, CAD s/p CABGx3 March 2017 with Dr. Funez, [facility], and chronic diastolic heart failure who presented out-patient for known AS that is now worsening.  Echo done showing severe AS, RONAN 0.86cm2, peak gradient 61mmHg.  He c/o decreased stamina and fatigue, CASON when climbing stairs NYHA class III symptoms.  Denies cough, hemoptysis, N/V/D, fever or chills.  Last dose of ASA 81mg was last night.     Patient seen in same day holding area; Reports no changes to PMHx or medications since last seen by our team. Denies acute or current SOB, chest pain, palpitation, N/V/D, fever/chills, recent illness, or any other concerning symptoms.

## 2018-02-07 ENCOUNTER — TRANSCRIPTION ENCOUNTER (OUTPATIENT)
Age: 83
End: 2018-02-07

## 2018-02-07 LAB
ALBUMIN SERPL ELPH-MCNC: 3.4 G/DL — SIGNIFICANT CHANGE UP (ref 3.3–5)
ALP SERPL-CCNC: 51 U/L — SIGNIFICANT CHANGE UP (ref 40–120)
ALT FLD-CCNC: 7 U/L — LOW (ref 10–45)
ANION GAP SERPL CALC-SCNC: 13 MMOL/L — SIGNIFICANT CHANGE UP (ref 5–17)
APTT BLD: 29.7 SEC — SIGNIFICANT CHANGE UP (ref 27.5–37.4)
AST SERPL-CCNC: 19 U/L — SIGNIFICANT CHANGE UP (ref 10–40)
BILIRUB SERPL-MCNC: 0.5 MG/DL — SIGNIFICANT CHANGE UP (ref 0.2–1.2)
BUN SERPL-MCNC: 11 MG/DL — SIGNIFICANT CHANGE UP (ref 7–23)
CALCIUM SERPL-MCNC: 8.8 MG/DL — SIGNIFICANT CHANGE UP (ref 8.4–10.5)
CHLORIDE SERPL-SCNC: 99 MMOL/L — SIGNIFICANT CHANGE UP (ref 96–108)
CO2 SERPL-SCNC: 24 MMOL/L — SIGNIFICANT CHANGE UP (ref 22–31)
CREAT SERPL-MCNC: 0.85 MG/DL — SIGNIFICANT CHANGE UP (ref 0.5–1.3)
GAS PNL BLDA: SIGNIFICANT CHANGE UP
GLUCOSE BLDC GLUCOMTR-MCNC: 122 MG/DL — HIGH (ref 70–99)
GLUCOSE BLDC GLUCOMTR-MCNC: 127 MG/DL — HIGH (ref 70–99)
GLUCOSE BLDC GLUCOMTR-MCNC: 62 MG/DL — LOW (ref 70–99)
GLUCOSE BLDC GLUCOMTR-MCNC: 85 MG/DL — SIGNIFICANT CHANGE UP (ref 70–99)
GLUCOSE BLDC GLUCOMTR-MCNC: 92 MG/DL — SIGNIFICANT CHANGE UP (ref 70–99)
GLUCOSE BLDC GLUCOMTR-MCNC: 95 MG/DL — SIGNIFICANT CHANGE UP (ref 70–99)
GLUCOSE SERPL-MCNC: 140 MG/DL — HIGH (ref 70–99)
HCT VFR BLD CALC: 33.5 % — LOW (ref 39–50)
HGB BLD-MCNC: 11 G/DL — LOW (ref 13–17)
INR BLD: 1.06 — SIGNIFICANT CHANGE UP (ref 0.88–1.16)
LACTATE SERPL-SCNC: 1.1 MMOL/L — SIGNIFICANT CHANGE UP (ref 0.5–2)
MAGNESIUM SERPL-MCNC: 2.2 MG/DL — SIGNIFICANT CHANGE UP (ref 1.6–2.6)
MCHC RBC-ENTMCNC: 32.8 G/DL — SIGNIFICANT CHANGE UP (ref 32–36)
MCHC RBC-ENTMCNC: 33.1 PG — SIGNIFICANT CHANGE UP (ref 27–34)
MCV RBC AUTO: 100.9 FL — HIGH (ref 80–100)
PHOSPHATE SERPL-MCNC: 3 MG/DL — SIGNIFICANT CHANGE UP (ref 2.5–4.5)
PLATELET # BLD AUTO: 178 K/UL — SIGNIFICANT CHANGE UP (ref 150–400)
POTASSIUM SERPL-MCNC: 3.9 MMOL/L — SIGNIFICANT CHANGE UP (ref 3.5–5.3)
POTASSIUM SERPL-SCNC: 3.9 MMOL/L — SIGNIFICANT CHANGE UP (ref 3.5–5.3)
PROT SERPL-MCNC: 6.1 G/DL — SIGNIFICANT CHANGE UP (ref 6–8.3)
PROTHROM AB SERPL-ACNC: 11.8 SEC — SIGNIFICANT CHANGE UP (ref 9.8–12.7)
RBC # BLD: 3.32 M/UL — LOW (ref 4.2–5.8)
RBC # FLD: 14.4 % — SIGNIFICANT CHANGE UP (ref 10.3–16.9)
SODIUM SERPL-SCNC: 136 MMOL/L — SIGNIFICANT CHANGE UP (ref 135–145)
WBC # BLD: 7.6 K/UL — SIGNIFICANT CHANGE UP (ref 3.8–10.5)
WBC # FLD AUTO: 7.6 K/UL — SIGNIFICANT CHANGE UP (ref 3.8–10.5)

## 2018-02-07 PROCEDURE — 99292 CRITICAL CARE ADDL 30 MIN: CPT

## 2018-02-07 PROCEDURE — 93306 TTE W/DOPPLER COMPLETE: CPT | Mod: 26

## 2018-02-07 PROCEDURE — 93010 ELECTROCARDIOGRAM REPORT: CPT

## 2018-02-07 PROCEDURE — 99238 HOSP IP/OBS DSCHRG MGMT 30/<: CPT

## 2018-02-07 PROCEDURE — 33208 INSRT HEART PM ATRIAL & VENT: CPT | Mod: KX

## 2018-02-07 PROCEDURE — 99291 CRITICAL CARE FIRST HOUR: CPT

## 2018-02-07 PROCEDURE — 99223 1ST HOSP IP/OBS HIGH 75: CPT

## 2018-02-07 PROCEDURE — 71045 X-RAY EXAM CHEST 1 VIEW: CPT | Mod: 26

## 2018-02-07 RX ORDER — VANCOMYCIN HCL 1 G
750 VIAL (EA) INTRAVENOUS ONCE
Qty: 0 | Refills: 0 | Status: COMPLETED | OUTPATIENT
Start: 2018-02-08 | End: 2018-02-08

## 2018-02-07 RX ORDER — ASPIRIN/CALCIUM CARB/MAGNESIUM 324 MG
81 TABLET ORAL DAILY
Qty: 0 | Refills: 0 | Status: DISCONTINUED | OUTPATIENT
Start: 2018-02-07 | End: 2018-02-08

## 2018-02-07 RX ORDER — DEXTROSE 50 % IN WATER 50 %
25 SYRINGE (ML) INTRAVENOUS ONCE
Qty: 0 | Refills: 0 | Status: COMPLETED | OUTPATIENT
Start: 2018-02-07 | End: 2018-02-07

## 2018-02-07 RX ORDER — VANCOMYCIN HCL 1 G
1000 VIAL (EA) INTRAVENOUS ONCE
Qty: 0 | Refills: 0 | Status: COMPLETED | OUTPATIENT
Start: 2018-02-07 | End: 2018-02-07

## 2018-02-07 RX ORDER — POTASSIUM CHLORIDE 20 MEQ
20 PACKET (EA) ORAL ONCE
Qty: 0 | Refills: 0 | Status: COMPLETED | OUTPATIENT
Start: 2018-02-07 | End: 2018-02-07

## 2018-02-07 RX ORDER — SIMVASTATIN 20 MG/1
40 TABLET, FILM COATED ORAL AT BEDTIME
Qty: 0 | Refills: 0 | Status: DISCONTINUED | OUTPATIENT
Start: 2018-02-07 | End: 2018-02-08

## 2018-02-07 RX ORDER — SODIUM CHLORIDE 9 MG/ML
3 INJECTION INTRAMUSCULAR; INTRAVENOUS; SUBCUTANEOUS EVERY 8 HOURS
Qty: 0 | Refills: 0 | Status: DISCONTINUED | OUTPATIENT
Start: 2018-02-07 | End: 2018-02-08

## 2018-02-07 RX ADMIN — Medication 50 MICROGRAM(S): at 06:22

## 2018-02-07 RX ADMIN — SIMVASTATIN 40 MILLIGRAM(S): 20 TABLET, FILM COATED ORAL at 21:19

## 2018-02-07 RX ADMIN — Medication 100 MILLIGRAM(S): at 01:17

## 2018-02-07 RX ADMIN — SODIUM CHLORIDE 3 MILLILITER(S): 9 INJECTION INTRAMUSCULAR; INTRAVENOUS; SUBCUTANEOUS at 21:38

## 2018-02-07 RX ADMIN — Medication 100 MILLIEQUIVALENT(S): at 05:00

## 2018-02-07 RX ADMIN — Medication 25 MILLILITER(S): at 00:00

## 2018-02-07 RX ADMIN — PANTOPRAZOLE SODIUM 40 MILLIGRAM(S): 20 TABLET, DELAYED RELEASE ORAL at 06:22

## 2018-02-07 RX ADMIN — Medication 400 GRAM(S): at 00:33

## 2018-02-07 NOTE — CONSULT NOTE ADULT - ASSESSMENT
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  He developed CHB post TAVR and has been pacing via right IJ TVP.  Telemetry showed intermittent lost of capture when pt was repositioned.    TVP threshold from last night was reportedly at 5 mA.   TVP pacing threshold this morning during interview was at 0.8 mA. 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  He developed CHB post TAVR and has been pacing via right IJ TVP.  Telemetry showed intermittent lost of capture when pt was repositioned.    TVP threshold from last night was reportedly at 5 mA.   TVP pacing threshold this morning during interview was at 0.8 mA.    - Plan for PPM implant today.  Risks and benefits were explained to the pt.  Consent obtained.   - CXR tomorrow and device check tomorrow.

## 2018-02-07 NOTE — DISCHARGE NOTE ADULT - PLAN OF CARE
Recover from Surgery -Please follow up with Dr. Barnes on ___.  The office is located at [facility], [facility], 4th floor. Call us with any questions  #[phone].    -Walk daily as tolerated and use your incentive spirometer every hour.    -No driving or strenuous activity/exercise for 6 weeks, or until  cleared by your surgeon.    -Gently clean your incisions with anti-bacterial soap and water, pat  dry.  You may leave them open to air.    -Call your doctor if you have shortness of breath, chest pain not  relieved by pain medication, dizziness, fever >101.5, or increased  redness or drainage from incisions. -Please follow up with Dr. Barnes on 2/20/18 at 12:00pm.  The office is located at [facility], [facility], 4th floor. Call us with any questions #[phone].  -Please follow up with Dr. Gonzales on 3/12/18 at 11:10am for follow up on your pacemaker. His information is listed below.  -Please follow up with Dr. Yaron Padilla (cardiologist) on 2/16/18 at 10:30am. His information is listed below.   -Please follow up with your primary care provider within 1-2 weeks of discharge.     -Walk daily as tolerated and use your incentive spirometer every hour.    -No driving or strenuous activity/exercise for 6 weeks, or until  cleared by your surgeon.    -Gently clean your incisions with anti-bacterial soap and water, pat  dry.  You may leave them open to air.    -Call your doctor if you have shortness of breath, chest pain not  relieved by pain medication, dizziness, fever >101.5, or increased  redness or drainage from incisions.

## 2018-02-07 NOTE — PHYSICAL THERAPY INITIAL EVALUATION ADULT - ADDITIONAL COMMENTS
Patient reports that at baseline, he is functionally independent with all ADLs and uses a cane at all times for ambulation. Lives in an elevator building; no steps to enter.

## 2018-02-07 NOTE — DISCHARGE NOTE ADULT - CARE PLAN
Principal Discharge DX:	Nonrheumatic aortic valve stenosis  Goal:	Recover from Surgery  Assessment and plan of treatment:	-Please follow up with Dr. Barnes on ___.  The office is located at [facility], [facility], 4th floor. Call us with any questions  #[phone].    -Walk daily as tolerated and use your incentive spirometer every hour.    -No driving or strenuous activity/exercise for 6 weeks, or until  cleared by your surgeon.    -Gently clean your incisions with anti-bacterial soap and water, pat  dry.  You may leave them open to air.    -Call your doctor if you have shortness of breath, chest pain not  relieved by pain medication, dizziness, fever >101.5, or increased  redness or drainage from incisions. Principal Discharge DX:	Nonrheumatic aortic valve stenosis  Goal:	Recover from Surgery  Assessment and plan of treatment:	-Please follow up with Dr. Barnes on 2/20/18 at 12:00pm.  The office is located at [facility], [facility], 4th floor. Call us with any questions #[phone].  -Please follow up with Dr. Gonzales on 3/12/18 at 11:10am for follow up on your pacemaker. His information is listed below.  -Please follow up with Dr. Yaron Padilla (cardiologist) on 2/16/18 at 10:30am. His information is listed below.   -Please follow up with your primary care provider within 1-2 weeks of discharge.     -Walk daily as tolerated and use your incentive spirometer every hour.    -No driving or strenuous activity/exercise for 6 weeks, or until  cleared by your surgeon.    -Gently clean your incisions with anti-bacterial soap and water, pat  dry.  You may leave them open to air.    -Call your doctor if you have shortness of breath, chest pain not  relieved by pain medication, dizziness, fever >101.5, or increased  redness or drainage from incisions.

## 2018-02-07 NOTE — CONSULT NOTE ADULT - SUBJECTIVE AND OBJECTIVE BOX
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  He developed CHB post TAVR and has been pacing via right IJ TVP.  Telemetry showed intermittent lost of capture when pt was moved (head of bed was increase    HTN, HLD, NIDDM, hypothyroidism,  atrial flutter s/p ablation, on Eliquis (last dose 2/3/18),  and chronic diastolic heart failure who presented out-patient for known AS that is now worsening.  Echo done showing severe AS, RONAN 0.86cm2, peak gradient 61mmHg.  He c/o decreased stamina and fatigue, CASON when climbing stairs NYHA class III symptoms.  Denies cough, hemoptysis, N/V/D, fever or chills.  Last dose of ASA 81mg was last night.     Patient seen in same day holding area; Reports no changes to PMHx or medications since last seen by our team. Denies acute or current SOB, chest pain, palpitation, N/V/D, fever/chills, recent illness, or any other concerning symptoms. (06 Feb 2018 09:52)          PAST MEDICAL AND SURGICAL HISTORY:  PAST MEDICAL & SURGICAL HISTORY:  High cholesterol  Diabetes  Prostate cancer: in remission  Hypertension  COPD (chronic obstructive pulmonary disease)  History of cataract surgery: b/l  History of knee replacement: b/l      FAMILY HISTORY: FAMILY HISTORY:  Family history of acute myocardial infarction (Father)      SOCIAL HISTORY:   Denies ETOH  Denies TOB  Denies illicit drugs    REVIEW OF SYSTEM: REVIEW OF SYSTEMS:    CONSTITUTIONAL: No weakness, fevers or chills  EYES/ENT: No visual changes;  No vertigo or throat pain   NECK: No pain or stiffness  RESPIRATORY: No cough, wheezing, hemoptysis; No shortness of breath  CARDIOVASCULAR: No chest pain or palpitations  GASTROINTESTINAL: No abdominal or epigastric pain. No nausea, vomiting, or hematemesis; No diarrhea or constipation. No melena or hematochezia.  GENITOURINARY: No dysuria, frequency or hematuria  NEUROLOGICAL: No numbness or weakness  SKIN: No itching, burning, rashes, or lesions   All other review of systems is negative unless indicated above.    ALLERGY:  narcotic analgesics (Nausea; Vomiting)  No Known Allergies      INPATIENT MEDICATIONS:  ALBUTerol    0.083% 2.5 milliGRAM(s) Nebulizer every 4 hours PRN  ALBUTerol    90 MICROgram(s) HFA Inhaler 2 Puff(s) Inhalation every 6 hours PRN  buDESOnide 160 MICROgram(s)/formoterol 4.5 MICROgram(s) Inhaler 2 Puff(s) Inhalation Once  dextrose 5%. 1000 milliLiter(s) IV Continuous <Continuous>  dextrose 50% Injectable 12.5 Gram(s) IV Push once  dextrose 50% Injectable 25 Gram(s) IV Push once  dextrose 50% Injectable 25 Gram(s) IV Push once  dextrose 50% Injectable 50 milliLiter(s) IV Push Once  dextrose 50% Injectable 25 milliLiter(s) IV Push Once  dextrose Gel 1 Dose(s) Oral once PRN  glucagon  Injectable 1 milliGRAM(s) IntraMuscular once PRN  insulin lispro (HumaLOG) corrective regimen sliding scale   SubCutaneous Before meals and at bedtime  levothyroxine 50 MICROGram(s) Oral daily  pantoprazole    Tablet 40 milliGRAM(s) Oral before breakfast  sodium chloride 0.45%. 1000 milliLiter(s) IV Continuous <Continuous>  vancomycin  IVPB 1000 milliGRAM(s) IV Intermittent once      VITAL SIGNS:   T(C): 37.1 (02-07-18 @ 09:18), Max: 37.1 (02-07-18 @ 09:18)  HR: 80 (02-07-18 @ 13:00) (54 - 80)  BP: 106/60 (02-07-18 @ 12:30) (106/60 - 106/60)  RR: 24 (02-07-18 @ 13:00) (11 - 25)  SpO2: 97% (02-07-18 @ 13:00) (94% - 100%)  Wt(kg): --    PHYSICAL EXAM:   Appearance:   CVS: S1/S2 normal,   Pulm:   Abd:  +BS, Soft, NT/ND	  Ext: No edema    LABS:                        11.0   7.6   )-----------( 178      ( 07 Feb 2018 03:41 )             33.5     02-07    136  |  99  |  11  ----------------------------<  140<H>  3.9   |  24  |  0.85    Ca    8.8      07 Feb 2018 03:41  Phos  3.0     02-07  Mg     2.2     02-07    TPro  6.1  /  Alb  3.4  /  TBili  0.5  /  DBili  x   /  AST  19  /  ALT  7<L>  /  AlkPhos  51  02-07    PT/INR - ( 07 Feb 2018 03:41 )   PT: 11.8 sec;   INR: 1.06          PTT - ( 07 Feb 2018 03:41 )  PTT:29.7 sec  TSH  Troponin   LIVER FUNCTIONS - ( 07 Feb 2018 03:41 )  Alb: 3.4 g/dL / Pro: 6.1 g/dL / ALK PHOS: 51 U/L / ALT: 7 U/L / AST: 19 U/L / GGT: x             EKG:    Telemetry:    ECHO: HISTORY OF PRESENT ILLNESS:   83 y/o male with history of aflutter with ablation in the past (on eliquis), 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  He developed CHB post TAVR and has been pacing via right IJ TVP.  Telemetry showed intermittent lost of capture when pt was repositioned.    TVP threshold from last night was reportedly at 5 mA.   TVP pacing threshold this morning during interview was at 0.8 mA.        PAST MEDICAL AND SURGICAL HISTORY:  High cholesterol  Diabetes  Prostate cancer: in remission  Hypertension  COPD (chronic obstructive pulmonary disease)  History of cataract surgery: b/l  History of knee replacement: b/l      FAMILY HISTORY:   Family history of acute myocardial infarction (Father)    SOCIAL HISTORY:   Denies ETOH  Tob: quit 32 years ago.  Denies illicit drugs      Review of Systems:   	CONSTITUTIONAL:  Denies Fevers / chills, sweats, +fatigue, weight loss, weight gain                                      	NEURO:  Denies parethesias, seizures, syncope, confusion                                                                                	EYES:  Denies Blurry vision, discharge, pain, loss of vision                                                                                    	ENMT:  Denies Difficulty hearing, vertigo, dysphagia, epistaxis, recent dental work                                       	CV:  Denies Chest pain, palpitations, +CASON, orthopnea. Denies h/o syncope.                                                                                          	RESPIRATORY:  Denies Wheezing, SOB, cough / sputum, hemoptysis                                                                	GI:  Denies Nausea, vomiting, diarrhea, constipation, melena, difficulty swallowing                                               	: Denies Hematuria, dysuria, urgency, incontinence                                                                                         	MUSCULOSKELETAL:  Denies arthritis, joint swelling, muscle weakness                                                             	SKIN/BREAST:  Denies rash, itching, hair loss, masses                                                                                            	PSYCH:  Denies depression, anxiety, suicidal ideation                                                                                               	HEME/LYMPH:  Denies bruises easily, enlarged lymph nodes, tender lymph nodes                                        	ENDOCRINE:  Denies cold intolerance, heat intolerance, polydipsia      ALLERGY:  narcotic analgesics (Nausea; Vomiting)  No Known Allergies      INPATIENT MEDICATIONS:  ALBUTerol    0.083% 2.5 milliGRAM(s) Nebulizer every 4 hours PRN  ALBUTerol    90 MICROgram(s) HFA Inhaler 2 Puff(s) Inhalation every 6 hours PRN  buDESOnide 160 MICROgram(s)/formoterol 4.5 MICROgram(s) Inhaler 2 Puff(s) Inhalation Once  insulin lispro (HumaLOG) corrective regimen sliding scale   SubCutaneous Before meals and at bedtime  levothyroxine 50 MICROGram(s) Oral daily  pantoprazole    Tablet 40 milliGRAM(s) Oral before breakfast  sodium chloride 0.45%. 1000 milliLiter(s) IV Continuous <Continuous>  vancomycin  IVPB 1000 milliGRAM(s) IV Intermittent once      VITAL SIGNS:   T(C): 37.1 (18 @ 09:18), Max: 37.1 (18 @ 09:18)  HR: 80 (18 @ 13:00) (54 - 80)  BP: 106/60 (18 @ 12:30) (106/60 - 106/60)  RR: 24 (18 @ 13:00) (11 - 25)  SpO2: 97% (18 @ 13:00) (94% - 100%)      PHYSICAL EXAM:   Appearance: NAD  Neck: Right IJ TVP in place  CVS: S1/S2 normal, regular, no murmur  Pulm: CTA anterioly.  No wheezing / rale / rhonchi  Abd:  +BS, Soft, NT/ND	  Ext: No LE edema.  Right groin with dressing. No hematoma.   Neuro: AAOx 3. No focal deficit.     LABS:                        11.0   7.6   )-----------( 178      ( 2018 03:41 )             33.5     02-07    136  |  99  |  11  ----------------------------<  140<H>  3.9   |  24  |  0.85    Ca    8.8      2018 03:41  Phos  3.0     02-07  Mg     2.2     02-07    TPro  6.1  /  Alb  3.4  /  TBili  0.5  /  DBili  x   /  AST  19  /  ALT  7<L>  /  AlkPhos  51  02-07    PT/INR - ( 2018 03:41 )   PT: 11.8 sec;   INR: 1.06          PTT - ( 2018 03:41 )  PTT:29.7 sec   LIVER FUNCTIONS - ( 2018 03:41 )  Alb: 3.4 g/dL / Pro: 6.1 g/dL / ALK PHOS: 51 U/L / ALT: 7 U/L / AST: 19 U/L / GGT: x             EK18: NSR 76bpm.  First degree AVB ( ms). RBBB, left anterior fascicular block.   EK18:  at 70 bpm.     Telemetry:  CHB with  at 60 bpm.      ECHO: < from: Echocardiogram (17 @ 12:21) >  Normal left ventricular size and wall thickness.The left ventricular wall   motion is normal. The left ventricular ejection fraction is estimated to   be 55-60%  The left atrium is mildly dilated. Right atrial size is   normal.The mitral inflow pattern is consistent with pseudo-normalization,   suggestive of moderately elevated left atrial pressure (15-20mmHg).  The right   ventricle is borderline dilated. The right ventricular systolic function is normal.   Calcified aortic valve with moderate to severe aortic stenosis and trace   aortic regurgitation.  The calculated aortic valve area using the   continuity equation is 0.9 cm2.The calculated aortic valve area indexed to body   surface area is 0.45 cm2/m2.The peak pressure gradient is 54 mmHg. The mean   pressure gradient is 33 mmHg.The dimensionless index (ratio of LVOTvelocity to   aortic velocity) was calculated to be 0.23.  There is moderate mitral annular   calcification. There is trace mitral regurgitation.  There is trace   tricuspid regurgitation.There is no echocardiographic evidence for pulmonary   hypertension.There is trace pulmonic regurgitation.No aortic root   dilatation.The IVC is dilated (>2.1 cm) with an abnormal inspiratory   collapse (<50%) consistent with elevated right atrial pressure.  There is no   pericardial effusion.

## 2018-02-07 NOTE — PROVIDER CONTACT NOTE (OTHER) - BACKGROUND
Patient s/p TAVR, asymptomatic, alert and oriented to person, place and time, speech clear, follows commands appripriately, denies any discomfort, dizziness or blurred vision

## 2018-02-07 NOTE — DISCHARGE NOTE ADULT - HOSPITAL COURSE
81 y/o male with PMH: former smoker, HTN, HLD, NIDDM, hypothyroidism, prostate CA s/p seeding, atrial flutter s/p ablation, on Eliquis (last dose 2/3/18), bifasicular heart block, COPD not on home O2, CAD s/p CABGx3 March 2017 with Ry Pete, and chronic diastolic heart failure who was seen in the office for aortic stenosis evaluation and intervention.  He was consented and admitted on 2/6 for TAVR.  Given his bifascicular block and valve implantation immediately after the valve deployment he went into complete heart block.  EP was consulted immediately.  He was brought to the CTICU for recovery with transvenous pacing.  POD #1 he had a PPM placed by EP.  He recovered in the CTIU overnight and was transferred to the telemetry floor on POD #2.  He is now stable for discharge home with medical follow up. 81 y/o male with PMH: former smoker, HTN, HLD, NIDDM, hypothyroidism, prostate CA s/p seeding, atrial flutter s/p ablation, on Eliquis (last dose 2/3/18), bifasicular heart block, COPD not on home O2, CAD s/p CABGx3 March 2017 with Ry Pete, and chronic diastolic heart failure who was seen in the office for aortic stenosis evaluation and intervention.  He was consented and admitted on 2/6 for TAVR.  Given his bifascicular block and valve implantation immediately after the valve deployment he went into complete heart block.  EP was consulted immediately.  He was brought to the CTICU for recovery with transvenous pacing.  POD #1 he had a PPM placed by EP.  He recovered in the CTIU overnight and was transferred to the telemetry floor on POD #2, PPM interrogated by EP with no issues. He has no acute complaints. He is now stable for discharge home with medical follow up per Dr. Barnes.

## 2018-02-07 NOTE — DISCHARGE NOTE ADULT - NSFTFSERV1RD_GEN_ALL_CORE
teaching and training/medication teaching and assessment/observation and assessment/rehabilitation services

## 2018-02-07 NOTE — DISCHARGE NOTE ADULT - MEDICATION SUMMARY - MEDICATIONS TO TAKE
I will START or STAY ON the medications listed below when I get home from the hospital:    acetaminophen 325 mg oral tablet  -- 2 tab(s) by mouth once, As Needed -for moderate pain. Do not exceed more than4g acetaminophen daily.  -- Indication: For Pain    aspirin 81 mg oral delayed release tablet  -- 1 tab(s) by mouth once a day  -- Indication: For Heart disease    dilTIAZem 120 mg/24 hours oral capsule, extended release  -- 1 cap(s) by mouth once a day  -- Indication: For Blood pressure    amiodarone 200 mg oral tablet  -- 1 tab(s) by mouth once a day  -- Indication: For Atrial fibrillation    apixaban 2.5 mg oral tablet  -- 1 tab(s) by mouth every 12 hours  -- Indication: For Blood thinner    metFORMIN 500 mg oral tablet  -- 1 tab(s) by mouth 2 times a day  -- Indication: For Diabetes    simvastatin 40 mg oral tablet  -- 1 tab(s) by mouth once a day (at bedtime)  -- Indication: For Cholesterol    Advair Diskus 500 mcg-50 mcg inhalation powder  -- 1 puff(s) inhaled 2 times a day  -- Indication: For COPD    albuterol with CFC 90 mcg/inh inhalation aerosol (obsolete)  -- 2 puff(s) inhaled every 6 hours, As Needed  -- Indication: For COPD    pantoprazole 40 mg oral delayed release tablet  -- 1 tab(s) by mouth once a day (before a meal)  -- Indication: For Stomach protection    levothyroxine 50 mcg (0.05 mg) oral tablet  -- 1 tab(s) by mouth once a day  -- Indication: For Hypothyroid

## 2018-02-07 NOTE — DISCHARGE NOTE ADULT - PATIENT PORTAL LINK FT
You can access the [facility], offered by [facility], by registering with the following website: [email or web address]

## 2018-02-07 NOTE — DISCHARGE NOTE ADULT - CARE PROVIDERS DIRECT ADDRESSES
[email or web address]

## 2018-02-07 NOTE — PROGRESS NOTE ADULT - SUBJECTIVE AND OBJECTIVE BOX
CTICU  CRITICAL  CARE  attending     Hand off received 					   Pertinent clinical, laboratory, radiographic, hemodynamic, echocardiographic, respiratory data, microbiologic data and chart were reviewed and analyzed frequently throughout the course of the day and night  Patient seen and examined with CTS/ SH attending at bedside  Pt is a 82y , Male, day # 1 s/p TAVR    post procedure:    complete heart block;  s/p PPM insertion today        Aortic stenosis: Aortic stenosis      , FAMILY HISTORY:  Family history of acute myocardial infarction (Father)  PAST MEDICAL & SURGICAL HISTORY:  High cholesterol  Diabetes  Prostate cancer: in remission  Hypertension  COPD (chronic obstructive pulmonary disease)  History of cataract surgery: b/l  History of knee replacement: b/l    Patient is a 82y old  Male who presents with a chief complaint of Aortic stenosis (07 Feb 2018 15:28)      14 system review was unremarkable  acute changes include acute respiratory failure  Vital signs, hemodynamic and respiratory parameters were reviewed from the bedside nursing flowsheet.  ICU Vital Signs Last 24 Hrs  T(C): 37.4 (07 Feb 2018 14:16), Max: 37.4 (07 Feb 2018 14:16)  T(F): 99.3 (07 Feb 2018 14:16), Max: 99.3 (07 Feb 2018 14:16)  HR: 84 (07 Feb 2018 15:00) (54 - 84)  BP: 122/61 (07 Feb 2018 15:00) (106/60 - 122/61)  BP(mean): 83 (07 Feb 2018 15:00) (76 - 83)  ABP: 144/46 (07 Feb 2018 15:00) (114/48 - 158/67)  ABP(mean): 72 (07 Feb 2018 15:00) (66 - 98)  RR: 26 (07 Feb 2018 15:00) (9 - 29)  SpO2: 95% (07 Feb 2018 15:00) (93% - 100%)    Adult Advanced Hemodynamics Last 24 Hrs  CVP(mm Hg): --  CVP(cm H2O): --  CO: --  CI: --  PA: --  PA(mean): --  PCWP: --  SVR: --  SVRI: --  PVR: --  PVRI: --, ABG - ( 07 Feb 2018 03:38 )  pH: 7.40  /  pCO2: 43    /  pO2: 113   / HCO3: 26    / Base Excess: 0.9   /  SaO2: 98                  Intake and output was reviewed and the fluid balance was calculated  Daily Height in cm: 167.64 (07 Feb 2018 14:00)    Daily   I&O's Summary    06 Feb 2018 07:01  -  07 Feb 2018 07:00  --------------------------------------------------------  IN: 520 mL / OUT: 2150 mL / NET: -1630 mL    07 Feb 2018 07:01  -  07 Feb 2018 16:07  --------------------------------------------------------  IN: 10 mL / OUT: 500 mL / NET: -490 mL        All lines and drain sites were assessed  [facility]      POCT Blood Glucose.: 95 mg/dL (07 Feb 2018 12:39)    No acute change in mental status  Auscultation of the chest reveals equal bs  Abdomen is soft  Extremities are warm and well perfused  Wounds appear clean and unremarkable  Antibiotics are periop    labs  CBC Full  -  ( 07 Feb 2018 03:41 )  WBC Count : 7.6 K/uL  Hemoglobin : 11.0 g/dL  Hematocrit : 33.5 %  Platelet Count - Automated : 178 K/uL  Mean Cell Volume : 100.9 fL  Mean Cell Hemoglobin : 33.1 pg  Mean Cell Hemoglobin Concentration : 32.8 g/dL  Auto Neutrophil # : x  Auto Lymphocyte # : x  Auto Monocyte # : x  Auto Eosinophil # : x  Auto Basophil # : x  Auto Neutrophil % : x  Auto Lymphocyte % : x  Auto Monocyte % : x  Auto Eosinophil % : x  Auto Basophil % : x    02-07    136  |  99  |  11  ----------------------------<  140<H>  3.9   |  24  |  0.85    Ca    8.8      07 Feb 2018 03:41  Phos  3.0     02-07  Mg     2.2     02-07    TPro  6.1  /  Alb  3.4  /  TBili  0.5  /  DBili  x   /  AST  19  /  ALT  7<L>  /  AlkPhos  51  02-07    PT/INR - ( 07 Feb 2018 03:41 )   PT: 11.8 sec;   INR: 1.06          PTT - ( 07 Feb 2018 03:41 )  PTT:29.7 sec  The current medications were reviewed   MEDICATIONS  (STANDING):  buDESOnide 160 MICROgram(s)/formoterol 4.5 MICROgram(s) Inhaler 2 Puff(s) Inhalation Once  dextrose 5%. 1000 milliLiter(s) (50 mL/Hr) IV Continuous <Continuous>  dextrose 50% Injectable 12.5 Gram(s) IV Push once  dextrose 50% Injectable 25 Gram(s) IV Push once  dextrose 50% Injectable 25 Gram(s) IV Push once  dextrose 50% Injectable 50 milliLiter(s) IV Push Once  dextrose 50% Injectable 25 milliLiter(s) IV Push Once  insulin lispro (HumaLOG) corrective regimen sliding scale   SubCutaneous Before meals and at bedtime  levothyroxine 50 MICROGram(s) Oral daily  pantoprazole    Tablet 40 milliGRAM(s) Oral before breakfast  sodium chloride 0.45%. 1000 milliLiter(s) (10 mL/Hr) IV Continuous <Continuous>    MEDICATIONS  (PRN):  ALBUTerol    0.083% 2.5 milliGRAM(s) Nebulizer every 4 hours PRN Shortness of Breath and/or Wheezing  ALBUTerol    90 MICROgram(s) HFA Inhaler 2 Puff(s) Inhalation every 6 hours PRN Shortness of Breath and/or Wheezing  dextrose Gel 1 Dose(s) Oral once PRN Blood Glucose LESS THAN 70 milliGRAM(s)/deciliter  glucagon  Injectable 1 milliGRAM(s) IntraMuscular once PRN Glucose LESS THAN 70 milligrams/deciliter       PROBLEM LIST/ ASSESSMENT:  HEALTH ISSUES - PROBLEM Dx:    complete heart block  s/p PPM  s/p TAVR      Aortic stenosis: Aortic stenosis      ,   Patient is a 82y old  Male who presents with a chief complaint of Aortic stenosis (07 Feb 2018 15:28)     s/p TAVR; PPM  acute changes include acute respiratory failure    My plan includes :  close hemodynamic, ventilatory and drain monitoring and management per post op routine    Monitor for arrhythmias and monitor parameters for organ perfusion  monitor neurologic status  Head of the bed should remain elevated to 45 deg .   chest PT and IS will be encouraged  monitor adequacy of oxygenation and ventilation and attempt to wean oxygen  Nutritional goals will be met using po eventually , ensure adequate caloric intake and montior the same  Stress ulcer and VTE prophylaxis will be achieved    Glycemic control is satisfactory  Electrolytes have been repleted as necessary and wound care has been carried out. Pain control has been achieved.   agressive physical therapy and early mobility and ambulation goals will be met   The family was updated about the course and plan  CRITICAL CARE TIME SPENT in evaluation and management, reassessments, review and interpretation of labs and x-rays, ventilator and hemodynamic management, formulating a plan and coordinating care: ___90____ MIN.  Time does not include procedural time.  CTICU ATTENDING     					    Aryan Eldridge MD

## 2018-02-07 NOTE — DISCHARGE NOTE ADULT - CARE PROVIDER_API CALL
Dennys Barnes), Cardiology; Internal Medicine; Interventional Cardiology  [address]  [address] [facility], [place] [address]  Phone: [phone]  Fax: [phone] Dennys Barnes), Cardiology; Internal Medicine; Interventional Cardiology  [address]  [address]th [place] [address]  Phone: [phone]  Fax: [phone]    Mary Kate Gonzales), Cardiac Electrophysiology; Cardiovascular Disease; Internal Medicine  [address] [address]  Phone: [phone]  Fax: [phone]    Yaron Padilla), Cardiovascular Disease  [facility] [place] [address]  Phone: [phone]  Fax: [phone]

## 2018-02-07 NOTE — PHYSICAL THERAPY INITIAL EVALUATION ADULT - PERTINENT HX OF CURRENT PROBLEM, REHAB EVAL
Patient is a 82 year old male who was completing a workup for a right TKA and was found to have AS; now s/p TAVR with post-op complication of complete heart block. Now s/p PPM today. Cleared to ambulate with rolling walker if need for functional mobility by Dr. lEdridge.

## 2018-02-08 VITALS
SYSTOLIC BLOOD PRESSURE: 131 MMHG | DIASTOLIC BLOOD PRESSURE: 67 MMHG | OXYGEN SATURATION: 95 % | HEART RATE: 82 BPM | RESPIRATION RATE: 16 BRPM

## 2018-02-08 LAB
ALBUMIN SERPL ELPH-MCNC: 3.3 G/DL — SIGNIFICANT CHANGE UP (ref 3.3–5)
ALP SERPL-CCNC: 49 U/L — SIGNIFICANT CHANGE UP (ref 40–120)
ALT FLD-CCNC: 7 U/L — LOW (ref 10–45)
ANION GAP SERPL CALC-SCNC: 10 MMOL/L — SIGNIFICANT CHANGE UP (ref 5–17)
APTT BLD: 29.1 SEC — SIGNIFICANT CHANGE UP (ref 27.5–37.4)
AST SERPL-CCNC: 21 U/L — SIGNIFICANT CHANGE UP (ref 10–40)
BILIRUB SERPL-MCNC: 0.7 MG/DL — SIGNIFICANT CHANGE UP (ref 0.2–1.2)
BUN SERPL-MCNC: 12 MG/DL — SIGNIFICANT CHANGE UP (ref 7–23)
CALCIUM SERPL-MCNC: 8.9 MG/DL — SIGNIFICANT CHANGE UP (ref 8.4–10.5)
CHLORIDE SERPL-SCNC: 102 MMOL/L — SIGNIFICANT CHANGE UP (ref 96–108)
CO2 SERPL-SCNC: 28 MMOL/L — SIGNIFICANT CHANGE UP (ref 22–31)
CREAT SERPL-MCNC: 1.01 MG/DL — SIGNIFICANT CHANGE UP (ref 0.5–1.3)
GLUCOSE BLDC GLUCOMTR-MCNC: 92 MG/DL — SIGNIFICANT CHANGE UP (ref 70–99)
GLUCOSE SERPL-MCNC: 93 MG/DL — SIGNIFICANT CHANGE UP (ref 70–99)
HCT VFR BLD CALC: 34 % — LOW (ref 39–50)
HGB BLD-MCNC: 11.2 G/DL — LOW (ref 13–17)
INR BLD: 1.11 — SIGNIFICANT CHANGE UP (ref 0.88–1.16)
MAGNESIUM SERPL-MCNC: 2 MG/DL — SIGNIFICANT CHANGE UP (ref 1.6–2.6)
MCHC RBC-ENTMCNC: 32.9 G/DL — SIGNIFICANT CHANGE UP (ref 32–36)
MCHC RBC-ENTMCNC: 33.4 PG — SIGNIFICANT CHANGE UP (ref 27–34)
MCV RBC AUTO: 101.5 FL — HIGH (ref 80–100)
PHOSPHATE SERPL-MCNC: 3.1 MG/DL — SIGNIFICANT CHANGE UP (ref 2.5–4.5)
PLATELET # BLD AUTO: 142 K/UL — LOW (ref 150–400)
POTASSIUM SERPL-MCNC: 4.2 MMOL/L — SIGNIFICANT CHANGE UP (ref 3.5–5.3)
POTASSIUM SERPL-SCNC: 4.2 MMOL/L — SIGNIFICANT CHANGE UP (ref 3.5–5.3)
PROT SERPL-MCNC: 6.5 G/DL — SIGNIFICANT CHANGE UP (ref 6–8.3)
PROTHROM AB SERPL-ACNC: 12.3 SEC — SIGNIFICANT CHANGE UP (ref 9.8–12.7)
RBC # BLD: 3.35 M/UL — LOW (ref 4.2–5.8)
RBC # FLD: 14.3 % — SIGNIFICANT CHANGE UP (ref 10.3–16.9)
SODIUM SERPL-SCNC: 140 MMOL/L — SIGNIFICANT CHANGE UP (ref 135–145)
WBC # BLD: 7.7 K/UL — SIGNIFICANT CHANGE UP (ref 3.8–10.5)
WBC # FLD AUTO: 7.7 K/UL — SIGNIFICANT CHANGE UP (ref 3.8–10.5)

## 2018-02-08 PROCEDURE — 86901 BLOOD TYPING SEROLOGIC RH(D): CPT

## 2018-02-08 PROCEDURE — 86923 COMPATIBILITY TEST ELECTRIC: CPT

## 2018-02-08 PROCEDURE — 84295 ASSAY OF SERUM SODIUM: CPT

## 2018-02-08 PROCEDURE — C1769: CPT

## 2018-02-08 PROCEDURE — 84100 ASSAY OF PHOSPHORUS: CPT

## 2018-02-08 PROCEDURE — 82330 ASSAY OF CALCIUM: CPT

## 2018-02-08 PROCEDURE — 71045 X-RAY EXAM CHEST 1 VIEW: CPT

## 2018-02-08 PROCEDURE — 83605 ASSAY OF LACTIC ACID: CPT

## 2018-02-08 PROCEDURE — C1760: CPT

## 2018-02-08 PROCEDURE — 82803 BLOOD GASES ANY COMBINATION: CPT

## 2018-02-08 PROCEDURE — 86850 RBC ANTIBODY SCREEN: CPT

## 2018-02-08 PROCEDURE — 82962 GLUCOSE BLOOD TEST: CPT

## 2018-02-08 PROCEDURE — 93306 TTE W/DOPPLER COMPLETE: CPT

## 2018-02-08 PROCEDURE — 80053 COMPREHEN METABOLIC PANEL: CPT

## 2018-02-08 PROCEDURE — C8925: CPT

## 2018-02-08 PROCEDURE — C1887: CPT

## 2018-02-08 PROCEDURE — 86900 BLOOD TYPING SEROLOGIC ABO: CPT

## 2018-02-08 PROCEDURE — 76000 FLUOROSCOPY <1 HR PHYS/QHP: CPT

## 2018-02-08 PROCEDURE — C1898: CPT

## 2018-02-08 PROCEDURE — 71045 X-RAY EXAM CHEST 1 VIEW: CPT | Mod: 26

## 2018-02-08 PROCEDURE — 85610 PROTHROMBIN TIME: CPT

## 2018-02-08 PROCEDURE — 97161 PT EVAL LOW COMPLEX 20 MIN: CPT

## 2018-02-08 PROCEDURE — 84132 ASSAY OF SERUM POTASSIUM: CPT

## 2018-02-08 PROCEDURE — 85730 THROMBOPLASTIN TIME PARTIAL: CPT

## 2018-02-08 PROCEDURE — 85027 COMPLETE CBC AUTOMATED: CPT

## 2018-02-08 PROCEDURE — 36415 COLL VENOUS BLD VENIPUNCTURE: CPT

## 2018-02-08 PROCEDURE — C1726: CPT

## 2018-02-08 PROCEDURE — 83735 ASSAY OF MAGNESIUM: CPT

## 2018-02-08 PROCEDURE — C1785: CPT

## 2018-02-08 PROCEDURE — C1889: CPT

## 2018-02-08 PROCEDURE — C1894: CPT

## 2018-02-08 PROCEDURE — 93005 ELECTROCARDIOGRAM TRACING: CPT

## 2018-02-08 RX ORDER — SIMVASTATIN 20 MG/1
1 TABLET, FILM COATED ORAL
Qty: 30 | Refills: 0
Start: 2018-02-08 | End: 2018-03-09

## 2018-02-08 RX ORDER — APIXABAN 2.5 MG/1
2.5 TABLET, FILM COATED ORAL EVERY 12 HOURS
Qty: 0 | Refills: 0 | Status: DISCONTINUED | OUTPATIENT
Start: 2018-02-08 | End: 2018-02-08

## 2018-02-08 RX ORDER — PANTOPRAZOLE SODIUM 20 MG/1
1 TABLET, DELAYED RELEASE ORAL
Qty: 30 | Refills: 0
Start: 2018-02-08 | End: 2018-03-09

## 2018-02-08 RX ORDER — DILTIAZEM HCL 120 MG
120 CAPSULE, EXT RELEASE 24 HR ORAL DAILY
Qty: 0 | Refills: 0 | Status: DISCONTINUED | OUTPATIENT
Start: 2018-02-08 | End: 2018-02-08

## 2018-02-08 RX ORDER — LEVOTHYROXINE SODIUM 125 MCG
1 TABLET ORAL
Qty: 30 | Refills: 0
Start: 2018-02-08 | End: 2018-03-09

## 2018-02-08 RX ORDER — AMIODARONE HYDROCHLORIDE 400 MG/1
1 TABLET ORAL
Qty: 30 | Refills: 0
Start: 2018-02-08 | End: 2018-03-09

## 2018-02-08 RX ORDER — METFORMIN HYDROCHLORIDE 850 MG/1
1 TABLET ORAL
Qty: 60 | Refills: 0
Start: 2018-02-08 | End: 2018-03-09

## 2018-02-08 RX ORDER — FLUTICASONE PROPIONATE AND SALMETEROL 50; 250 UG/1; UG/1
1 POWDER ORAL; RESPIRATORY (INHALATION)
Qty: 1 | Refills: 0
Start: 2018-02-08 | End: 2018-03-09

## 2018-02-08 RX ORDER — ACETAMINOPHEN 500 MG
650 TABLET ORAL ONCE
Qty: 0 | Refills: 0 | Status: COMPLETED | OUTPATIENT
Start: 2018-02-08 | End: 2018-02-08

## 2018-02-08 RX ORDER — ASPIRIN/CALCIUM CARB/MAGNESIUM 324 MG
1 TABLET ORAL
Qty: 30 | Refills: 0
Start: 2018-02-08 | End: 2018-03-09

## 2018-02-08 RX ORDER — APIXABAN 2.5 MG/1
1 TABLET, FILM COATED ORAL
Qty: 60 | Refills: 0
Start: 2018-02-08 | End: 2018-03-09

## 2018-02-08 RX ORDER — AMIODARONE HYDROCHLORIDE 400 MG/1
200 TABLET ORAL DAILY
Qty: 0 | Refills: 0 | Status: DISCONTINUED | OUTPATIENT
Start: 2018-02-08 | End: 2018-02-08

## 2018-02-08 RX ORDER — DILTIAZEM HCL 120 MG
1 CAPSULE, EXT RELEASE 24 HR ORAL
Qty: 30 | Refills: 0
Start: 2018-02-08 | End: 2018-03-09

## 2018-02-08 RX ORDER — ACETAMINOPHEN 500 MG
2 TABLET ORAL
Qty: 60 | Refills: 0
Start: 2018-02-08 | End: 2018-03-09

## 2018-02-08 RX ADMIN — PANTOPRAZOLE SODIUM 40 MILLIGRAM(S): 20 TABLET, DELAYED RELEASE ORAL at 06:02

## 2018-02-08 RX ADMIN — SODIUM CHLORIDE 3 MILLILITER(S): 9 INJECTION INTRAMUSCULAR; INTRAVENOUS; SUBCUTANEOUS at 06:01

## 2018-02-08 RX ADMIN — Medication 81 MILLIGRAM(S): at 10:37

## 2018-02-08 RX ADMIN — Medication 650 MILLIGRAM(S): at 10:37

## 2018-02-08 RX ADMIN — Medication 50 MICROGRAM(S): at 06:02

## 2018-02-08 RX ADMIN — AMIODARONE HYDROCHLORIDE 200 MILLIGRAM(S): 400 TABLET ORAL at 10:37

## 2018-02-08 RX ADMIN — Medication 120 MILLIGRAM(S): at 10:51

## 2018-02-08 RX ADMIN — APIXABAN 2.5 MILLIGRAM(S): 2.5 TABLET, FILM COATED ORAL at 10:37

## 2018-02-08 RX ADMIN — Medication 250 MILLIGRAM(S): at 10:37

## 2018-02-08 NOTE — PROCEDURE NOTE - ADDITIONAL PROCEDURE DETAILS
Normal device check. Leads stable. RV sensing amplitude unable to be checked (no escape above 30 bpm). His current rhythm is A-sense, V-pace at 80-90 bpm. His total A-pace is 0% and his total V-pace is 100%. No AT/AF or VT events recorded.    Dressing over ppm pocket was previously removed. Wound approximated. Dermabond in place. No erythema, and no hematoma. He is c/o mild pocket pain, especially when it's touched. Tylenol ordered. CXR from this morning resulted. Good lead placement, no pneumothorax. Pt. stable for d/c from an EP perspective. His follow-up appointment will be on March 12 at 11:10am.

## 2018-02-08 NOTE — PROGRESS NOTE ADULT - SUBJECTIVE AND OBJECTIVE BOX
Patient discussed on morning rounds with Dr. Barnes    Operation / Date: 2/6/18 TF TAVR (meagan) EF 55%, c/b CHB s/p PPM 2/7/18    Surgeon: Dr. Barnes     Referring Physician: Dr. Yaron Padilla    SUBJECTIVE ASSESSMENT:  82y Male seen and examined. Today patient has no acute complaints. Denies fever, chills, chest pain, palpitations, SOB, abdominal pain, n/v/d/c. IS ambulating, tolerating PO diet.      Hospital Course:  81 y/o male with PMH: former smoker, HTN, HLD, NIDDM, hypothyroidism, prostate CA s/p seeding, atrial flutter s/p ablation, on Eliquis (last dose 2/3/18), bifasicular heart block, COPD not on home O2, CAD s/p CABGx3 March 2017 with Ry Pete, and chronic diastolic heart failure who was seen in the office for aortic stenosis evaluation and intervention.  He was consented and admitted on 2/6 for TAVR.  Given his bifascicular block and valve implantation immediately after the valve deployment he went into complete heart block.  EP was consulted immediately.  He was brought to the CTICU for recovery with transvenous pacing.  POD #1 he had a PPM placed by EP.  He recovered in the CTIU overnight and was transferred to the telemetry floor on POD #2, PPM interrogated by EP with no issues. He has no acute complaints. He is now stable for discharge home with medical follow up per Dr. Barnes.    Vital Signs Last 24 Hrs  T(C): 36.2 (08 Feb 2018 09:27), Max: 37.4 (07 Feb 2018 14:16)  T(F): 97.1 (08 Feb 2018 09:27), Max: 99.3 (07 Feb 2018 14:16)  HR: 92 (08 Feb 2018 05:03) (78 - 94)  BP: 112/61 (08 Feb 2018 05:03) (95/56 - 123/66)  BP(mean): 83 (08 Feb 2018 05:03) (72 - 85)  RR: 18 (08 Feb 2018 05:03) (9 - 31)  SpO2: 92% (08 Feb 2018 05:03) (90% - 97%)  I&O's Detail    07 Feb 2018 07:01  -  08 Feb 2018 07:00  --------------------------------------------------------  IN:    sodium chloride 0.45%.: 10 mL  Total IN: 10 mL    OUT:    Voided: 1425 mL  Total OUT: 1425 mL    Total NET: -1415 mL      08 Feb 2018 07:01  -  08 Feb 2018 09:51  --------------------------------------------------------  IN:    Oral Fluid: 100 mL  Total IN: 100 mL    OUT:  Total OUT: 0 mL    Total NET: 100 mL          PHYSICAL EXAM:    General: Sitting comfortably in chair, no acute distress    Neurological: awake alert oriented, no neuro deficits     Cardiovascular: S1S2, Paced rhythm, no murmurs appreciated    Respiratory: CTA b/l, no wheeze/rhonchi/rales    Gastrointestinal: +BS, soft nontender nondistended     Extremities: warm and well perfused, no edema, no calf tenderness    Vascular: 2+ peripheral pulses b/l    Incision Sites: R groin: CDI, no signs of hematoma/infxn.  PPM: CDI, no signs of infection/hematoma.      LABS:                        11.2   7.7   )-----------( 142      ( 08 Feb 2018 05:52 )             34.0       COUMADIN:  NO    PT/INR - ( 08 Feb 2018 05:52 )   PT: 12.3 sec;   INR: 1.11          PTT - ( 08 Feb 2018 05:52 )  PTT:29.1 sec    02-08    140  |  102  |  12  ----------------------------<  93  4.2   |  28  |  1.01    Ca    8.9      08 Feb 2018 05:52  Phos  3.1     02-08  Mg     2.0     02-08    TPro  6.5  /  Alb  3.3  /  TBili  0.7  /  DBili  x   /  AST  21  /  ALT  7<L>  /  AlkPhos  49  02-08          MEDICATIONS  (STANDING):  acetaminophen   Tablet. 650 milliGRAM(s) Oral once  amiodarone    Tablet 200 milliGRAM(s) Oral daily  apixaban 2.5 milliGRAM(s) Oral every 12 hours  aspirin enteric coated 81 milliGRAM(s) Oral daily  buDESOnide 160 MICROgram(s)/formoterol 4.5 MICROgram(s) Inhaler 2 Puff(s) Inhalation Once  dextrose 5%. 1000 milliLiter(s) (50 mL/Hr) IV Continuous <Continuous>  dextrose 50% Injectable 12.5 Gram(s) IV Push once  dextrose 50% Injectable 25 Gram(s) IV Push once  dextrose 50% Injectable 25 Gram(s) IV Push once  dextrose 50% Injectable 50 milliLiter(s) IV Push Once  dextrose 50% Injectable 25 milliLiter(s) IV Push Once  diltiazem    milliGRAM(s) Oral daily  insulin lispro (HumaLOG) corrective regimen sliding scale   SubCutaneous Before meals and at bedtime  levothyroxine 50 MICROGram(s) Oral daily  pantoprazole    Tablet 40 milliGRAM(s) Oral before breakfast  simvastatin 40 milliGRAM(s) Oral at bedtime  sodium chloride 0.45%. 1000 milliLiter(s) (10 mL/Hr) IV Continuous <Continuous>  sodium chloride 0.9% lock flush 3 milliLiter(s) IV Push every 8 hours  vancomycin  IVPB 750 milliGRAM(s) IV Intermittent once      Discharge CXR:  < from: Xray Chest 1 View- PORTABLE-Routine (02.08.18 @ 07:54) >  A portable frontal view of the chest is compared to the prior study dated   2/7/2018. Right IJ vascular sheath has been normal. Pacemaker electrodes   are unchanged. Stable heart size. Slightly prominent vascular markings in   the lung bases. No new focal consolidation. No large pleural effusion or   pneumothorax. Calcified granuloma right upper lobe.      IMPRESSION: Removal of right IJ vascular sheath.  Otherwise unchanged.    < end of copied text >    Discharge ECHO:  < from: Echocardiogram (02.07.18 @ 15:14) >  The left atrial size is normal. Right atrial size is normal.Meagan valve   in   aortic position. No aortic regurgitation noted. The calculated aortic   valve   EOAI  is 2.1 cm2. The calculated aortic valve EOAI is 1 cm2/m2.  The   dimensionless index (ratio of LVOTvelocity to aortic velocity) was   calculated   to be 0.54. The peak pressure gradient is 15 mmHg. The mean pressure   gradient   is 7 mmHg.  There is mild mitral valve thickening. There is mild mitral   annular calcification. There is trace mitral regurgitation.  Structurally   normal tricuspid valve. No tricuspid regurgitation noted.There was   insufficient TR detected from which to calculate pulmonary artery   systolic   pressure.  The pulmonic valve is not well visualized. There is trace   pulmonic   regurgitation.  The right ventricle is normal in size and function.   There is a   pacemaker wire in the right heart.  There is mild concentric left   ventricular   hypertrophy.The leftventricular wall motion is normal. The left   ventricular   ejection fraction is 57%.  No aortic root dilatation.There is no   pericardial   effusion.When compared to prior study performed on 1/23/18, Meagan valve   in   aortic position is new.    < end of copied text >

## 2018-02-14 ENCOUNTER — APPOINTMENT (OUTPATIENT)
Dept: HEART AND VASCULAR | Facility: CLINIC | Age: 83
End: 2018-02-14

## 2018-02-20 ENCOUNTER — APPOINTMENT (OUTPATIENT)
Dept: CARDIOTHORACIC SURGERY | Facility: CLINIC | Age: 83
End: 2018-02-20
Payer: MEDICARE

## 2018-02-20 VITALS
DIASTOLIC BLOOD PRESSURE: 71 MMHG | RESPIRATION RATE: 18 BRPM | WEIGHT: 192 LBS | BODY MASS INDEX: 30.99 KG/M2 | TEMPERATURE: 97.2 F | HEART RATE: 85 BPM | SYSTOLIC BLOOD PRESSURE: 138 MMHG | OXYGEN SATURATION: 95 %

## 2018-02-20 DIAGNOSIS — Z87.891 PERSONAL HISTORY OF NICOTINE DEPENDENCE: ICD-10-CM

## 2018-02-20 DIAGNOSIS — E03.9 HYPOTHYROIDISM, UNSPECIFIED: ICD-10-CM

## 2018-02-20 DIAGNOSIS — I45.2 BIFASCICULAR BLOCK: ICD-10-CM

## 2018-02-20 DIAGNOSIS — I35.0 NONRHEUMATIC AORTIC (VALVE) STENOSIS: ICD-10-CM

## 2018-02-20 DIAGNOSIS — I11.0 HYPERTENSIVE HEART DISEASE WITH HEART FAILURE: ICD-10-CM

## 2018-02-20 DIAGNOSIS — I44.2 ATRIOVENTRICULAR BLOCK, COMPLETE: ICD-10-CM

## 2018-02-20 DIAGNOSIS — I25.10 ATHEROSCLEROTIC HEART DISEASE OF NATIVE CORONARY ARTERY WITHOUT ANGINA PECTORIS: ICD-10-CM

## 2018-02-20 DIAGNOSIS — Z95.1 PRESENCE OF AORTOCORONARY BYPASS GRAFT: ICD-10-CM
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
## 2018-02-20 DIAGNOSIS — I50.32 CHRONIC DIASTOLIC (CONGESTIVE) HEART FAILURE: ICD-10-CM

## 2018-02-20 DIAGNOSIS — I48.92 UNSPECIFIED ATRIAL FLUTTER: ICD-10-CM

## 2018-02-20 DIAGNOSIS — Z85.46 PERSONAL HISTORY OF MALIGNANT NEOPLASM OF PROSTATE: ICD-10-CM

## 2018-02-20 DIAGNOSIS — E87.2 ACIDOSIS: ICD-10-CM

## 2018-02-20 DIAGNOSIS — E11.9 TYPE 2 DIABETES MELLITUS WITHOUT COMPLICATIONS: ICD-10-CM

## 2018-02-20 DIAGNOSIS — J44.9 CHRONIC OBSTRUCTIVE PULMONARY DISEASE, UNSPECIFIED: ICD-10-CM

## 2018-02-20 DIAGNOSIS — E78.5 HYPERLIPIDEMIA, UNSPECIFIED: ICD-10-CM

## 2018-02-20 PROCEDURE — 99214 OFFICE O/P EST MOD 30 MIN: CPT

## 2018-03-06 ENCOUNTER — APPOINTMENT (OUTPATIENT)
Dept: HEART AND VASCULAR | Facility: CLINIC | Age: 83
End: 2018-03-06
Payer: MEDICARE

## 2018-03-06 ENCOUNTER — RX RENEWAL (OUTPATIENT)
Age: 83
End: 2018-03-06

## 2018-03-06 VITALS
WEIGHT: 197 LBS | HEART RATE: 63 BPM | SYSTOLIC BLOOD PRESSURE: 124 MMHG | BODY MASS INDEX: 31.66 KG/M2 | DIASTOLIC BLOOD PRESSURE: 60 MMHG | HEIGHT: 66 IN

## 2018-03-06 PROCEDURE — 99214 OFFICE O/P EST MOD 30 MIN: CPT | Mod: 25

## 2018-03-06 PROCEDURE — 93000 ELECTROCARDIOGRAM COMPLETE: CPT

## 2018-03-12 ENCOUNTER — APPOINTMENT (OUTPATIENT)
Dept: HEART AND VASCULAR | Facility: CLINIC | Age: 83
End: 2018-03-12
Payer: MEDICARE

## 2018-03-12 VITALS
SYSTOLIC BLOOD PRESSURE: 127 MMHG | BODY MASS INDEX: 31.02 KG/M2 | DIASTOLIC BLOOD PRESSURE: 61 MMHG | HEART RATE: 83 BPM | WEIGHT: 193 LBS | HEIGHT: 66 IN

## 2018-03-12 PROCEDURE — 99024 POSTOP FOLLOW-UP VISIT: CPT

## 2018-03-12 PROCEDURE — 93000 ELECTROCARDIOGRAM COMPLETE: CPT

## 2018-03-12 RX ORDER — AMIODARONE HYDROCHLORIDE 200 MG/1
200 TABLET ORAL DAILY
Qty: 30 | Refills: 1 | Status: DISCONTINUED | COMMUNITY
End: 2018-03-12

## 2018-03-18 ENCOUNTER — FORM ENCOUNTER (OUTPATIENT)
Age: 83
End: 2018-03-18

## 2018-03-19 ENCOUNTER — OUTPATIENT (OUTPATIENT)
Dept: OUTPATIENT SERVICES | Facility: HOSPITAL | Age: 83
LOS: 1 days | End: 2018-03-19
Payer: MEDICARE

## 2018-03-19 ENCOUNTER — APPOINTMENT (OUTPATIENT)
Dept: CARDIOTHORACIC SURGERY | Facility: CLINIC | Age: 83
End: 2018-03-19
Payer: MEDICARE

## 2018-03-19 VITALS
OXYGEN SATURATION: 96 % | TEMPERATURE: 97.6 F | WEIGHT: 193 LBS | DIASTOLIC BLOOD PRESSURE: 78 MMHG | HEART RATE: 80 BPM | BODY MASS INDEX: 31.15 KG/M2 | RESPIRATION RATE: 18 BRPM | SYSTOLIC BLOOD PRESSURE: 153 MMHG

## 2018-03-19 DIAGNOSIS — I35.9 NONRHEUMATIC AORTIC VALVE DISORDER, UNSPECIFIED: ICD-10-CM

## 2018-03-19 DIAGNOSIS — Z98.49 CATARACT EXTRACTION STATUS, UNSPECIFIED EYE: Chronic | ICD-10-CM

## 2018-03-19 DIAGNOSIS — Z96.659 PRESENCE OF UNSPECIFIED ARTIFICIAL KNEE JOINT: Chronic | ICD-10-CM

## 2018-03-19 LAB
ALBUMIN SERPL ELPH-MCNC: 4.2 G/DL — SIGNIFICANT CHANGE UP (ref 3.3–5)
ALP SERPL-CCNC: 77 U/L — SIGNIFICANT CHANGE UP (ref 40–120)
ALT FLD-CCNC: 9 U/L — LOW (ref 10–45)
ANION GAP SERPL CALC-SCNC: 12 MMOL/L — SIGNIFICANT CHANGE UP (ref 5–17)
AST SERPL-CCNC: 21 U/L — SIGNIFICANT CHANGE UP (ref 10–40)
BASOPHILS NFR BLD AUTO: 0.6 % — SIGNIFICANT CHANGE UP (ref 0–2)
BILIRUB SERPL-MCNC: 0.4 MG/DL — SIGNIFICANT CHANGE UP (ref 0.2–1.2)
BUN SERPL-MCNC: 19 MG/DL — SIGNIFICANT CHANGE UP (ref 7–23)
CALCIUM SERPL-MCNC: 9.2 MG/DL — SIGNIFICANT CHANGE UP (ref 8.4–10.5)
CHLORIDE SERPL-SCNC: 100 MMOL/L — SIGNIFICANT CHANGE UP (ref 96–108)
CO2 SERPL-SCNC: 27 MMOL/L — SIGNIFICANT CHANGE UP (ref 22–31)
CREAT SERPL-MCNC: 1.2 MG/DL — SIGNIFICANT CHANGE UP (ref 0.5–1.3)
EOSINOPHIL NFR BLD AUTO: 2.4 % — SIGNIFICANT CHANGE UP (ref 0–6)
GLUCOSE SERPL-MCNC: 92 MG/DL — SIGNIFICANT CHANGE UP (ref 70–99)
HCT VFR BLD CALC: 36.6 % — LOW (ref 39–50)
HGB BLD-MCNC: 12 G/DL — LOW (ref 13–17)
LYMPHOCYTES # BLD AUTO: 34.1 % — SIGNIFICANT CHANGE UP (ref 13–44)
MCHC RBC-ENTMCNC: 32.8 G/DL — SIGNIFICANT CHANGE UP (ref 32–36)
MCHC RBC-ENTMCNC: 33.9 PG — SIGNIFICANT CHANGE UP (ref 27–34)
MCV RBC AUTO: 103.4 FL — HIGH (ref 80–100)
MONOCYTES NFR BLD AUTO: 11 % — SIGNIFICANT CHANGE UP (ref 2–14)
NEUTROPHILS NFR BLD AUTO: 51.9 % — SIGNIFICANT CHANGE UP (ref 43–77)
NT-PROBNP SERPL-SCNC: 461 PG/ML — HIGH (ref 0–300)
PLATELET # BLD AUTO: 154 K/UL — SIGNIFICANT CHANGE UP (ref 150–400)
POTASSIUM SERPL-MCNC: 4.1 MMOL/L — SIGNIFICANT CHANGE UP (ref 3.5–5.3)
POTASSIUM SERPL-SCNC: 4.1 MMOL/L — SIGNIFICANT CHANGE UP (ref 3.5–5.3)
PROT SERPL-MCNC: 7.6 G/DL — SIGNIFICANT CHANGE UP (ref 6–8.3)
RBC # BLD: 3.54 M/UL — LOW (ref 4.2–5.8)
RBC # FLD: 13.9 % — SIGNIFICANT CHANGE UP (ref 10.3–16.9)
SODIUM SERPL-SCNC: 139 MMOL/L — SIGNIFICANT CHANGE UP (ref 135–145)
WBC # BLD: 6.2 K/UL — SIGNIFICANT CHANGE UP (ref 3.8–10.5)
WBC # FLD AUTO: 6.2 K/UL — SIGNIFICANT CHANGE UP (ref 3.8–10.5)

## 2018-03-19 PROCEDURE — 99214 OFFICE O/P EST MOD 30 MIN: CPT | Mod: 25

## 2018-03-19 PROCEDURE — 93306 TTE W/DOPPLER COMPLETE: CPT

## 2018-03-19 PROCEDURE — 36415 COLL VENOUS BLD VENIPUNCTURE: CPT

## 2018-03-19 PROCEDURE — 85025 COMPLETE CBC W/AUTO DIFF WBC: CPT

## 2018-03-19 PROCEDURE — 80053 COMPREHEN METABOLIC PANEL: CPT

## 2018-03-19 PROCEDURE — 83880 ASSAY OF NATRIURETIC PEPTIDE: CPT

## 2018-03-19 PROCEDURE — 93010 ELECTROCARDIOGRAM REPORT: CPT

## 2018-03-19 PROCEDURE — 93005 ELECTROCARDIOGRAM TRACING: CPT

## 2018-04-18 ENCOUNTER — RX RENEWAL (OUTPATIENT)
Age: 83
End: 2018-04-18

## 2018-04-20 ENCOUNTER — RX RENEWAL (OUTPATIENT)
Age: 83
End: 2018-04-20

## 2018-06-18 ENCOUNTER — RX RENEWAL (OUTPATIENT)
Age: 83
End: 2018-06-18

## 2018-07-10 ENCOUNTER — APPOINTMENT (OUTPATIENT)
Dept: HEART AND VASCULAR | Facility: CLINIC | Age: 83
End: 2018-07-10
Payer: MEDICARE

## 2018-07-10 VITALS
HEIGHT: 66 IN | SYSTOLIC BLOOD PRESSURE: 110 MMHG | BODY MASS INDEX: 31.02 KG/M2 | WEIGHT: 193 LBS | HEART RATE: 95 BPM | DIASTOLIC BLOOD PRESSURE: 60 MMHG

## 2018-07-10 PROCEDURE — 99214 OFFICE O/P EST MOD 30 MIN: CPT | Mod: 25

## 2018-07-10 PROCEDURE — 93000 ELECTROCARDIOGRAM COMPLETE: CPT

## 2018-07-10 RX ORDER — FLUTICASONE PROPIONATE AND SALMETEROL 50; 250 UG/1; UG/1
250-50 POWDER RESPIRATORY (INHALATION)
Refills: 0 | Status: ACTIVE | COMMUNITY

## 2018-07-16 ENCOUNTER — APPOINTMENT (OUTPATIENT)
Dept: HEART AND VASCULAR | Facility: CLINIC | Age: 83
End: 2018-07-16
Payer: MEDICARE

## 2018-07-16 VITALS
BODY MASS INDEX: 31.18 KG/M2 | SYSTOLIC BLOOD PRESSURE: 125 MMHG | HEART RATE: 95 BPM | DIASTOLIC BLOOD PRESSURE: 63 MMHG | WEIGHT: 194 LBS | HEIGHT: 66 IN

## 2018-07-16 DIAGNOSIS — Z95.0 PRESENCE OF CARDIAC PACEMAKER: ICD-10-CM

## 2018-07-16 PROCEDURE — 93280 PM DEVICE PROGR EVAL DUAL: CPT

## 2018-07-23 ENCOUNTER — FORM ENCOUNTER (OUTPATIENT)
Age: 83
End: 2018-07-23

## 2018-07-24 ENCOUNTER — OUTPATIENT (OUTPATIENT)
Dept: OUTPATIENT SERVICES | Facility: HOSPITAL | Age: 83
LOS: 1 days | End: 2018-07-24
Payer: MEDICARE

## 2018-07-24 DIAGNOSIS — Z98.49 CATARACT EXTRACTION STATUS, UNSPECIFIED EYE: Chronic | ICD-10-CM

## 2018-07-24 DIAGNOSIS — I25.10 ATHEROSCLEROTIC HEART DISEASE OF NATIVE CORONARY ARTERY WITHOUT ANGINA PECTORIS: ICD-10-CM

## 2018-07-24 DIAGNOSIS — Z96.659 PRESENCE OF UNSPECIFIED ARTIFICIAL KNEE JOINT: Chronic | ICD-10-CM

## 2018-07-24 PROCEDURE — A9505: CPT

## 2018-07-24 PROCEDURE — A9500: CPT

## 2018-07-24 PROCEDURE — 93018 CV STRESS TEST I&R ONLY: CPT

## 2018-07-24 PROCEDURE — 93016 CV STRESS TEST SUPVJ ONLY: CPT

## 2018-07-24 PROCEDURE — 78452 HT MUSCLE IMAGE SPECT MULT: CPT

## 2018-07-24 PROCEDURE — 93017 CV STRESS TEST TRACING ONLY: CPT

## 2018-07-24 PROCEDURE — 78452 HT MUSCLE IMAGE SPECT MULT: CPT | Mod: 26

## 2018-07-25 ENCOUNTER — RESULT REVIEW (OUTPATIENT)
Age: 83
End: 2018-07-25

## 2018-07-25 PROBLEM — Z95.0 PRESENCE OF CARDIAC PACEMAKER: Status: ACTIVE | Noted: 2018-03-06

## 2018-09-19 NOTE — PATIENT PROFILE ADULT. - FUNCTIONAL SCREEN CURRENT LEVEL: BATHING, MLM
Physician Query-General


Query to Physician:





Dr Canas,





Can you please clarify the reason for the admission for :





1.   Polyhydramonios





2.  Previous 





3.  Other





PHYSICIAN RESPONSE:


Based on the clinical findings in the record, please respond to the query above 

on this document as an addendum. Possible, probable, or questionable diagnosis 

can be coded for INPATIENTS ONLY.





Physician Response:


Physician Response


This patient was admitted for repeat  due to polyhydramnios














If you have questions please contact:


                   


:


Ext:





Thank you for your time and cooperation.


Clinical /








*********************This is a permanent part of the medical record*************

******











JULIANA DE JESUS Sep 19, 2018 16:41


KENDRA CANAS MD Sep 20, 2018 07:50 (0) independent

## 2018-12-10 ENCOUNTER — RX RENEWAL (OUTPATIENT)
Age: 83
End: 2018-12-10

## 2018-12-10 RX ORDER — LISINOPRIL 40 MG/1
40 TABLET ORAL
Qty: 90 | Refills: 3 | Status: ACTIVE | COMMUNITY
Start: 2018-12-10 | End: 1900-01-01

## 2019-01-27 ENCOUNTER — FORM ENCOUNTER (OUTPATIENT)
Age: 84
End: 2019-01-27

## 2019-01-28 ENCOUNTER — APPOINTMENT (OUTPATIENT)
Dept: HEART AND VASCULAR | Facility: CLINIC | Age: 84
End: 2019-01-28
Payer: MEDICARE

## 2019-01-28 ENCOUNTER — OUTPATIENT (OUTPATIENT)
Dept: OUTPATIENT SERVICES | Facility: HOSPITAL | Age: 84
LOS: 1 days | End: 2019-01-28
Payer: MEDICARE

## 2019-01-28 VITALS
HEART RATE: 101 BPM | SYSTOLIC BLOOD PRESSURE: 120 MMHG | BODY MASS INDEX: 33.59 KG/M2 | DIASTOLIC BLOOD PRESSURE: 58 MMHG | WEIGHT: 209 LBS | HEIGHT: 66 IN

## 2019-01-28 DIAGNOSIS — Z96.659 PRESENCE OF UNSPECIFIED ARTIFICIAL KNEE JOINT: Chronic | ICD-10-CM

## 2019-01-28 DIAGNOSIS — I44.2 ATRIOVENTRICULAR BLOCK, COMPLETE: ICD-10-CM

## 2019-01-28 DIAGNOSIS — Z98.49 CATARACT EXTRACTION STATUS, UNSPECIFIED EYE: Chronic | ICD-10-CM

## 2019-01-28 DIAGNOSIS — I48.91 UNSPECIFIED ATRIAL FIBRILLATION: ICD-10-CM

## 2019-01-28 PROCEDURE — 93306 TTE W/DOPPLER COMPLETE: CPT | Mod: 26

## 2019-01-28 PROCEDURE — 93280 PM DEVICE PROGR EVAL DUAL: CPT

## 2019-01-28 PROCEDURE — 99214 OFFICE O/P EST MOD 30 MIN: CPT | Mod: 25

## 2019-01-28 PROCEDURE — 93306 TTE W/DOPPLER COMPLETE: CPT

## 2019-01-29 LAB
ANION GAP SERPL CALC-SCNC: 14 MMOL/L
BASOPHILS # BLD AUTO: 0.06 K/UL
BASOPHILS NFR BLD AUTO: 0.6 %
BUN SERPL-MCNC: 25 MG/DL
CALCIUM SERPL-MCNC: 9.4 MG/DL
CHLORIDE SERPL-SCNC: 101 MMOL/L
CO2 SERPL-SCNC: 24 MMOL/L
CREAT SERPL-MCNC: 1.21 MG/DL
EOSINOPHIL # BLD AUTO: 0.16 K/UL
EOSINOPHIL NFR BLD AUTO: 1.6 %
GLUCOSE SERPL-MCNC: 86 MG/DL
HCT VFR BLD CALC: 41.9 %
HGB BLD-MCNC: 13.2 G/DL
IMM GRANULOCYTES NFR BLD AUTO: 0.2 %
LYMPHOCYTES # BLD AUTO: 2.73 K/UL
LYMPHOCYTES NFR BLD AUTO: 27.9 %
MAN DIFF?: NORMAL
MCHC RBC-ENTMCNC: 31.5 GM/DL
MCHC RBC-ENTMCNC: 32 PG
MCV RBC AUTO: 101.7 FL
MONOCYTES # BLD AUTO: 0.9 K/UL
MONOCYTES NFR BLD AUTO: 9.2 %
NEUTROPHILS # BLD AUTO: 5.9 K/UL
NEUTROPHILS NFR BLD AUTO: 60.5 %
PLATELET # BLD AUTO: 229 K/UL
POTASSIUM SERPL-SCNC: 4.4 MMOL/L
RBC # BLD: 4.12 M/UL
RBC # FLD: 15 %
SODIUM SERPL-SCNC: 139 MMOL/L
WBC # FLD AUTO: 9.77 K/UL

## 2019-01-29 RX ORDER — APIXABAN 5 MG/1
5 TABLET, FILM COATED ORAL
Qty: 180 | Refills: 3 | Status: ACTIVE | COMMUNITY
Start: 1900-01-01 | End: 1900-01-01

## 2019-01-29 NOTE — PROCEDURE
[Complete Heart Block] : complete heart block [Pacemaker] : pacemaker [DDD] : DDD [Longevity: ___ months] : The estimated remaining battery life is [unfilled] months [Normal] : The battery status is normal. [Lead Imp:  ___ohms] : lead impedance was [unfilled] ohms [Sensing Amplitude ___mv] : sensing amplitude was [unfilled] mv [___V @] : [unfilled] V [___ ms] : [unfilled] ms [de-identified] : Medtronic [de-identified] : ADVISA  MRI [id number] [de-identified] : [id number] [de-identified] : 2/7/18 [de-identified] : no escape > 30 bpm [de-identified] :  [de-identified] : 9 years to  [de-identified] : PAF with CVR ()

## 2019-01-29 NOTE — DISCUSSION/SUMMARY
[Pacemaker Function Normal] : normal pacemaker function [FreeTextEntry1] : Mr. Fernandez is a pleasant 83 year-old gentleman with recurrent near syncope and PVCs noted on telemetry prior to finding of multivessel CAD s/p OPCAB 3/2017. Post operative atrial fibrillation noted.  Amiodarone was discontinued December 2017 due to concerns of toxicity. Long-term monitor January 2018 without recurrent AT/AF and no ventricular ectopy identified. He is s/p TAVR complicated by CHB, requiring dual chamber PPM implant on 2/7/18. His device is functioning appropriately as programmed and all measured data is WNL. He is pacemaker dependent with no escape on interrogation today.  He has had paroxysms of atrial fibrillation with CVR (ventricular pacing).  Labs ordered today; will increase Eliquis 5 mg BID for appropriate therapy.  He will return for follow-up in 6 months and knows to call with any questions or concerns in the interim.

## 2019-01-29 NOTE — PHYSICAL EXAM
[General Appearance - Well Developed] : well developed [Normal Appearance] : normal appearance [Well Groomed] : well groomed [General Appearance - Well Nourished] : well nourished [No Deformities] : no deformities [General Appearance - In No Acute Distress] : no acute distress [Respiration, Rhythm And Depth] : normal respiratory rhythm and effort [Exaggerated Use Of Accessory Muscles For Inspiration] : no accessory muscle use [Auscultation Breath Sounds / Voice Sounds] : lungs were clear to auscultation bilaterally [Clean] : clean [Dry] : dry [Well-Healed] : well-healed [Abdomen Soft] : soft [Abdomen Tenderness] : non-tender [Abdomen Mass (___ Cm)] : no abdominal mass palpated [Nail Clubbing] : no clubbing of the fingernails [Cyanosis, Localized] : no localized cyanosis [Petechial Hemorrhages (___cm)] : no petechial hemorrhages [Normal Conjunctiva] : the conjunctiva exhibited no abnormalities [Eyelids - No Xanthelasma] : the eyelids demonstrated no xanthelasmas [Normal Oral Mucosa] : normal oral mucosa [No Oral Pallor] : no oral pallor [No Oral Cyanosis] : no oral cyanosis [Normal Jugular Venous A Waves Present] : normal jugular venous A waves present [Normal Jugular Venous V Waves Present] : normal jugular venous V waves present [No Jugular Venous Oliva A Waves] : no jugular venous oliva A waves [Abnormal Walk] : normal gait [Gait - Sufficient For Exercise Testing] : the gait was sufficient for exercise testing [Skin Color & Pigmentation] : normal skin color and pigmentation [] : no rash [No Venous Stasis] : no venous stasis [Skin Lesions] : no skin lesions [No Skin Ulcers] : no skin ulcer [No Xanthoma] : no  xanthoma was observed [5th Left ICS - MCL] : palpated at the 5th LICS in the midclavicular line [Normal] : normal [No Precordial Heave] : no precordial heave was noted [Normal Rate] : normal [Rhythm Regular] : regular [Normal S1] : normal S1 [Normal S2] : normal S2 [II] : a grade 2 [No Pitting Edema] : no pitting edema present [Palpable Crepitus] : no palpable crepitus [Bleeding] : no active bleeding [Foul Odor] : no foul smell [Purulent Drainage] : no purulent drainage [Serosanguineous Drainage] : no serosanquineous drainage [Serous Drainage] : no serous drainage [Erythema] : not erythematous [Warm] : not warm [Tender] : not tender [Indurated] : not indurated [Fluctuant] : not fluctuant

## 2019-01-29 NOTE — HISTORY OF PRESENT ILLNESS
[FreeTextEntry1] : Mr. Fernandez is an 82 yo Male with a past medical history significant for HTN, HLD, DM II, Hypothyroidism, COPD, Prostate CA s/p seed implant (2002), Atrial flutter s/p Ablation in 2003 at [facility] who presented to [facility] with complaint of near syncope and chest pain. He was noted to have PVCs on telemetry with a relatively low burden, he was unaware of rapid/irregular heart action. He was found to have 3VCAD s/p OPCAB X3 3/6/17 (Dr. Funez) with post operative atrial fibrillation. Amiodarone was discontinued in December 2017. Ambulatory telemetry 1/19 - 1/26/18 significant for SR (55-) with no AT/AF and no PVCs identified. He underwent TAVR with Dr. Barnes, course was complicated by CHB necessitating PPM placement 2/7/18. He presents today for routine follow-up. He feels well and offers no acute complaints.  No bleeding issues on Eliquis.\par \par Holter 5/2017 SR (73-), PVC burden 0.1%, brief PAT (4 beats)\par \par ECHO 3/2018 EF 55-60%\par

## 2019-02-06 ENCOUNTER — RESULT CHARGE (OUTPATIENT)
Age: 84
End: 2019-02-06

## 2019-02-11 ENCOUNTER — APPOINTMENT (OUTPATIENT)
Dept: CARDIOTHORACIC SURGERY | Facility: CLINIC | Age: 84
End: 2019-02-11
Payer: MEDICARE

## 2019-02-11 ENCOUNTER — NON-APPOINTMENT (OUTPATIENT)
Age: 84
End: 2019-02-11

## 2019-02-11 VITALS
HEART RATE: 107 BPM | SYSTOLIC BLOOD PRESSURE: 166 MMHG | TEMPERATURE: 97.5 F | DIASTOLIC BLOOD PRESSURE: 84 MMHG | OXYGEN SATURATION: 94 % | WEIGHT: 209 LBS | BODY MASS INDEX: 33.73 KG/M2 | RESPIRATION RATE: 21 BRPM

## 2019-02-11 PROCEDURE — 93000 ELECTROCARDIOGRAM COMPLETE: CPT

## 2019-02-11 PROCEDURE — 99214 OFFICE O/P EST MOD 30 MIN: CPT

## 2019-02-13 NOTE — REVIEW OF SYSTEMS
[Joint Pain] : joint pain [Negative] : Psychiatric [Cough] : cough [see HPI] : see HPI [Fever] : no fever [Headache] : no headache [Chills] : no chills [Feeling Fatigued] : not feeling fatigued [Shortness Of Breath] : no shortness of breath [Dyspnea on exertion] : not dyspnea during exertion [Chest  Pressure] : no chest pressure [Chest Pain] : no chest pain [Lower Ext Edema] : no extremity edema [Leg Claudication] : no intermittent leg claudication [Palpitations] : no palpitations

## 2019-02-13 NOTE — PHYSICAL EXAM
[Normal Appearance] : normal appearance [General Appearance - In No Acute Distress] : no acute distress [Normal Jugular Venous A Waves Present] : normal jugular venous A waves present [Normal Jugular Venous V Waves Present] : normal jugular venous V waves present [No Jugular Venous Oliva A Waves] : no jugular venous oliva A waves [] : no respiratory distress [Respiration, Rhythm And Depth] : normal respiratory rhythm and effort [Exaggerated Use Of Accessory Muscles For Inspiration] : no accessory muscle use [Heart Rate And Rhythm] : heart rate and rhythm were normal [Bowel Sounds] : normal bowel sounds [Abdomen Soft] : soft [Abdomen Tenderness] : non-tender [Abnormal Walk] : normal gait [Oriented To Time, Place, And Person] : oriented to person, place, and time [FreeTextEntry1] : trace edema bilaterally

## 2019-02-13 NOTE — HISTORY OF PRESENT ILLNESS
[FreeTextEntry1] : 83 year old male who is a former smoker (40 pack-year history; quit 32 years ago) with a history of HTN, hyperlipidemia, DM-II (on oral medication), hypothyroidism, prostate cancer s/p seeding, atrial flutter s/p ablation 8 years ago (on Eliquis), left anterior fascicular block/RBBB, COPD (not on home O2), known CAD s/p 3V OPCAB (LIMA-LAD, SVG-ramus and SVG-RCA) on 3/6/2017 with Dr. Funez at [facility] and chronic diastolic heart failure with aortic stenosis s/p TF TAVR on 02/6/18 using S3 (29 mm, commercial) c/b CHB s/p PPM on 02/7/18 who presents for his year follow up. \par \par Since his last visit, the patient continues to feel well.  He denies any chest pain, SOB at rest, or with exertion, palpitations, dizziness, LE edema.  He continues to have knee pain which limits his mobility.  He does admit to a recent URI with cough, however denies any fever, chills.

## 2019-02-14 ENCOUNTER — APPOINTMENT (OUTPATIENT)
Dept: PULMONOLOGY | Facility: CLINIC | Age: 84
End: 2019-02-14
Payer: MEDICARE

## 2019-02-14 VITALS
OXYGEN SATURATION: 97 % | HEART RATE: 89 BPM | WEIGHT: 209 LBS | TEMPERATURE: 97.5 F | HEIGHT: 66 IN | DIASTOLIC BLOOD PRESSURE: 78 MMHG | SYSTOLIC BLOOD PRESSURE: 124 MMHG | BODY MASS INDEX: 33.59 KG/M2

## 2019-02-14 PROCEDURE — 94010 BREATHING CAPACITY TEST: CPT

## 2019-02-14 PROCEDURE — 99215 OFFICE O/P EST HI 40 MIN: CPT | Mod: 25

## 2019-02-14 PROCEDURE — 71046 X-RAY EXAM CHEST 2 VIEWS: CPT

## 2019-02-14 NOTE — REASON FOR VISIT
[Follow-Up] : a follow-up visit [FreeTextEntry1] : 83 year old man with asthma no copd status tavr, bypass, and pacemker her with dyspnea

## 2019-02-14 NOTE — PHYSICAL EXAM
[General Appearance - Well Developed] : well developed [Normal Appearance] : normal appearance [Well Groomed] : well groomed [General Appearance - Well Nourished] : well nourished [No Deformities] : no deformities [General Appearance - In No Acute Distress] : no acute distress [Normal Conjunctiva] : the conjunctiva exhibited no abnormalities [Eyelids - No Xanthelasma] : the eyelids demonstrated no xanthelasmas [Neck Appearance] : the appearance of the neck was normal [Neck Cervical Mass (___cm)] : no neck mass was observed [Jugular Venous Distention Increased] : there was no jugular-venous distention [Thyroid Diffuse Enlargement] : the thyroid was not enlarged [Thyroid Nodule] : there were no palpable thyroid nodules [Heart Rate And Rhythm] : heart rate and rhythm were normal [Heart Sounds] : normal S1 and S2 [Murmurs] : no murmurs present [Respiration, Rhythm And Depth] : normal respiratory rhythm and effort [Exaggerated Use Of Accessory Muscles For Inspiration] : no accessory muscle use [Abdomen Soft] : soft [Abdomen Tenderness] : non-tender [] : no hepato-splenomegaly [Abdomen Mass (___ Cm)] : no abdominal mass palpated [Abnormal Walk] : normal gait [Gait - Sufficient For Exercise Testing] : the gait was sufficient for exercise testing [FreeTextEntry1] : Chest is clear this time. In the past he had some rales in the right lower lobe but I do not see them or hear them this time

## 2019-02-14 NOTE — HISTORY OF PRESENT ILLNESS
[FreeTextEntry1] : here with dyspne, patient with asthma, who has been spending his last two years have heart issues, status bpass, pmn and tavr, and his pulmoanry function is far from where it was when I last saw him.  he is here wheezing and his spiromeotry is much lower.  had two years of issues with his hear shashi i haven't seen him since then,

## 2019-02-14 NOTE — DISCUSSION/SUMMARY
[FreeTextEntry1] : tehre is no reason why i cna't bring his pulmonary  function back to where it was, and then at least all he'll be miserable about will his knee\par \par 12 day pre\par breo samples.

## 2019-02-14 NOTE — REVIEW OF SYSTEMS
[Negative] : Allergy/Immunology [Dyspnea] : dyspnea [Wheezing] : wheezing [FreeTextEntry6] : severe knee pain

## 2019-02-14 NOTE — PROCEDURE
[FreeTextEntry1] : spirometry with worse obstruction \par \par chest film with no evicence for chf or infection,  irregular heart border likely due to bypass

## 2019-02-28 NOTE — DIETITIAN INITIAL EVALUATION ADULT. - DOB: +DATEOFBIRTH
Orders sent to [facility] medical    [facility] ~[place]     Statement Selected

## 2019-03-07 ENCOUNTER — APPOINTMENT (OUTPATIENT)
Dept: PULMONOLOGY | Facility: CLINIC | Age: 84
End: 2019-03-07
Payer: MEDICARE

## 2019-03-07 VITALS
TEMPERATURE: 97.6 F | SYSTOLIC BLOOD PRESSURE: 150 MMHG | HEART RATE: 107 BPM | HEIGHT: 66 IN | DIASTOLIC BLOOD PRESSURE: 80 MMHG | OXYGEN SATURATION: 94 %

## 2019-03-07 PROCEDURE — 94010 BREATHING CAPACITY TEST: CPT

## 2019-03-07 PROCEDURE — 99214 OFFICE O/P EST MOD 30 MIN: CPT | Mod: 25

## 2019-03-07 PROCEDURE — 71046 X-RAY EXAM CHEST 2 VIEWS: CPT

## 2019-03-07 RX ORDER — ALBUTEROL SULFATE 90 UG/1
108 (90 BASE) AEROSOL, METERED RESPIRATORY (INHALATION) 4 TIMES DAILY
Qty: 1 | Refills: 11 | Status: ACTIVE | COMMUNITY
Start: 2019-03-07 | End: 1900-01-01

## 2019-03-08 LAB — DEPRECATED D DIMER PPP IA-ACNC: 451 NG/ML DDU

## 2019-03-08 NOTE — HISTORY OF PRESENT ILLNESS
[FreeTextEntry1] : breathing better, but pain on his right side of chest, acute onset ,  after steroids and bd, he is breathing bettere.

## 2019-03-08 NOTE — PHYSICAL EXAM
[General Appearance - Well Developed] : well developed [Normal Appearance] : normal appearance [Well Groomed] : well groomed [General Appearance - Well Nourished] : well nourished [No Deformities] : no deformities [General Appearance - In No Acute Distress] : no acute distress [Normal Conjunctiva] : the conjunctiva exhibited no abnormalities [Eyelids - No Xanthelasma] : the eyelids demonstrated no xanthelasmas [Neck Appearance] : the appearance of the neck was normal [Neck Cervical Mass (___cm)] : no neck mass was observed [Jugular Venous Distention Increased] : there was no jugular-venous distention [Thyroid Diffuse Enlargement] : the thyroid was not enlarged [Thyroid Nodule] : there were no palpable thyroid nodules [Heart Rate And Rhythm] : heart rate and rhythm were normal [Heart Sounds] : normal S1 and S2 [Murmurs] : no murmurs present [Respiration, Rhythm And Depth] : normal respiratory rhythm and effort [Exaggerated Use Of Accessory Muscles For Inspiration] : no accessory muscle use [FreeTextEntry1] : pain over lateral axillary line upper torso [Abdomen Soft] : soft [Abdomen Tenderness] : non-tender [] : no hepato-splenomegaly [Abdomen Mass (___ Cm)] : no abdominal mass palpated [Abnormal Walk] : normal gait [Gait - Sufficient For Exercise Testing] : the gait was sufficient for exercise testing

## 2019-03-08 NOTE — REVIEW OF SYSTEMS
[Dyspnea] : dyspnea [Pleuritic Pain] : pleuritic pain [Wheezing] : wheezing [Negative] : Allergy/Immunology [FreeTextEntry8] : much better howevere [FreeTextEntry6] : severe knee pain

## 2019-03-08 NOTE — PROCEDURE
[FreeTextEntry1] : spirometory is improved\par \par a chest film shows small infiltrate over area of thickened pleural, likely there is a fracture in there

## 2019-03-08 NOTE — DISCUSSION/SUMMARY
[FreeTextEntry1] : d dimer, small elevation pre test prob was low,  i wiill not pursue this as an embolli\par \par plan repeat chest film in a months' time,  \par \par stay on inhaler\par \par

## 2019-03-22 RX ORDER — PREDNISONE 10 MG/1
10 TABLET ORAL
Qty: 30 | Refills: 5 | Status: DISCONTINUED | COMMUNITY
Start: 2019-02-14 | End: 2019-03-22

## 2019-04-02 ENCOUNTER — APPOINTMENT (OUTPATIENT)
Dept: HEART AND VASCULAR | Facility: CLINIC | Age: 84
End: 2019-04-02
Payer: MEDICARE

## 2019-04-02 VITALS
HEART RATE: 107 BPM | SYSTOLIC BLOOD PRESSURE: 140 MMHG | BODY MASS INDEX: 35.52 KG/M2 | DIASTOLIC BLOOD PRESSURE: 72 MMHG | WEIGHT: 221 LBS | HEIGHT: 66 IN

## 2019-04-02 DIAGNOSIS — I45.10 UNSPECIFIED RIGHT BUNDLE-BRANCH BLOCK: ICD-10-CM

## 2019-04-02 DIAGNOSIS — I25.10 ATHEROSCLEROTIC HEART DISEASE OF NATIVE CORONARY ARTERY W/OUT ANGINA PECTORIS: ICD-10-CM

## 2019-04-02 DIAGNOSIS — I44.4 LEFT ANTERIOR FASCICULAR BLOCK: ICD-10-CM

## 2019-04-02 DIAGNOSIS — I48.91 UNSPECIFIED ATRIAL FIBRILLATION: ICD-10-CM

## 2019-04-02 DIAGNOSIS — E78.5 HYPERLIPIDEMIA, UNSPECIFIED: ICD-10-CM

## 2019-04-02 DIAGNOSIS — I51.89 OTHER ILL-DEFINED HEART DISEASES: ICD-10-CM

## 2019-04-02 DIAGNOSIS — Z95.1 PRESENCE OF AORTOCORONARY BYPASS GRAFT: ICD-10-CM

## 2019-04-02 DIAGNOSIS — I10 ESSENTIAL (PRIMARY) HYPERTENSION: ICD-10-CM

## 2019-04-02 DIAGNOSIS — Z95.2 PRESENCE OF PROSTHETIC HEART VALVE: ICD-10-CM

## 2019-04-02 PROCEDURE — 99215 OFFICE O/P EST HI 40 MIN: CPT

## 2019-04-02 NOTE — DISCUSSION/SUMMARY
[Atrial Fibrillation] : atrial fibrillation [Cardiomyopathy] : cardiomyopathy [Coronary Artery Disease] : coronary artery disease [Dietary Modification] : dietary modification [Weight Reduction] : weight reduction [Hyperlipidemia] : hyperlipidemia [Diet Modification] : diet modification [Exercise] : exercise [Hypertension] : hypertension [Stable] : stable [None] : none [Exercise Regimen] : an exercise regimen [Weight Loss] : weight loss [Sodium Restriction] : sodium restriction [Patient] : the patient [de-identified] : EP f/u [de-identified] : s/p OPCAB LIMA to LAD, SVG to Ramus, SVG to RCA [FreeTextEntry1] : s/p TAVR - stable
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
## 2019-04-02 NOTE — REVIEW OF SYSTEMS
[Fever] : no fever [Headache] : no headache [Chills] : no chills [Feeling Fatigued] : feeling fatigued [Abdominal Pain] : no abdominal pain [Nausea] : no nausea [Vomiting] : no vomiting [Heartburn] : no heartburn [Change in Appetite] : no change in appetite [Change In The Stool] : no change in stool [Dysphagia] : no dysphagia [Urinary Frequency] : urinary frequency [Hematuria] : no hematuria [Pain During Urination] : no dysuria [Impotence] : impotence [Nocturia] : nocturia [Joint Pain] : joint pain [Joint Swelling] : joint swelling [Joint Stiffness] : joint stiffness [Muscle Cramps] : no muscle cramps [Limb Weakness (Paresis)] : no limb weakness [Easy Bleeding] : a tendency for easy bleeding [Swollen Glands] : no swollen glands [Easy Bruising] : a tendency for easy bruising [Swollen Glands In The Neck] : no swollen glands in the neck [Negative] : Endocrine [FreeTextEntry2] : R arm and hand ecchymosis

## 2019-04-02 NOTE — PHYSICAL EXAM
[General Appearance - Well Developed] : well developed [Normal Appearance] : normal appearance [Well Groomed] : well groomed [General Appearance - Well Nourished] : well nourished [No Deformities] : no deformities [General Appearance - In No Acute Distress] : no acute distress [Normal Oral Mucosa] : normal oral mucosa [No Oral Pallor] : no oral pallor [No Oral Cyanosis] : no oral cyanosis [Normal Jugular Venous A Waves Present] : normal jugular venous A waves present [Normal Jugular Venous V Waves Present] : normal jugular venous V waves present [No Jugular Venous Oliva A Waves] : no jugular venous oliva A waves [Respiration, Rhythm And Depth] : normal respiratory rhythm and effort [Exaggerated Use Of Accessory Muscles For Inspiration] : no accessory muscle use [Auscultation Breath Sounds / Voice Sounds] : lungs were clear to auscultation bilaterally [Heart Rate And Rhythm] : heart rate and rhythm were normal [Heart Sounds] : normal S1 and S2 [Abdomen Soft] : soft [Abdomen Tenderness] : non-tender [Abdomen Mass (___ Cm)] : no abdominal mass palpated [Abnormal Walk] : normal gait [Gait - Sufficient For Exercise Testing] : the gait was sufficient for exercise testing [Nail Clubbing] : no clubbing of the fingernails [Cyanosis, Localized] : no localized cyanosis [Petechial Hemorrhages (___cm)] : no petechial hemorrhages [Skin Color & Pigmentation] : normal skin color and pigmentation [] : no rash [No Venous Stasis] : no venous stasis [Skin Lesions] : no skin lesions [No Skin Ulcers] : no skin ulcer [No Xanthoma] : no  xanthoma was observed [FreeTextEntry1] : R arm and hand ecchymosis

## 2019-04-04 ENCOUNTER — NON-APPOINTMENT (OUTPATIENT)
Age: 84
End: 2019-04-04

## 2019-04-05 ENCOUNTER — RX RENEWAL (OUTPATIENT)
Age: 84
End: 2019-04-05

## 2019-04-17 ENCOUNTER — APPOINTMENT (OUTPATIENT)
Dept: PULMONOLOGY | Facility: CLINIC | Age: 84
End: 2019-04-17
Payer: MEDICARE

## 2019-04-17 VITALS
HEIGHT: 66 IN | SYSTOLIC BLOOD PRESSURE: 120 MMHG | TEMPERATURE: 97.7 F | DIASTOLIC BLOOD PRESSURE: 70 MMHG | HEART RATE: 113 BPM | OXYGEN SATURATION: 91 %

## 2019-04-17 PROCEDURE — 71046 X-RAY EXAM CHEST 2 VIEWS: CPT

## 2019-04-17 PROCEDURE — 94010 BREATHING CAPACITY TEST: CPT

## 2019-04-17 PROCEDURE — 99214 OFFICE O/P EST MOD 30 MIN: CPT | Mod: 25

## 2019-04-18 NOTE — REVIEW OF SYSTEMS
[Dyspnea] : dyspnea [Pleuritic Pain] : pleuritic pain [Wheezing] : wheezing [Negative] : Allergy/Immunology [FreeTextEntry8] : breathing is ok, the pain is the main issues [FreeTextEntry6] : severe knee pain

## 2019-04-18 NOTE — REASON FOR VISIT
[Follow-Up] : a follow-up visit [FreeTextEntry1] : infirmed asthmatic who is here for a follow up chest film , done after the onset of chest pain

## 2019-04-18 NOTE — PROCEDURE
[FreeTextEntry1] : moderate flow obstruction spirometry\par \par the chest film shows that the pleural based infiltrate is small, but there appear to be pleural thickning

## 2019-04-18 NOTE — DISCUSSION/SUMMARY
[FreeTextEntry1] : etiology of the radiologic finding i obscure\par \par the bilateral chest pain is concerning.\par \par plan ct of the chest with contrast to see if there is bony or parenchymal disease.

## 2019-04-18 NOTE — HISTORY OF PRESENT ILLNESS
[FreeTextEntry1] : last month co chest pain and a pleural based infiltrate in the right upper lobe was seen,  thought perhaps there was a fracture.  brought back this week and the pain now is in  his back and other side.  here to do a chest film and see if this infilitrate has improved at all.   He is in great pain from his knee and walks with great difficulty

## 2019-04-29 ENCOUNTER — APPOINTMENT (OUTPATIENT)
Dept: HEART AND VASCULAR | Facility: CLINIC | Age: 84
End: 2019-04-29
Payer: MEDICARE

## 2019-04-29 VITALS
BODY MASS INDEX: 35.52 KG/M2 | WEIGHT: 221 LBS | TEMPERATURE: 97.8 F | HEART RATE: 110 BPM | SYSTOLIC BLOOD PRESSURE: 130 MMHG | DIASTOLIC BLOOD PRESSURE: 60 MMHG | HEIGHT: 66 IN | RESPIRATION RATE: 18 BRPM | OXYGEN SATURATION: 94 %

## 2019-04-29 DIAGNOSIS — R07.81 PLEURODYNIA: ICD-10-CM

## 2019-04-29 PROCEDURE — 99214 OFFICE O/P EST MOD 30 MIN: CPT

## 2019-04-29 PROCEDURE — 99203 OFFICE O/P NEW LOW 30 MIN: CPT

## 2019-04-29 NOTE — DISCUSSION/SUMMARY
[FreeTextEntry1] : rib pain appears muscular skeletal, had recent CT of chest will f/u, call thursday\par knee pain- ortho eval

## 2019-04-29 NOTE — HISTORY OF PRESENT ILLNESS
[FreeTextEntry1] : looking to establish care prior notes and events reviewed\par c/o b/l rib pain past month mostly at night while lying down\par also R knee pain

## 2019-04-29 NOTE — PHYSICAL EXAM
[General Appearance - Well Developed] : well developed [Normal Appearance] : normal appearance [Well Groomed] : well groomed [General Appearance - Well Nourished] : well nourished [No Deformities] : no deformities [General Appearance - In No Acute Distress] : no acute distress [Normal Conjunctiva] : the conjunctiva exhibited no abnormalities [Eyelids - No Xanthelasma] : the eyelids demonstrated no xanthelasmas [Normal Oral Mucosa] : normal oral mucosa [No Oral Pallor] : no oral pallor [No Oral Cyanosis] : no oral cyanosis [Normal Jugular Venous A Waves Present] : normal jugular venous A waves present [Normal Jugular Venous V Waves Present] : normal jugular venous V waves present [No Jugular Venous Oliva A Waves] : no jugular venous oliva A waves [Respiration, Rhythm And Depth] : normal respiratory rhythm and effort [Exaggerated Use Of Accessory Muscles For Inspiration] : no accessory muscle use [Auscultation Breath Sounds / Voice Sounds] : lungs were clear to auscultation bilaterally [Heart Rate And Rhythm] : heart rate and rhythm were normal [Heart Sounds] : normal S1 and S2 [Abdomen Soft] : soft [Abdomen Tenderness] : non-tender [Abdomen Mass (___ Cm)] : no abdominal mass palpated [FreeTextEntry1] : cane [Nail Clubbing] : no clubbing of the fingernails [Cyanosis, Localized] : no localized cyanosis [Petechial Hemorrhages (___cm)] : no petechial hemorrhages [] : no ischemic changes [Oriented To Time, Place, And Person] : oriented to person, place, and time [Affect] : the affect was normal [Mood] : the mood was normal [No Anxiety] : not feeling anxious

## 2019-05-22 ENCOUNTER — APPOINTMENT (OUTPATIENT)
Dept: PHYSICAL MEDICINE AND REHAB | Facility: CLINIC | Age: 84
End: 2019-05-22
Payer: MEDICARE

## 2019-05-22 VITALS
OXYGEN SATURATION: 97 % | BODY MASS INDEX: 36.16 KG/M2 | DIASTOLIC BLOOD PRESSURE: 60 MMHG | WEIGHT: 225 LBS | HEIGHT: 66 IN | SYSTOLIC BLOOD PRESSURE: 140 MMHG | HEART RATE: 92 BPM

## 2019-05-22 DIAGNOSIS — M25.561 PAIN IN RIGHT KNEE: ICD-10-CM

## 2019-05-22 PROCEDURE — 99203 OFFICE O/P NEW LOW 30 MIN: CPT

## 2019-05-22 RX ORDER — NAPROXEN 500 MG/1
500 TABLET ORAL TWICE DAILY
Qty: 30 | Refills: 0 | Status: COMPLETED | COMMUNITY
Start: 2019-04-29 | End: 2019-05-22

## 2019-05-22 RX ORDER — TADALAFIL 5 MG/1
5 TABLET, FILM COATED ORAL
Qty: 10 | Refills: 0 | Status: COMPLETED | COMMUNITY
Start: 2018-03-06 | End: 2019-05-22

## 2019-05-22 RX ORDER — OXYCODONE AND ACETAMINOPHEN 5; 325 MG/1; MG/1
5-325 TABLET ORAL
Qty: 60 | Refills: 0 | Status: COMPLETED | COMMUNITY
Start: 2019-03-07 | End: 2019-05-22

## 2019-05-23 PROBLEM — M25.561 KNEE PAIN, RIGHT: Status: ACTIVE | Noted: 2019-04-29

## 2019-05-23 NOTE — ASSESSMENT
[FreeTextEntry1] : Impression:\par 1. Right Knee DJD\par \par Plan: After review of the history, physical examination, and imaging, the patient's symptoms are consistent with progressive degeneration of right knee arthritis after partial knee arthroplasty. The imaging results and diagnosis were discussed in detail with the patient. We discussed all the potential treatment options and the patient is interested in conversion to total arthroplasty. I am recommending that the patient see Dr. Louise. The patient expressed verbal understanding and is in agreement with the plan of care. All of the patient's questions and concerns were addressed during today's visit.\par

## 2019-05-23 NOTE — HISTORY OF PRESENT ILLNESS
[FreeTextEntry1] : Mr. GINGER COLLINS is a very pleasant 84 year male who comes in for evaluation of right knee pain  that has been ongoing for approximately one year  without any specific injury or inciting event. The patient previously underwent partial arthroplasty and is now interested in conversion to total. The pain is located primarily on the right knee intermittent in nature and described as sharp pain. The pain is rated as 10/10 during today's visit while sitting down, and ranges from 5-10/10. The patient's symptoms are aggravated by standing up and walking any distances  and alleviated by lying down and rest. The patient denies any night pain, numbness/tingling, weakness, or bowel/bladder dysfunction. The patient has no other complaints at this time.\par \par

## 2019-05-23 NOTE — DATA REVIEWED
[FreeTextEntry1] : XR Right Knee 1/2019: Status post unicondylar medial compartment right knee arthroplasty.  There is severe lateral compartment and moderate patellofemoral joint space narrowing. There is a moderate knee effusion. \par

## 2019-05-23 NOTE — PHYSICAL EXAM
[FreeTextEntry1] : GEN:  NAD, AAOx3.\par HEENT:  NCAT. EOMI. Anicteric sclerae, no discharge.\par PSYCH:  Normal mood and affect. Responds appropriately to commands.\par CV:  DP and PT pulses 2+ bilaterally, no edema.\par INSPECTION:  No effusion, discoloration. Normal patellar tracking. \par GAIT: Non-antalgic.\par Lumbosacral AROM: within normal limits.\par Hip and ankle PROM: Full and pain free.\par Knee AROM: Full and pain free.\par PALPATION:  R (+) effusion, crepitus. No warmth. (+) TTP R-LJL/Patellar Facets. No tenderness noted at popliteal fossa, patellar tendon, quad tendon, hamstrings, ITB.  \par LYMPH:  No popliteal LAD or lymphedema.\par REFLEXES:  2+ symmetric bilateral knee and ankles.\par SENSATION:  Normal to light touch in the bilateral lower extremities.\par MOTOR:  5/5 in all key muscle groups of the bilateral lower limbs. No spasticity, tremor, atrophy or contractures noted.\par SPECIAL:  Apley Grind negative bilaterally, Francisco negative bilaterally, Medial/Lateral compression negative bilaterally, Varus/Valgus stress negative bilaterally, Single Leg Squat negative bilaterally, Anterior/Posterior drawer negative bilaterally, Lachman negative bilaterally.

## 2019-05-29 ENCOUNTER — FORM ENCOUNTER (OUTPATIENT)
Age: 84
End: 2019-05-29

## 2019-05-30 ENCOUNTER — OUTPATIENT (OUTPATIENT)
Dept: OUTPATIENT SERVICES | Facility: HOSPITAL | Age: 84
LOS: 1 days | End: 2019-05-30
Payer: MEDICARE

## 2019-05-30 ENCOUNTER — APPOINTMENT (OUTPATIENT)
Dept: ORTHOPEDIC SURGERY | Facility: CLINIC | Age: 84
End: 2019-05-30
Payer: MEDICARE

## 2019-05-30 VITALS
BODY MASS INDEX: 36.16 KG/M2 | SYSTOLIC BLOOD PRESSURE: 120 MMHG | HEIGHT: 66 IN | DIASTOLIC BLOOD PRESSURE: 70 MMHG | WEIGHT: 225 LBS

## 2019-05-30 DIAGNOSIS — Z96.659 PRESENCE OF UNSPECIFIED ARTIFICIAL KNEE JOINT: Chronic | ICD-10-CM

## 2019-05-30 DIAGNOSIS — M21.061 VALGUS DEFORMITY, NOT ELSEWHERE CLASSIFIED, RIGHT KNEE: ICD-10-CM

## 2019-05-30 DIAGNOSIS — Z98.49 CATARACT EXTRACTION STATUS, UNSPECIFIED EYE: Chronic | ICD-10-CM

## 2019-05-30 DIAGNOSIS — M17.11 UNILATERAL PRIMARY OSTEOARTHRITIS, RIGHT KNEE: ICD-10-CM

## 2019-05-30 PROCEDURE — 73564 X-RAY EXAM KNEE 4 OR MORE: CPT

## 2019-05-30 PROCEDURE — 72170 X-RAY EXAM OF PELVIS: CPT | Mod: 26

## 2019-05-30 PROCEDURE — 73564 X-RAY EXAM KNEE 4 OR MORE: CPT | Mod: 26,50

## 2019-05-30 PROCEDURE — 72170 X-RAY EXAM OF PELVIS: CPT

## 2019-05-30 PROCEDURE — 99204 OFFICE O/P NEW MOD 45 MIN: CPT

## 2019-06-04 ENCOUNTER — RX RENEWAL (OUTPATIENT)
Age: 84
End: 2019-06-04

## 2019-06-05 ENCOUNTER — INPATIENT (INPATIENT)
Facility: HOSPITAL | Age: 84
LOS: 9 days | Discharge: NURSING, REHAB OR LONG-TERM CARE | DRG: 37 | End: 2019-06-15
Attending: SURGERY | Admitting: SURGERY
Payer: MEDICARE

## 2019-06-05 VITALS
WEIGHT: 212.08 LBS | RESPIRATION RATE: 20 BRPM | SYSTOLIC BLOOD PRESSURE: 156 MMHG | DIASTOLIC BLOOD PRESSURE: 88 MMHG | TEMPERATURE: 98 F | OXYGEN SATURATION: 97 % | HEART RATE: 92 BPM

## 2019-06-05 DIAGNOSIS — E11.9 TYPE 2 DIABETES MELLITUS WITHOUT COMPLICATIONS: ICD-10-CM

## 2019-06-05 DIAGNOSIS — R63.8 OTHER SYMPTOMS AND SIGNS CONCERNING FOOD AND FLUID INTAKE: ICD-10-CM

## 2019-06-05 DIAGNOSIS — Z29.9 ENCOUNTER FOR PROPHYLACTIC MEASURES, UNSPECIFIED: ICD-10-CM

## 2019-06-05 DIAGNOSIS — Z95.2 PRESENCE OF PROSTHETIC HEART VALVE: Chronic | ICD-10-CM

## 2019-06-05 DIAGNOSIS — Z91.89 OTHER SPECIFIED PERSONAL RISK FACTORS, NOT ELSEWHERE CLASSIFIED: ICD-10-CM

## 2019-06-05 DIAGNOSIS — Z96.659 PRESENCE OF UNSPECIFIED ARTIFICIAL KNEE JOINT: Chronic | ICD-10-CM

## 2019-06-05 DIAGNOSIS — I10 ESSENTIAL (PRIMARY) HYPERTENSION: ICD-10-CM

## 2019-06-05 DIAGNOSIS — Z95.1 PRESENCE OF AORTOCORONARY BYPASS GRAFT: Chronic | ICD-10-CM

## 2019-06-05 DIAGNOSIS — J44.9 CHRONIC OBSTRUCTIVE PULMONARY DISEASE, UNSPECIFIED: ICD-10-CM

## 2019-06-05 DIAGNOSIS — Z98.49 CATARACT EXTRACTION STATUS, UNSPECIFIED EYE: Chronic | ICD-10-CM

## 2019-06-05 DIAGNOSIS — I48.91 UNSPECIFIED ATRIAL FIBRILLATION: ICD-10-CM

## 2019-06-05 DIAGNOSIS — R47.1 DYSARTHRIA AND ANARTHRIA: ICD-10-CM

## 2019-06-05 DIAGNOSIS — Z95.0 PRESENCE OF CARDIAC PACEMAKER: Chronic | ICD-10-CM

## 2019-06-05 LAB
ALBUMIN SERPL ELPH-MCNC: 4 G/DL — SIGNIFICANT CHANGE UP (ref 3.3–5)
ALP SERPL-CCNC: 69 U/L — SIGNIFICANT CHANGE UP (ref 40–120)
ALT FLD-CCNC: 10 U/L — SIGNIFICANT CHANGE UP (ref 10–45)
ANION GAP SERPL CALC-SCNC: 11 MMOL/L — SIGNIFICANT CHANGE UP (ref 5–17)
APTT BLD: 28.9 SEC — SIGNIFICANT CHANGE UP (ref 27.5–36.3)
AST SERPL-CCNC: 20 U/L — SIGNIFICANT CHANGE UP (ref 10–40)
BASOPHILS # BLD AUTO: 0.05 K/UL — SIGNIFICANT CHANGE UP (ref 0–0.2)
BASOPHILS NFR BLD AUTO: 0.4 % — SIGNIFICANT CHANGE UP (ref 0–2)
BILIRUB SERPL-MCNC: 0.4 MG/DL — SIGNIFICANT CHANGE UP (ref 0.2–1.2)
BUN SERPL-MCNC: 21 MG/DL — SIGNIFICANT CHANGE UP (ref 7–23)
CALCIUM SERPL-MCNC: 9.3 MG/DL — SIGNIFICANT CHANGE UP (ref 8.4–10.5)
CHLORIDE SERPL-SCNC: 102 MMOL/L — SIGNIFICANT CHANGE UP (ref 96–108)
CHOLEST SERPL-MCNC: 180 MG/DL — SIGNIFICANT CHANGE UP (ref 10–199)
CO2 SERPL-SCNC: 28 MMOL/L — SIGNIFICANT CHANGE UP (ref 22–31)
CREAT SERPL-MCNC: 1.14 MG/DL — SIGNIFICANT CHANGE UP (ref 0.5–1.3)
EOSINOPHIL # BLD AUTO: 0.23 K/UL — SIGNIFICANT CHANGE UP (ref 0–0.5)
EOSINOPHIL NFR BLD AUTO: 2 % — SIGNIFICANT CHANGE UP (ref 0–6)
GLUCOSE SERPL-MCNC: 89 MG/DL — SIGNIFICANT CHANGE UP (ref 70–99)
HCT VFR BLD CALC: 43.1 % — SIGNIFICANT CHANGE UP (ref 39–50)
HDLC SERPL-MCNC: 51 MG/DL — SIGNIFICANT CHANGE UP
HGB BLD-MCNC: 13.7 G/DL — SIGNIFICANT CHANGE UP (ref 13–17)
IMM GRANULOCYTES NFR BLD AUTO: 0.4 % — SIGNIFICANT CHANGE UP (ref 0–1.5)
INR BLD: 1.3 — HIGH (ref 0.88–1.16)
LIPID PNL WITH DIRECT LDL SERPL: 79 MG/DL — SIGNIFICANT CHANGE UP
LYMPHOCYTES # BLD AUTO: 3.48 K/UL — HIGH (ref 1–3.3)
LYMPHOCYTES # BLD AUTO: 30.1 % — SIGNIFICANT CHANGE UP (ref 13–44)
MCHC RBC-ENTMCNC: 31.8 GM/DL — LOW (ref 32–36)
MCHC RBC-ENTMCNC: 32.4 PG — SIGNIFICANT CHANGE UP (ref 27–34)
MCV RBC AUTO: 101.9 FL — HIGH (ref 80–100)
MONOCYTES # BLD AUTO: 1.02 K/UL — HIGH (ref 0–0.9)
MONOCYTES NFR BLD AUTO: 8.8 % — SIGNIFICANT CHANGE UP (ref 2–14)
NEUTROPHILS # BLD AUTO: 6.75 K/UL — SIGNIFICANT CHANGE UP (ref 1.8–7.4)
NEUTROPHILS NFR BLD AUTO: 58.3 % — SIGNIFICANT CHANGE UP (ref 43–77)
NRBC # BLD: 0 /100 WBCS — SIGNIFICANT CHANGE UP (ref 0–0)
PLATELET # BLD AUTO: 186 K/UL — SIGNIFICANT CHANGE UP (ref 150–400)
POTASSIUM SERPL-MCNC: 4.8 MMOL/L — SIGNIFICANT CHANGE UP (ref 3.5–5.3)
POTASSIUM SERPL-SCNC: 4.8 MMOL/L — SIGNIFICANT CHANGE UP (ref 3.5–5.3)
PROT SERPL-MCNC: 7.5 G/DL — SIGNIFICANT CHANGE UP (ref 6–8.3)
PROTHROM AB SERPL-ACNC: 14.8 SEC — HIGH (ref 10–12.9)
RBC # BLD: 4.23 M/UL — SIGNIFICANT CHANGE UP (ref 4.2–5.8)
RBC # FLD: 14.2 % — SIGNIFICANT CHANGE UP (ref 10.3–14.5)
SODIUM SERPL-SCNC: 141 MMOL/L — SIGNIFICANT CHANGE UP (ref 135–145)
TOTAL CHOLESTEROL/HDL RATIO MEASUREMENT: 3.5 RATIO — SIGNIFICANT CHANGE UP (ref 3.4–9.6)
TRIGL SERPL-MCNC: 252 MG/DL — HIGH (ref 10–149)
TROPONIN T SERPL-MCNC: <0.01 NG/ML — SIGNIFICANT CHANGE UP (ref 0–0.01)
WBC # BLD: 11.58 K/UL — HIGH (ref 3.8–10.5)
WBC # FLD AUTO: 11.58 K/UL — HIGH (ref 3.8–10.5)

## 2019-06-05 PROCEDURE — 71045 X-RAY EXAM CHEST 1 VIEW: CPT | Mod: 26

## 2019-06-05 PROCEDURE — 0042T: CPT

## 2019-06-05 PROCEDURE — 93010 ELECTROCARDIOGRAM REPORT: CPT

## 2019-06-05 PROCEDURE — 99291 CRITICAL CARE FIRST HOUR: CPT

## 2019-06-05 PROCEDURE — 70450 CT HEAD/BRAIN W/O DYE: CPT | Mod: 26,59

## 2019-06-05 PROCEDURE — 70496 CT ANGIOGRAPHY HEAD: CPT | Mod: 26

## 2019-06-05 PROCEDURE — 99285 EMERGENCY DEPT VISIT HI MDM: CPT

## 2019-06-05 PROCEDURE — 70498 CT ANGIOGRAPHY NECK: CPT | Mod: 26

## 2019-06-05 RX ORDER — HEPARIN SODIUM 5000 [USP'U]/ML
5000 INJECTION INTRAVENOUS; SUBCUTANEOUS EVERY 8 HOURS
Refills: 0 | Status: DISCONTINUED | OUTPATIENT
Start: 2019-06-05 | End: 2019-06-05

## 2019-06-05 RX ORDER — ATORVASTATIN CALCIUM 80 MG/1
80 TABLET, FILM COATED ORAL AT BEDTIME
Refills: 0 | Status: DISCONTINUED | OUTPATIENT
Start: 2019-06-05 | End: 2019-06-07

## 2019-06-05 RX ORDER — IPRATROPIUM/ALBUTEROL SULFATE 18-103MCG
3 AEROSOL WITH ADAPTER (GRAM) INHALATION ONCE
Refills: 0 | Status: COMPLETED | OUTPATIENT
Start: 2019-06-05 | End: 2019-06-05

## 2019-06-05 RX ORDER — LISINOPRIL 2.5 MG/1
40 TABLET ORAL ONCE
Refills: 0 | Status: COMPLETED | OUTPATIENT
Start: 2019-06-05 | End: 2019-06-05

## 2019-06-05 RX ORDER — ACETAMINOPHEN 500 MG
650 TABLET ORAL ONCE
Refills: 0 | Status: COMPLETED | OUTPATIENT
Start: 2019-06-05 | End: 2019-06-05

## 2019-06-05 RX ORDER — APIXABAN 2.5 MG/1
5 TABLET, FILM COATED ORAL EVERY 12 HOURS
Refills: 0 | Status: DISCONTINUED | OUTPATIENT
Start: 2019-06-05 | End: 2019-06-06

## 2019-06-05 RX ADMIN — LISINOPRIL 40 MILLIGRAM(S): 2.5 TABLET ORAL at 15:17

## 2019-06-05 RX ADMIN — Medication 650 MILLIGRAM(S): at 22:07

## 2019-06-05 RX ADMIN — ATORVASTATIN CALCIUM 80 MILLIGRAM(S): 80 TABLET, FILM COATED ORAL at 22:07

## 2019-06-05 RX ADMIN — Medication 3 MILLILITER(S): at 15:35

## 2019-06-05 RX ADMIN — APIXABAN 5 MILLIGRAM(S): 2.5 TABLET, FILM COATED ORAL at 18:57

## 2019-06-05 RX ADMIN — Medication 3 MILLILITER(S): at 15:17

## 2019-06-05 NOTE — H&P ADULT - ASSESSMENT
84F y/o male with PMHx former smoker, HTN, HLD, NIDDM, hypothyroidism, prostate CA s/p seeding, atrial flutter s/p ablation, on Eliquis (last dose 2/3/18), bifasicular heart block s/p pacemaker, COPD not on home O2, CAD s/p CABGx3 March 2017 with Ry Pete, and chronic diastolic heart failure presenting with two days of slurred speech, concerning for CVA.

## 2019-06-05 NOTE — H&P ADULT - NSHPPHYSICALEXAM_GEN_ALL_CORE
.  VITAL SIGNS:  T(C): 36.7 (06-05-19 @ 19:37), Max: 36.7 (06-05-19 @ 19:37)  T(F): 98 (06-05-19 @ 19:37), Max: 98 (06-05-19 @ 19:37)  HR: 98 (06-05-19 @ 19:37) (92 - 104)  BP: 151/88 (06-05-19 @ 19:37) (149/99 - 166/81)  BP(mean): --  RR: 18 (06-05-19 @ 19:37) (16 - 20)  SpO2: 94% (06-05-19 @ 19:37) (93% - 97%)  Wt(kg): --    PHYSICAL EXAM:    Constitutional: Obese male sitting in bed comfortably, NAD  Head: NC/AT  Eyes: PERRL, EOMI, anicteric sclera  ENT: no nasal discharge; uvula midline, no oropharyngeal erythema or exudates; MMM  Neck: supple; no JVD or thyromegaly  Respiratory: CTA B/L; no W/R/R, no retractions; no wheezing heard  Cardiac: +S1/S2; RRR; no M/R/G; PMI non-displaced  Gastrointestinal: abdomen tender on left and right flanks, NT/ND; no rebound or guarding; +BSx4  Extremities: WWP; peripheral edema b/l pitting; R knee with pain on palpation  Musculoskeletal: NROM x4; no joint swelling, tenderness or erythema  Vascular: 2+ radial, DP/PT pulses B/L  Dermatologic: skin warm, dry and intact; no rashes, wounds, or scars  Neurologic:    Mental Status: AOX3, 3/3 short term recall, 1/3 long term recall, calculation intact, attention intact; dysarthria noted  CN: 2-12 intact  Motor: Normal bulk and tone; 5/5 strength X4 extremities, RLE limited by pain  Sensory: SILT X4  Coordination: No dysmetria noted  Reflexes: No clonus b/l  Gait: Deferred  Psychiatric: affect and characteristics of appearance, verbalizations, behaviors are appropriate

## 2019-06-05 NOTE — ED PROVIDER NOTE - OBJECTIVE STATEMENT
Pt w/ PMHx CAD s/p CABG x 3, HTN, HLD, NIDDM, AF s/p PPM on Eliquis, AVR, COPD (home O2), hypothyroidism, chronic R knee pain pending TKR in July p/w difficulty speaking. Pt reports at approx 12 pm he began feeling he couldn't find his words. Sx associated w/ 7/10 L frontal HA. Pt reports concomitant dizziness, not vertiginous. Sx began around 12 pm today. Pt reports improvement in all sx except HA. Pt had similar 15 min episode yesterday afternoon. No paresthesias, focal weakness, neck pain, or vision changes. Pt is forgetful and states it is not his baseline. He is accompanied by a friend whom states he is under a lot of stress. Pt did not take his medications today and is unable to name his medications

## 2019-06-05 NOTE — ED ADULT NURSE NOTE - NSIMPLEMENTINTERV_GEN_ALL_ED
Implemented All Fall with Harm Risk Interventions:  [place] to call system. Call bell, personal items and telephone within reach. Instruct patient to call for assistance. Room bathroom lighting operational. Non-slip footwear when patient is off stretcher. Physically safe environment: no spills, clutter or unnecessary equipment. Stretcher in lowest position, wheels locked, appropriate side rails in place. Provide visual cue, wrist band, yellow gown, etc. Monitor gait and stability. Monitor for mental status changes and reorient to person, place, and time. Review medications for side effects contributing to fall risk. Reinforce activity limits and safety measures with patient and family. Provide visual clues: red socks.

## 2019-06-05 NOTE — CONSULT NOTE ADULT - ASSESSMENT
84M CAD s/p CABG x 3, HTN, HLD, NIDDM, AF s/p PPM on Eliquis, AVR, COPD (home O2), hypothyroidism, chronic R knee pain pending TKR in July who presents with word finding difficulty concern for TIA/CVA but unable to get tPA given patient on Eliquis. Will need to get MRI and MOHINI for evaluation for possible clot while on Eliquis.     -MRI Head Non Con  -MOHINI with contrast  -PT/OT  -Bedrest for 24h  -Hold Plavix as patient currently on Eliquis, continue ASA  -Atorvastatin 80mg  -HgbA1c, Lipid, TSH, CBC, CMP, Coags, ESR, CRP  -SCDS + HSQ  -Dysphagia screen

## 2019-06-05 NOTE — H&P ADULT - PROBLEM SELECTOR PLAN 9
1.       PCP Contacted on Admission: (Y/N) --> Name & Phone #:  2.       Date of Contact with PCP:  3.       PCP Contacted at Discharge: (Y/N)  4.       Summary of Handoff Given to PCP:   5.       Post-Discharge Appointment Date and Location:

## 2019-06-05 NOTE — H&P ADULT - NSICDXPASTMEDICALHX_GEN_ALL_CORE_FT
PAST MEDICAL HISTORY:  COPD (chronic obstructive pulmonary disease)     Diabetes     High cholesterol     Hypertension     Prostate cancer in remission

## 2019-06-05 NOTE — H&P ADULT - PROBLEM SELECTOR PLAN 5
- BP elevated to 15/88 on admission  - Hold lisinopril 40 mg (home dose) in setting of permissive HTN  - pt hx of lasix but reports does not take

## 2019-06-05 NOTE — H&P ADULT - PROBLEM SELECTOR PLAN 1
- New onset dysarthria, unclear cause  - Sudden onset as well  - Consideration of CVA as new cause; especially in setting of multiple risk factors.  Unclear how compliant patient is with medication regimen including eliquis, with potential for embolic stroke.  - CVA workup as below  - Speech eval

## 2019-06-05 NOTE — ED ADULT NURSE NOTE - CHPI ED NUR SYMPTOMS NEG
no nausea/no confusion/no fever/no vomiting/no change in level of consciousness/no loss of consciousness/no blurred vision

## 2019-06-05 NOTE — ED PROVIDER NOTE - NS ED ROS FT
Constitutional: No fever or chills.   Eyes: No pain, blurry vision, or discharge.  ENMT: No hearing changes, pain, discharge or infections. No neck pain or stiffness.  Cardiac: No chest pain, SOB or edema. No chest pain with exertion.  Respiratory: No cough or respiratory distress. No hemoptysis. No history of asthma or RAD.  GI: No nausea, vomiting, diarrhea or abdominal pain.  : No dysuria, frequency or burning.  MS: No myalgia, muscle weakness, joint pain or back pain.  Neuro: See HPI  Skin: No skin rash.   Endocrine: No history of thyroid disease or diabetes.  Except as documented in the HPI, all other systems are negative.

## 2019-06-05 NOTE — ED PROVIDER NOTE - OTHER DETAILS FREE TEXT FOR MDM ADDL HISTORY OBTAINED FROM QUESTION
Pharmacy: recent Rx's: Prednisone 10 mg QD, Metformin 500 my BID, Lisinopril 40 mg QD, Eliquis 5 mg BID

## 2019-06-05 NOTE — H&P ADULT - PROBLEM SELECTOR PLAN 2
CT perfusion negative, CT brain with Several patchy hypodense areas in the periventricular white matter of bilateral cerebral hemispheres and CT angio with bilateral carotid stenosis 90%.  - Resume eliquis 5 mg BID  - No aspirin  - On simvastatin 40 at home, will switch to lipitor 80 mg QHS  - MRI brain without contrast (recent pacemaker seems to be MR compatible based on model)  - MOHINI in AM  - Passed bedside dysphagia screen  - SCDs

## 2019-06-05 NOTE — H&P ADULT - NSICDXPASTSURGICALHX_GEN_ALL_CORE_FT
PAST SURGICAL HISTORY:  Artificial pacemaker     H/O aortic valve replacement     History of cataract surgery b/l    History of knee replacement b/l    S/P CABG x 3

## 2019-06-05 NOTE — ED ADULT NURSE NOTE - OBJECTIVE STATEMENT
Pt coming from home due inability to "form words," dizziness and headache since noon. Pt states dizziness felt like a "constant light." Pt states this happened yesterday, however it went away with no residuals. pt complains of pain to "back of left eye as well." Also weakness and tingling to "left side of the head." Pt having slurred speech at times, as well as inability to find words. Pt slow to respond questions at times. Headache 7/10. usual lower extremities weakness. Uses cane on Eliquis.

## 2019-06-05 NOTE — H&P ADULT - HISTORY OF PRESENT ILLNESS
84F y/o male with PMHx former smoker, HTN, HLD, NIDDM, hypothyroidism, prostate CA s/p seeding, atrial flutter s/p ablation, on Eliquis (last dose 2/3/18), bifasicular heart block s/p pacemaker, COPD not on home O2, CAD s/p CABGx3 March 2017 with Dr. Funez, [facility], and chronic diastolic heart failure presenting with two days of slurred speech.  One day PTA and on day of arrival, pt started having speech difficulties at around 12 PM.  Denies HA, weakness, 84F y/o male with PMHx former smoker, HTN, HLD, NIDDM, hypothyroidism, prostate CA s/p seeding, atrial flutter s/p ablation, on Eliquis (last dose 2/3/18), bifasicular heart block s/p pacemaker, COPD not on home O2, CAD s/p CABGx3 March 2017 with Dr. Funez, [facility], and chronic diastolic heart failure presenting with two days of slurred speech.  One day PTA and on day of arrival, pt started having speech difficulties at around 12 PM.  He was told by his girlfriend to come to the ED.  Denies HA, weakness, fevers, chills, CP, SOB.  Denies numbness and tingling of extremities or difficulties with balance.    ED Course:  initial /88 92 97.9 97%RA.  Stroke code called.  CT brain stroke protocol with Several patchy hypodense areas in the periventricular white matter of bilateral cerebral hemispheres.  CT perfusion nml.  CTA head and neck with calcified plaques noted in the aortic arch, bifurcations of both common carotid arteries and bilateral carotid siphons, 90% stenosis at the origins of both internal carotids arteries, minimal stenosis in the distal portion of the left vertebral artery.Given lisinopril 40 mg, 2 duonebs, resumed home eliquis dosing.  Admitted to stroke service for further CVA workup.

## 2019-06-05 NOTE — ED ADULT NURSE REASSESSMENT NOTE - NS ED NURSE REASSESS COMMENT FT1
Patient care and endorsement received from previous RN.  Patient a Stroke code, c/o of dizziness and difficulty finding words, no chest pain or SOB.  NIH scale score 4, NO TPA administered, baseline weakness of lower extremities, uses cane to ambulate.  Patient a/ox 3, no slurred speech, no facial asymmetry.  PMHx:  Pacemaker in place ( V paced ) , cardiac stents and Bypass X 3, aortic valve replacement and multiple knee replacement.  Lisinopril PO and Duonebs for wheezing administered.  For 7 lachman admit.  Room pending. PIV #[id number] in left AC .

## 2019-06-05 NOTE — H&P ADULT - NSHPLABSRESULTS_GEN_ALL_CORE
13.7   11.58 )-----------( 186      ( 05 Jun 2019 14:37 )             43.1   06-05    141  |  102  |  21  ----------------------------<  89  4.8   |  28  |  1.14    Ca    9.3      05 Jun 2019 14:37    TPro  7.5  /  Alb  4.0  /  TBili  0.4  /  DBili  x   /  AST  20  /  ALT  10  /  AlkPhos  69  06-05    PT/INR - ( 05 Jun 2019 14:37 )   PT: 14.8 sec;   INR: 1.30          PTT - ( 05 Jun 2019 14:37 )  PTT:28.9 sec    < from: CT Brain Stroke Protocol (06.05.19 @ 14:44) >      1.  No significant interval change.  No intracranial hemorrhage or mass.    2.  Several patchy hypodense areas in the periventricular white matter of   bilateral cerebral hemispheres.    < end of copied text >    < from: CT Perfusion w/ Maps w/ IV Cont (06.05.19 @ 14:44) >    Normal CT perfusion study.    < end of copied text >    < from: CT Angio Head w/ IV Cont (06.05.19 @ 14:44) >    1.  Calcified plaques noted in the aortic arch, bifurcations of both   common carotid arteries and bilateral carotid siphons.    2.  Approximately 90% stenosis at the origins of both internal carotid   arteries.    3.  Minimal stenosis in the distal portion of the left vertebral artery.    < end of copied text >

## 2019-06-05 NOTE — ED ADULT NURSE NOTE - PSH
Artificial pacemaker    H/O aortic valve replacement    History of cataract surgery  b/l  History of knee replacement  b/l  S/P CABG x 3

## 2019-06-05 NOTE — H&P ADULT - PROBLEM SELECTOR PLAN 3
- Hx of Afib along with hx of bifasciular heart block requiring pacemaker implantation  - Patient on eliquis 5 mg BID; MOHINI pending to r/o clot  - Previous med rec with dilt 120 and amiodarone 200 but on outpatient medication review, and per pharmacist has not picked those medications up recently  - Will monitor HR, though with pacemaker

## 2019-06-05 NOTE — ED PROVIDER NOTE - PHYSICAL EXAMINATION
Constitutional: Well appearing, well nourished, awake, alert, oriented to person, place, time/situation and in no apparent distress.  ENMT: Airway patent. Dry MM  Eyes: Clear bilaterally. PERRL. EOMI. No nystagmus. Normal visual fields  Cardiac: Normal rate, regular rhythm.  Heart sounds S1, S2.  No murmurs, rubs or gallops. No JVD  Respiratory: Mildly diminished throughout w/ diffuse exp wheezing. no R/R. No increased WOB, tachypnea, hypoxia, or accessory mm use. Pt speaks in full sentences.   Gastrointestinal: Abd soft, NT, ND, NABS. No guarding, rebound, or rigidity. No pulsatile abdominal masses. No organomegaly appreciated. No CVAT   Musculoskeletal: Range of motion is not limited. trace b/l LE edema. no calf ttp  Neuro: see NIHSS  Skin: Skin normal color for race, warm, dry and intact. No evidence of rash.  Psych: Alert and oriented to person, place, time/situation. anxious, somewhat bizarre affect. intermittently cries. no apparent risk to self or others.

## 2019-06-05 NOTE — ED ADULT NURSE NOTE - MUSCULOSKELETAL WDL
Full range of motion of upper and lower extremities, no joint tenderness/swelling. weakness to lower extremeties, uses cane

## 2019-06-05 NOTE — ED ADULT TRIAGE NOTE - CHIEF COMPLAINT QUOTE
pt c/o trouble speaking since noon today. pt states "this happened yesterday and then went away but now started again at noon." pt is on eliquis and also c/o headache. denies head injury/truma, vision changes, numbness/tingling. per pts friend "I couldn't understand his speech and that's not normal." + slurred speech. fs 84.

## 2019-06-05 NOTE — ED ADULT NURSE NOTE - NEURO MENTATION
Pt unable to fully describe his symptoms that happened at 1200. Stated he does not remember/memory loss, short term

## 2019-06-05 NOTE — ED PROVIDER NOTE - CLINICAL SUMMARY MEDICAL DECISION MAKING FREE TEXT BOX
Pt p/w dysarthria, HA, memory loss. STroke code initiated. NIHSS 4. Not a candidate for TPA 2/2 Eliquis. Pt seen and examined by stroke. Admit to 7lachman under Dr Emerson

## 2019-06-05 NOTE — H&P ADULT - NSHPREVIEWOFSYSTEMS_GEN_ALL_CORE
REVIEW OF SYSTEMS    General:	Denies fevers, chills    Ophthalmologic: Denies blurry vision  	  ENMT:	Denies changes in hearing    Respiratory and Thorax: Denies SOB  	  Cardiovascular:	Extensive cardiac hx    Gastrointestinal:	Denies nausea or vomiting    Genitourinary:	Denies urinary frequency    Musculoskeletal:	For R knee surgery in one month    Neurological:	As above

## 2019-06-06 ENCOUNTER — APPOINTMENT (OUTPATIENT)
Dept: PULMONOLOGY | Facility: CLINIC | Age: 84
End: 2019-06-06

## 2019-06-06 DIAGNOSIS — E03.9 HYPOTHYROIDISM, UNSPECIFIED: ICD-10-CM

## 2019-06-06 LAB
ALBUMIN SERPL ELPH-MCNC: 3.8 G/DL — SIGNIFICANT CHANGE UP (ref 3.3–5)
ALP SERPL-CCNC: 62 U/L — SIGNIFICANT CHANGE UP (ref 40–120)
ALT FLD-CCNC: 9 U/L — LOW (ref 10–45)
ANION GAP SERPL CALC-SCNC: 10 MMOL/L — SIGNIFICANT CHANGE UP (ref 5–17)
AST SERPL-CCNC: 16 U/L — SIGNIFICANT CHANGE UP (ref 10–40)
BILIRUB SERPL-MCNC: 0.4 MG/DL — SIGNIFICANT CHANGE UP (ref 0.2–1.2)
BUN SERPL-MCNC: 19 MG/DL — SIGNIFICANT CHANGE UP (ref 7–23)
CALCIUM SERPL-MCNC: 8.9 MG/DL — SIGNIFICANT CHANGE UP (ref 8.4–10.5)
CHLORIDE SERPL-SCNC: 100 MMOL/L — SIGNIFICANT CHANGE UP (ref 96–108)
CHOLEST SERPL-MCNC: 162 MG/DL — SIGNIFICANT CHANGE UP (ref 10–199)
CO2 SERPL-SCNC: 26 MMOL/L — SIGNIFICANT CHANGE UP (ref 22–31)
CREAT SERPL-MCNC: 1.26 MG/DL — SIGNIFICANT CHANGE UP (ref 0.5–1.3)
CRP SERPL-MCNC: 0.58 MG/DL — HIGH (ref 0–0.4)
ERYTHROCYTE [SEDIMENTATION RATE] IN BLOOD: 14 MM/HR — SIGNIFICANT CHANGE UP
GLUCOSE BLDC GLUCOMTR-MCNC: 101 MG/DL — HIGH (ref 70–99)
GLUCOSE BLDC GLUCOMTR-MCNC: 92 MG/DL — SIGNIFICANT CHANGE UP (ref 70–99)
GLUCOSE SERPL-MCNC: 95 MG/DL — SIGNIFICANT CHANGE UP (ref 70–99)
HBA1C BLD-MCNC: 6.3 % — HIGH (ref 4–5.6)
HCT VFR BLD CALC: 39.9 % — SIGNIFICANT CHANGE UP (ref 39–50)
HDLC SERPL-MCNC: 45 MG/DL — SIGNIFICANT CHANGE UP
HGB BLD-MCNC: 12.6 G/DL — LOW (ref 13–17)
LIPID PNL WITH DIRECT LDL SERPL: 87 MG/DL — SIGNIFICANT CHANGE UP
MAGNESIUM SERPL-MCNC: 2 MG/DL — SIGNIFICANT CHANGE UP (ref 1.6–2.6)
MCHC RBC-ENTMCNC: 31.6 GM/DL — LOW (ref 32–36)
MCHC RBC-ENTMCNC: 32.1 PG — SIGNIFICANT CHANGE UP (ref 27–34)
MCV RBC AUTO: 101.8 FL — HIGH (ref 80–100)
NRBC # BLD: 0 /100 WBCS — SIGNIFICANT CHANGE UP (ref 0–0)
PLATELET # BLD AUTO: 166 K/UL — SIGNIFICANT CHANGE UP (ref 150–400)
POTASSIUM SERPL-MCNC: 3.9 MMOL/L — SIGNIFICANT CHANGE UP (ref 3.5–5.3)
POTASSIUM SERPL-SCNC: 3.9 MMOL/L — SIGNIFICANT CHANGE UP (ref 3.5–5.3)
PROT SERPL-MCNC: 6.6 G/DL — SIGNIFICANT CHANGE UP (ref 6–8.3)
RBC # BLD: 3.92 M/UL — LOW (ref 4.2–5.8)
RBC # FLD: 14.5 % — SIGNIFICANT CHANGE UP (ref 10.3–14.5)
SODIUM SERPL-SCNC: 136 MMOL/L — SIGNIFICANT CHANGE UP (ref 135–145)
T4 AB SER-ACNC: <3 UG/DL — LOW (ref 3.17–11.72)
TOTAL CHOLESTEROL/HDL RATIO MEASUREMENT: 3.6 RATIO — SIGNIFICANT CHANGE UP (ref 3.4–9.6)
TRIGL SERPL-MCNC: 148 MG/DL — SIGNIFICANT CHANGE UP (ref 10–149)
TSH SERPL-MCNC: 42.63 UIU/ML — HIGH (ref 0.35–4.94)
TSH SERPL-MCNC: 43.12 UIU/ML — HIGH (ref 0.35–4.94)
WBC # BLD: 8.65 K/UL — SIGNIFICANT CHANGE UP (ref 3.8–10.5)
WBC # FLD AUTO: 8.65 K/UL — SIGNIFICANT CHANGE UP (ref 3.8–10.5)

## 2019-06-06 PROCEDURE — 74018 RADEX ABDOMEN 1 VIEW: CPT | Mod: 26

## 2019-06-06 PROCEDURE — 99233 SBSQ HOSP IP/OBS HIGH 50: CPT

## 2019-06-06 PROCEDURE — 72100 X-RAY EXAM L-S SPINE 2/3 VWS: CPT | Mod: 26

## 2019-06-06 PROCEDURE — 93306 TTE W/DOPPLER COMPLETE: CPT | Mod: 26

## 2019-06-06 PROCEDURE — 72070 X-RAY EXAM THORAC SPINE 2VWS: CPT | Mod: 26

## 2019-06-06 PROCEDURE — 99232 SBSQ HOSP IP/OBS MODERATE 35: CPT

## 2019-06-06 RX ORDER — IPRATROPIUM/ALBUTEROL SULFATE 18-103MCG
3 AEROSOL WITH ADAPTER (GRAM) INHALATION ONCE
Refills: 0 | Status: COMPLETED | OUTPATIENT
Start: 2019-06-06 | End: 2019-06-06

## 2019-06-06 RX ORDER — LEVOTHYROXINE SODIUM 125 MCG
50 TABLET ORAL DAILY
Refills: 0 | Status: DISCONTINUED | OUTPATIENT
Start: 2019-06-07 | End: 2019-06-07

## 2019-06-06 RX ORDER — ASPIRIN/CALCIUM CARB/MAGNESIUM 324 MG
81 TABLET ORAL DAILY
Refills: 0 | Status: DISCONTINUED | OUTPATIENT
Start: 2019-06-06 | End: 2019-06-07

## 2019-06-06 RX ORDER — HEPARIN SODIUM 5000 [USP'U]/ML
INJECTION INTRAVENOUS; SUBCUTANEOUS
Qty: 25000 | Refills: 0 | Status: DISCONTINUED | OUTPATIENT
Start: 2019-06-07 | End: 2019-06-07

## 2019-06-06 RX ORDER — LEVOTHYROXINE SODIUM 125 MCG
50 TABLET ORAL DAILY
Refills: 0 | Status: DISCONTINUED | OUTPATIENT
Start: 2019-06-06 | End: 2019-06-06

## 2019-06-06 RX ORDER — DEXTROSE 50 % IN WATER 50 %
15 SYRINGE (ML) INTRAVENOUS ONCE
Refills: 0 | Status: DISCONTINUED | OUTPATIENT
Start: 2019-06-06 | End: 2019-06-07

## 2019-06-06 RX ORDER — HEPARIN SODIUM 5000 [USP'U]/ML
1200 INJECTION INTRAVENOUS; SUBCUTANEOUS
Qty: 25000 | Refills: 0 | Status: DISCONTINUED | OUTPATIENT
Start: 2019-06-06 | End: 2019-06-06

## 2019-06-06 RX ORDER — DEXTROSE 50 % IN WATER 50 %
12.5 SYRINGE (ML) INTRAVENOUS ONCE
Refills: 0 | Status: DISCONTINUED | OUTPATIENT
Start: 2019-06-06 | End: 2019-06-07

## 2019-06-06 RX ORDER — ACETAMINOPHEN 500 MG
650 TABLET ORAL EVERY 6 HOURS
Refills: 0 | Status: DISCONTINUED | OUTPATIENT
Start: 2019-06-06 | End: 2019-06-07

## 2019-06-06 RX ORDER — INSULIN LISPRO 100/ML
VIAL (ML) SUBCUTANEOUS
Refills: 0 | Status: DISCONTINUED | OUTPATIENT
Start: 2019-06-06 | End: 2019-06-07

## 2019-06-06 RX ORDER — DEXTROSE 50 % IN WATER 50 %
25 SYRINGE (ML) INTRAVENOUS ONCE
Refills: 0 | Status: DISCONTINUED | OUTPATIENT
Start: 2019-06-06 | End: 2019-06-07

## 2019-06-06 RX ORDER — GLUCAGON INJECTION, SOLUTION 0.5 MG/.1ML
1 INJECTION, SOLUTION SUBCUTANEOUS ONCE
Refills: 0 | Status: DISCONTINUED | OUTPATIENT
Start: 2019-06-06 | End: 2019-06-07

## 2019-06-06 RX ORDER — SODIUM CHLORIDE 9 MG/ML
1000 INJECTION, SOLUTION INTRAVENOUS
Refills: 0 | Status: DISCONTINUED | OUTPATIENT
Start: 2019-06-06 | End: 2019-06-07

## 2019-06-06 RX ADMIN — APIXABAN 5 MILLIGRAM(S): 2.5 TABLET, FILM COATED ORAL at 06:06

## 2019-06-06 RX ADMIN — ATORVASTATIN CALCIUM 80 MILLIGRAM(S): 80 TABLET, FILM COATED ORAL at 21:00

## 2019-06-06 RX ADMIN — APIXABAN 5 MILLIGRAM(S): 2.5 TABLET, FILM COATED ORAL at 17:33

## 2019-06-06 RX ADMIN — Medication 650 MILLIGRAM(S): at 06:06

## 2019-06-06 RX ADMIN — Medication 650 MILLIGRAM(S): at 06:30

## 2019-06-06 RX ADMIN — Medication 650 MILLIGRAM(S): at 18:20

## 2019-06-06 RX ADMIN — Medication 3 MILLILITER(S): at 07:19

## 2019-06-06 RX ADMIN — Medication 650 MILLIGRAM(S): at 16:58

## 2019-06-06 NOTE — CONSULT NOTE ADULT - ASSESSMENT
84F y/o male with PMHx former smoker, HTN, HLD, NIDDM, hypothyroidism, prostate CA s/p seeding, atrial flutter s/p ablation, on Eliquis (last dose 2/3/18), bifasicular heart block s/p pacemaker, COPD not on home O2, CAD s/p CABGx3 March 2017 with Dr. Funez, [facility], and chronic diastolic heart failure presenting with two days of slurred speech, concerning for CVA.      Problem/Plan - 1:  ·  Problem: Dysarthria.  Plan: - New onset dysarthria, unclear cause  - Sudden onset as well  - Consideration of CVA as new cause; especially in setting of multiple risk factors.  Unclear how compliant patient is with medication regimen including eliquis, with potential for embolic stroke.  - CVA workup as below  - Speech eval.     Problem/Plan - 2:  ·  Problem: R/O Cerebrovascular accident (CVA), unspecified mechanism.  Plan: CT perfusion negative, CT brain with Several patchy hypodense areas in the periventricular white matter of bilateral cerebral hemispheres and CT angio with bilateral carotid stenosis 90%.  - Resume eliquis 5 mg BID  - No aspirin  - On simvastatin 40 at home, will switch to lipitor 80 mg QHS  - MRI brain without contrast (recent pacemaker seems to be MR compatible based on model)  - MOHINI in AM  - Passed bedside dysphagia screen  - SCDs.     Problem/Plan - 3:  ·  Problem: Atrial fibrillation, unspecified type.  Plan: - Hx of Afib along with hx of bifasciular heart block requiring pacemaker implantation  - Patient on eliquis 5 mg BID; MOHINI pending to r/o clot  - Previous med rec with dilt 120 and amiodarone 200 but on outpatient medication review, and per pharmacist has not picked those medications up recently  - Will monitor HR, though with pacemaker.     Problem/Plan - 4:  ·  Problem: Type 2 diabetes mellitus without complication, without long-term current use of insulin.  Plan: - Takes metformin 500 mg BID; previous A1c 6.8  - AM A1c pending  - mISS.     Problem/Plan - 5:  ·  Problem: Essential hypertension.  Plan: - BP elevated to 15/88 on admission  - Hold lisinopril 40 mg (home dose) in setting of permissive HTN  - pt hx of lasix but reports does not take.     Problem/Plan - 6:  Problem: Chronic obstructive pulmonary disease, unspecified COPD type. Plan: - Hx of COPD, not currently on any nebulizer  - Duoneb PRn wheezing.    Problem/Plan - 7:  ·  Problem: Prophylactic measure.  Plan: VTE PPx: SCDs, eliquis  No indication for GI PPx  FULL CODE

## 2019-06-06 NOTE — PROGRESS NOTE ADULT - SUBJECTIVE AND OBJECTIVE BOX
NOTE AND RECS PENDING  OVERNIGHT EVENTS:    SUBJECTIVE / INTERVAL HPI: Patient seen and examined at bedside.     VITAL SIGNS:  Vital Signs Last 24 Hrs  T(C): 36.5 (06 Jun 2019 08:57), Max: 36.9 (06 Jun 2019 01:18)  T(F): 97.7 (06 Jun 2019 08:57), Max: 98.5 (06 Jun 2019 01:18)  HR: 86 (06 Jun 2019 13:05) (76 - 104)  BP: 131/72 (06 Jun 2019 13:05) (131/72 - 169/81)  BP(mean): 92 (06 Jun 2019 13:05) (90 - 119)  RR: 18 (06 Jun 2019 13:05) (16 - 18)  SpO2: 92% (06 Jun 2019 13:05) (91% - 95%)    PHYSICAL EXAM:    General: WDWN  HEENT: NC/AT; PERRL, anicteric sclera; MMM  Neck: supple  Cardiovascular: +S1/S2; RRR  Respiratory: CTA B/L; no W/R/R  Gastrointestinal: soft, NT/ND; +BSx4  Extremities: WWP; no edema, clubbing or cyanosis  Vascular: 2+ radial, DP/PT pulses B/L  Neurological: AAOx3; no focal deficits    MEDICATIONS:  MEDICATIONS  (STANDING):  apixaban 5 milliGRAM(s) Oral every 12 hours  atorvastatin 80 milliGRAM(s) Oral at bedtime    MEDICATIONS  (PRN):  acetaminophen   Tablet .. 650 milliGRAM(s) Oral every 6 hours PRN Moderate Pain (4 - 6)      ALLERGIES:  Allergies    No Known Allergies    Intolerances    narcotic analgesics (Nausea; Vomiting)      LABS:                        12.6   8.65  )-----------( 166      ( 06 Jun 2019 06:55 )             39.9     06-06    136  |  100  |  19  ----------------------------<  95  3.9   |  26  |  1.26    Ca    8.9      06 Jun 2019 06:55  Mg     2.0     06-06    TPro  6.6  /  Alb  3.8  /  TBili  0.4  /  DBili  x   /  AST  16  /  ALT  9<L>  /  AlkPhos  62  06-06    PT/INR - ( 05 Jun 2019 14:37 )   PT: 14.8 sec;   INR: 1.30          PTT - ( 05 Jun 2019 14:37 )  PTT:28.9 sec    CAPILLARY BLOOD GLUCOSE  84 (05 Jun 2019 14:49)      POCT Blood Glucose.: 92 mg/dL (06 Jun 2019 13:21)      RADIOLOGY & ADDITIONAL TESTS: Reviewed.    ASSESSMENT:    PLAN: OVERNIGHT EVENTS:  - No acute events overnight    SUBJECTIVE / INTERVAL HPI: Patient seen and examined at bedside. Reporting b/l abd/flank pain.  Denies chest pain or shortness of breath.  Still with slurred speech/dysarthria.    VITAL SIGNS:  Vital Signs Last 24 Hrs  T(C): 36.5 (06 Jun 2019 08:57), Max: 36.9 (06 Jun 2019 01:18)  T(F): 97.7 (06 Jun 2019 08:57), Max: 98.5 (06 Jun 2019 01:18)  HR: 86 (06 Jun 2019 13:05) (76 - 104)  BP: 131/72 (06 Jun 2019 13:05) (131/72 - 169/81)  BP(mean): 92 (06 Jun 2019 13:05) (90 - 119)  RR: 18 (06 Jun 2019 13:05) (16 - 18)  SpO2: 92% (06 Jun 2019 13:05) (91% - 95%)    PHYSICAL EXAM:    Constitutional: Obese male sitting in bed comfortably, NAD  Eyes: PERRL, EOMI, anicteric sclera  ENT: no oropharyngeal erythema or exudates; MMM  Respiratory: CTA B/L; no W/R/R, no retractions; no wheezing heard  Cardiac: +S1/S2; RRR; no M/R/G; PMI non-displaced  Gastrointestinal: abdomen tender to palpation on left and right flanks, NT/ND; no rebound or guarding; +BSx4  Extremities: WWP; peripheral edema b/l pitting; R knee with pain on palpation  Musculoskeletal: NROM x4; no joint swelling, tenderness or erythema  Vascular: 2+ radial, DP/PT pulses B/L    Neurologic:    	Mental Status: AOX3, dysarthria noted  	CN: 2-12 intact  	Motor: Normal bulk and tone; 5/5 strength X4 extremities, RLE limited by pain  	Sensory: SILT X4  	Coordination: No dysmetria noted  	Reflexes: No clonus b/l  Psychiatric: affect and characteristics of appearance, verbalizations, behaviors are appropriate    MEDICATIONS:  MEDICATIONS  (STANDING):  apixaban 5 milliGRAM(s) Oral every 12 hours  atorvastatin 80 milliGRAM(s) Oral at bedtime    MEDICATIONS  (PRN):  acetaminophen   Tablet .. 650 milliGRAM(s) Oral every 6 hours PRN Moderate Pain (4 - 6)      ALLERGIES:  Allergies    No Known Allergies    Intolerances    narcotic analgesics (Nausea; Vomiting)      LABS:                        12.6   8.65  )-----------( 166      ( 06 Jun 2019 06:55 )             39.9     06-06    136  |  100  |  19  ----------------------------<  95  3.9   |  26  |  1.26    Ca    8.9      06 Jun 2019 06:55  Mg     2.0     06-06    TPro  6.6  /  Alb  3.8  /  TBili  0.4  /  DBili  x   /  AST  16  /  ALT  9<L>  /  AlkPhos  62  06-06    PT/INR - ( 05 Jun 2019 14:37 )   PT: 14.8 sec;   INR: 1.30          PTT - ( 05 Jun 2019 14:37 )  PTT:28.9 sec    CAPILLARY BLOOD GLUCOSE  84 (05 Jun 2019 14:49)      POCT Blood Glucose.: 92 mg/dL (06 Jun 2019 13:21)      RADIOLOGY & ADDITIONAL TESTS: Reviewed.    ASSESSMENT:    PLAN:

## 2019-06-06 NOTE — CONSULT NOTE ADULT - ASSESSMENT
84F y/o male with PMHx former smoker, HTN, HLD, NIDDM, hypothyroidism, prostate CA s/p seeding, atrial flutter s/p ablation, on Eliquis (last dose 2/3/18), bifasicular heart block s/p pacemaker, COPD not on home O2, CAD s/p CABGx3 March 2017 with Dr. Funez, [facility], and chronic diastolic heart failure presenting with two days of slurred speech.    - carotid duplex  - b/l lower extremity duplex  - ASA, statin   - switch from Eliquis to a heparin drip  - added onto OR schedule for possible Left carotic endarterectomy tomorrow  - pre op: NPO at midnight, IVF at midnight, AM labs, T&S x 2, up to date chest xray and EKG

## 2019-06-06 NOTE — PHYSICAL EXAM
[de-identified] : Constitutional: Overweight, but well appearing. No acute distress.\par Mental Status: Alert & oriented to person, place and time. Normal affect.\par Pulmonary: No respiratory distress. Normal chest excursion.\par \par Gait: Right antalgic.\par Ambulatory assist devices: Cane.\par \par Cervical spine: Skin intact. No visible deformity. Painless active ROM without evident restriction.\par Bilateral upper extremities: Skin intact. No deformity. Painless active ROM without evident restriction.\par Thoracolumbar spine: No deformity. No tenderness. No radicular pain on passive straight leg raise bilaterally.\par \par Pelvis: No pelvic obliquity. No tenderness.\par Leg lengths: Equal.\par Bilateral Hips: No swelling or deformity. Painless and unrestricted range of motion. No crepitation. \par \par Right Knee:\par Skin intact.\par Well healed surgical scar.\par No erythema or ecchymosis.\par Large effusion. Popliteal cyst.\par 5-10 degrees of varus, valgus laxity. 5 mm of anterior drawer.\par Lateral joint line tenderness.\par Painful ROM from full extension to 130 degrees of flexion.\par Central patellar tracking.\par (+) severe crepitation at lateral joint line\par \par RLE edema, greater than left\par Right calf tenderness\par \par Left Knee:\par Skin intact.\par Well healed midline surgical scar.\par No erythema or ecchymosis. No swelling or effusion. No deformity. No focal tenderness.\par Painless ROM from full extension to 135 degrees of flexion.\par Central patellar tracking. No crepitation.\par Mild laxity.\par \par Neurological: Intact distal crude touch sensation. Normal distal motor power. \par Cardiovascular: Palpable dorsalis pedis and posterior tibialis pulses. Brisk capillary refill. No peripheral edema.\par Lymphatics: No peripheral adenopathy appreciated. [de-identified] : X-ray imaging of the bilateral knees done here today demonstrates right knee with a well-fixed medial unicondylar knee replacement, however there is severe valgus osteoarthritis of the knee with advanced damage to the lateral tibiofemoral compartment with peaks and troughs with one bone grooving the other, there is also moderate to severe patellofemoral arthritis, left knee with a well-fixed, well-aligned left TKR

## 2019-06-06 NOTE — CONSULT NOTE ADULT - ASSESSMENT
84F with CAD s/p CABG x 3, HTN, HLD, NIDDM, AF s/p PPM on Eliquis, TAVR on 2/6/19, COPD (home O2), hypothyroidism who presents with symptoms of TIA/strole. 84F with CAD s/p CABG x 3, HTN, HLD, NIDDM, AF s/p PPM on Eliquis, TAVR on 2/6/18, COPD (home O2), hypothyroidism who presents with symptoms of TIA/stroke.    Plan:  Problem 1: slurred speech with concern for CVA/TIA  -Complete MRI as per neurology to better assess for CVA/TIA  -c/w statin    Problem 2: s/p TAVR over 1 year ago  -patient is well beyond the window for a yoni-procedural embolic event  -will re-assess valve on MOHINI  -no aspirin/plavix as patient is fully anticoagulated with eliquis    Problem 3: hx of Afib on eliquis  -check MOHINI to r/o atrial thrombus  -c/w eliquis    Problem 4: 90% b/l carotid stenosis  -consider vascular consult if CVA/TIA suspected and cardioembolic source ruled out  -c/w statin    I have reviewed clinical labs tests and reports, radiology tests and reports, as well as old patient medical records, and discussed with the refering physician.

## 2019-06-06 NOTE — OCCUPATIONAL THERAPY INITIAL EVALUATION ADULT - GENERAL OBSERVATIONS, REHAB EVAL
Patient right hand dominant. Chart reviewed, SILVIANO Santoro cleared patient for OT evaluation. Patient received seated in chair, NAD, +IV heplock, +tele.

## 2019-06-06 NOTE — HISTORY OF PRESENT ILLNESS
[Worsening] : worsening [___ yrs] : [unfilled] year(s) ago [Standing] : standing [Constant] : ~He/She~ states the symptoms seem to be constant [Bending] : worsened by bending [Sitting] : worsened by sitting [Walking] : worsened by walking [None] : No relieving factors are noted [de-identified] : 84 year old male former smoker with HTN, HLD and DM presents today for initial evaluation and surgical consult of right knee pain that began in the summer of 2018 after a fall. He is s/p a right partial knee replacement 14 years ago and s/p left partial knee replacement 8 years ago that was converted to total one year ago. Dr. Anderson performed the procedures. At his last visit with Dr. Anderson, patient was prescribed a brace and physical therapy but he did not like wearing the brace and reports that physical therapy was very painful. Mr. Fernandez presents here today because he is unsatisfied by his most recent interaction with Dr. Anderson and no longer wants his care at [facility].\par Today, he reports severe right knee pain along with severe stiffness and instability on all surfaces. In the left knee, he reports mild, occasional pain and mild stiffness. Patient can walk less than 5 blocks with a severe limp and a cane. He uses a rail to ascend and descend stairs. Bracing and cane use help a little to relieve pain whereas physical therapy, crutches and acetaminophen do not help.\par Of note, patient is s/p triple heart bypass and valve placement about 2 years ago. Both procedures were done at [facility] by Dr. Funez. [Acetaminophen] : not relieved by acetaminophen [Physical Therapy] : not relieved by physical therapy [NSAIDs] : not relieved by nonsteroidal anti-inflammatory drugs

## 2019-06-06 NOTE — OCCUPATIONAL THERAPY INITIAL EVALUATION ADULT - RANGE OF MOTION EXAMINATION
CN Testing: b/l frontalis intact; b/l buccinator intact; smile symmetrical; tongue protrusion at midline; b/l eyes open/close intact; b/l shoulder elevation intact

## 2019-06-06 NOTE — OCCUPATIONAL THERAPY INITIAL EVALUATION ADULT - ORIENTATION, REHAB EVAL
+dysarthria and decreased sentence fluency, able to recite months of year and days of week in descending order with mod-max increased time and effort/oriented to person, place, time and situation

## 2019-06-06 NOTE — DISCUSSION/SUMMARY
[de-identified] : Mr. COLLINS has progressively severe knee pain and disability secondary to DJD and has failed extensive conservative care including activity modification, analgesic medications and therapeutic exercise. The patient was indicated for conversion to right total knee replacement.\par \par We discussed the details of the procedure, the expected recovery period, and the expected outcome. We discussed the likelihood of satisfaction after complete recovery, and the potential causes of dissatisfaction. The importance of active patient participation in the rehabilitation protocol was emphasized, along with its influence on short and long-term outcomes. Specific risks of total knee replacement were discussed in detail. We discussed the risk of surgical site complications including but not limited to: surgical site infection, wound healing complications, bone fracture, tendon or ligament injury, neurovascular injury, hemorrhage, postoperative stiffness or instability, persistent pain and need for reoperation or manipulation under anesthesia. We discussed surgical blood loss and the possible need for blood transfusion. We discussed the risk of perioperative medical complications, including but not limited to catheter-associated urinary tract infection, venous thromboembolism and other cardiopulmonary complications. We discussed anesthetic options and the risk of anesthesia-related complications. We discussed the durability of prosthetic knees and limitations related to wear, osteolysis and loosening. The patient was given a copy of my preoperative packet with additional information about the procedure.\par \par Based on his h/o CAD, triple bypass and stent placement, I informed Mr. Collins that he will need cardiac clearance prior to the procedure.\par \par The patient gave informed consent for the surgical procedure and was instructed to speak to my surgical coordinator to arrange the logistics of surgical scheduling, presurgical testing, and medical optimization and clearance.

## 2019-06-06 NOTE — PHYSICAL THERAPY INITIAL EVALUATION ADULT - GENERAL OBSERVATIONS, REHAB EVAL
Pt received OOB sitting in the chair, +chair alarm, +hep-lock, +EKG, NAD. Pt left as found, NAD, call bell in reach, +chair alarm, RN awares.

## 2019-06-06 NOTE — CONSULT NOTE ADULT - SUBJECTIVE AND OBJECTIVE BOX
Patient is a 84y old  Male who presents with a chief complaint of Slurred Speech (05 Jun 2019 18:20)       HPI:  84F y/o male with PMHx former smoker, HTN, HLD, NIDDM, hypothyroidism, prostate CA s/p seeding, atrial flutter s/p ablation, on Eliquis (last dose 2/3/18), bifasicular heart block s/p pacemaker, COPD not on home O2, CAD s/p CABGx3 March 2017 with Ry Pete, and chronic diastolic heart failure presenting with two days of slurred speech.  One day PTA and on day of arrival, pt started having speech difficulties at around 12 PM.  He was told by his girlfriend to come to the ED.  Denies HA, weakness, fevers, chills, CP, SOB.  Denies numbness and tingling of extremities or difficulties with balance.    ED Course:  initial /88 92 97.9 97%RA.  Stroke code called.  CT brain stroke protocol with Several patchy hypodense areas in the periventricular white matter of bilateral cerebral hemispheres.  CT perfusion nml.  CTA head and neck with calcified plaques noted in the aortic arch, bifurcations of both common carotid arteries and bilateral carotid siphons, 90% stenosis at the origins of both internal carotids arteries, minimal stenosis in the distal portion of the left vertebral artery.Given lisinopril 40 mg, 2 duonebs, resumed home eliquis dosing.  Admitted to stroke service for further CVA workup. (05 Jun 2019 18:20)      PAST MEDICAL & SURGICAL HISTORY:  High cholesterol  Diabetes  Prostate cancer: in remission  Hypertension  COPD (chronic obstructive pulmonary disease)  H/O aortic valve replacement  S/P CABG x 3  Artificial pacemaker  History of cataract surgery: b/l  History of knee replacement: b/l      MEDICATIONS  (STANDING):  apixaban 5 milliGRAM(s) Oral every 12 hours  atorvastatin 80 milliGRAM(s) Oral at bedtime    MEDICATIONS  (PRN):  acetaminophen   Tablet .. 650 milliGRAM(s) Oral every 6 hours PRN Moderate Pain (4 - 6)      Social History: lives with his girlfriend in an elevator accessible apartment building    Functional Level Prior to Admission: ADL independent, walks with a cane    FAMILY HISTORY:  Family history of acute myocardial infarction (Father)      CBC Full  -  ( 06 Jun 2019 06:55 )  WBC Count : 8.65 K/uL  RBC Count : 3.92 M/uL  Hemoglobin : 12.6 g/dL  Hematocrit : 39.9 %  Platelet Count - Automated : 166 K/uL  Mean Cell Volume : 101.8 fl  Mean Cell Hemoglobin : 32.1 pg  Mean Cell Hemoglobin Concentration : 31.6 gm/dL  Auto Neutrophil # : x  Auto Lymphocyte # : x  Auto Monocyte # : x  Auto Eosinophil # : x  Auto Basophil # : x  Auto Neutrophil % : x  Auto Lymphocyte % : x  Auto Monocyte % : x  Auto Eosinophil % : x  Auto Basophil % : x      06-06    136  |  100  |  19  ----------------------------<  95  3.9   |  26  |  1.26    Ca    8.9      06 Jun 2019 06:55  Mg     2.0     06-06    TPro  6.6  /  Alb  3.8  /  TBili  0.4  /  DBili  x   /  AST  16  /  ALT  9<L>  /  AlkPhos  62  06-06            Radiology:    < from: CT Brain Stroke Protocol (06.05.19 @ 14:44) >    EXAM:  CT BRAIN STROKE PROTOCOL                          PROCEDURE DATE:  06/05/2019          INTERPRETATION:  -  CT OF THE HEAD:    Clinical information:  65 y/o male,  trouble speaking.  R/O CVA.      Multiple axial scans of the head were obtained without intravenous   contrast medium administration. The lateral and coronal reformatted   images were created. The previous head CT scan dated 1/23/18 is retrieved   for comparison.    No significant interval change is seen.    The ventricles and subarachnoid spaces are slightly dilated.  No   intracranial hemorrhage or mass is seen.  There are several patchy   hypodense areas in the periventricular white matter of bilateral cerebral   hemispheres, probably representing ischemic changes or aging   demyelination.     Calcifications are seen in bilateral carotid siphons presumably secondary   to atherosclerotic changes.     The bony structures are intact.  Both mastoid bones are well pneumatized   and appear normal.  The sella turcica is normal in size.  Both ocular   lenses are surgically absent.  Otherwise both orbits are unremarkable.    The visualized paranasal sinuses are clear.    IMPRESSION:-    1.  No significant interval change.  No intracranial hemorrhage or mass.    2.  Several patchy hypodense areas in the periventricular white matter of   bilateral cerebral hemispheres.        < from: CT Perfusion w/ Maps w/ IV Cont (06.05.19 @ 14:44) >  EXAM:  CT PERFUSION W MAPS IC                          PROCEDURE DATE:  06/05/2019          INTERPRETATION:  ILuis MD, have reviewed the images and   the report and agree with the findings.    ******PRELIMINARY REPORT******     PROCEDURE: CT Perfusion with intravenous contrast.    INDICATION: Word findings difficulty.     TECHNIQUE: Following the intravenous administration of 40 ml of Optiray   350, serial axial images were obtained through the brain. The CT   perfusion data set was post processed per [facility] protocol   generating color maps of CBF, CBV, MTT, and Tmax.    COMPARISON: None    FINDINGS: The CT perfusion study demonstrates no perfusion abnormality.   There is no mismatch volume.    IMPRESSION: Normal CT perfusion study.        < from: CT Angio Head w/ IV Cont (06.05.19 @ 14:44) >  EXAM:  CT ANGIO BRAIN (W)AW IC                          EXAM:  CT ANGIO NECK (W)AW IC                          PROCEDURE DATE:  06/05/2019          INTERPRETATION:  -  CT ANGIOGRAM OF THE NECK AND HEAD:    Clinical information: 85 y/o male,  trouble in speaking .  R/O CVA.     Following the intravenous bolus injection of 85 cc Optiray 350 mg%   helical axial scans of the head and neck were obtained with isotropic CT   data.  Axial 3 mm and 0.9 mm scans were presented.  The thinner CT data   were sent to the [employer] workstation for analysis.  3D stereoscopic   reformatted images of the cranial and cervical arteries were created.     In addition, 2D MIP images of the head and neck  were obtained in coronal   and sagittal planes.       Theaortic arch and the major cervical arteries are well opacified.   Multiple small calcified plaques are noted in the aortic arch, including   the origins of the left subclavian artery and innominate artery.. No   significant stenosis is seen in eithersubclavian artery.      Both common carotid arteries excluding the bifurcations appear normal.   Several calcified plaques are noted at the bifurcations of both common   carotid arteries, resulting in severe stenosis at the origin of both   internal carotid arteries. On both sides,  approximately 90% stenosis is   seen at the origin of both internal carotid arteries. The remaining   cervical internal carotid arteries on both sides are slightly tortuous   and pursue a retropharyngeal course in themidportion. Extracranial   arteries are unremarkable.    Both cervical vertebral arteries are well opacified and appear normal in   caliber and course. There is no evidence of vertebral or carotid artery   dissection.      The intracranial arteries including the basilar and vertebral arteries   are well opacified.  Small calcified plaques are noted in bilateral   carotid siphons. No aneurysm or arteriovenous malformation is seen.     The [place] of [place] is well defined and appears normal. Both posterior   communicating arteries are hypoplastic as normal variants. There is    minimal stenosis in the distal portion of the left vertebral artery   presumably secondary to atherosclerotic changes. The other major cerebral   arteries are unremarkable. No focal stenosis is noted in other major   cerebral arteries.     The visualized intracranial veins are unremarkable.  There is no evidence   of venous thrombosis or obstruction.    IMPRESSION:-    1.  Calcified plaques noted in the aortic arch, bifurcations of both   common carotid arteries and bilateral carotid siphons.    2.  Approximately 90% stenosis at the origins of both internal carotid   arteries.    3.  Minimal stenosis in the distal portion of the left vertebral artery.              Vital Signs Last 24 Hrs  T(C): 36.5 (06 Jun 2019 08:57), Max: 36.9 (06 Jun 2019 01:18)  T(F): 97.7 (06 Jun 2019 08:57), Max: 98.5 (06 Jun 2019 01:18)  HR: 82 (06 Jun 2019 08:48) (76 - 104)  BP: 140/73 (06 Jun 2019 08:48) (140/73 - 169/81)  BP(mean): 90 (06 Jun 2019 08:48) (90 - 117)  RR: 17 (06 Jun 2019 08:48) (16 - 20)  SpO2: 92% (06 Jun 2019 08:48) (91% - 97%)    REVIEW OF SYSTEMS:    CONSTITUTIONAL: No fever, weight loss, or fatigue  EYES: No eye pain, visual disturbances, or discharge  ENMT:  No difficulty hearing, tinnitus, vertigo; No sinus or throat pain  NECK: No pain or stiffness  BREASTS: No pain, masses, or nipple discharge  RESPIRATORY: No cough, wheezing, chills or hemoptysis; No shortness of breath  CARDIOVASCULAR: No chest pain, palpitations, dizziness, or leg swelling  GASTROINTESTINAL: No abdominal or epigastric pain. No nausea, vomiting, or hematemesis; No diarrhea or constipation. No melena or hematochezia.  GENITOURINARY: No dysuria, frequency, hematuria, or incontinence  NEUROLOGICAL: slurred speech  SKIN: No itching, burning, rashes, or lesions   LYMPH NODES: No enlarged glands  ENDOCRINE: No heat or cold intolerance; No hair loss  MUSCULOSKELETAL: No joint pain or swelling; No muscle, back, or extremity pain  PSYCHIATRIC: No depression, anxiety, mood swings, or difficulty sleeping  HEME/LYMPH: No easy bruising, or bleeding gums  ALLERGY AND IMMUNOLOGIC: No hives or eczema  VASCULAR: no swelling, erythema      Physical Exam: obese 85 yo  gentleman lying in semi Kamara's position, c/o slurry speech    Head: normocephalic, atraumatic    Eyes: PERRLA, EOMI, no nystagmus, sclera anicteric    ENT: nasal discharge, uvula midline, no oropharyngeal erythema/exudate    Neck: supple, negative JVD, negative carotid bruits, no thyromegaly    Chest: CTA bilaterally, neg wheeze, rhonchi, rales, crackles, egophany    Cardiovascular: regular rate and rhythm, neg murmurs/rubs/gallops    Abdomen: soft, non distended, non tender, negative rebound/guarding, normal bowel sounds, neg hepatosplenomegaly    Extremities: 1 + LE edema, negative calf tenderness to palpation, negative Mattie's sign    :     Neurologic Exam:    Alert and oriented to person, place, date/year, speech fluent w/ dysarthria, recent and remote memory intact, repetition intact, comprehension intact,     Cranial Nerves:     II:                       pupils equal, round and reactive to light, visual fields intact   III/ IV/VI:             extraocular movements intact, neg nystagmus, neg ptosis  V:                       facial sensation intact, V1-3 normal  VII:                     face symmetric, no droop, normal eye closure and smile  VIII:                    hearing intact to finger rub bilaterally  IX/ X:                 soft palate rise symmetrical  XI:                      head turning, shoulder shrug normal  XII:                     tongue midline    Motor Exam:    Upper Extremities:     RIght:   5/5   /intrinsics               5/5  wrist flexors/extensors               5/5  biceps/triceps               5/5  deltoid               negative drift    Left :     5/5  /intrinsics               5/5  wrist flexors/extensors               5/5 biceps/triceps               5/5 deltoid               negative drift    Lower Extremities:                 Right:     5/5  DF/PF/ evertors/ invertors                3+/5  Quad/ hamstrings sec to pain h/o OA                5/5  hip flexors/adductors/abductors                 Left:       5/5  DF/PF/ evertors/ invertors                5/5  Quad/ hamstrings                5/5  hip flexors/adductors/abductors                       Sensory:    intact to LT/PP in all UE/LE dermatomes    DTR:            = biceps/     triceps/     brachioradialis                      = patella/   medial hamstring/ankle                      neg clonus                      neg Babinski                        Finger to Nose:  slight R    Heel to Shin:  wnl    Rapid Alternating movements:  wnl    Joint Position Sense:  intact    Romberg:  not tested    Tandem Walking:  not tested    Gait:  not tested        PM&R Impression:    1) r/o acute stroke  2) dysarthria  3) no focal weakness        Recommendations:    1) Physical therapy focusing on therapeutic exercises, bed mobility/transfer out of bed evaluation, progressive ambulation with assistive devices prn.    2) Anticipated Disposition Plan/Recs: d/c home

## 2019-06-06 NOTE — PHYSICAL THERAPY INITIAL EVALUATION ADULT - LEVEL OF INDEPENDENCE: GAIT, REHAB EVAL
Pt ambulated~100 feet with b/l UEs on portable monitor with supervision, slightly increased lateral sway, no lose of balance, pt then ambulated~50 feet 1 HHA with RUE mimic as a SC with close supervision/supervision, fairly steady, no lose of balance, slightly increased lateral sway.

## 2019-06-06 NOTE — PHYSICAL THERAPY INITIAL EVALUATION ADULT - MANUAL MUSCLE TESTING RESULTS, REHAB EVAL
RUE~5/5, LUE~4+/5 grossly, LLE~4+/5 , RLE~4+/5 except right knee flexion/extension~3/5  due to knee pain

## 2019-06-06 NOTE — OCCUPATIONAL THERAPY INITIAL EVALUATION ADULT - MD ORDER
Per chart, 84F y/o male with PMHx former smoker, HTN, HLD, NIDDM, hypothyroidism, prostate CA s/p seeding, atrial flutter s/p ablation, on Eliquis (last dose 2/3/18), bifasicular heart block s/p pacemaker, COPD not on home O2, CAD s/p CABGx3 March 2017 with Ry Pete, and chronic diastolic heart failure presenting with two days of slurred speech, concerning for CVA.

## 2019-06-06 NOTE — OCCUPATIONAL THERAPY INITIAL EVALUATION ADULT - ADDITIONAL COMMENTS
Patient lives with his girlfriend who assists with IADLs and patient also has a . Patient reports independence with BADLs, using a SC indoors/outdoors, and having grab bars in shower/toilet.

## 2019-06-06 NOTE — PHYSICAL THERAPY INITIAL EVALUATION ADULT - GAIT DEVIATIONS NOTED, PT EVAL
increased time in double stance/decreased jazlyn/decreased step length/fairly steady, slightly increased lateral sway.

## 2019-06-06 NOTE — PHYSICAL THERAPY INITIAL EVALUATION ADULT - ADDITIONAL COMMENTS
Pt lives with girlfriend in an apartment with 1 small step get into building. Prior to admission, pt ambulated independently with straight cane.

## 2019-06-06 NOTE — CONSULT NOTE ADULT - SUBJECTIVE AND OBJECTIVE BOX
Surgeon: Dr. Barnes    Requesting Physician: Dr. Emerson    HISTORY OF PRESENT ILLNESS (Need 4):    84F with CAD s/p CABG x 3, HTN, HLD, NIDDM, AF s/p PPM on Eliquis, TAVR, COPD (home O2), hypothyroidism who was sent to [facility] ED by his girlfriend after 2 episodes of difficulty word finding.  Stroke code was called, he was not a candidate for tPa 2/2 eliquis.  He had a CT head and CTA neck which did not reveal an acute infaract but did demonstrate >90% carotid stenosis bilaterally.  An MRI and MOHINI are pending    PAST MEDICAL & SURGICAL HISTORY:  High cholesterol  Diabetes  Prostate cancer: in remission  Hypertension  COPD (chronic obstructive pulmonary disease)  H/O aortic valve replacement  S/P CABG x 3  Artificial pacemaker  History of cataract surgery: b/l  History of knee replacement: b/l      MEDICATIONS  (STANDING):  apixaban 5 milliGRAM(s) Oral every 12 hours  atorvastatin 80 milliGRAM(s) Oral at bedtime    MEDICATIONS  (PRN):  acetaminophen   Tablet .. 650 milliGRAM(s) Oral every 6 hours PRN Moderate Pain (4 - 6)      Allergies     No Known Allergies    Intolerances    narcotic analgesics (Nausea; Vomiting)      SOCIAL HISTORY:  Smoker:  NO                            ETOH use:  NO    Ilicit Drug use:   NO  Assisted device use (Cane / Walker): Cane  Live with:    FAMILY HISTORY:  Family history of acute myocardial infarction (Father)      Review of Systems (Need 10):  CONSTITUTIONAL: Denies fevers / chills, sweats, fatigue, weight loss, weight gain                                       NEURO:  Denies parathesias, seizures, syncope, confusion                                                                                  EYES:  Denies blurry vision, discharge, pain, loss of vision                                                                                    ENMT:  Denies difficulty hearing, vertigo, dysphagia, epistaxis, recent dental work                                       CV:  Denies chest pain, palpitations, CASON, orthopnea                                                                                           RESPIRATORY:  Denies wWheezing, SOB, cough / sputum, hemoptysis                                                               GI:  Denies nausea, vomiting, diarrhea, constipation, melena                                                                          : Denies hematuria, dysuria, urgency, incontinence                                                                                          MUSKULOSKELETAL:  Denies arthritis, joint swelling, muscle weakness                                                             SKIN/BREAST:  Denies rash, itching, hair loss, masses                                                                                              PSYCH:  Denies depression, anxiety, suicidal ideation                                                                                                HEME/LYMPH:  Denies bruises easily, enlarged lymph nodes, tender lymph nodes                                          ENDOCRINE:  Denies cold intolerance, heat intolerance, polydipsia                                                                      Vital Signs Last 24 Hrs  T(C): 36.5 (06 Jun 2019 08:57), Max: 36.9 (06 Jun 2019 01:18)  T(F): 97.7 (06 Jun 2019 08:57), Max: 98.5 (06 Jun 2019 01:18)  HR: 86 (06 Jun 2019 13:05) (76 - 104)  BP: 131/72 (06 Jun 2019 13:05) (131/72 - 169/81)  BP(mean): 92 (06 Jun 2019 13:05) (90 - 119)  RR: 18 (06 Jun 2019 13:05) (16 - 20)  SpO2: 92% (06 Jun 2019 13:05) (91% - 95%)    Physical Exam (Need 8)  CONSTITUTIONAL:                                                                          WNL  NEURO:                                                                                             WNL                      EYES:                                                                                                  WNL  ENMT:                                                                                                WNL  CV:                                                                                                      WNL  RESPIRATORY:                                                                                  WNL  GI:                                                                                                       WNL  : ALLEN + / -                                                                                 WNL  MUSKULOSKELETAL:                                                                       WNL  SKIN / BREAST:                                                                                 WNL                                                          LABS:                        12.6   8.65  )-----------( 166      ( 06 Jun 2019 06:55 )             39.9     06-06    136  |  100  |  19  ----------------------------<  95  3.9   |  26  |  1.26    Ca    8.9      06 Jun 2019 06:55  Mg     2.0     06-06    TPro  6.6  /  Alb  3.8  /  TBili  0.4  /  DBili  x   /  AST  16  /  ALT  9<L>  /  AlkPhos  62  06-06    PT/INR - ( 05 Jun 2019 14:37 )   PT: 14.8 sec;   INR: 1.30          PTT - ( 05 Jun 2019 14:37 )  PTT:28.9 sec    CARDIAC MARKERS ( 05 Jun 2019 14:37 )  x     / <0.01 ng/mL / x     / x     / x              RADIOLOGY & ADDITIONAL STUDIES:  CAROTID U/S:    CXR:    CT Scan:    EKG:    TTE / MOHINI:    Cardiac Cath: Surgeon: Dr. Barnes    Requesting Physician: Dr. Emerson    HISTORY OF PRESENT ILLNESS (Need 4):    84F with CAD s/p CABG x 3, HTN, HLD, NIDDM, AF s/p PPM on Eliquis, TAVR on 2/6/19, COPD (home O2), hypothyroidism who was sent to [facility] ED by his girlfriend after 2 episodes of difficulty word finding.  Stroke code was called, he was not a candidate for tPa 2/2 eliquis.  He had a CT head and CTA neck which did not reveal an acute infaract but did demonstrate >90% carotid stenosis bilaterally.  An MRI and MOHINI are pending    PAST MEDICAL & SURGICAL HISTORY:  High cholesterol  Diabetes  Prostate cancer: in remission  Hypertension  COPD (chronic obstructive pulmonary disease)  H/O aortic valve replacement  S/P CABG x 3  Artificial pacemaker  History of cataract surgery: b/l  History of knee replacement: b/l      MEDICATIONS  (STANDING):  apixaban 5 milliGRAM(s) Oral every 12 hours  atorvastatin 80 milliGRAM(s) Oral at bedtime    MEDICATIONS  (PRN):  acetaminophen   Tablet .. 650 milliGRAM(s) Oral every 6 hours PRN Moderate Pain (4 - 6)      Allergies     No Known Allergies    Intolerances    narcotic analgesics (Nausea; Vomiting)      SOCIAL HISTORY:  Smoker:  NO                            ETOH use:  NO    Ilicit Drug use:   NO  Assisted device use (Cane / Walker): Cane  Live with:    FAMILY HISTORY:  Family history of acute myocardial infarction (Father)      Review of Systems (Need 10):  CONSTITUTIONAL: Denies fevers / chills, sweats, fatigue, weight loss, weight gain                                       NEURO:  Denies parathesias, seizures, syncope, confusion                                                                                  EYES:  Denies blurry vision, discharge, pain, loss of vision                                                                                    ENMT:  Denies difficulty hearing, vertigo, dysphagia, epistaxis, recent dental work                                       CV:  Denies chest pain, palpitations, CASON, orthopnea                                                                                           RESPIRATORY:  Denies wWheezing, SOB, cough / sputum, hemoptysis                                                               GI:  Denies nausea, vomiting, diarrhea, constipation, melena                                                                          : Denies hematuria, dysuria, urgency, incontinence                                                                                          MUSKULOSKELETAL:  Denies arthritis, joint swelling, muscle weakness                                                             SKIN/BREAST:  Denies rash, itching, hair loss, masses                                                                                              PSYCH:  Denies depression, anxiety, suicidal ideation                                                                                                HEME/LYMPH:  Denies bruises easily, enlarged lymph nodes, tender lymph nodes                                          ENDOCRINE:  Denies cold intolerance, heat intolerance, polydipsia                                                                      Vital Signs Last 24 Hrs  T(C): 36.5 (06 Jun 2019 08:57), Max: 36.9 (06 Jun 2019 01:18)  T(F): 97.7 (06 Jun 2019 08:57), Max: 98.5 (06 Jun 2019 01:18)  HR: 86 (06 Jun 2019 13:05) (76 - 104)  BP: 131/72 (06 Jun 2019 13:05) (131/72 - 169/81)  BP(mean): 92 (06 Jun 2019 13:05) (90 - 119)  RR: 18 (06 Jun 2019 13:05) (16 - 20)  SpO2: 92% (06 Jun 2019 13:05) (91% - 95%)    Physical Exam (Need 8)  CONSTITUTIONAL:                                                                          WNL  NEURO:                                                                                             WNL                      EYES:                                                                                                  WNL  ENMT:                                                                                                WNL  CV:                                                                                                      WNL  RESPIRATORY:                                                                                  WNL  GI:                                                                                                       WNL  : ALLEN + / -                                                                                 WNL  MUSKULOSKELETAL:                                                                       WNL  SKIN / BREAST:                                                                                 WNL                                                          LABS:                        12.6   8.65  )-----------( 166      ( 06 Jun 2019 06:55 )             39.9     06-06    136  |  100  |  19  ----------------------------<  95  3.9   |  26  |  1.26    Ca    8.9      06 Jun 2019 06:55  Mg     2.0     06-06    TPro  6.6  /  Alb  3.8  /  TBili  0.4  /  DBili  x   /  AST  16  /  ALT  9<L>  /  AlkPhos  62  06-06    PT/INR - ( 05 Jun 2019 14:37 )   PT: 14.8 sec;   INR: 1.30          PTT - ( 05 Jun 2019 14:37 )  PTT:28.9 sec    CARDIAC MARKERS ( 05 Jun 2019 14:37 )  x     / <0.01 ng/mL / x     / x     / x              RADIOLOGY & ADDITIONAL STUDIES:  CAROTID U/S:    CXR:    CT Scan:    EKG:    TTE / MOHINI:    Cardiac Cath: Surgeon: Dr. Barnes    Requesting Physician: Dr. Emerson    HISTORY OF PRESENT ILLNESS (Need 4):    84F with CAD s/p CABG x 3, HTN, HLD, NIDDM, AF s/p PPM on Eliquis, TAVR on 2/6/19, COPD (home O2), hypothyroidism who was sent to [facility] ED by his girlfriend after 2 days of slurred speech and difficulty word finding.  Stroke code was called, he was not a candidate for tPa 2/2 eliquis.  He had a CT head and CTA neck which did not reveal an acute infaract but did demonstrate >90% carotid stenosis bilaterally.  An MRI and MOHINI are pending    PAST MEDICAL & SURGICAL HISTORY:  High cholesterol  Diabetes  Prostate cancer: in remission  Hypertension  COPD (chronic obstructive pulmonary disease)  H/O aortic valve replacement  S/P CABG x 3  Artificial pacemaker  History of cataract surgery: b/l  History of knee replacement: b/l      MEDICATIONS  (STANDING):  apixaban 5 milliGRAM(s) Oral every 12 hours  atorvastatin 80 milliGRAM(s) Oral at bedtime    MEDICATIONS  (PRN):  acetaminophen   Tablet .. 650 milliGRAM(s) Oral every 6 hours PRN Moderate Pain (4 - 6)      Allergies     No Known Allergies    Intolerances    narcotic analgesics (Nausea; Vomiting)      SOCIAL HISTORY:  Smoker:  NO                            ETOH use:  NO    Ilicit Drug use:   NO  Assisted device use (Cane / Walker): Cane  Live with:    FAMILY HISTORY:  Family history of acute myocardial infarction (Father)      Review of Systems (Need 10):  CONSTITUTIONAL: Denies fevers / chills, sweats, fatigue, weight loss, weight gain                                       NEURO:  Denies parathesias, seizures, syncope, confusion                                                                                  EYES:  Denies blurry vision, discharge, pain, loss of vision                                                                                    ENMT:  Denies difficulty hearing, vertigo, dysphagia, epistaxis, recent dental work                                       CV:  Denies chest pain, palpitations, CASON, orthopnea                                                                                           RESPIRATORY:  Denies wWheezing, SOB, cough / sputum, hemoptysis                                                               GI:  Denies nausea, vomiting, diarrhea, constipation, melena                                                                          : Denies hematuria, dysuria, urgency, incontinence                                                                                          MUSKULOSKELETAL:  Denies arthritis, joint swelling, muscle weakness                                                             SKIN/BREAST:  Denies rash, itching, hair loss, masses                                                                                              PSYCH:  Denies depression, anxiety, suicidal ideation                                                                                                HEME/LYMPH:  Denies bruises easily, enlarged lymph nodes, tender lymph nodes                                          ENDOCRINE:  Denies cold intolerance, heat intolerance, polydipsia                                                                      Vital Signs Last 24 Hrs  T(C): 36.5 (06 Jun 2019 08:57), Max: 36.9 (06 Jun 2019 01:18)  T(F): 97.7 (06 Jun 2019 08:57), Max: 98.5 (06 Jun 2019 01:18)  HR: 86 (06 Jun 2019 13:05) (76 - 104)  BP: 131/72 (06 Jun 2019 13:05) (131/72 - 169/81)  BP(mean): 92 (06 Jun 2019 13:05) (90 - 119)  RR: 18 (06 Jun 2019 13:05) (16 - 20)  SpO2: 92% (06 Jun 2019 13:05) (91% - 95%)    Physical Exam (Need 8)  CONSTITUTIONAL:                                                                          WNL  NEURO:                                                                                             WNL                      EYES:                                                                                                  WNL  ENMT:                                                                                                WNL  CV:                                                                                                      WNL  RESPIRATORY:                                                                                  WNL  GI:                                                                                                       WNL  : ALLEN + / -                                                                                 WNL  MUSKULOSKELETAL:                                                                       WNL  SKIN / BREAST:                                                                                 WNL                                                          LABS:                        12.6   8.65  )-----------( 166      ( 06 Jun 2019 06:55 )             39.9     06-06    136  |  100  |  19  ----------------------------<  95  3.9   |  26  |  1.26    Ca    8.9      06 Jun 2019 06:55  Mg     2.0     06-06    TPro  6.6  /  Alb  3.8  /  TBili  0.4  /  DBili  x   /  AST  16  /  ALT  9<L>  /  AlkPhos  62  06-06    PT/INR - ( 05 Jun 2019 14:37 )   PT: 14.8 sec;   INR: 1.30          PTT - ( 05 Jun 2019 14:37 )  PTT:28.9 sec    CARDIAC MARKERS ( 05 Jun 2019 14:37 )  x     / <0.01 ng/mL / x     / x     / x              RADIOLOGY & ADDITIONAL STUDIES:    CXR: no effusions/infiltrates    CT Scan: The CT perfusion study demonstrates no perfusion abnormality. CTA of the head and neck revealed 90% stenosis at the origin of b/l carotid arteries    TTE / MOHINI: pending Surgeon: Dr. Barnes    Requesting Physician: Dr. Emerson    HISTORY OF PRESENT ILLNESS (Need 4):    84F with CAD s/p CABG x 3, HTN, HLD, NIDDM, AF s/p PPM on Eliquis, TAVR on 2/6/18, COPD (home O2), hypothyroidism who presented to [facility] ED with 2 days of slurred speech and difficulty word finding.  Stroke code was called, he was not a candidate for tPa 2/2 eliquis.  He had a CT head and CTA neck which did not reveal an acute infaract but did demonstrate >90% carotid stenosis bilaterally.  An MRI and MOHINI are pending    PAST MEDICAL & SURGICAL HISTORY:  High cholesterol  Diabetes  Prostate cancer: in remission  Hypertension  COPD (chronic obstructive pulmonary disease)  H/O aortic valve replacement  S/P CABG x 3  Artificial pacemaker  History of cataract surgery: b/l  History of knee replacement: b/l      MEDICATIONS  (STANDING):  apixaban 5 milliGRAM(s) Oral every 12 hours  atorvastatin 80 milliGRAM(s) Oral at bedtime    MEDICATIONS  (PRN):  acetaminophen   Tablet .. 650 milliGRAM(s) Oral every 6 hours PRN Moderate Pain (4 - 6)      Allergies     No Known Allergies    Intolerances    narcotic analgesics (Nausea; Vomiting)      SOCIAL HISTORY:  Smoker:  NO                            ETOH use:  NO    Ilicit Drug use:   NO  Assisted device use (Cane / Walker): Cane  Live with:    FAMILY HISTORY:  Family history of acute myocardial infarction (Father)      Review of Systems (Need 10):  CONSTITUTIONAL: Denies fevers / chills, sweats, fatigue, weight loss, weight gain                                       NEURO:  slurred speech/difficulty word finding as per HPI, but denies parathesias, seizures, syncope, confusion                                                                                  EYES:  Denies blurry vision, discharge, pain, loss of vision                                                                                    ENMT:  Denies difficulty hearing, vertigo, dysphagia, epistaxis, recent dental work                                       CV:  Denies chest pain, palpitations, CASON, orthopnea                                                                                           RESPIRATORY:  Denies wWheezing, SOB, cough / sputum, hemoptysis                                                               GI:  Denies nausea, vomiting, diarrhea, constipation, melena                                                                          : Denies hematuria, dysuria, urgency, incontinence                                                                                          MUSKULOSKELETAL:  Denies arthritis, joint swelling, muscle weakness                                                             SKIN/BREAST:  Denies rash, itching, hair loss, masses                                                                                              PSYCH:  Denies depression, anxiety, suicidal ideation                                                                                                HEME/LYMPH:  Denies bruises easily, enlarged lymph nodes, tender lymph nodes                                          ENDOCRINE:  Denies cold intolerance, heat intolerance, polydipsia                                                                      Vital Signs Last 24 Hrs  T(C): 36.5 (06 Jun 2019 08:57), Max: 36.9 (06 Jun 2019 01:18)  T(F): 97.7 (06 Jun 2019 08:57), Max: 98.5 (06 Jun 2019 01:18)  HR: 86 (06 Jun 2019 13:05) (76 - 104)  BP: 131/72 (06 Jun 2019 13:05) (131/72 - 169/81)  BP(mean): 92 (06 Jun 2019 13:05) (90 - 119)  RR: 18 (06 Jun 2019 13:05) (16 - 20)  SpO2: 92% (06 Jun 2019 13:05) (91% - 95%)    Physical Exam (Need 8)  CONSTITUTIONAL: WN/WD, NAD  NEURO: A&Ox3                   EYES: EOMI, PERRL/A  ENMT: MMM  CV: S1S2 irreg  RESPIRATORY: CTA b/l no W/R/R  GI: soft NT/ND +BS  : no cook  MUSKULOSKELETAL: no kyphosis/scoliosis  SKIN / BREAST: no rashes/lesions                                                          LABS:                        12.6   8.65  )-----------( 166      ( 06 Jun 2019 06:55 )             39.9     06-06    136  |  100  |  19  ----------------------------<  95  3.9   |  26  |  1.26    Ca    8.9      06 Jun 2019 06:55  Mg     2.0     06-06    TPro  6.6  /  Alb  3.8  /  TBili  0.4  /  DBili  x   /  AST  16  /  ALT  9<L>  /  AlkPhos  62  06-06    PT/INR - ( 05 Jun 2019 14:37 )   PT: 14.8 sec;   INR: 1.30          PTT - ( 05 Jun 2019 14:37 )  PTT:28.9 sec    CARDIAC MARKERS ( 05 Jun 2019 14:37 )  x     / <0.01 ng/mL / x     / x     / x              RADIOLOGY & ADDITIONAL STUDIES:    CXR: no effusions/infiltrates    CT Scan: The CT perfusion study demonstrates no perfusion abnormality. CTA of the head and neck revealed 90% stenosis at the origin of b/l carotid arteries    TTE / MOHINI: pending Surgeon: Dr. Barnes    Requesting Physician: Dr. Emerson    HISTORY OF PRESENT ILLNESS (Need 4):    84F with CAD s/p CABG x 3, HTN, HLD, NIDDM, AF s/p PPM on Eliquis, TAVR on 2/6/18, COPD (home O2), hypothyroidism who presented to [facility] ED with 2 days of slurred speech and difficulty word finding.  Stroke code was called, he was not a candidate for tPa 2/2 eliquis.  He had a CT head and CTA neck which did not reveal an acute infaract but did demonstrate >90% carotid stenosis bilaterally.  Patient was seen and examined after returning from MOHINI, still recovering from sedation and was A&Ox1 (oriented to person only).  He was unable to give any history and could not recall why he came to the hospital, although he did remember Dr. Barnes had replaced his valve.      PAST MEDICAL & SURGICAL HISTORY:  High cholesterol  Diabetes  Prostate cancer: in remission  Hypertension  COPD (chronic obstructive pulmonary disease)  H/O aortic valve replacement  S/P CABG x 3  Artificial pacemaker  History of cataract surgery: b/l  History of knee replacement: b/l    MEDICATIONS  (STANDING):  apixaban 5 milliGRAM(s) Oral every 12 hours  atorvastatin 80 milliGRAM(s) Oral at bedtime    MEDICATIONS  (PRN):  acetaminophen   Tablet .. 650 milliGRAM(s) Oral every 6 hours PRN Moderate Pain (4 - 6)    Allergies     No Known Allergies    Intolerances    narcotic analgesics (Nausea; Vomiting)    SOCIAL HISTORY:  Smoker:  NO                            ETOH use:  NO    Ilicit Drug use:   NO  Assisted device use (Cane / Walker): Cane    FAMILY HISTORY:  Family history of acute myocardial infarction (Father)    Review of Systems (Need 10):  CONSTITUTIONAL: Denies fevers / chills, sweats, fatigue, weight loss, weight gain                                       NEURO:  slurred speech/difficulty word finding as per HPI, but denies parathesias, seizures, syncope, confusion                                                                                  EYES:  Denies blurry vision, discharge, pain, loss of vision                                                                                    ENMT:  Denies difficulty hearing, vertigo, dysphagia, epistaxis, recent dental work                                       CV:  Denies chest pain, palpitations, CASON, orthopnea                                                                                           RESPIRATORY:  Denies wWheezing, SOB, cough / sputum, hemoptysis                                                               GI:  Denies nausea, vomiting, diarrhea, constipation, melena                                                                          : Denies hematuria, dysuria, urgency, incontinence                                                                                          MUSKULOSKELETAL:  Denies arthritis, joint swelling, muscle weakness                                                             SKIN/BREAST:  Denies rash, itching, hair loss, masses                                                                                              PSYCH:  Denies depression, anxiety, suicidal ideation                                                                                                HEME/LYMPH:  Denies bruises easily, enlarged lymph nodes, tender lymph nodes                                          ENDOCRINE:  Denies cold intolerance, heat intolerance, polydipsia                                                                      Vital Signs Last 24 Hrs  T(C): 36.5 (06 Jun 2019 08:57), Max: 36.9 (06 Jun 2019 01:18)  T(F): 97.7 (06 Jun 2019 08:57), Max: 98.5 (06 Jun 2019 01:18)  HR: 86 (06 Jun 2019 13:05) (76 - 104)  BP: 131/72 (06 Jun 2019 13:05) (131/72 - 169/81)  BP(mean): 92 (06 Jun 2019 13:05) (90 - 119)  RR: 18 (06 Jun 2019 13:05) (16 - 20)  SpO2: 92% (06 Jun 2019 13:05) (91% - 95%)    Physical Exam (Need 8)  CONSTITUTIONAL: WN/WD, NAD  NEURO: A&Ox3                   EYES: EOMI, PERRL/A  ENMT: MMM  CV: S1S2 irreg  RESPIRATORY: CTA b/l no W/R/R  GI: soft NT/ND +BS  : no cook  MUSKULOSKELETAL: no kyphosis/scoliosis  SKIN / BREAST: no rashes/lesions                                                          LABS:                        12.6   8.65  )-----------( 166      ( 06 Jun 2019 06:55 )             39.9     06-06    136  |  100  |  19  ----------------------------<  95  3.9   |  26  |  1.26    Ca    8.9      06 Jun 2019 06:55  Mg     2.0     06-06    TPro  6.6  /  Alb  3.8  /  TBili  0.4  /  DBili  x   /  AST  16  /  ALT  9<L>  /  AlkPhos  62  06-06    PT/INR - ( 05 Jun 2019 14:37 )   PT: 14.8 sec;   INR: 1.30          PTT - ( 05 Jun 2019 14:37 )  PTT:28.9 sec    CARDIAC MARKERS ( 05 Jun 2019 14:37 )  x     / <0.01 ng/mL / x     / x     / x              RADIOLOGY & ADDITIONAL STUDIES:    CXR: no effusions/infiltrates    CT Scan: The CT perfusion study demonstrates no perfusion abnormality. CTA of the head and neck revealed 90% stenosis at the origin of b/l carotid arteries    TTE / MOHINI: pending

## 2019-06-06 NOTE — END OF VISIT
[FreeTextEntry3] : All medical record entries made by Paul Simon acting as a scribe for the performing provider (Duane Louise MD and/or PETER Bales) on 05/30/2019. All entries were dictated to me by the performing medical provider. In signing this record, the medical provider affirms that they have personally performed the history, physical exam, assessment and plan and have also directed, reviewed and agreed to the documentation in the chart. [>50% of Time Spent on Counseling for ____] : Greater than 50% of the encounter time was spent on counseling for [unfilled] [Time Spent: ___ minutes] : I have spent [unfilled] minutes of face to face time with the patient

## 2019-06-06 NOTE — OCCUPATIONAL THERAPY INITIAL EVALUATION ADULT - VISUAL ACUITY
Patient wears glasses for reading, not worn during eval, denies diplopia or blurry vision, able to correctly read clock ~10ft away.

## 2019-06-06 NOTE — CONSULT NOTE ADULT - SUBJECTIVE AND OBJECTIVE BOX
VASCULAR SURGERY CONSULT NOTE    HPI:  84F y/o male with PMHx former smoker, HTN, HLD, NIDDM, hypothyroidism, prostate CA s/p seeding, atrial flutter s/p ablation, on Eliquis (last dose 2/3/18), bifasicular heart block s/p pacemaker, COPD not on home O2, CAD s/p CABGx3 March 2017 with Dr. Funez, Ry, and chronic diastolic heart failure presenting with two days of slurred speech.  One day PTA and on day of arrival, pt started having speech difficulties at around 12 PM.  He was told by his girlfriend to come to the ED.  Denies HA, weakness, fevers, chills, CP, SOB.  Denies numbness and tingling of extremities or difficulties with balance.    ED Course:  initial /88 92 97.9 97%RA.  Stroke code called.  CT brain stroke protocol with Several patchy hypodense areas in the periventricular white matter of bilateral cerebral hemispheres.  CT perfusion nml.  CTA head and neck with calcified plaques noted in the aortic arch, bifurcations of both common carotid arteries and bilateral carotid siphons, 90% stenosis at the origins of both internal carotids arteries, minimal stenosis in the distal portion of the left vertebral artery.Given lisinopril 40 mg, 2 duonebs, resumed home eliquis dosing.  Admitted to stroke service for further CVA workup. (05 Jun 2019 18:20)    VASCULAR SURGERY ADDENDUM: Vascular surgery consulted for CT findings of 90% carotid stenosis bilaterally.     PMHx:   PAST MEDICAL & SURGICAL HISTORY:  High cholesterol  Diabetes  Prostate cancer: in remission  Hypertension  COPD (chronic obstructive pulmonary disease)  H/O aortic valve replacement  S/P CABG x 3  Artificial pacemaker  History of cataract surgery: b/l  History of knee replacement: b/l    PSHx:    ROS: Pertinent positives/negatives noted in HPI.     HOME MEDS:   apixaban 5 mg oral tablet: 1 tab(s) orally 2 times a day  Lasix 20 mg oral tablet: 1 tab(s) orally once a day  lisinopril 40 mg oral tablet: 1 tab(s) orally once a day  metFORMIN 500 mg oral tablet: 1 tab(s) orally 2 times a day  simvastatin 40 mg oral tablet: 1 tab(s) orally once a day (at bedtime)    ALLERGIES: NKDA  Allergies    No Known Allergies    Intolerances    narcotic analgesics (Nausea; Vomiting)  SocHx:     FHx: No pertinent history in first degree relatives.   FAMILY HISTORY:  Family history of acute myocardial infarction (Father)     T(C): 36.5 (06-06-19 @ 21:22), Max: 36.9 (06-06-19 @ 01:18)  HR: 86 (06-06-19 @ 20:11) (76 - 98)  BP: 165/78 (06-06-19 @ 20:11) (131/72 - 169/81)  RR: 20 (06-06-19 @ 20:11) (16 - 20)  SpO2: 98% (06-06-19 @ 20:11) (91% - 98%)    PHYSICAL EXAM:  Neuro: Alert and Oriented X 4, cranial nerves intact, motor and sensation intact throughout  Respiratory: CTA b/l. no respiratory distress  Cardiovascular: NSR, RRR  Gastrointestinal: soft, NT/ND  Extremities: WWP, 1+ non pitting edema, sensation and motor intact, palpable DP b/l      LABS:                        12.6   8.65  )-----------( 166      ( 06 Jun 2019 06:55 )             39.9     06-06    136  |  100  |  19  ----------------------------<  95  3.9   |  26  |  1.26    Ca    8.9      06 Jun 2019 06:55  Mg     2.0     06-06    TPro  6.6  /  Alb  3.8  /  TBili  0.4  /  DBili  x   /  AST  16  /  ALT  9<L>  /  AlkPhos  62  06-06    PT/INR - ( 05 Jun 2019 14:37 )   PT: 14.8 sec;   INR: 1.30          PTT - ( 05 Jun 2019 14:37 )  PTT:28.9 sec

## 2019-06-06 NOTE — OCCUPATIONAL THERAPY INITIAL EVALUATION ADULT - PLANNED THERAPY INTERVENTIONS, OT EVAL
ADL retraining/bed mobility training/strengthening/transfer training/balance training/fine motor coordination training

## 2019-06-06 NOTE — OCCUPATIONAL THERAPY INITIAL EVALUATION ADULT - STANDING BALANCE: DYNAMIC, REHAB EVAL
fair minus/Patient ambulated 6ft forward and 6ft back with RUE HHA, requiring Olu. Patient ambulated 6ft forward and back with quad cane, requiring CGA, no LOB

## 2019-06-07 ENCOUNTER — RESULT REVIEW (OUTPATIENT)
Age: 84
End: 2019-06-07

## 2019-06-07 LAB
ANION GAP SERPL CALC-SCNC: 10 MMOL/L — SIGNIFICANT CHANGE UP (ref 5–17)
ANION GAP SERPL CALC-SCNC: 11 MMOL/L — SIGNIFICANT CHANGE UP (ref 5–17)
APPEARANCE UR: CLEAR — SIGNIFICANT CHANGE UP
APTT BLD: 29.6 SEC — SIGNIFICANT CHANGE UP (ref 27.5–36.3)
APTT BLD: 32 SEC — SIGNIFICANT CHANGE UP (ref 27.5–36.3)
BILIRUB UR-MCNC: NEGATIVE — SIGNIFICANT CHANGE UP
BUN SERPL-MCNC: 16 MG/DL — SIGNIFICANT CHANGE UP (ref 7–23)
BUN SERPL-MCNC: 17 MG/DL — SIGNIFICANT CHANGE UP (ref 7–23)
CALCIUM SERPL-MCNC: 8.5 MG/DL — SIGNIFICANT CHANGE UP (ref 8.4–10.5)
CALCIUM SERPL-MCNC: 9.3 MG/DL — SIGNIFICANT CHANGE UP (ref 8.4–10.5)
CHLORIDE SERPL-SCNC: 102 MMOL/L — SIGNIFICANT CHANGE UP (ref 96–108)
CHLORIDE SERPL-SCNC: 105 MMOL/L — SIGNIFICANT CHANGE UP (ref 96–108)
CO2 SERPL-SCNC: 26 MMOL/L — SIGNIFICANT CHANGE UP (ref 22–31)
CO2 SERPL-SCNC: 26 MMOL/L — SIGNIFICANT CHANGE UP (ref 22–31)
COLOR SPEC: YELLOW — SIGNIFICANT CHANGE UP
CREAT SERPL-MCNC: 0.98 MG/DL — SIGNIFICANT CHANGE UP (ref 0.5–1.3)
CREAT SERPL-MCNC: 1.02 MG/DL — SIGNIFICANT CHANGE UP (ref 0.5–1.3)
DIFF PNL FLD: NEGATIVE — SIGNIFICANT CHANGE UP
GLUCOSE BLDC GLUCOMTR-MCNC: 108 MG/DL — HIGH (ref 70–99)
GLUCOSE BLDC GLUCOMTR-MCNC: 63 MG/DL — LOW (ref 70–99)
GLUCOSE BLDC GLUCOMTR-MCNC: 78 MG/DL — SIGNIFICANT CHANGE UP (ref 70–99)
GLUCOSE BLDC GLUCOMTR-MCNC: 83 MG/DL — SIGNIFICANT CHANGE UP (ref 70–99)
GLUCOSE SERPL-MCNC: 80 MG/DL — SIGNIFICANT CHANGE UP (ref 70–99)
GLUCOSE SERPL-MCNC: 98 MG/DL — SIGNIFICANT CHANGE UP (ref 70–99)
GLUCOSE UR QL: NEGATIVE — SIGNIFICANT CHANGE UP
HCT VFR BLD CALC: 36.1 % — LOW (ref 39–50)
HCT VFR BLD CALC: 41.5 % — SIGNIFICANT CHANGE UP (ref 39–50)
HGB BLD-MCNC: 11.4 G/DL — LOW (ref 13–17)
HGB BLD-MCNC: 13.3 G/DL — SIGNIFICANT CHANGE UP (ref 13–17)
INR BLD: 1.35 — HIGH (ref 0.88–1.16)
INR BLD: 1.4 — HIGH (ref 0.88–1.16)
KETONES UR-MCNC: NEGATIVE — SIGNIFICANT CHANGE UP
LEUKOCYTE ESTERASE UR-ACNC: NEGATIVE — SIGNIFICANT CHANGE UP
MAGNESIUM SERPL-MCNC: 1.7 MG/DL — SIGNIFICANT CHANGE UP (ref 1.6–2.6)
MAGNESIUM SERPL-MCNC: 2.1 MG/DL — SIGNIFICANT CHANGE UP (ref 1.6–2.6)
MCHC RBC-ENTMCNC: 31.6 GM/DL — LOW (ref 32–36)
MCHC RBC-ENTMCNC: 31.8 PG — SIGNIFICANT CHANGE UP (ref 27–34)
MCHC RBC-ENTMCNC: 32 GM/DL — SIGNIFICANT CHANGE UP (ref 32–36)
MCHC RBC-ENTMCNC: 32.4 PG — SIGNIFICANT CHANGE UP (ref 27–34)
MCV RBC AUTO: 100.8 FL — HIGH (ref 80–100)
MCV RBC AUTO: 101 FL — HIGH (ref 80–100)
NITRITE UR-MCNC: NEGATIVE — SIGNIFICANT CHANGE UP
NRBC # BLD: 0 /100 WBCS — SIGNIFICANT CHANGE UP (ref 0–0)
NRBC # BLD: 0 /100 WBCS — SIGNIFICANT CHANGE UP (ref 0–0)
PH UR: 7 — SIGNIFICANT CHANGE UP (ref 5–8)
PHOSPHATE SERPL-MCNC: 5.2 MG/DL — HIGH (ref 2.5–4.5)
PLATELET # BLD AUTO: 149 K/UL — LOW (ref 150–400)
PLATELET # BLD AUTO: 166 K/UL — SIGNIFICANT CHANGE UP (ref 150–400)
POTASSIUM SERPL-MCNC: 3.9 MMOL/L — SIGNIFICANT CHANGE UP (ref 3.5–5.3)
POTASSIUM SERPL-MCNC: 4.3 MMOL/L — SIGNIFICANT CHANGE UP (ref 3.5–5.3)
POTASSIUM SERPL-SCNC: 3.9 MMOL/L — SIGNIFICANT CHANGE UP (ref 3.5–5.3)
POTASSIUM SERPL-SCNC: 4.3 MMOL/L — SIGNIFICANT CHANGE UP (ref 3.5–5.3)
PROT UR-MCNC: NEGATIVE MG/DL — SIGNIFICANT CHANGE UP
PROTHROM AB SERPL-ACNC: 15.4 SEC — HIGH (ref 10–12.9)
PROTHROM AB SERPL-ACNC: 16 SEC — HIGH (ref 10–12.9)
RBC # BLD: 3.58 M/UL — LOW (ref 4.2–5.8)
RBC # BLD: 4.11 M/UL — LOW (ref 4.2–5.8)
RBC # FLD: 14.1 % — SIGNIFICANT CHANGE UP (ref 10.3–14.5)
RBC # FLD: 14.3 % — SIGNIFICANT CHANGE UP (ref 10.3–14.5)
SODIUM SERPL-SCNC: 139 MMOL/L — SIGNIFICANT CHANGE UP (ref 135–145)
SODIUM SERPL-SCNC: 141 MMOL/L — SIGNIFICANT CHANGE UP (ref 135–145)
SP GR SPEC: 1.01 — SIGNIFICANT CHANGE UP (ref 1–1.03)
T3 SERPL-MCNC: 67 NG/DL — LOW (ref 80–200)
T3FREE SERPL-MCNC: 1.71 PG/ML — LOW (ref 1.8–4.6)
UROBILINOGEN FLD QL: 0.2 E.U./DL — SIGNIFICANT CHANGE UP
WBC # BLD: 10.9 K/UL — HIGH (ref 3.8–10.5)
WBC # BLD: 8.41 K/UL — SIGNIFICANT CHANGE UP (ref 3.8–10.5)
WBC # FLD AUTO: 10.9 K/UL — HIGH (ref 3.8–10.5)
WBC # FLD AUTO: 8.41 K/UL — SIGNIFICANT CHANGE UP (ref 3.8–10.5)

## 2019-06-07 PROCEDURE — 99222 1ST HOSP IP/OBS MODERATE 55: CPT | Mod: GC

## 2019-06-07 PROCEDURE — 71045 X-RAY EXAM CHEST 1 VIEW: CPT | Mod: 26

## 2019-06-07 PROCEDURE — 35301 RECHANNELING OF ARTERY: CPT | Mod: GC

## 2019-06-07 PROCEDURE — 99233 SBSQ HOSP IP/OBS HIGH 50: CPT

## 2019-06-07 PROCEDURE — 99232 SBSQ HOSP IP/OBS MODERATE 35: CPT

## 2019-06-07 RX ORDER — DEXTROSE 50 % IN WATER 50 %
25 SYRINGE (ML) INTRAVENOUS ONCE
Refills: 0 | Status: DISCONTINUED | OUTPATIENT
Start: 2019-06-07 | End: 2019-06-15

## 2019-06-07 RX ORDER — IPRATROPIUM/ALBUTEROL SULFATE 18-103MCG
3 AEROSOL WITH ADAPTER (GRAM) INHALATION EVERY 6 HOURS
Refills: 0 | Status: DISCONTINUED | OUTPATIENT
Start: 2019-06-07 | End: 2019-06-15

## 2019-06-07 RX ORDER — INSULIN LISPRO 100/ML
VIAL (ML) SUBCUTANEOUS
Refills: 0 | Status: DISCONTINUED | OUTPATIENT
Start: 2019-06-07 | End: 2019-06-15

## 2019-06-07 RX ORDER — SENNA PLUS 8.6 MG/1
2 TABLET ORAL DAILY
Refills: 0 | Status: DISCONTINUED | OUTPATIENT
Start: 2019-06-07 | End: 2019-06-08

## 2019-06-07 RX ORDER — ASPIRIN/CALCIUM CARB/MAGNESIUM 324 MG
81 TABLET ORAL DAILY
Refills: 0 | Status: DISCONTINUED | OUTPATIENT
Start: 2019-06-07 | End: 2019-06-07

## 2019-06-07 RX ORDER — ATORVASTATIN CALCIUM 80 MG/1
80 TABLET, FILM COATED ORAL AT BEDTIME
Refills: 0 | Status: DISCONTINUED | OUTPATIENT
Start: 2019-06-07 | End: 2019-06-08

## 2019-06-07 RX ORDER — DEXTROSE 50 % IN WATER 50 %
25 SYRINGE (ML) INTRAVENOUS ONCE
Refills: 0 | Status: COMPLETED | OUTPATIENT
Start: 2019-06-07 | End: 2019-06-07

## 2019-06-07 RX ORDER — ASPIRIN/CALCIUM CARB/MAGNESIUM 324 MG
300 TABLET ORAL DAILY
Refills: 0 | Status: DISCONTINUED | OUTPATIENT
Start: 2019-06-07 | End: 2019-06-11

## 2019-06-07 RX ORDER — ACETAMINOPHEN 500 MG
1000 TABLET ORAL ONCE
Refills: 0 | Status: COMPLETED | OUTPATIENT
Start: 2019-06-07 | End: 2019-06-07

## 2019-06-07 RX ORDER — MAGNESIUM SULFATE 500 MG/ML
2 VIAL (ML) INJECTION ONCE
Refills: 0 | Status: COMPLETED | OUTPATIENT
Start: 2019-06-07 | End: 2019-06-07

## 2019-06-07 RX ORDER — LEVOTHYROXINE SODIUM 125 MCG
50 TABLET ORAL DAILY
Refills: 0 | Status: DISCONTINUED | OUTPATIENT
Start: 2019-06-07 | End: 2019-06-07

## 2019-06-07 RX ORDER — ACETAMINOPHEN 500 MG
650 TABLET ORAL EVERY 6 HOURS
Refills: 0 | Status: DISCONTINUED | OUTPATIENT
Start: 2019-06-07 | End: 2019-06-07

## 2019-06-07 RX ORDER — HYDRALAZINE HCL 50 MG
10 TABLET ORAL ONCE
Refills: 0 | Status: COMPLETED | OUTPATIENT
Start: 2019-06-07 | End: 2019-06-07

## 2019-06-07 RX ORDER — CEFAZOLIN SODIUM 1 G
2000 VIAL (EA) INJECTION EVERY 8 HOURS
Refills: 0 | Status: DISCONTINUED | OUTPATIENT
Start: 2019-06-07 | End: 2019-06-07

## 2019-06-07 RX ORDER — SODIUM CHLORIDE 9 MG/ML
1000 INJECTION INTRAMUSCULAR; INTRAVENOUS; SUBCUTANEOUS
Refills: 0 | Status: DISCONTINUED | OUTPATIENT
Start: 2019-06-07 | End: 2019-06-09

## 2019-06-07 RX ORDER — IPRATROPIUM/ALBUTEROL SULFATE 18-103MCG
3 AEROSOL WITH ADAPTER (GRAM) INHALATION ONCE
Refills: 0 | Status: COMPLETED | OUTPATIENT
Start: 2019-06-07 | End: 2019-06-07

## 2019-06-07 RX ORDER — PHENYLEPHRINE HYDROCHLORIDE 10 MG/ML
0.1 INJECTION INTRAVENOUS
Qty: 40 | Refills: 0 | Status: DISCONTINUED | OUTPATIENT
Start: 2019-06-07 | End: 2019-06-08

## 2019-06-07 RX ORDER — LEVOTHYROXINE SODIUM 125 MCG
25 TABLET ORAL
Refills: 0 | Status: DISCONTINUED | OUTPATIENT
Start: 2019-06-08 | End: 2019-06-11

## 2019-06-07 RX ORDER — DOCUSATE SODIUM 100 MG
100 CAPSULE ORAL THREE TIMES A DAY
Refills: 0 | Status: DISCONTINUED | OUTPATIENT
Start: 2019-06-07 | End: 2019-06-08

## 2019-06-07 RX ORDER — CEFAZOLIN SODIUM 1 G
2000 VIAL (EA) INJECTION EVERY 8 HOURS
Refills: 0 | Status: COMPLETED | OUTPATIENT
Start: 2019-06-07 | End: 2019-06-08

## 2019-06-07 RX ADMIN — Medication 3 MILLILITER(S): at 18:22

## 2019-06-07 RX ADMIN — Medication 100 MILLIGRAM(S): at 21:48

## 2019-06-07 RX ADMIN — Medication 1000 MILLIGRAM(S): at 19:55

## 2019-06-07 RX ADMIN — Medication 10 MILLIGRAM(S): at 09:17

## 2019-06-07 RX ADMIN — ATORVASTATIN CALCIUM 80 MILLIGRAM(S): 80 TABLET, FILM COATED ORAL at 21:47

## 2019-06-07 RX ADMIN — Medication 3 MILLILITER(S): at 21:38

## 2019-06-07 RX ADMIN — Medication 25 GRAM(S): at 15:48

## 2019-06-07 RX ADMIN — Medication 300 MILLIGRAM(S): at 19:29

## 2019-06-07 RX ADMIN — Medication 2000 MILLIGRAM(S): at 21:47

## 2019-06-07 RX ADMIN — Medication 50 GRAM(S): at 18:23

## 2019-06-07 RX ADMIN — PHENYLEPHRINE HYDROCHLORIDE 3.61 MICROGRAM(S)/KG/MIN: 10 INJECTION INTRAVENOUS at 18:22

## 2019-06-07 RX ADMIN — Medication 400 MILLIGRAM(S): at 19:29

## 2019-06-07 NOTE — PROGRESS NOTE ADULT - PROBLEM SELECTOR PLAN 10
1.       PCP Contacted on Admission: (Y/N) --> Name & Phone #:  2.       Date of Contact with PCP:  3.       PCP Contacted at Discharge: (Y/N)  4.       Summary of Handoff Given to PCP:   5.       Post-Discharge Appointment Date and Location:
1.       PCP Contacted on Admission: (Y/N) --> Name & Phone #:  2.       Date of Contact with PCP:  3.       PCP Contacted at Discharge: (Y/N)  4.       Summary of Handoff Given to PCP:   5.       Post-Discharge Appointment Date and Location:

## 2019-06-07 NOTE — CHART NOTE - NSCHARTNOTEFT_GEN_A_CORE
Went to see patient this AM, but currently at OR with vascular.  - Case discussed with Dr. Barnes. MOHINI reviewed.   - Continue to medically optimize per primary team.  - Would consider possible watchman device for EVERETTE closure to reduce risk of stroke, as well as possible PFO closure.   - MRI head pending PPM compatibility  - Obtain collateral regarding patient compliance with home medications including eliquis  - Continue CVA workup.   - Will continue to follow. Went to see patient this AM, but currently at OR with vascular.  - Case discussed with Dr. Barnes. MOHINI reviewed.   - Continue to medically optimize per primary team.  - Would consider possible watchman device for EVERETTE closure to reduce risk of stroke (if no neuro cause identified)  - MRI head pending PPM compatibility  - Obtain collateral regarding patient compliance with home medications including eliquis  - Continue CVA workup.   - Will continue to follow.

## 2019-06-07 NOTE — PROGRESS NOTE ADULT - PROBLEM SELECTOR PLAN 1
- New onset dysarthria, unclear cause; speech c/s and recs pending  - MRI pending; needs to be monitored 2/2 pacemaker dependency  - Consideration of CVA as new cause; especially in setting of multiple risk factors.  Unclear how compliant patient is with medication regimen including eliquis, with potential for embolic stroke.  - CVA workup as below  - Speech eval pending
- New onset dysarthria, unclear cause; speech c/s and recs pending  - MRI pending; needs to be monitored 2/2 pacemaker dependency  - Consideration of CVA as new cause; especially in setting of multiple risk factors.  Unclear how compliant patient is with medication regimen including eliquis, with potential for embolic stroke.  - CVA workup as below  - Speech eval pending

## 2019-06-07 NOTE — PROGRESS NOTE ADULT - PROBLEM SELECTOR PLAN 7
- Hx of COPD, not currently on any nebulizer  - Duoneb PRn wheezing
- Hx of COPD, not currently on any nebulizer  - Duoneb PRn wheezing

## 2019-06-07 NOTE — PROGRESS NOTE ADULT - PROBLEM SELECTOR PLAN 4
Hx of hypothyroidism and in 2018 was discharged on 50 mcg of levothyroxine  - Currently TSH 40.  restarted levothyroxine at 50 mcg.
Hx of hypothyroidism and in 2018 was discharged on 50 mcg of levothyroxine  - Currently TSH 40.  Will restart levothyroxine at 50 mcg.

## 2019-06-07 NOTE — PROGRESS NOTE ADULT - PROBLEM SELECTOR PLAN 6
- BPs 130s-150s   - Hold lisinopril 40 mg (home dose) in setting of permissive HTN  - pt hx of lasix but reports does not take
- BPs 130s-150s   - Hold lisinopril 40 mg (home dose) in setting of permissive HTN  - pt hx of lasix but reports does not take

## 2019-06-07 NOTE — PROGRESS NOTE ADULT - PROBLEM SELECTOR PLAN 2
CT perfusion negative, CT brain with Several patchy hypodense areas in the periventricular white matter of bilateral cerebral hemispheres and CT angio with bilateral carotid stenosis 90%.  - Hold eliquis 5 mg BID prior to procedure  - No aspirin  - Lipitor 80 mg QHS  - MRI brain without contrast (recent pacemaker seems to be MR compatible based on model);   - MOHINI - no clots noted; but with extensive plaque in aortic arch.  Also with PFO and contrast  - Passed bedside dysphagia screen  - SCDs  - vascular consult with recs pending for bilateral carotid stenosis; for CEA today
CT perfusion negative, CT brain with Several patchy hypodense areas in the periventricular white matter of bilateral cerebral hemispheres and CT angio with bilateral carotid stenosis 90%.  - Resume eliquis 5 mg BID  - No aspirin  - Lipitor 80 mg QHS  - MRI brain without contrast (recent pacemaker seems to be MR compatible based on model);   - MOHINI - no clots noted; but with extensive plaque in aortic arch.  Also with PFO and contrast  - Passed bedside dysphagia screen  - SCDs  - vascular consult with recs pending for bilateral carotid stenosis

## 2019-06-07 NOTE — PROGRESS NOTE ADULT - PROBLEM SELECTOR PLAN 3
- Hx of Afib along with hx of bifasciular heart block requiring pacemaker implantation  - Patient on eliquis 5 mg BID; MOHINI pending to r/o clot  - Previous med rec with dilt 120 and amiodarone 200 but on outpatient medication review, and per pharmacist has not picked those medications up recently  - Will monitor HR, though with pacemaker dependency
- Hx of Afib along with hx of bifasciular heart block requiring pacemaker implantation  - Patient on eliquis 5 mg BID; MOHINI pending to r/o clot  - Previous med rec with dilt 120 and amiodarone 200 but on outpatient medication review, and per pharmacist has not picked those medications up recently  - Will monitor HR, though with pacemaker dependency

## 2019-06-07 NOTE — PROGRESS NOTE ADULT - SUBJECTIVE AND OBJECTIVE BOX
Hospital Course:  84F y/o male with PMHx former smoker, HTN, HLD, NIDDM, hypothyroidism, prostate CA s/p seeding, atrial flutter s/p ablation, on Eliquis (last dose 2/3/18), bifasicular heart block s/p pacemaker, COPD not on home O2, CAD s/p CABGx3 2017 with Dr. Funez, Ry, and chronic diastolic heart failure presenting with two days of slurred speech, concerning for CVA.  CT perfusion negative, CT brain with Several patchy hypodense areas in the periventricular white matter of bilateral cerebral hemispheres and CT angio with bilateral carotid stenosis 90%.  MOHINI done which demonstrated no clots; but with extensive plaque in aortic arch.  Also with PFO.  EF 55-60%.  MRI pending; however, pt has a pacemaker and needs to have EP convert it to MRI compatible mode when going downstairs.  Additionally, has had flank pain b/l.  U/A pending, back XRay reads pending, Renal U/S b/l pending to r/o hydronephrosis/ kidney stones.  TSH noted to be 40s, so restarted levothyroxine 50 mcg.        OVERNIGHT EVENTS:  Slightly delirious at night post MOHINI anesthesia.    SUBJECTIVE / INTERVAL HPI: Patient seen and examined at bedside. C/O back pain.  Denies chest pain, SOB.      VITAL SIGNS:  Vital Signs Last 24 Hrs  T(C): 36.6 (2019 05:52), Max: 36.6 (2019 05:52)  T(F): 97.8 (2019 05:52), Max: 97.8 (2019 05:52)  HR: 88 (2019 04:06) (80 - 98)  BP: 165/75 (2019 04:06) (131/72 - 165/78)  BP(mean): 113 (2019 04:06) (90 - 119)  RR: 20 (2019 04:06) (16 - 20)  SpO2: 97% (2019 04:06) (91% - 98%)    PHYSICAL EXAM:    Constitutional: Obese male sitting in bed comfortably, NAD  Eyes: PERRL, EOMI, anicteric sclera  ENT: no oropharyngeal erythema or exudates; MMM  Respiratory: CTA B/L; no W/R/R, no retractions; no wheezing heard this AM  Cardiac: +S1/S2; RRR; no M/R/G; PMI non-displaced  Gastrointestinal: abdomen tender to palpation on left and right flanks, NT/ND; no rebound or guarding; +BSx4  Extremities: WWP; peripheral edema b/l pitting; R knee with pain on palpation  Musculoskeletal: CVA tenderness b/l; NROM x4; no joint swelling, tenderness or erythema  Vascular: 2+ radial, DP/PT pulses B/L  Neurologic:    	Mental Status: Sleepy, somewhat disoriented  	CN: 2-12 intact  	Motor: Normal bulk and tone; 5/5 strength X4 extremities, RLE limited by pain  	Sensory: SILT X4  	Coordination: No dysmetria noted  	Reflexes: No clonus b/l  Psychiatric: affect and characteristics of appearance, verbalizations, behaviors are appropriate    MEDICATIONS:  MEDICATIONS  (STANDING):  aspirin enteric coated 81 milliGRAM(s) Oral daily  atorvastatin 80 milliGRAM(s) Oral at bedtime  dextrose 5%. 1000 milliLiter(s) (50 mL/Hr) IV Continuous <Continuous>  dextrose 50% Injectable 12.5 Gram(s) IV Push once  dextrose 50% Injectable 25 Gram(s) IV Push once  dextrose 50% Injectable 25 Gram(s) IV Push once  heparin  Infusion.  Unit(s)/Hr (18 mL/Hr) IV Continuous <Continuous>  insulin lispro (HumaLOG) corrective regimen sliding scale   SubCutaneous Before meals and at bedtime  levothyroxine 50 MICROGram(s) Oral daily    MEDICATIONS  (PRN):  acetaminophen   Tablet .. 650 milliGRAM(s) Oral every 6 hours PRN Moderate Pain (4 - 6)  dextrose 40% Gel 15 Gram(s) Oral once PRN Blood Glucose LESS THAN 70 milliGRAM(s)/deciliter  glucagon  Injectable 1 milliGRAM(s) IntraMuscular once PRN Glucose LESS THAN 70 milligrams/deciliter      ALLERGIES:  Allergies    No Known Allergies    Intolerances    narcotic analgesics (Nausea; Vomiting)      LABS:                        13.3   8.41  )-----------( 166      ( 2019 06:06 )             41.5     06-07    139  |  102  |  17  ----------------------------<  98  4.3   |  26  |  0.98    Ca    9.3      2019 06:06  Mg     2.1     06-07    TPro  6.6  /  Alb  3.8  /  TBili  0.4  /  DBili  x   /  AST  16  /  ALT  9<L>  /  AlkPhos  62  06-06    PT/INR - ( 2019 06:06 )   PT: 16.0 sec;   INR: 1.40          PTT - ( 2019 06:06 )  PTT:29.6 sec  Urinalysis Basic - ( 2019 00:07 )    Color: Yellow / Appearance: Clear / S.010 / pH: x  Gluc: x / Ketone: NEGATIVE  / Bili: Negative / Urobili: 0.2 E.U./dL   Blood: x / Protein: NEGATIVE mg/dL / Nitrite: NEGATIVE   Leuk Esterase: NEGATIVE / RBC: x / WBC x   Sq Epi: x / Non Sq Epi: x / Bacteria: x      CAPILLARY BLOOD GLUCOSE  84 (2019 14:49)      POCT Blood Glucose.: 83 mg/dL (2019 06:24)      RADIOLOGY & ADDITIONAL TESTS: Reviewed.    ASSESSMENT:    PLAN:

## 2019-06-07 NOTE — PROGRESS NOTE ADULT - PROBLEM SELECTOR PLAN 9
F: No current IVF  E: replete PRN  N: Passed dysphagia screen; pureed diet
F: No current IVF  E: replete PRN  N: Passed dysphagia screen; pureed diet

## 2019-06-07 NOTE — PROGRESS NOTE ADULT - PROBLEM SELECTOR PLAN 8
VTE PPx: SCDs, eliquis  No indication for GI PPx  FULL CODE  Dispo: [facility]
VTE PPx: SCDs, eliquis  No indication for GI PPx  FULL CODE  Dispo: [facility]

## 2019-06-07 NOTE — PROGRESS NOTE ADULT - PROBLEM SELECTOR PROBLEM 2
R/O Cerebrovascular accident (CVA), unspecified mechanism
R/O Cerebrovascular accident (CVA), unspecified mechanism

## 2019-06-07 NOTE — PROGRESS NOTE ADULT - PROBLEM SELECTOR PLAN 5
- Takes metformin 500 mg BID; previous A1c 6.8  - AM A1c 6.3  - mISS
- Takes metformin 500 mg BID; previous A1c 6.8  - AM A1c 6.3  - mISS

## 2019-06-07 NOTE — CONSULT NOTE ADULT - SUBJECTIVE AND OBJECTIVE BOX
HPI:  **INCOMPLETE      84F y/o male with PMHx former smoker, HTN, HLD, NIDDM, hypothyroidism, prostate CA s/p seeding, atrial flutter s/p ablation, on Eliquis (last dose 2/3/18), bifasicular heart block s/p pacemaker, COPD not on home O2, CAD s/p CABGx3 2017 with Dr. Funez, [place], and chronic diastolic heart failure presenting with two days of slurred speech.  One day PTA and on day of arrival, pt started having speech difficulties at around 12 PM.  He was told by his girlfriend to come to the ED.  Denies HA, weakness, fevers, chills, CP, SOB.  Denies numbness and tingling of extremities or difficulties with balance.    ED Course:  initial /88 92 97.9 97%RA.  Stroke code called.  CT brain stroke protocol with Several patchy hypodense areas in the periventricular white matter of bilateral cerebral hemispheres.  CT perfusion nml.  CTA head and neck with calcified plaques noted in the aortic arch, bifurcations of both common carotid arteries and bilateral carotid siphons, 90% stenosis at the origins of both internal carotids arteries, minimal stenosis in the distal portion of the left vertebral artery.Given lisinopril 40 mg, 2 duonebs, resumed home eliquis dosing.  Admitted to stroke service for further CVA workup. (2019 18:20)      SICU ADDENDUM:    PMH:     MEDS:     Allergies    No Known Allergies    Intolerances    narcotic analgesics (Nausea; Vomiting)      ICU Vital Signs Last 24 Hrs  T(F): 97.5 (19 @ 10:00), Max: 97.8 (19 @ 02:17)  HR: 86 (19 @ 09:10) (80 - 98)  BP: 182/103 (19 @ 09:10) (140/105 - 182/103)  BP(mean): 132 (19 @ 09:10) (102 - 132)  ABP: --  RR: 18 (19 @ 09:10) (16 - 20)  SpO2: 98% (19 @ 09:10) (93% - 98%)    General: NAD, resting comfortably in bed. Well developed, well groomed  NEURO: AAOx3, CN II-XII grossly intact, EOMI, follows commands  HEENT: PERRL, MMM  CV: RRR, no MRG  PULM: CTAB, nonlabored breathing, no respiratory distress  ABD: soft, NT/ND. No rebound, no guarding, no tympany. Incisions CDI.   : Randle in place, normal genitalia  RECTAL: intact sphincter tone, no evidence of hemorrhoids, no blood, stool in vault  EXTREM: WWP, no edema, no calf tenderness  VASC: No cyanosis, pallor. Palpable radial and PT bilaterally  SKIN: No rashes noted  PSYCH: Appropriate affect, answers questions appropriately  LABS:        139  |  102  |  17  ----------------------------<  98  4.3   |  26  |  0.98    Ca    9.3      2019 06:06  Mg     2.1     -    TPro  6.6  /  Alb  3.8  /  TBili  0.4  /  DBili  x   /  AST  16  /  ALT  9<L>  /  AlkPhos  62  06-06  LIVER FUNCTIONS - ( 2019 06:55 )  Alb: 3.8 g/dL / Pro: 6.6 g/dL / ALK PHOS: 62 U/L / ALT: 9 U/L / AST: 16 U/L / GGT: x                               13.3   8.41  )-----------( 166      ( 2019 06:06 )             41.5   PT/INR - ( 2019 06:06 )   PT: 16.0 sec;   INR: 1.40          PTT - ( 2019 06:06 )  PTT:29.6 secCARDIAC MARKERS ( 2019 14:37 )  x     / <0.01 ng/mL / x     / x     / x        Urinalysis Basic - ( 2019 00:07 )    Color: Yellow / Appearance: Clear / S.010 / pH: x  Gluc: x / Ketone: NEGATIVE  / Bili: Negative / Urobili: 0.2 E.U./dL   Blood: x / Protein: NEGATIVE mg/dL / Nitrite: NEGATIVE   Leuk Esterase: NEGATIVE / RBC: x / WBC x   Sq Epi: x / Non Sq Epi: x / Bacteria: x    CAPILLARY BLOOD GLUCOSE      POCT Blood Glucose.: 83 mg/dL (2019 06:24)  POCT Blood Glucose.: 101 mg/dL (2019 21:56)    Randle:	  [ ] None	[ ] Daily Randle Order Placed	   Indication:	  [X ] Strict I and O's    [ ] Obstruction     [ ] Incontinence + Stage 3 or 4 Decubitus  Central Line:  [ X] None	   [ ]  Medication / TPN Administration     [ ] No Peripheral IV     A/P:    85 yo M PMH:    NEURO:  CV:  PULM:   GI/FEN:  :  ENDO:  ID:  PPX:  LINES:  WOUNDS/DRAINS: HPI:  INCOMPLETE*****  84F y/o male with PMHx former smoker, HTN, HLD, NIDDM, hypothyroidism, prostate CA s/p seeding, atrial flutter s/p ablation, on Eliquis (last dose 2/3/18), bifasicular heart block s/p pacemaker, COPD not on home O2, CAD s/p CABGx3 2017 with Dr. Funez, [place], and chronic diastolic heart failure presenting with two days of slurred speech.  One day PTA and on day of arrival, pt started having speech difficulties at around 12 PM.  He was told by his girlfriend to come to the ED.  Denies HA, weakness, fevers, chills, CP, SOB.  Denies numbness and tingling of extremities or difficulties with balance.    ED Course:  initial /88 92 97.9 97%RA.  Stroke code called.  CT brain stroke protocol with Several patchy hypodense areas in the periventricular white matter of bilateral cerebral hemispheres.  CT perfusion nml.  CTA head and neck with calcified plaques noted in the aortic arch, bifurcations of both common carotid arteries and bilateral carotid siphons, 90% stenosis at the origins of both internal carotids arteries, minimal stenosis in the distal portion of the left vertebral artery.Given lisinopril 40 mg, 2 duonebs, resumed home eliquis dosing.  Admitted to stroke service for further CVA workup. (2019 18:20)      SICU ADDENDUM:      PMH:     MEDS:     Allergies    No Known Allergies    Intolerances    narcotic analgesics (Nausea; Vomiting)      ICU Vital Signs Last 24 Hrs  T(F): 97.5 (19 @ 10:00), Max: 97.8 (19 @ 02:17)  HR: 86 (19 @ 09:10) (80 - 98)  BP: 182/103 (19 @ 09:10) (140/105 - 182/103)  BP(mean): 132 (19 @ 09:10) (102 - 132)  ABP: --  RR: 18 (19 @ 09:10) (16 - 20)  SpO2: 98% (19 @ 09:10) (93% - 98%)    General: NAD, resting comfortably in bed. Well developed, well groomed  NEURO: AAOx3, CN II-XII grossly intact, EOMI, follows commands  HEENT: PERRL, MMM  CV: RRR, no MRG  PULM: CTAB, nonlabored breathing, no respiratory distress  ABD: soft, NT/ND. No rebound, no guarding, no tympany. Incisions CDI.   : Randle in place, normal genitalia  RECTAL: intact sphincter tone, no evidence of hemorrhoids, no blood, stool in vault  EXTREM: WWP, no edema, no calf tenderness  VASC: No cyanosis, pallor. Palpable radial and PT bilaterally  SKIN: No rashes noted  PSYCH: Appropriate affect, answers questions appropriately  LABS:        139  |  102  |  17  ----------------------------<  98  4.3   |  26  |  0.98    Ca    9.3      2019 06:06  Mg     2.1         TPro  6.6  /  Alb  3.8  /  TBili  0.4  /  DBili  x   /  AST  16  /  ALT  9<L>  /  AlkPhos  62  06-06  LIVER FUNCTIONS - ( 2019 06:55 )  Alb: 3.8 g/dL / Pro: 6.6 g/dL / ALK PHOS: 62 U/L / ALT: 9 U/L / AST: 16 U/L / GGT: x                               13.3   8.41  )-----------( 166      ( 2019 06:06 )             41.5   PT/INR - ( 2019 06:06 )   PT: 16.0 sec;   INR: 1.40          PTT - ( 2019 06:06 )  PTT:29.6 secCARDIAC MARKERS ( 2019 14:37 )  x     / <0.01 ng/mL / x     / x     / x        Urinalysis Basic - ( 2019 00:07 )    Color: Yellow / Appearance: Clear / S.010 / pH: x  Gluc: x / Ketone: NEGATIVE  / Bili: Negative / Urobili: 0.2 E.U./dL   Blood: x / Protein: NEGATIVE mg/dL / Nitrite: NEGATIVE   Leuk Esterase: NEGATIVE / RBC: x / WBC x   Sq Epi: x / Non Sq Epi: x / Bacteria: x    CAPILLARY BLOOD GLUCOSE      POCT Blood Glucose.: 83 mg/dL (2019 06:24)  POCT Blood Glucose.: 101 mg/dL (2019 21:56)    Randle:	  [ ] None	[ ] Daily Randle Order Placed	   Indication:	  [X ] Strict I and O's    [ ] Obstruction     [ ] Incontinence + Stage 3 or 4 Decubitus  Central Line:  [ X] None	   [ ]  Medication / TPN Administration     [ ] No Peripheral IV HPI:    84F y/o male with PMHx former smoker, HTN, HLD, NIDDM, hypothyroidism, prostate CA s/p seeding, atrial flutter s/p ablation, on Eliquis (last dose 2/3/18), bifasicular heart block s/p pacemaker, COPD not on home O2, CAD s/p CABGx3 2017 with Dr. Funez, [facility], and chronic diastolic heart failure presenting with two days of slurred speech.  One day PTA and on day of arrival, pt started having speech difficulties at around 12 PM.  He was told by his girlfriend to come to the ED.  Denies HA, weakness, fevers, chills, CP, SOB.  Denies numbness and tingling of extremities or difficulties with balance.    ED Course:  initial /88 92 97.9 97%RA.  Stroke code called.  CT brain stroke protocol with Several patchy hypodense areas in the periventricular white matter of bilateral cerebral hemispheres.  CT perfusion nml.  CTA head and neck with calcified plaques noted in the aortic arch, bifurcations of both common carotid arteries and bilateral carotid siphons, 90% stenosis at the origins of both internal carotids arteries, minimal stenosis in the distal portion of the left vertebral artery.Given lisinopril 40 mg, 2 duonebs, resumed home eliquis dosing.  Admitted to stroke service for further CVA workup. (2019 18:20)      SICU ADDENDUM:  85 yo M admitted to the SICU post operatively following left carotid endarterectomy c/b hypotension requiring pressor support admitted for hemodynamic monitoring and neuro checks q 1 hour      PMH: HTN, HLD, DM, prostate cancer s/p seeding, atrial flutter s/p ablation, bifasicular heart block s/p pacemaker, COPD, CAD s/p CABGx3 3/2017, chronic dHF    MEDS: eliquis 5mg BID, lasix 20mg qD, lisinopril 40mg qD, metformin 500mg BID, simvastatin 40mg qHS    Allergies    No Known Allergies    Intolerances    narcotic analgesics (Nausea; Vomiting)      ICU Vital Signs Last 24 Hrs  T(F): 97.5 (19 @ 10:00), Max: 97.8 (19 @ 02:17)  HR: 86 (19 @ 09:10) (80 - 98)  BP: 182/103 (19 @ 09:10) (140/105 - 182/103)  BP(mean): 132 (19 @ 09:10) (102 - 132)  ABP: --  RR: 18 (19 @ 09:10) (16 - 20)  SpO2: 98% (19 @ 09:10) (93% - 98%)    Physical Exam:  General: NAD, resting comfortably in bed. Well developed, well groomed  NEURO: AAOx3, CN II-XII grossly intact, EOMI, follows commands, left tongue deviation  HEENT: PERRL, MMM  NECK: supple, incision clean, dry ,and intact. no evidence of hematoma  CV: RRR, no MRG  PULM: CTAB, nonlabored breathing, no respiratory distress  ABD: soft, NT/ND. No rebound, no guarding, no tympany.   : Randle in place, normal genitalia  EXTREM: WWP, no edema, no calf tenderness  VASC: No cyanosis, pallor. Palpable radial and PT bilaterally  SKIN: No rashes noted  PSYCH: Appropriate affect, answers questions appropriately  LABS:        139  |  102  |  17  ----------------------------<  98  4.3   |  26  |  0.98    Ca    9.3      2019 06:06  Mg     2.1         TPro  6.6  /  Alb  3.8  /  TBili  0.4  /  DBili  x   /  AST  16  /  ALT  9<L>  /  AlkPhos  62  06-06  LIVER FUNCTIONS - ( 2019 06:55 )  Alb: 3.8 g/dL / Pro: 6.6 g/dL / ALK PHOS: 62 U/L / ALT: 9 U/L / AST: 16 U/L / GGT: x                               13.3   8.41  )-----------( 166      ( 2019 06:06 )             41.5   PT/INR - ( 2019 06:06 )   PT: 16.0 sec;   INR: 1.40          PTT - ( 2019 06:06 )  PTT:29.6 secCARDIAC MARKERS ( 2019 14:37 )  x     / <0.01 ng/mL / x     / x     / x        Urinalysis Basic - ( 2019 00:07 )    Color: Yellow / Appearance: Clear / S.010 / pH: x  Gluc: x / Ketone: NEGATIVE  / Bili: Negative / Urobili: 0.2 E.U./dL   Blood: x / Protein: NEGATIVE mg/dL / Nitrite: NEGATIVE   Leuk Esterase: NEGATIVE / RBC: x / WBC x   Sq Epi: x / Non Sq Epi: x / Bacteria: x    CAPILLARY BLOOD GLUCOSE      POCT Blood Glucose.: 83 mg/dL (2019 06:24)  POCT Blood Glucose.: 101 mg/dL (2019 21:56)    Randle:	  [ ] None	[ ] Daily Randle Order Placed	   Indication:	  [X ] Strict I and O's    [ ] Obstruction     [ ] Incontinence + Stage 3 or 4 Decubitus  Central Line:  [ X] None	   [ ]  Medication / TPN Administration     [ ] No Peripheral IV

## 2019-06-08 LAB
ANION GAP SERPL CALC-SCNC: 11 MMOL/L — SIGNIFICANT CHANGE UP (ref 5–17)
ANION GAP SERPL CALC-SCNC: 12 MMOL/L — SIGNIFICANT CHANGE UP (ref 5–17)
ANION GAP SERPL CALC-SCNC: 12 MMOL/L — SIGNIFICANT CHANGE UP (ref 5–17)
ANION GAP SERPL CALC-SCNC: 9 MMOL/L — SIGNIFICANT CHANGE UP (ref 5–17)
APTT BLD: 24.5 SEC — LOW (ref 27.5–36.3)
APTT BLD: 28.3 SEC — SIGNIFICANT CHANGE UP (ref 27.5–36.3)
APTT BLD: 37.9 SEC — HIGH (ref 27.5–36.3)
BASE EXCESS BLDA CALC-SCNC: -3.7 MMOL/L — LOW (ref -2–3)
BASE EXCESS BLDA CALC-SCNC: -3.8 MMOL/L — LOW (ref -2–3)
BUN SERPL-MCNC: 12 MG/DL — SIGNIFICANT CHANGE UP (ref 7–23)
BUN SERPL-MCNC: 13 MG/DL — SIGNIFICANT CHANGE UP (ref 7–23)
BUN SERPL-MCNC: 14 MG/DL — SIGNIFICANT CHANGE UP (ref 7–23)
BUN SERPL-MCNC: 15 MG/DL — SIGNIFICANT CHANGE UP (ref 7–23)
CALCIUM SERPL-MCNC: 6.3 MG/DL — CRITICAL LOW (ref 8.4–10.5)
CALCIUM SERPL-MCNC: 7.8 MG/DL — LOW (ref 8.4–10.5)
CALCIUM SERPL-MCNC: 8.3 MG/DL — LOW (ref 8.4–10.5)
CALCIUM SERPL-MCNC: 8.5 MG/DL — SIGNIFICANT CHANGE UP (ref 8.4–10.5)
CHLORIDE SERPL-SCNC: 104 MMOL/L — SIGNIFICANT CHANGE UP (ref 96–108)
CHLORIDE SERPL-SCNC: 105 MMOL/L — SIGNIFICANT CHANGE UP (ref 96–108)
CHLORIDE SERPL-SCNC: 106 MMOL/L — SIGNIFICANT CHANGE UP (ref 96–108)
CHLORIDE SERPL-SCNC: 116 MMOL/L — HIGH (ref 96–108)
CK MB CFR SERPL CALC: 4.8 NG/ML — SIGNIFICANT CHANGE UP (ref 0–6.7)
CK SERPL-CCNC: 189 U/L — SIGNIFICANT CHANGE UP (ref 30–200)
CO2 SERPL-SCNC: 16 MMOL/L — LOW (ref 22–31)
CO2 SERPL-SCNC: 21 MMOL/L — LOW (ref 22–31)
CO2 SERPL-SCNC: 22 MMOL/L — SIGNIFICANT CHANGE UP (ref 22–31)
CO2 SERPL-SCNC: 27 MMOL/L — SIGNIFICANT CHANGE UP (ref 22–31)
CREAT SERPL-MCNC: 0.71 MG/DL — SIGNIFICANT CHANGE UP (ref 0.5–1.3)
CREAT SERPL-MCNC: 0.85 MG/DL — SIGNIFICANT CHANGE UP (ref 0.5–1.3)
CREAT SERPL-MCNC: 0.92 MG/DL — SIGNIFICANT CHANGE UP (ref 0.5–1.3)
CREAT SERPL-MCNC: 1.02 MG/DL — SIGNIFICANT CHANGE UP (ref 0.5–1.3)
GLUCOSE BLDC GLUCOMTR-MCNC: 104 MG/DL — HIGH (ref 70–99)
GLUCOSE BLDC GLUCOMTR-MCNC: 109 MG/DL — HIGH (ref 70–99)
GLUCOSE BLDC GLUCOMTR-MCNC: 86 MG/DL — SIGNIFICANT CHANGE UP (ref 70–99)
GLUCOSE SERPL-MCNC: 128 MG/DL — HIGH (ref 70–99)
GLUCOSE SERPL-MCNC: 133 MG/DL — HIGH (ref 70–99)
GLUCOSE SERPL-MCNC: 155 MG/DL — HIGH (ref 70–99)
GLUCOSE SERPL-MCNC: 94 MG/DL — SIGNIFICANT CHANGE UP (ref 70–99)
HCO3 BLDA-SCNC: 21 MMOL/L — SIGNIFICANT CHANGE UP (ref 21–28)
HCO3 BLDA-SCNC: 22 MMOL/L — SIGNIFICANT CHANGE UP (ref 21–28)
HCT VFR BLD CALC: 35.2 % — LOW (ref 39–50)
HCT VFR BLD CALC: 37.4 % — LOW (ref 39–50)
HCT VFR BLD CALC: 39.8 % — SIGNIFICANT CHANGE UP (ref 39–50)
HGB BLD-MCNC: 11.2 G/DL — LOW (ref 13–17)
HGB BLD-MCNC: 12 G/DL — LOW (ref 13–17)
HGB BLD-MCNC: 12.4 G/DL — LOW (ref 13–17)
INR BLD: 1.22 — HIGH (ref 0.88–1.16)
INR BLD: 1.26 — HIGH (ref 0.88–1.16)
INR BLD: 1.28 — HIGH (ref 0.88–1.16)
LACTATE SERPL-SCNC: 1.5 MMOL/L — SIGNIFICANT CHANGE UP (ref 0.5–2)
LACTATE SERPL-SCNC: 2.4 MMOL/L — HIGH (ref 0.5–2)
MAGNESIUM SERPL-MCNC: 1.4 MG/DL — LOW (ref 1.6–2.6)
MAGNESIUM SERPL-MCNC: 2.1 MG/DL — SIGNIFICANT CHANGE UP (ref 1.6–2.6)
MCHC RBC-ENTMCNC: 31.2 GM/DL — LOW (ref 32–36)
MCHC RBC-ENTMCNC: 31.8 GM/DL — LOW (ref 32–36)
MCHC RBC-ENTMCNC: 32.1 GM/DL — SIGNIFICANT CHANGE UP (ref 32–36)
MCHC RBC-ENTMCNC: 32.3 PG — SIGNIFICANT CHANGE UP (ref 27–34)
MCHC RBC-ENTMCNC: 32.7 PG — SIGNIFICANT CHANGE UP (ref 27–34)
MCHC RBC-ENTMCNC: 32.8 PG — SIGNIFICANT CHANGE UP (ref 27–34)
MCV RBC AUTO: 102.2 FL — HIGH (ref 80–100)
MCV RBC AUTO: 102.9 FL — HIGH (ref 80–100)
MCV RBC AUTO: 103.6 FL — HIGH (ref 80–100)
NRBC # BLD: 0 /100 WBCS — SIGNIFICANT CHANGE UP (ref 0–0)
PCO2 BLDA: 37 MMHG — SIGNIFICANT CHANGE UP (ref 35–48)
PCO2 BLDA: 42 MMHG — SIGNIFICANT CHANGE UP (ref 35–48)
PH BLDA: 7.33 — LOW (ref 7.35–7.45)
PH BLDA: 7.37 — SIGNIFICANT CHANGE UP (ref 7.35–7.45)
PHOSPHATE SERPL-MCNC: 2.6 MG/DL — SIGNIFICANT CHANGE UP (ref 2.5–4.5)
PHOSPHATE SERPL-MCNC: 3.1 MG/DL — SIGNIFICANT CHANGE UP (ref 2.5–4.5)
PLATELET # BLD AUTO: 135 K/UL — LOW (ref 150–400)
PLATELET # BLD AUTO: 140 K/UL — LOW (ref 150–400)
PLATELET # BLD AUTO: 148 K/UL — LOW (ref 150–400)
PO2 BLDA: 88 MMHG — SIGNIFICANT CHANGE UP (ref 83–108)
PO2 BLDA: SIGNIFICANT CHANGE UP MMHG (ref 83–108)
POTASSIUM SERPL-MCNC: 3.1 MMOL/L — LOW (ref 3.5–5.3)
POTASSIUM SERPL-MCNC: 3.8 MMOL/L — SIGNIFICANT CHANGE UP (ref 3.5–5.3)
POTASSIUM SERPL-MCNC: 3.8 MMOL/L — SIGNIFICANT CHANGE UP (ref 3.5–5.3)
POTASSIUM SERPL-MCNC: 4.5 MMOL/L — SIGNIFICANT CHANGE UP (ref 3.5–5.3)
POTASSIUM SERPL-SCNC: 3.1 MMOL/L — LOW (ref 3.5–5.3)
POTASSIUM SERPL-SCNC: 3.8 MMOL/L — SIGNIFICANT CHANGE UP (ref 3.5–5.3)
POTASSIUM SERPL-SCNC: 3.8 MMOL/L — SIGNIFICANT CHANGE UP (ref 3.5–5.3)
POTASSIUM SERPL-SCNC: 4.5 MMOL/L — SIGNIFICANT CHANGE UP (ref 3.5–5.3)
PROTHROM AB SERPL-ACNC: 13.9 SEC — HIGH (ref 10–12.9)
PROTHROM AB SERPL-ACNC: 14.3 SEC — HIGH (ref 10–12.9)
PROTHROM AB SERPL-ACNC: 14.6 SEC — HIGH (ref 10–12.9)
RBC # BLD: 3.42 M/UL — LOW (ref 4.2–5.8)
RBC # BLD: 3.66 M/UL — LOW (ref 4.2–5.8)
RBC # BLD: 3.84 M/UL — LOW (ref 4.2–5.8)
RBC # FLD: 14.2 % — SIGNIFICANT CHANGE UP (ref 10.3–14.5)
RBC # FLD: 14.3 % — SIGNIFICANT CHANGE UP (ref 10.3–14.5)
RBC # FLD: 14.5 % — SIGNIFICANT CHANGE UP (ref 10.3–14.5)
SAO2 % BLDA: 96 % — SIGNIFICANT CHANGE UP (ref 95–100)
SAO2 % BLDA: 99 % — SIGNIFICANT CHANGE UP (ref 95–100)
SODIUM SERPL-SCNC: 138 MMOL/L — SIGNIFICANT CHANGE UP (ref 135–145)
SODIUM SERPL-SCNC: 140 MMOL/L — SIGNIFICANT CHANGE UP (ref 135–145)
SODIUM SERPL-SCNC: 140 MMOL/L — SIGNIFICANT CHANGE UP (ref 135–145)
SODIUM SERPL-SCNC: 143 MMOL/L — SIGNIFICANT CHANGE UP (ref 135–145)
T4 FREE SERPL-MCNC: 0.4 NG/DL — LOW (ref 0.9–1.8)
TROPONIN T SERPL-MCNC: <0.01 NG/ML — SIGNIFICANT CHANGE UP (ref 0–0.01)
TROPONIN T SERPL-MCNC: <0.01 NG/ML — SIGNIFICANT CHANGE UP (ref 0–0.01)
WBC # BLD: 10.74 K/UL — HIGH (ref 3.8–10.5)
WBC # BLD: 11.85 K/UL — HIGH (ref 3.8–10.5)
WBC # BLD: 14.21 K/UL — HIGH (ref 3.8–10.5)
WBC # FLD AUTO: 10.74 K/UL — HIGH (ref 3.8–10.5)
WBC # FLD AUTO: 11.85 K/UL — HIGH (ref 3.8–10.5)
WBC # FLD AUTO: 14.21 K/UL — HIGH (ref 3.8–10.5)

## 2019-06-08 PROCEDURE — 71045 X-RAY EXAM CHEST 1 VIEW: CPT | Mod: 26,76

## 2019-06-08 PROCEDURE — 99291 CRITICAL CARE FIRST HOUR: CPT

## 2019-06-08 PROCEDURE — 70450 CT HEAD/BRAIN W/O DYE: CPT | Mod: 26

## 2019-06-08 PROCEDURE — 35800 EXPLORE NECK VESSELS: CPT | Mod: 78,GC

## 2019-06-08 RX ORDER — DEXMEDETOMIDINE HYDROCHLORIDE IN 0.9% SODIUM CHLORIDE 4 UG/ML
0.3 INJECTION INTRAVENOUS
Qty: 200 | Refills: 0 | Status: DISCONTINUED | OUTPATIENT
Start: 2019-06-08 | End: 2019-06-08

## 2019-06-08 RX ORDER — NOREPINEPHRINE BITARTRATE/D5W 8 MG/250ML
0.05 PLASTIC BAG, INJECTION (ML) INTRAVENOUS
Qty: 8 | Refills: 0 | Status: DISCONTINUED | OUTPATIENT
Start: 2019-06-08 | End: 2019-06-10

## 2019-06-08 RX ORDER — PROPOFOL 10 MG/ML
5 INJECTION, EMULSION INTRAVENOUS
Qty: 1000 | Refills: 0 | Status: DISCONTINUED | OUTPATIENT
Start: 2019-06-08 | End: 2019-06-09

## 2019-06-08 RX ORDER — POTASSIUM CHLORIDE 20 MEQ
10 PACKET (EA) ORAL
Refills: 0 | Status: COMPLETED | OUTPATIENT
Start: 2019-06-08 | End: 2019-06-08

## 2019-06-08 RX ORDER — CEFAZOLIN SODIUM 1 G
1000 VIAL (EA) INJECTION EVERY 8 HOURS
Refills: 0 | Status: DISCONTINUED | OUTPATIENT
Start: 2019-06-08 | End: 2019-06-08

## 2019-06-08 RX ORDER — ROBINUL 0.2 MG/ML
0.2 INJECTION INTRAMUSCULAR; INTRAVENOUS ONCE
Refills: 0 | Status: COMPLETED | OUTPATIENT
Start: 2019-06-08 | End: 2019-06-08

## 2019-06-08 RX ORDER — PIPERACILLIN AND TAZOBACTAM 4; .5 G/20ML; G/20ML
3.38 INJECTION, POWDER, LYOPHILIZED, FOR SOLUTION INTRAVENOUS EVERY 6 HOURS
Refills: 0 | Status: DISCONTINUED | OUTPATIENT
Start: 2019-06-08 | End: 2019-06-15

## 2019-06-08 RX ORDER — HALOPERIDOL DECANOATE 100 MG/ML
0.5 INJECTION INTRAMUSCULAR ONCE
Refills: 0 | Status: DISCONTINUED | OUTPATIENT
Start: 2019-06-08 | End: 2019-06-08

## 2019-06-08 RX ORDER — SODIUM CHLORIDE 9 MG/ML
500 INJECTION, SOLUTION INTRAVENOUS ONCE
Refills: 0 | Status: COMPLETED | OUTPATIENT
Start: 2019-06-08 | End: 2019-06-08

## 2019-06-08 RX ORDER — MAGNESIUM SULFATE 500 MG/ML
2 VIAL (ML) INJECTION ONCE
Refills: 0 | Status: COMPLETED | OUTPATIENT
Start: 2019-06-08 | End: 2019-06-08

## 2019-06-08 RX ORDER — HALOPERIDOL DECANOATE 100 MG/ML
0.5 INJECTION INTRAMUSCULAR ONCE
Refills: 0 | Status: COMPLETED | OUTPATIENT
Start: 2019-06-08 | End: 2019-06-08

## 2019-06-08 RX ADMIN — Medication 25 MICROGRAM(S): at 06:35

## 2019-06-08 RX ADMIN — Medication 3 MILLILITER(S): at 18:00

## 2019-06-08 RX ADMIN — Medication 100 MILLIGRAM(S): at 06:48

## 2019-06-08 RX ADMIN — Medication 100 MILLIEQUIVALENT(S): at 17:55

## 2019-06-08 RX ADMIN — Medication 100 MILLIEQUIVALENT(S): at 18:52

## 2019-06-08 RX ADMIN — Medication 300 MILLIGRAM(S): at 22:32

## 2019-06-08 RX ADMIN — PIPERACILLIN AND TAZOBACTAM 200 GRAM(S): 4; .5 INJECTION, POWDER, LYOPHILIZED, FOR SOLUTION INTRAVENOUS at 23:02

## 2019-06-08 RX ADMIN — Medication 50 GRAM(S): at 17:52

## 2019-06-08 RX ADMIN — PHENYLEPHRINE HYDROCHLORIDE 3.61 MICROGRAM(S)/KG/MIN: 10 INJECTION INTRAVENOUS at 04:38

## 2019-06-08 RX ADMIN — ROBINUL 0.2 MILLIGRAM(S): 0.2 INJECTION INTRAMUSCULAR; INTRAVENOUS at 08:32

## 2019-06-08 RX ADMIN — SODIUM CHLORIDE 1000 MILLILITER(S): 9 INJECTION, SOLUTION INTRAVENOUS at 01:30

## 2019-06-08 RX ADMIN — Medication 3 MILLILITER(S): at 06:32

## 2019-06-08 RX ADMIN — PIPERACILLIN AND TAZOBACTAM 200 GRAM(S): 4; .5 INJECTION, POWDER, LYOPHILIZED, FOR SOLUTION INTRAVENOUS at 17:55

## 2019-06-08 RX ADMIN — Medication 3 MILLILITER(S): at 10:00

## 2019-06-08 RX ADMIN — SODIUM CHLORIDE 65 MILLILITER(S): 9 INJECTION INTRAMUSCULAR; INTRAVENOUS; SUBCUTANEOUS at 23:04

## 2019-06-08 RX ADMIN — Medication 2000 MILLIGRAM(S): at 06:34

## 2019-06-08 RX ADMIN — Medication 100 MILLIEQUIVALENT(S): at 19:52

## 2019-06-08 RX ADMIN — Medication 3 MILLILITER(S): at 22:01

## 2019-06-08 RX ADMIN — HALOPERIDOL DECANOATE 0.5 MILLIGRAM(S): 100 INJECTION INTRAMUSCULAR at 10:54

## 2019-06-08 NOTE — DIETITIAN INITIAL EVALUATION ADULT. - OTHER INFO
84F y/o male with PMHx former smoker, HTN, HLD, NIDDM, hypothyroidism, prostate CA s/p seeding, atrial flutter s/p ablation, on Eliquis (last dose 2/3/18), bifasicular heart block s/p pacemaker, COPD not on home O2, CAD s/p CABGx3 March 2017 with Dr. Funez, [facility], and chronic diastolic heart failure presenting with two days of slurred speech CT showed 90% stenosis of bilateral carotid arteries now s/p left carotid endarterectomy c/b hypotension 83y/o male with PMHx former smoker, HTN, HLD, NIDDM, hypothyroidism, prostate CA s/p seeding, atrial flutter s/p ablation, on Eliquis (last dose 2/3/18), bifasicular heart block s/p pacemaker, COPD not on home O2, CAD s/p CABGx3 March 2017 with Dr. Funez, [facility], and chronic diastolic heart failure presenting with two days of slurred speech CT showed 90% stenosis of bilateral carotid arteries now s/p left carotid endarterectomy c/b hypotension. Pt extubated last night 6/7. He is seen in bed this am, confused and with RN at bedside. Pt with varying states of confusion and uncooperativeness; not appropriate for education at this time. Pt is NPO and pending formal SLP swallow eval. If medically cleared recommend Dash/TLC, CST CHO diet per appropriate consistency. Please provide assistance with meals and maintain aspiration precautions as appropriate. Unsure of diet/wt history PTA as no family present. RD to follow per policy and re-attempt education as appropriate.

## 2019-06-08 NOTE — BRIEF OPERATIVE NOTE - NSICDXBRIEFPREOP_GEN_ALL_CORE_FT
PRE-OP DIAGNOSIS:  Carotid artery stenosis, unilateral, left 07-Jun-2019 13:46:33  Keysha Alanis
PRE-OP DIAGNOSIS:  Hematoma of neck 08-Jun-2019 15:02:46  Margy Bains

## 2019-06-08 NOTE — BRIEF OPERATIVE NOTE - NSICDXBRIEFPOSTOP_GEN_ALL_CORE_FT
POST-OP DIAGNOSIS:  Stenosis of left carotid artery 07-Jun-2019 13:46:47  Keysha Alanis
POST-OP DIAGNOSIS:  Hematoma of neck 08-Jun-2019 15:03:02  Margy Bains

## 2019-06-08 NOTE — BRIEF OPERATIVE NOTE - OPERATION/FINDINGS
Severe ICA stenosis.   Photofix patch
Left neck incision explored. Small amount of hematoma in superficial subcutaneous layer. ~ 5 cc of serosanguinous fluid evacuated over carotid patch but no hematoma. Suture line intact without bleeding. Triphasic signal with normal diastolic flow on doppler. Hemostasis achieved. Incision closed in 2 layers.

## 2019-06-08 NOTE — PROGRESS NOTE ADULT - ASSESSMENT
84F y/o male with PMHx former smoker, HTN, HLD, NIDDM, hypothyroidism, prostate CA s/p seeding, atrial flutter s/p ablation, on Eliquis (last dose 2/3/18), bifasicular heart block s/p pacemaker, COPD not on home O2, CAD s/p CABGx3 March 2017 with Dr. Funez, [facility], and chronic diastolic heart failure presenting with two days of slurred speech CT showed 90% stenosis of bilateral carotid arteries now s/p left carotid endarterectomy c/b hypotension    NEURO: tylenol PRN. precedex for agitation  CV:  phenylephrine, SBP goal 110-160, ASA  PULM: RA. will give glycopyrrolate x1 for increased secretions.   GI/FEN: NPO until work-up by speech and swallow  : joyce  ENDO: ISS, snythroid 25mg IV  HEME: ASA, hold SQH   ID: Ancef x 3 doses  PPX: SCDs  LINES: PIVs, A-line  WOUNDS/DRAINS: left neck incision, no drain

## 2019-06-08 NOTE — CHART NOTE - NSCHARTNOTEFT_GEN_A_CORE
SICU team called to bedside by nursing staff to evaluate patient with increased work of breathing. Pt was previously responding to questions and following commands but now appears to be disoriented and less redirectable. States he cannot catch a deep breath. Denies chest pain.    ICU Vital Signs Last 24 Hrs  T(C): 37.3 (08 Jun 2019 09:04), Max: 37.3 (08 Jun 2019 09:04)  T(F): 99.1 (08 Jun 2019 09:04), Max: 99.1 (08 Jun 2019 09:04)  HR: 115 (08 Jun 2019 12:48) (58 - 122)  BP: 140/71 (08 Jun 2019 08:00) (113/56 - 144/60)  BP(mean): 93 (08 Jun 2019 08:00) (74 - 100)  ABP: 136/54 (08 Jun 2019 11:00) (100/44 - 162/64)  ABP(mean): 80 (08 Jun 2019 11:00) (64 - 94)  RR: 18 (08 Jun 2019 12:48) (18 - 39)  SpO2: 93% (08 Jun 2019 12:48) (88% - 100%)    Pt agitated, pulling at lines and O2 mask. Appears cyanotic with respiratory distress. Neck incision is CDI however neck now with fullness and firmness crossing midline and expanding ecchymosis not seen on prior exam roughly 1 hour ago. Lungs CTA.     Decision was made to intubate for airway protection in the setting of presumed expanding hematoma at surgical site. Anesthesia paged overhead and pt was sedated with propofol and easily intubated with glidescope. Labs sent. Pt added on for Class 1 neck exploration with plan for immediate post-op CT head to evaluate for hemorrhagic stroke. SICU Attending and vascular team aware of events. SICU team called to bedside by nursing staff to evaluate patient with increased work of breathing. Pt was previously responding to questions and following commands but now appears to be disoriented and less redirectable. States he cannot catch a deep breath. Denies chest pain.    ICU Vital Signs Last 24 Hrs  T(C): 37.3 (08 Jun 2019 09:04), Max: 37.3 (08 Jun 2019 09:04)  T(F): 99.1 (08 Jun 2019 09:04), Max: 99.1 (08 Jun 2019 09:04)  HR: 115 (08 Jun 2019 12:48) (58 - 122)  BP: 140/71 (08 Jun 2019 08:00) (113/56 - 144/60)  BP(mean): 93 (08 Jun 2019 08:00) (74 - 100)  ABP: 136/54 (08 Jun 2019 11:00) (100/44 - 162/64)  ABP(mean): 80 (08 Jun 2019 11:00) (64 - 94)  RR: 18 (08 Jun 2019 12:48) (18 - 39)  SpO2: 93% (08 Jun 2019 12:48) (88% - 100%)    Pt agitated, pulling at lines and O2 mask. Appears cyanotic with respiratory distress. Audible gugrling/uppear airway secretions present. Neck incision is CDI however neck now with fullness and firmness crossing midline and expanding ecchymosis not seen on prior exam roughly 1 hour ago. Lungs CTA.     Decision was made to intubate for airway protection in the setting of presumed expanding hematoma at surgical site. Anesthesia paged overhead and pt was sedated with propofol and easily intubated with glidescope. Labs sent. Pt added on for Class 1 neck exploration with plan for immediate post-op CT head to evaluate for hemorrhagic stroke. SICU Attending and vascular team aware of events. SICU team called to bedside by nursing staff to evaluate patient with increased work of breathing. Pt was previously responding to questions and following commands but now appears to be disoriented and less redirectable. States he cannot catch a deep breath. Denies chest pain.    ICU Vital Signs Last 24 Hrs  T(C): 37.3 (08 Jun 2019 09:04), Max: 37.3 (08 Jun 2019 09:04)  T(F): 99.1 (08 Jun 2019 09:04), Max: 99.1 (08 Jun 2019 09:04)  HR: 115 (08 Jun 2019 12:48) (58 - 122)  BP: 140/71 (08 Jun 2019 08:00) (113/56 - 144/60)  BP(mean): 93 (08 Jun 2019 08:00) (74 - 100)  ABP: 136/54 (08 Jun 2019 11:00) (100/44 - 162/64)  ABP(mean): 80 (08 Jun 2019 11:00) (64 - 94)  RR: 18 (08 Jun 2019 12:48) (18 - 39)  SpO2: 93% (08 Jun 2019 12:48) (88% - 100%)    Pt agitated, pulling at lines and O2 mask. Appears cyanotic with respiratory distress. Audible gurgling/upper airway secretions present. Neck incision is CDI however neck now with fullness and firmness crossing midline and expanding ecchymosis not seen on prior exam roughly 1 hour ago. Lungs CTA.     Decision was made to intubate for airway protection in the setting of presumed expanding hematoma at surgical site. Anesthesia paged overhead and pt was sedated with propofol and easily intubated with glidescope. Labs sent. Pt added on for Class 1 neck exploration with plan for immediate post-op CT head to evaluate for hemorrhagic stroke. SICU Attending and vascular team aware of events.

## 2019-06-08 NOTE — DIETITIAN INITIAL EVALUATION ADULT. - ENERGY NEEDS
Ht 167.64cm; Wt 96.2Kg  IBW 64.5Kg; %%  BMI 34.1    Utilized IBW to calculate needs, pt >120% of IBW. Adjusted for post-op/age. Fluids per team 2/2 HF.

## 2019-06-08 NOTE — BRIEF OPERATIVE NOTE - NSICDXBRIEFOPLAUNCH_GEN_ALL_CORE
28 year old woman with no PMH recent  2 weeks ago without any epidural, diagnosed with preeclampsia on day of delivery and started on labetalol which she has since continued c/o 2 days of weakness, fever, chest pain, sob, mild congestion/sore throat, headache.  She has been in her usual state of health until these symptoms started; denies recent travel, sick contacts, hemoptysis.  In the ED, CT chest shows multifocal PNA.  WBC 45 with left shift.  Tmax 102.7 in ED. (2018 22:29)        MEDICATIONS  (STANDING):  ALBUTerol/ipratropium for Nebulization 3 milliLiter(s) Nebulizer every 6 hours  azithromycin   Tablet 250 milliGRAM(s) Oral daily  labetalol 200 milliGRAM(s) Oral two times a day  oseltamivir 75 milliGRAM(s) Oral two times a day  piperacillin/tazobactam IVPB. 3.375 Gram(s) IV Intermittent every 8 hours  sodium chloride 0.9%. 1000 milliLiter(s) (125 mL/Hr) IV Continuous <Continuous>    MEDICATIONS  (PRN):  acetaminophen   Tablet 650 milliGRAM(s) Oral every 6 hours PRN For Temp greater than 38 C (100.4 F)  guaiFENesin    Syrup 200 milliGRAM(s) Oral every 6 hours PRN Cough      REVIEW OF SYSTEMS:    CONSTITUTIONAL: No fever, chills, weight loss, or fatigue  HEENT: No sore throat, runny nose, ear ache  RESPIRATORY: No cough, wheezing, No shortness of breath  CARDIOVASCULAR: No chest pain, palpitations, dizziness  GASTROINTESTINAL: No abdominal pain. No nausea, vomiting, diarrhea  GENITOURINARY: No dysuria, increase frequency, hematuria, or incontinence  NEUROLOGICAL: No headaches, memory loss, loss of strength, numbness, or tremors, no weakness  EXTREMITY: No pedal edema BLE  SKIN: No itching, burning, rashes, or lesions     VITAL SIGNS:  T(C): 37 (18 @ 11:22), Max: 37.3 (01-15-18 @ 23:59)  T(F): 98.6 (18 @ 11:22), Max: 99.1 (01-15-18 @ 23:59)  HR: 101 (18 @ 11:22) (85 - 101)  BP: 95/52 (18 @ 11:22) (95/52 - 124/83)  RR: 18 (18 @ 11:22) (17 - 18)  SpO2: 98% (18 @ 11:22) (96% - 99%)  Wt(kg): --    PHYSICAL EXAM:    GENERAL: not in any distress  HEENT: Neck is supple, normocephalic, atraumatic   CHEST/LUNG: Clear to auscultation bilaterally; No rales, rhonchi, wheezing  HEART: Regular rate and rhythm; No murmurs, rubs, or gallops  ABDOMEN: Soft, Nontender, Nondistended; Bowel sounds present, no rebound   EXTREMITIES:  2+ Peripheral Pulses, No clubbing, cyanosis, or edema  GENITOURINARY:   SKIN: No rashes or lesions  BACK: no pressor sore   NERVOUS SYSTEM:  Alert & Oriented X3, Good concentration  PSYCH: normal affect     LABS:                         10.8   22.7  )-----------( 243      ( 2018 06:00 )             31.0         143  |  111<H>  |  8   ----------------------------<  77  3.5   |  25  |  0.57    Ca    8.2<L>      2018 06:00  Mg     1.5         TPro  7.8  /  Alb  2.9<L>  /  TBili  0.7  /  DBili  x   /  AST  19  /  ALT  24  /  AlkPhos  107      LIVER FUNCTIONS - ( 2018 17:44 )  Alb: 2.9 g/dL / Pro: 7.8 gm/dL / ALK PHOS: 107 U/L / ALT: 24 U/L / AST: 19 U/L / GGT: x             Urinalysis Basic - ( 2018 18:46 )    Color: Yellow / Appearance: Slightly Turbid / S.005 / pH: x  Gluc: x / Ketone: Trace  / Bili: Negative / Urobili: Negative mg/dL   Blood: x / Protein: 30 mg/dL / Nitrite: Negative   Leuk Esterase: Moderate / RBC: 6-10 /HPF / WBC >50   Sq Epi: x / Non Sq Epi: Few / Bacteria: Many        CARDIAC MARKERS ( 2018 17:44 )  .022 ng/mL / x     / x     / x     / <0.5 ng/mL        HCG Quantitative, Serum: 12 mIU/mL ( @ 19:44)            C Diff by PCR Result: NotDetec (01-15 @ 08:37)    Culture Results:   Normal Respiratory Romina present (01-15 @ 11:01)  Culture Results:   No growth to date. (01-15 @ 00:05)  Culture Results:   No growth to date. (01-15 @ 00:04)  Culture Results:   No growth ( @ 23:43)          Legionella Antigen, Urine: Negative (01-15 @ 08:37)        Radiology:
28 year old woman with no PMH recent  2 weeks ago without any epidural, diagnosed with preeclampsia on day of delivery and started on labetalol which she has since continued c/o 2 days of weakness, fever, chest pain, sob, mild congestion/sore throat, headache.  She has been in her usual state of health until these symptoms started; denies recent travel, sick contacts, hemoptysis.  In the ED, CT chest shows multifocal PNA.  WBC 45 with left shift.  Tmax 102.7 in ED. (2018 22:29)  feels better   is pumping   breasts are engorged      MEDICATIONS  (STANDING):  ALBUTerol/ipratropium for Nebulization 3 milliLiter(s) Nebulizer every 6 hours  azithromycin   Tablet 250 milliGRAM(s) Oral daily  labetalol 200 milliGRAM(s) Oral two times a day  oseltamivir 75 milliGRAM(s) Oral two times a day  piperacillin/tazobactam IVPB. 3.375 Gram(s) IV Intermittent every 8 hours  sodium chloride 0.9%. 1000 milliLiter(s) (125 mL/Hr) IV Continuous <Continuous>    MEDICATIONS  (PRN):  acetaminophen   Tablet 650 milliGRAM(s) Oral every 6 hours PRN For Temp greater than 38 C (100.4 F)  guaiFENesin    Syrup 200 milliGRAM(s) Oral every 6 hours PRN Cough      REVIEW OF SYSTEMS:    feels much better  breasts hurt cough is less    VITAL SIGNS:  T(C): 37 (18 @ 11:22), Max: 37.3 (01-15-18 @ 23:59)  T(F): 98.6 (18 @ 11:22), Max: 99.1 (01-15-18 @ 23:59)  HR: 101 (18 @ 11:22) (85 - 101)  BP: 95/52 (18 @ 11:22) (95/52 - 124/83)  RR: 18 (18 @ 11:22) (17 - 18)  SpO2: 98% (18 @ 11:22) (96% - 99%)  Wt(kg): --    PHYSICAL EXAM:    GENERAL: not in any distress  HEENT: Neck is supple, normocephalic, atraumatic   CHEST/LUNG: Clear to auscultation bilaterally;   occ  rhonchi,   no wheezing  HEART: Regular rate and rhythm; No murmurs, rubs, or gallops  ABDOMEN: Soft, Nontender, Nondistended; Bowel sounds present, no rebound   EXTREMITIES:  2+ Peripheral Pulses, No clubbing, cyanosis, or edema  SKIN: No rashes or lesions  BACK: no pressor sore   NERVOUS SYSTEM:  Alert & Oriented X3,                       10.8   22.7  )-----------( 243      ( 2018 06:00 )             31.0         143  |  111<H>  |  8   ----------------------------<  77  3.5   |  25  |  0.57    Ca    8.2<L>      2018 06:00  Mg     1.5         TPro  7.8  /  Alb  2.9<L>  /  TBili  0.7  /  DBili  x   /  AST  19  /  ALT  24  /  AlkPhos  107      LIVER FUNCTIONS - ( 2018 17:44 )  Alb: 2.9 g/dL / Pro: 7.8 gm/dL / ALK PHOS: 107 U/L / ALT: 24 U/L / AST: 19 U/L / GGT: x             Urinalysis Basic - ( 2018 18:46 )    Color: Yellow / Appearance: Slightly Turbid / S.005 / pH: x  Gluc: x / Ketone: Trace  / Bili: Negative / Urobili: Negative mg/dL   Blood: x / Protein: 30 mg/dL / Nitrite: Negative   Leuk Esterase: Moderate / RBC: 6-10 /HPF / WBC >50   Sq Epi: x / Non Sq Epi: Few / Bacteria: Many        CARDIAC MARKERS ( 2018 17:44 )  .022 ng/mL / x     / x     / x     / <0.5 ng/mL        HCG Quantitative, Serum: 12 mIU/mL ( @ 19:44)            C Diff by PCR Result: NotDetec (01-15 @ 08:37)    Culture Results:   Normal Respiratory Romina present (01-15 @ 11:01)  Culture Results:   No growth to date. (01-15 @ 00:05)  Culture Results:   No growth to date. (01-15 @ 00:04)  Culture Results:   No growth ( @ 23:43)          Legionella Antigen, Urine: Negative (01-15 @ 08:37)        Radiology:
<--- Click to Launch ICDx for PreOp, PostOp and Procedure
<--- Click to Launch ICDx for PreOp, PostOp and Procedure

## 2019-06-08 NOTE — PROGRESS NOTE ADULT - SUBJECTIVE AND OBJECTIVE BOX
Vascular Surgery Post-Op Note, PCN:     Pre-Op Dx: DYSARTHRIA  Hematoma of neck  Carotid artery stenosis, unilateral, left  Dysarthria    Procedure: Exploration of left side of neck  Carotid endarterectomy    Surgeon: Antonio    Subjective/Interval events:  Patient sedated and intubated. Remains on norepinephrine.     Vital Signs Last 24 Hrs  T(C): 37.3 (2019 09:04), Max: 37.3 (2019 09:04)  T(F): 99.1 (2019 09:04), Max: 99.1 (2019 09:04)  HR: 74 (2019 18:00) (60 - 122)  BP: 124/61 (2019 17:00) (58/33 - 147/69)  BP(mean): 86 (2019 17:00) (38 - 100)  RR: 11 (2019 18:00) (10 - 44)  SpO2: 98% (2019 18:00) (85% - 100%)    Physical Exam:  General: Intubated, sedated  Neck: Remains soft from L side across midline; ecchymosis on inferior aspect of chin; edematous chin/neck, but soft, no hematoma  Pulmonary: Intubated  Cardiovascular: NSR  Neuro: Sedated    LABS:                     11.2   11.85 )-----------( 135      ( 2019 15:57 )             35.2     06-08  143  |  116<H>  |  12  ----------------------------<  128<H>  3.1<L>   |  16<L>  |  0.71    Ca    6.3<LL>      2019 15:57  Phos  2.6     06-08  Mg     1.4     06-08    PT/INR - ( 2019 15:57 )   PT: 14.6 sec;   INR: 1.28     PTT - ( 2019 15:57 )  PTT:37.9 sec    CAPILLARY BLOOD GLUCOSE  POCT Blood Glucose.: 104 mg/dL (2019 11:50)  POCT Blood Glucose.: 86 mg/dL (2019 06:28)  POCT Blood Glucose.: 78 mg/dL (2019 21:30)    Urinalysis Basic - ( 2019 00:07 )  Color: Yellow / Appearance: Clear / S.010 / pH: x  Gluc: x / Ketone: NEGATIVE  / Bili: Negative / Urobili: 0.2 E.U./dL   Blood: x / Protein: NEGATIVE mg/dL / Nitrite: NEGATIVE   Leuk Esterase: NEGATIVE / RBC: x / WBC x   Sq Epi: x / Non Sq Epi: x / Bacteria: x    Assessment:84y Male s/p above procedure    - Neck remains soft, no concern for hematoma  - f/u drain output  - Continue care per SICU team

## 2019-06-08 NOTE — PROCEDURE NOTE - NSTRACHPOSTINTU_RESP_A_CORE
Appropriate capnography/Breath sounds bilateral/Chest excursion noted/Breath sounds equal/Positive end tidal Co2 noted

## 2019-06-08 NOTE — BRIEF OPERATIVE NOTE - NSICDXBRIEFPROCEDURE_GEN_ALL_CORE_FT
PROCEDURES:  Carotid endarterectomy 07-Jun-2019 13:46:09 LEft Keysha Alanis
PROCEDURES:  Exploration of left side of neck 08-Jun-2019 15:02:23  Margy Bains

## 2019-06-08 NOTE — PROGRESS NOTE ADULT - SUBJECTIVE AND OBJECTIVE BOX
INTERVAL HPI/OVERNIGHT EVENTS: ON : +bloody urine 2/2 traumatic cook, wait for UO to clear up before dc cook. NPO, ++secretion. Decrease UO, flushed, CXR not overload, EF 55%, bladder scan 73cc. bolus 500cc for fluid challenge. Uo improved.     PT seen and examined at bedside. c/o left-sided back/flank pain that radiates to umbilicus. Denies CP/SOB/N/V/headache    MEDICATIONS  (STANDING):  ALBUTerol/ipratropium for Nebulization 3 milliLiter(s) Nebulizer every 6 hours  aspirin Suppository 300 milliGRAM(s) Rectal daily  dexmedetomidine Infusion 0.3 MICROgram(s)/kG/Hr (7.215 mL/Hr) IV Continuous <Continuous>  dextrose 50% Injectable 25 Gram(s) IV Push once  dextrose 50% Injectable 25 Gram(s) IV Push once  insulin lispro (HumaLOG) corrective regimen sliding scale   SubCutaneous Before meals and at bedtime  levothyroxine Injectable 25 MICROGram(s) IV Push <User Schedule>  phenylephrine    Infusion 0.1 MICROgram(s)/kG/Min (3.608 mL/Hr) IV Continuous <Continuous>  sodium chloride 0.9%. 1000 milliLiter(s) (65 mL/Hr) IV Continuous <Continuous>    MEDICATIONS  (PRN):      Drug Dosing Weight  Height (cm): 167.64 (2018 14:00)  Weight (kg): 96.2 (2019 14:13)  BMI (kg/m2): 34.2 (2019 14:13)  BSA (m2): 2.05 (2019 14:13)      PAST MEDICAL & SURGICAL HISTORY:  High cholesterol  Diabetes  Prostate cancer: in remission  Hypertension  COPD (chronic obstructive pulmonary disease)  H/O aortic valve replacement  S/P CABG x 3  Artificial pacemaker  History of cataract surgery: b/l  History of knee replacement: b/l      ICU Vital Signs Last 24 Hrs  T(C): 37.3 (2019 09:04), Max: 37.3 (2019 09:04)  T(F): 99.1 (2019 09:04), Max: 99.1 (2019 09:04)  HR: 86 (2019 08:00) (58 - 90)  BP: 140/71 (2019 08:00) (113/56 - 144/60)  BP(mean): 93 (2019 08:00) (74 - 100)  ABP: 132/44 (2019 08:00) (100/44 - 162/64)  ABP(mean): 72 (2019 08:00) (64 - 94)  RR: 31 (2019 08:00) (18 - 37)  SpO2: 95% (2019 08:00) (88% - 100%)            2019 07:01  -  2019 07:00  --------------------------------------------------------  IN:    IV PiggyBack: 700 mL    phenylephrine   Infusion: 446.8 mL    sodium chloride 0.9%.: 1105 mL  Total IN: 2251.8 mL    OUT:    Indwelling Catheter - Urethral: 1007 mL  Total OUT: 1007 mL    Total NET: 1244.8 mL      2019 07:01  -  2019 09:11  --------------------------------------------------------  IN:    dexmedetomidine Infusion: 15 mL    sodium chloride 0.9%.: 130 mL  Total IN: 145 mL    OUT:    Indwelling Catheter - Urethral: 100 mL  Total OUT: 100 mL    Total NET: 45 mL        PHYSICAL EXAM:  General: NAD, intermittently agitates and restless in bed  NEURO: A&Ox3, +left tongue deviation otherwise no focal deficits  HEENT:  MMM  NECK: incision soft, CDI. no hematoma  CV: RRR, no MRG  PULM: CTAB, nonlabored breathing +upper airway secretions  ABD: soft, NT/ND  : Cook in place draining pink urine  EXTREM: WWP, no edema, no calf tenderness  VASC: No cyanosis, pallor. Palpable radial and PT bilaterally  SKIN: No rashes        LABS:  CBC Full  -  ( 2019 05:50 )  WBC Count : 10.74 K/uL  RBC Count : 3.66 M/uL  Hemoglobin : 12.0 g/dL  Hematocrit : 37.4 %  Platelet Count - Automated : 140 K/uL  Mean Cell Volume : 102.2 fl  Mean Cell Hemoglobin : 32.8 pg  Mean Cell Hemoglobin Concentration : 32.1 gm/dL  Auto Neutrophil # : x  Auto Lymphocyte # : x  Auto Monocyte # : x  Auto Eosinophil # : x  Auto Basophil # : x  Auto Neutrophil % : x  Auto Lymphocyte % : x  Auto Monocyte % : x  Auto Eosinophil % : x  Auto Basophil % : x        140  |  104  |  15  ----------------------------<  94  3.8   |  27  |  1.02    Ca    8.5      2019 05:50  Phos  3.1     06-08  Mg     2.1     06-08      PT/INR - ( 2019 05:50 )   PT: 14.3 sec;   INR: 1.26          PTT - ( 2019 05:50 )  PTT:28.3 sec  Urinalysis Basic - ( 2019 00:07 )    Color: Yellow / Appearance: Clear / S.010 / pH: x  Gluc: x / Ketone: NEGATIVE  / Bili: Negative / Urobili: 0.2 E.U./dL   Blood: x / Protein: NEGATIVE mg/dL / Nitrite: NEGATIVE   Leuk Esterase: NEGATIVE / RBC: x / WBC x   Sq Epi: x / Non Sq Epi: x / Bacteria: x

## 2019-06-08 NOTE — DIETITIAN INITIAL EVALUATION ADULT. - NS AS NUTRI INTERV MEALS SNACK
Fat - modified diet/Mineral - modified diet/Carbohydrate - modified diet/Dash/TLC, CST CHO diet per appropriate consistency after SLP eval

## 2019-06-08 NOTE — CHART NOTE - NSCHARTNOTEFT_GEN_A_CORE
As discussed with Dr. Barnes on morning rounds, SHD will sign off at this time.  Upon discharge patient should have follow up arranged with Dr. Barnes to be further evaluated for watchman device +/- PFO closure.  Please ensure to obtain MRI brain prior to discharge if possible.  Please call [phone] prior to discharge to schedule follow up appointment.  Office is located at [address] [place] [address], [address].  Feel free to reconsult if needed [phone].

## 2019-06-09 LAB
ANION GAP SERPL CALC-SCNC: 9 MMOL/L — SIGNIFICANT CHANGE UP (ref 5–17)
BUN SERPL-MCNC: 13 MG/DL — SIGNIFICANT CHANGE UP (ref 7–23)
CALCIUM SERPL-MCNC: 8 MG/DL — LOW (ref 8.4–10.5)
CHLORIDE SERPL-SCNC: 108 MMOL/L — SIGNIFICANT CHANGE UP (ref 96–108)
CO2 SERPL-SCNC: 23 MMOL/L — SIGNIFICANT CHANGE UP (ref 22–31)
CREAT SERPL-MCNC: 0.97 MG/DL — SIGNIFICANT CHANGE UP (ref 0.5–1.3)
GLUCOSE BLDC GLUCOMTR-MCNC: 106 MG/DL — HIGH (ref 70–99)
GLUCOSE BLDC GLUCOMTR-MCNC: 66 MG/DL — LOW (ref 70–99)
GLUCOSE BLDC GLUCOMTR-MCNC: 76 MG/DL — SIGNIFICANT CHANGE UP (ref 70–99)
GLUCOSE BLDC GLUCOMTR-MCNC: 78 MG/DL — SIGNIFICANT CHANGE UP (ref 70–99)
GLUCOSE BLDC GLUCOMTR-MCNC: 83 MG/DL — SIGNIFICANT CHANGE UP (ref 70–99)
GLUCOSE SERPL-MCNC: 125 MG/DL — HIGH (ref 70–99)
HCT VFR BLD CALC: 36.2 % — LOW (ref 39–50)
HGB BLD-MCNC: 11.4 G/DL — LOW (ref 13–17)
MAGNESIUM SERPL-MCNC: 2.2 MG/DL — SIGNIFICANT CHANGE UP (ref 1.6–2.6)
MCHC RBC-ENTMCNC: 31.5 GM/DL — LOW (ref 32–36)
MCHC RBC-ENTMCNC: 32.4 PG — SIGNIFICANT CHANGE UP (ref 27–34)
MCV RBC AUTO: 102.8 FL — HIGH (ref 80–100)
NRBC # BLD: 0 /100 WBCS — SIGNIFICANT CHANGE UP (ref 0–0)
PHOSPHATE SERPL-MCNC: 2.4 MG/DL — LOW (ref 2.5–4.5)
PLATELET # BLD AUTO: 134 K/UL — LOW (ref 150–400)
POTASSIUM SERPL-MCNC: 4.2 MMOL/L — SIGNIFICANT CHANGE UP (ref 3.5–5.3)
POTASSIUM SERPL-SCNC: 4.2 MMOL/L — SIGNIFICANT CHANGE UP (ref 3.5–5.3)
RBC # BLD: 3.52 M/UL — LOW (ref 4.2–5.8)
RBC # FLD: 14.4 % — SIGNIFICANT CHANGE UP (ref 10.3–14.5)
SODIUM SERPL-SCNC: 140 MMOL/L — SIGNIFICANT CHANGE UP (ref 135–145)
WBC # BLD: 10.07 K/UL — SIGNIFICANT CHANGE UP (ref 3.8–10.5)
WBC # FLD AUTO: 10.07 K/UL — SIGNIFICANT CHANGE UP (ref 3.8–10.5)

## 2019-06-09 PROCEDURE — 99291 CRITICAL CARE FIRST HOUR: CPT

## 2019-06-09 PROCEDURE — 71045 X-RAY EXAM CHEST 1 VIEW: CPT | Mod: 26

## 2019-06-09 RX ORDER — DEXTROSE 50 % IN WATER 50 %
12.5 SYRINGE (ML) INTRAVENOUS ONCE
Refills: 0 | Status: DISCONTINUED | OUTPATIENT
Start: 2019-06-09 | End: 2019-06-09

## 2019-06-09 RX ORDER — DEXMEDETOMIDINE HYDROCHLORIDE IN 0.9% SODIUM CHLORIDE 4 UG/ML
0.03 INJECTION INTRAVENOUS
Qty: 200 | Refills: 0 | Status: DISCONTINUED | OUTPATIENT
Start: 2019-06-09 | End: 2019-06-11

## 2019-06-09 RX ORDER — DEXTROSE 50 % IN WATER 50 %
12.5 SYRINGE (ML) INTRAVENOUS ONCE
Refills: 0 | Status: COMPLETED | OUTPATIENT
Start: 2019-06-09 | End: 2019-06-09

## 2019-06-09 RX ORDER — SODIUM CHLORIDE 9 MG/ML
500 INJECTION INTRAMUSCULAR; INTRAVENOUS; SUBCUTANEOUS ONCE
Refills: 0 | Status: COMPLETED | OUTPATIENT
Start: 2019-06-09 | End: 2019-06-09

## 2019-06-09 RX ADMIN — PIPERACILLIN AND TAZOBACTAM 200 GRAM(S): 4; .5 INJECTION, POWDER, LYOPHILIZED, FOR SOLUTION INTRAVENOUS at 23:10

## 2019-06-09 RX ADMIN — DEXMEDETOMIDINE HYDROCHLORIDE IN 0.9% SODIUM CHLORIDE 0.72 MICROGRAM(S)/KG/HR: 4 INJECTION INTRAVENOUS at 19:51

## 2019-06-09 RX ADMIN — Medication 3 MILLILITER(S): at 15:31

## 2019-06-09 RX ADMIN — DEXMEDETOMIDINE HYDROCHLORIDE IN 0.9% SODIUM CHLORIDE 0.72 MICROGRAM(S)/KG/HR: 4 INJECTION INTRAVENOUS at 08:24

## 2019-06-09 RX ADMIN — Medication 3 MILLILITER(S): at 05:47

## 2019-06-09 RX ADMIN — PIPERACILLIN AND TAZOBACTAM 200 GRAM(S): 4; .5 INJECTION, POWDER, LYOPHILIZED, FOR SOLUTION INTRAVENOUS at 17:21

## 2019-06-09 RX ADMIN — PIPERACILLIN AND TAZOBACTAM 200 GRAM(S): 4; .5 INJECTION, POWDER, LYOPHILIZED, FOR SOLUTION INTRAVENOUS at 06:05

## 2019-06-09 RX ADMIN — Medication 25 MICROGRAM(S): at 06:06

## 2019-06-09 RX ADMIN — PIPERACILLIN AND TAZOBACTAM 200 GRAM(S): 4; .5 INJECTION, POWDER, LYOPHILIZED, FOR SOLUTION INTRAVENOUS at 11:52

## 2019-06-09 RX ADMIN — SODIUM CHLORIDE 2000 MILLILITER(S): 9 INJECTION INTRAMUSCULAR; INTRAVENOUS; SUBCUTANEOUS at 02:06

## 2019-06-09 RX ADMIN — Medication 12.5 GRAM(S): at 23:10

## 2019-06-09 RX ADMIN — PROPOFOL 2.89 MICROGRAM(S)/KG/MIN: 10 INJECTION, EMULSION INTRAVENOUS at 01:46

## 2019-06-09 RX ADMIN — Medication 3 MILLILITER(S): at 09:07

## 2019-06-09 RX ADMIN — Medication 300 MILLIGRAM(S): at 11:51

## 2019-06-09 RX ADMIN — Medication 9.02 MICROGRAM(S)/KG/MIN: at 06:18

## 2019-06-09 NOTE — PROGRESS NOTE ADULT - SUBJECTIVE AND OBJECTIVE BOX
STATUS POST:  6/7 L CEA, 6/8 RTOR for hematoma evacuation     SUBJECTIVE: Unable to participate in interview. No acute events overnight.    norepinephrine Infusion 0.05 MICROgram(s)/kG/Min IV Continuous <Continuous>  piperacillin/tazobactam IVPB. 3.375 Gram(s) IV Intermittent every 6 hours      Vital Signs Last 24 Hrs  T(C): 37.1 (09 Jun 2019 05:00), Max: 37.1 (09 Jun 2019 05:00)  T(F): 98.8 (09 Jun 2019 05:00), Max: 98.8 (09 Jun 2019 05:00)  HR: 68 (09 Jun 2019 08:00) (58 - 115)  BP: 120/62 (09 Jun 2019 08:00) (58/33 - 157/73)  BP(mean): 90 (09 Jun 2019 08:00) (38 - 111)  RR: 12 (09 Jun 2019 08:00) (10 - 44)  SpO2: 98% (09 Jun 2019 08:00) (85% - 99%)  I&O's Detail    08 Jun 2019 07:01  -  09 Jun 2019 07:00  --------------------------------------------------------  IN:    dexmedetomidine Infusion: 15 mL    IV PiggyBack: 2948 mL    norepinephrine Infusion: 192 mL    phenylephrine   Infusion: 65 mL    propofol Infusion: 170.9 mL    sodium chloride 0.9%: 1365 mL  Total IN: 4755.9 mL    OUT:    Bulb: 22 mL    Indwelling Catheter - Urethral: 910 mL  Total OUT: 932 mL    Total NET: 3823.9 mL      09 Jun 2019 07:01  -  09 Jun 2019 09:07  --------------------------------------------------------  IN:    dexmedetomidine Infusion: 1.2 mL    norepinephrine Infusion: 14.4 mL    propofol Infusion: 10.7 mL    sodium chloride 0.9%: 65 mL  Total IN: 91.3 mL    OUT:    Indwelling Catheter - Urethral: 65 mL  Total OUT: 65 mL    Total NET: 26.3 mL          General: NAD, resting comfortably in bed, intubated, sedated on propofol and dexmedetomidine, on 3mcg levophed  HEENT: L neck dressing CDI, soft, no hematoma; NICOLE in place with serous drainage  C/V: NSR on monitor  Pulm: Nonlabored breathing, no respiratory distress, ventilated on VC-AC  : Randle in place with light kim colored urine  Extrem: WWP, no edema  Neuro: no focal deficits        LABS:                        11.4   10.07 )-----------( 134      ( 09 Jun 2019 05:22 )             36.2     06-09    140  |  108  |  13  ----------------------------<  125<H>  4.2   |  23  |  0.97    Ca    8.0<L>      09 Jun 2019 05:22  Phos  2.4     06-09  Mg     2.2     06-09      PT/INR - ( 08 Jun 2019 15:57 )   PT: 14.6 sec;   INR: 1.28          PTT - ( 08 Jun 2019 15:57 )  PTT:37.9 sec

## 2019-06-09 NOTE — PROGRESS NOTE ADULT - SUBJECTIVE AND OBJECTIVE BOX
24 hr events:   On: neurologically intact, UOP dropped to 15, 500 NS given  6/8: continues to have thin secretions so given glycopyrrolate and SS consutled but unable to fully evaluate 2/2 pt cooperation. appeared to have durga aspiration with sips of water. agitation not improved with precedex, haldol given. increased work of breathing noted in early afternoon 2/2 ?hematoma or upper airway secretions - intubated for airway protection and taken to OR for neck exploration, small amount of SS fluid evacuated and drain left in place. Post-op CTH negative for bleed       MEDICATIONS  (STANDING):  ALBUTerol/ipratropium for Nebulization 3 milliLiter(s) Nebulizer every 6 hours  aspirin Suppository 300 milliGRAM(s) Rectal daily  dexmedetomidine Infusion 0.03 MICROgram(s)/kG/Hr (0.722 mL/Hr) IV Continuous <Continuous>  dextrose 50% Injectable 25 Gram(s) IV Push once  dextrose 50% Injectable 25 Gram(s) IV Push once  insulin lispro (HumaLOG) corrective regimen sliding scale   SubCutaneous Before meals and at bedtime  levothyroxine Injectable 25 MICROGram(s) IV Push <User Schedule>  norepinephrine Infusion 0.05 MICROgram(s)/kG/Min (9.019 mL/Hr) IV Continuous <Continuous>  piperacillin/tazobactam IVPB. 3.375 Gram(s) IV Intermittent every 6 hours  propofol Infusion 5 MICROgram(s)/kG/Min (2.886 mL/Hr) IV Continuous <Continuous>    MEDICATIONS  (PRN):      Randle:	  [ ] None	[X] Daily Randle Order Placed	   Indication:	  [ ] Strict I and O's    [ ] Obstruction     [ ] Incontinence + Stage 3 or 4 Decubitus      ICU Vital Signs Last 24 Hrs  T(C): 37.1 (09 Jun 2019 05:00), Max: 37.3 (08 Jun 2019 09:04)  T(F): 98.8 (09 Jun 2019 05:00), Max: 99.1 (08 Jun 2019 09:04)  HR: 68 (09 Jun 2019 08:00) (58 - 122)  BP: 120/62 (09 Jun 2019 08:00) (58/33 - 157/73)  BP(mean): 90 (09 Jun 2019 08:00) (38 - 111)  ABP: 142/62 (09 Jun 2019 08:00) (68/28 - 182/74)  ABP(mean): 88 (09 Jun 2019 08:00) (42 - 108)  RR: 12 (09 Jun 2019 08:00) (10 - 44)  SpO2: 98% (09 Jun 2019 08:00) (85% - 99%)      Physical Exam:  General: NAD, intermittently agitated  NEURO: A&Ox3, +left tongue deviation otherwise no focal deficits  NECK: incision soft, CDI. no hematoma  CV: RRR, no MRG  PULM: CTAB, nonlabored breathing +upper airway secretions  ABD: soft, NT/ND  : Randle in place draining pink urine  EXTREM: WWP, no edema, no calf tenderness  VASC: No cyanosis, pallor. Palpable radial bilaterally    Vent settings:  Mode: AC/ CMV (Assist Control/ Continuous Mandatory Ventilation), RR (machine): 12, TV (machine): 500, FiO2: 50, PEEP: 5, ITime: 1, MAP: 11, PIP: 21    I&O's Summary    08 Jun 2019 07:01  -  09 Jun 2019 07:00  --------------------------------------------------------  IN: 4755.9 mL / OUT: 932 mL / NET: 3823.9 mL    09 Jun 2019 07:01  -  09 Jun 2019 08:31  --------------------------------------------------------  IN: 91.3 mL / OUT: 35 mL / NET: 56.3 mL        LABS:                        11.4   10.07 )-----------( 134      ( 09 Jun 2019 05:22 )             36.2     06-09    140  |  108  |  13  ----------------------------<  125<H>  4.2   |  23  |  0.97    Ca    8.0<L>      09 Jun 2019 05:22  Phos  2.4     06-09  Mg     2.2     06-09      PT/INR - ( 08 Jun 2019 15:57 )   PT: 14.6 sec;   INR: 1.28          PTT - ( 08 Jun 2019 15:57 )  PTT:37.9 sec    CAPILLARY BLOOD GLUCOSE      POCT Blood Glucose.: 78 mg/dL (09 Jun 2019 06:20)  POCT Blood Glucose.: 109 mg/dL (08 Jun 2019 22:28)  POCT Blood Glucose.: 104 mg/dL (08 Jun 2019 11:50)      Cultures:    Drips:    RADIOLOGY & ADDITIONAL STUDIES: 24 hr events:   On: neurologically intact, UOP dropped to 15, 500 NS given  6/8: continues to have thin secretions so given glycopyrrolate and SS consutled but unable to fully evaluate 2/2 pt cooperation. appeared to have durga aspiration with sips of water. agitation not improved with precedex, haldol given. increased work of breathing noted in early afternoon 2/2 ?hematoma or upper airway secretions - intubated for airway protection and taken to OR for neck exploration, small amount of SS fluid evacuated and drain left in place. Post-op CTH negative for bleed   ROS not obtainable    MEDICATIONS  (STANDING):  ALBUTerol/ipratropium for Nebulization 3 milliLiter(s) Nebulizer every 6 hours  aspirin Suppository 300 milliGRAM(s) Rectal daily  dexmedetomidine Infusion 0.03 MICROgram(s)/kG/Hr (0.722 mL/Hr) IV Continuous <Continuous>  dextrose 50% Injectable 25 Gram(s) IV Push once  dextrose 50% Injectable 25 Gram(s) IV Push once  insulin lispro (HumaLOG) corrective regimen sliding scale   SubCutaneous Before meals and at bedtime  levothyroxine Injectable 25 MICROGram(s) IV Push <User Schedule>  norepinephrine Infusion 0.05 MICROgram(s)/kG/Min (9.019 mL/Hr) IV Continuous <Continuous>  piperacillin/tazobactam IVPB. 3.375 Gram(s) IV Intermittent every 6 hours  propofol Infusion 5 MICROgram(s)/kG/Min (2.886 mL/Hr) IV Continuous <Continuous>    MEDICATIONS  (PRN):      Randle:	  [ ] None	[X] Daily Randle Order Placed	   Indication:	  [ ] Strict I and O's    [ ] Obstruction     [ ] Incontinence + Stage 3 or 4 Decubitus      ICU Vital Signs Last 24 Hrs  T(C): 37.1 (09 Jun 2019 05:00), Max: 37.3 (08 Jun 2019 09:04)  T(F): 98.8 (09 Jun 2019 05:00), Max: 99.1 (08 Jun 2019 09:04)  HR: 68 (09 Jun 2019 08:00) (58 - 122)  BP: 120/62 (09 Jun 2019 08:00) (58/33 - 157/73)  BP(mean): 90 (09 Jun 2019 08:00) (38 - 111)  ABP: 142/62 (09 Jun 2019 08:00) (68/28 - 182/74)  ABP(mean): 88 (09 Jun 2019 08:00) (42 - 108)  RR: 12 (09 Jun 2019 08:00) (10 - 44)  SpO2: 98% (09 Jun 2019 08:00) (85% - 99%)      Physical Exam:  General: NAD, intermittently agitated  NEURO: A&Ox3, +left tongue deviation otherwise no focal deficits  NECK: incision soft, CDI. no hematoma  CV: RRR, no MRG  PULM: CTAB, nonlabored breathing +upper airway secretions  ABD: soft, NT/ND  : Randle in place draining pink urine  EXTREM: WWP, no edema, no calf tenderness  VASC: No cyanosis, pallor. Palpable pulses bilaterally    Vent settings:  Mode: AC/ CMV (Assist Control/ Continuous Mandatory Ventilation), RR (machine): 12, TV (machine): 500, FiO2: 50, PEEP: 5, ITime: 1, MAP: 11, PIP: 21    I&O's Summary    08 Jun 2019 07:01  -  09 Jun 2019 07:00  --------------------------------------------------------  IN: 4755.9 mL / OUT: 932 mL / NET: 3823.9 mL    09 Jun 2019 07:01  -  09 Jun 2019 08:31  --------------------------------------------------------  IN: 91.3 mL / OUT: 35 mL / NET: 56.3 mL        LABS:                        11.4   10.07 )-----------( 134      ( 09 Jun 2019 05:22 )             36.2     06-09    140  |  108  |  13  ----------------------------<  125<H>  4.2   |  23  |  0.97    Ca    8.0<L>      09 Jun 2019 05:22  Phos  2.4     06-09  Mg     2.2     06-09      PT/INR - ( 08 Jun 2019 15:57 )   PT: 14.6 sec;   INR: 1.28          PTT - ( 08 Jun 2019 15:57 )  PTT:37.9 sec    CAPILLARY BLOOD GLUCOSE      POCT Blood Glucose.: 78 mg/dL (09 Jun 2019 06:20)  POCT Blood Glucose.: 109 mg/dL (08 Jun 2019 22:28)  POCT Blood Glucose.: 104 mg/dL (08 Jun 2019 11:50)      Cultures:    Drips:    RADIOLOGY & ADDITIONAL STUDIES:

## 2019-06-09 NOTE — PROGRESS NOTE ADULT - ASSESSMENT
84F y/o male with PMHx former smoker, HTN, HLD, NIDDM, hypothyroidism, prostate CA s/p seeding, atrial flutter s/p ablation, on Eliquis (last dose 2/3/18), bifasicular heart block s/p pacemaker, COPD not on home O2, CAD s/p CABGx3 March 2017 with Dr. Funez, [facility], and chronic diastolic heart failure presenting with two days of slurred speech CT showed 90% stenosis of bilateral carotid arteries now s/p left carotid endarterectomy c/b hypotension and respiratory  failure requiring intubation and take back to OR for neck exploration on 6/8    NEURO: tylenol PRN  CV: levo for SBP goal 110-160, ASA OR  PULM: AC 50/500/12/5  GI/FEN: NPO  : cook  ENDO: ISS, synthroid 25mg IV  HEME: ASA, hold SQH   ID: Zosyn (6/8-6/15)  PPX: SCDs  LINES: PIVs, A-line  WOUNDS/DRAINS: left neck incision. Neck JPX1 84F y/o male with PMHx former smoker, HTN, HLD, NIDDM, hypothyroidism, prostate CA s/p seeding, atrial flutter s/p ablation, on Eliquis (last dose 2/3/18), bifasicular heart block s/p pacemaker, COPD not on home O2, CAD s/p CABGx3 March 2017 with Dr. Funez, [facility], and chronic diastolic heart failure presenting with two days of slurred speech CT showed 90% stenosis of bilateral carotid arteries now s/p left carotid endarterectomy c/b hypotension and respiratory  failure requiring intubation and take back to OR for neck exploration on 6/8    NEURO: tylenol PRN  CV: levo for SBP goal 110-160, ASA HI  PULM: AC 50/500/12/5  GI/FEN: NPO  : cook  ENDO: ISS, synthroid 25mg IV  HEME: ASA, hold SQH   ID: Zosyn (6/8-6/15) empirically for LLL infiltrate which today appears improved but given copious secretions yesterday and the infiltrate will give short antibiotic course. To consider extubation trial tomorrow on precedex.  PPX: SCDs  LINES: PIVs, A-line  WOUNDS/DRAINS: left neck incision. Neck JPX1

## 2019-06-09 NOTE — PROGRESS NOTE ADULT - ASSESSMENT
84F PMHx HTN, HLD, DM, A flutter on Eliquis, bifascicular heart block s/p pacemaker, COPD not on home O2, CAD s/p CABG, CHF a/w bilateral carotid artery stenosis s/p L CEA (6/7), c/b hypotension, respiratory failure requiring intubation, c/f hematoma, s/p RTOR for neck exploration without hematoma (6/8).    Care per SICU.  Vascular surgery will continue to follow.

## 2019-06-10 ENCOUNTER — APPOINTMENT (OUTPATIENT)
Dept: CT IMAGING | Facility: HOSPITAL | Age: 84
End: 2019-06-10

## 2019-06-10 LAB
ANION GAP SERPL CALC-SCNC: 10 MMOL/L — SIGNIFICANT CHANGE UP (ref 5–17)
BUN SERPL-MCNC: 13 MG/DL — SIGNIFICANT CHANGE UP (ref 7–23)
CALCIUM SERPL-MCNC: 7.9 MG/DL — LOW (ref 8.4–10.5)
CHLORIDE SERPL-SCNC: 107 MMOL/L — SIGNIFICANT CHANGE UP (ref 96–108)
CO2 SERPL-SCNC: 22 MMOL/L — SIGNIFICANT CHANGE UP (ref 22–31)
CREAT SERPL-MCNC: 0.97 MG/DL — SIGNIFICANT CHANGE UP (ref 0.5–1.3)
GLUCOSE BLDC GLUCOMTR-MCNC: 114 MG/DL — HIGH (ref 70–99)
GLUCOSE BLDC GLUCOMTR-MCNC: 126 MG/DL — HIGH (ref 70–99)
GLUCOSE BLDC GLUCOMTR-MCNC: 79 MG/DL — SIGNIFICANT CHANGE UP (ref 70–99)
GLUCOSE BLDC GLUCOMTR-MCNC: 80 MG/DL — SIGNIFICANT CHANGE UP (ref 70–99)
GLUCOSE BLDC GLUCOMTR-MCNC: 92 MG/DL — SIGNIFICANT CHANGE UP (ref 70–99)
GLUCOSE SERPL-MCNC: 84 MG/DL — SIGNIFICANT CHANGE UP (ref 70–99)
HCT VFR BLD CALC: 33.3 % — LOW (ref 39–50)
HGB BLD-MCNC: 10.5 G/DL — LOW (ref 13–17)
MAGNESIUM SERPL-MCNC: 1.9 MG/DL — SIGNIFICANT CHANGE UP (ref 1.6–2.6)
MCHC RBC-ENTMCNC: 31.5 GM/DL — LOW (ref 32–36)
MCHC RBC-ENTMCNC: 32.5 PG — SIGNIFICANT CHANGE UP (ref 27–34)
MCV RBC AUTO: 103.1 FL — HIGH (ref 80–100)
NRBC # BLD: 0 /100 WBCS — SIGNIFICANT CHANGE UP (ref 0–0)
PHOSPHATE SERPL-MCNC: 2.4 MG/DL — LOW (ref 2.5–4.5)
PLATELET # BLD AUTO: 121 K/UL — LOW (ref 150–400)
POTASSIUM SERPL-MCNC: 4 MMOL/L — SIGNIFICANT CHANGE UP (ref 3.5–5.3)
POTASSIUM SERPL-SCNC: 4 MMOL/L — SIGNIFICANT CHANGE UP (ref 3.5–5.3)
RBC # BLD: 3.23 M/UL — LOW (ref 4.2–5.8)
RBC # FLD: 14.4 % — SIGNIFICANT CHANGE UP (ref 10.3–14.5)
SODIUM SERPL-SCNC: 139 MMOL/L — SIGNIFICANT CHANGE UP (ref 135–145)
WBC # BLD: 9.26 K/UL — SIGNIFICANT CHANGE UP (ref 3.8–10.5)
WBC # FLD AUTO: 9.26 K/UL — SIGNIFICANT CHANGE UP (ref 3.8–10.5)

## 2019-06-10 PROCEDURE — 99291 CRITICAL CARE FIRST HOUR: CPT

## 2019-06-10 PROCEDURE — 71045 X-RAY EXAM CHEST 1 VIEW: CPT | Mod: 26

## 2019-06-10 PROCEDURE — 99232 SBSQ HOSP IP/OBS MODERATE 35: CPT

## 2019-06-10 RX ORDER — DEXAMETHASONE 0.5 MG/5ML
10 ELIXIR ORAL EVERY 8 HOURS
Refills: 0 | Status: COMPLETED | OUTPATIENT
Start: 2019-06-10 | End: 2019-06-11

## 2019-06-10 RX ORDER — ROBINUL 0.2 MG/ML
0.2 INJECTION INTRAMUSCULAR; INTRAVENOUS ONCE
Refills: 0 | Status: COMPLETED | OUTPATIENT
Start: 2019-06-10 | End: 2019-06-10

## 2019-06-10 RX ORDER — HEPARIN SODIUM 5000 [USP'U]/ML
5000 INJECTION INTRAVENOUS; SUBCUTANEOUS EVERY 8 HOURS
Refills: 0 | Status: DISCONTINUED | OUTPATIENT
Start: 2019-06-10 | End: 2019-06-15

## 2019-06-10 RX ORDER — MAGNESIUM SULFATE 500 MG/ML
1 VIAL (ML) INJECTION ONCE
Refills: 0 | Status: COMPLETED | OUTPATIENT
Start: 2019-06-10 | End: 2019-06-10

## 2019-06-10 RX ORDER — DEXAMETHASONE 0.5 MG/5ML
10 ELIXIR ORAL EVERY 8 HOURS
Refills: 0 | Status: DISCONTINUED | OUTPATIENT
Start: 2019-06-10 | End: 2019-06-10

## 2019-06-10 RX ORDER — ROBINUL 0.2 MG/ML
0.2 INJECTION INTRAMUSCULAR; INTRAVENOUS ONCE
Refills: 0 | Status: DISCONTINUED | OUTPATIENT
Start: 2019-06-10 | End: 2019-06-11

## 2019-06-10 RX ADMIN — Medication 3 MILLILITER(S): at 11:53

## 2019-06-10 RX ADMIN — PIPERACILLIN AND TAZOBACTAM 200 GRAM(S): 4; .5 INJECTION, POWDER, LYOPHILIZED, FOR SOLUTION INTRAVENOUS at 12:10

## 2019-06-10 RX ADMIN — Medication 3 MILLILITER(S): at 17:17

## 2019-06-10 RX ADMIN — Medication 102 MILLIGRAM(S): at 22:14

## 2019-06-10 RX ADMIN — Medication 3 MILLILITER(S): at 21:25

## 2019-06-10 RX ADMIN — Medication 300 MILLIGRAM(S): at 12:10

## 2019-06-10 RX ADMIN — Medication 3 MILLILITER(S): at 01:10

## 2019-06-10 RX ADMIN — PIPERACILLIN AND TAZOBACTAM 200 GRAM(S): 4; .5 INJECTION, POWDER, LYOPHILIZED, FOR SOLUTION INTRAVENOUS at 17:11

## 2019-06-10 RX ADMIN — Medication 25 MICROGRAM(S): at 07:00

## 2019-06-10 RX ADMIN — Medication 102 MILLIGRAM(S): at 14:38

## 2019-06-10 RX ADMIN — HEPARIN SODIUM 5000 UNIT(S): 5000 INJECTION INTRAVENOUS; SUBCUTANEOUS at 14:42

## 2019-06-10 RX ADMIN — ROBINUL 0.2 MILLIGRAM(S): 0.2 INJECTION INTRAMUSCULAR; INTRAVENOUS at 09:29

## 2019-06-10 RX ADMIN — Medication 3 MILLILITER(S): at 06:19

## 2019-06-10 RX ADMIN — Medication 100 GRAM(S): at 07:00

## 2019-06-10 RX ADMIN — PIPERACILLIN AND TAZOBACTAM 200 GRAM(S): 4; .5 INJECTION, POWDER, LYOPHILIZED, FOR SOLUTION INTRAVENOUS at 07:00

## 2019-06-10 RX ADMIN — HEPARIN SODIUM 5000 UNIT(S): 5000 INJECTION INTRAVENOUS; SUBCUTANEOUS at 21:24

## 2019-06-10 RX ADMIN — PIPERACILLIN AND TAZOBACTAM 200 GRAM(S): 4; .5 INJECTION, POWDER, LYOPHILIZED, FOR SOLUTION INTRAVENOUS at 23:34

## 2019-06-10 NOTE — CONSULT NOTE ADULT - SUBJECTIVE AND OBJECTIVE BOX
ENT Consult Response    84F y/o male with PMHx former smoker, HTN, HLD, NIDDM, hypothyroidism, prostate CA s/p seeding, atrial flutter s/p ablation, on Eliquis (last dose 2/3/18), bifasicular heart block s/p pacemaker, COPD not on home O2, CAD s/p CABGx3 March 2017 with Dr. Funez, [place], and chronic diastolic heart failure presenting with two days of slurred speech CT showed 90% stenosis of bilateral carotid arteries now s/p left carotid endarterectomy c/b hypotension and respiratory  failure requiring intubation and take back to OR for neck exploration on 6/8.     Extubated today, 6/10, without obvious cuff leak and continues to have sterterous sounds. Patient states his voice is "garbled"  and sounds very different to him. Denies difficulty breathing or moving air. Able to speak in complete sentences. Does get out of breath. ENT consulted for evaluation of upper aerodigestive tract for stertorous sounds as well as a vocal cord paralysis.     PAST MEDICAL & SURGICAL HISTORY:  High cholesterol  Diabetes  Prostate cancer: in remission  Hypertension  COPD (chronic obstructive pulmonary disease)  H/O aortic valve replacement  S/P CABG x 3  Artificial pacemaker  History of cataract surgery: b/l  History of knee replacement: b/l    PE:  Lying in bed with nasal cannula, speaking in full sentences, satting in the mid 90s  OC/Opx: Neo Classification IV, difficulty to see posterior pharynegal wall, no FOM edema, no palatal edema  Neck: diffuse eccymosis across the neck, thick, soft, surgical incision on L neck   FFL:  Nasal cavity and nasopharynx clear  Uvula elongated  Secretions in the opx and hpx, able to clear them  Epiglottis edematous, arytenoids edematous, pyriforms effaced b/l; L>R  Epiglottis with siginificant flapping biphasically  False cords mobile  Unable to visualize the TVFs 2/2 circumferential non watery edema  Airway is sensate  Airway patent, likely would be able to be intubated from above     A/P: 84M with hx as above, most notable for neck surgery with complications as well as intubation/reintubation. Cannot visualize motion of L cord at this time, given diffuse edema. Edema does not appear to be allergic in nature (usually watery edema and asymmetric). Rather the edema is circumferential and likely 2/2 a combination extrinsic compression (although neck is soft), prolonged intubation deconditioning as well as inflammation. Airway findings appear similar to that of severe SHAQUILLE.   - would likely benefit from CPAP/BiPAP   - if no contraindication, 10q8 of decadron for 3 doses  - consider heliox if patient worsens   - will follow   - will re evaluate for vocal cord paresis/paralysis as edema decreases    d/w Dr. Mark

## 2019-06-10 NOTE — PROGRESS NOTE ADULT - ASSESSMENT
84F y/o male with PMHx former smoker, HTN, HLD, NIDDM, hypothyroidism, prostate CA s/p seeding, atrial flutter s/p ablation, on Eliquis (last dose 2/3/18), bifasicular heart block s/p pacemaker, COPD not on home O2, CAD s/p CABGx3 March 2017 with Dr. Funez, [facility], and chronic diastolic heart failure presenting with two days of slurred speech CT showed 90% stenosis of bilateral carotid arteries now s/p left carotid endarterectomy c/b hypotension and respiratory  failure requiring intubation and take back to OR for neck exploration on 6/8    NEURO: tylenol PRN, precedex  CV: SBP goal 110-160, ASA ND  PULM: AC 50/500/12/5. CPAP wean. glycopyrrolate prior to extubation  GI/FEN: NPO  : cook  ENDO: ISS, snythroid 25mg IV  HEME: ASA, hold SQH   ID: Zosyn (6/8-6/15)  PPX: SCDs  LINES: PIVs, A-line  WOUNDS/DRAINS: left neck incision. Neck JPX1

## 2019-06-10 NOTE — PROGRESS NOTE ADULT - SUBJECTIVE AND OBJECTIVE BOX
Neurology Stroke Progress Note    INTERVAL HPI/OVERNIGHT EVENTS:  Patient seen and examined.     MEDICATIONS  (STANDING):  ALBUTerol/ipratropium for Nebulization 3 milliLiter(s) Nebulizer every 6 hours  aspirin Suppository 300 milliGRAM(s) Rectal daily  dexmedetomidine Infusion 0.03 MICROgram(s)/kG/Hr (0.722 mL/Hr) IV Continuous <Continuous>  dextrose 50% Injectable 25 Gram(s) IV Push once  dextrose 50% Injectable 25 Gram(s) IV Push once  glycopyrrolate Injectable 0.2 milliGRAM(s) IV Push once  glycopyrrolate Injectable 0.2 milliGRAM(s) IV Push once  heparin  Injectable 5000 Unit(s) SubCutaneous every 8 hours  insulin lispro (HumaLOG) corrective regimen sliding scale   SubCutaneous Before meals and at bedtime  levothyroxine Injectable 25 MICROGram(s) IV Push <User Schedule>  norepinephrine Infusion 0.05 MICROgram(s)/kG/Min (9.019 mL/Hr) IV Continuous <Continuous>  piperacillin/tazobactam IVPB. 3.375 Gram(s) IV Intermittent every 6 hours    MEDICATIONS  (PRN):      Allergies    No Known Allergies    Intolerances    narcotic analgesics (Nausea; Vomiting)      ROS: As per HPI, otherwise negative    Vital Signs Last 24 Hrs  T(C): 37.2 (10 Darvin 2019 00:33), Max: 37.6 (09 Jun 2019 21:45)  T(F): 98.9 (10 Darvin 2019 00:33), Max: 99.7 (09 Jun 2019 21:45)  HR: 98 (10 Darvin 2019 09:00) (58 - 106)  BP: 141/78 (10 Darvin 2019 09:00) (99/57 - 141/83)  BP(mean): 90 (10 Darvin 2019 09:00) (69 - 98)  RR: 11 (10 Darvin 2019 09:00) (9 - 37)  SpO2: 94% (10 Darvin 2019 09:00) (93% - 100%)    Physical exam:  General: awake and alert, sitting comfortably, no acute distress    Neurologic:  Mental status: awake, alert, oriented to person, place, and time. Speech is fluent - able to name, repeat, and describe picture fully. Follows commands. Attention/concentration intact. No dysarthria, no aphasia.  Cranial nerves:   II: visual fields are full to confrontation. pupils equally round and reactive to light,   III, IV, VI: EOMI without nystagmus  V:  V1-V3 sensation intact,   VII: no facial droop, facie is symmetric with normal eye closure and smile  VIII: hearing is intact to finger rub  IX, X: Uvula is midline and soft palate rises symmetrically  XI: head turning and shoulder shrug are intact.  XII: tongue midline  Motor: Normal bulk and tone, strength 5/5 in b/l UE and LE,  strength 5/5. No pronator drift  Sensation: intact to light touch. No neglect.  Coordination: No dysmetria on finger-to-nose and heel-to-shin  Reflexes: downgoing toes bilaterally  Gait: narrow and steady, no ataxia. Romberg negative        LABS:                        10.5   9.26  )-----------( 121      ( 10 Darvin 2019 05:07 )             33.3     06-10    139  |  107  |  13  ----------------------------<  84  4.0   |  22  |  0.97    Ca    7.9<L>      10 Darvin 2019 05:07  Phos  2.4     06-10  Mg     1.9     06-10      PT/INR - ( 08 Jun 2019 15:57 )   PT: 14.6 sec;   INR: 1.28          PTT - ( 08 Jun 2019 15:57 )  PTT:37.9 sec      RADIOLOGY & ADDITIONAL TESTS:      Assessment and Plan  84y Male former smoker, PMH HTN, HLD, NIDDM, hypothyroidism, prostate CA s/p seeding, Aflutter s/p ablation on Eliquis, bifascicular heart block s/p PPM, COPD, CAD, chronic diastolic heart failure, presented with 2 days of slurred speech. Also found to have bilateral carotid stenosis, now s/p     1)Secondary stroke prevention  -ASA 81 and Plavix 75  -Atorvastatin 80    2) Stroke risk factors  -    3) Further workup     DVT prophylaxis   -Heparin SQ TID and SCDs Neurology Stroke Progress Note    INTERVAL HPI/OVERNIGHT EVENTS:  Patient seen and examined.   ETT in place, pt awake, indicating with his hands that he wants the ETT taken out.  Follows commands with both hands.    MEDICATIONS  (STANDING):  ALBUTerol/ipratropium for Nebulization 3 milliLiter(s) Nebulizer every 6 hours  aspirin Suppository 300 milliGRAM(s) Rectal daily  dexmedetomidine Infusion 0.03 MICROgram(s)/kG/Hr (0.722 mL/Hr) IV Continuous <Continuous>  dextrose 50% Injectable 25 Gram(s) IV Push once  dextrose 50% Injectable 25 Gram(s) IV Push once  glycopyrrolate Injectable 0.2 milliGRAM(s) IV Push once  glycopyrrolate Injectable 0.2 milliGRAM(s) IV Push once  heparin  Injectable 5000 Unit(s) SubCutaneous every 8 hours  insulin lispro (HumaLOG) corrective regimen sliding scale   SubCutaneous Before meals and at bedtime  levothyroxine Injectable 25 MICROGram(s) IV Push <User Schedule>  norepinephrine Infusion 0.05 MICROgram(s)/kG/Min (9.019 mL/Hr) IV Continuous <Continuous>  piperacillin/tazobactam IVPB. 3.375 Gram(s) IV Intermittent every 6 hours    MEDICATIONS  (PRN):      Allergies    No Known Allergies    Intolerances    narcotic analgesics (Nausea; Vomiting)      ROS: As per HPI, otherwise negative    Vital Signs Last 24 Hrs  T(C): 37.2 (10 Darvin 2019 00:33), Max: 37.6 (09 Jun 2019 21:45)  T(F): 98.9 (10 Darvin 2019 00:33), Max: 99.7 (09 Jun 2019 21:45)  HR: 98 (10 Darvin 2019 09:00) (58 - 106)  BP: 141/78 (10 Darvin 2019 09:00) (99/57 - 141/83)  BP(mean): 90 (10 Darvin 2019 09:00) (69 - 98)  RR: 11 (10 Darvin 2019 09:00) (9 - 37)  SpO2: 94% (10 Darvin 2019 09:00) (93% - 100%)    Physical exam:  General: awake and alert, sitting comfortably, no acute distress    Neurologic:  Mental status: awake, alert, intubated. Follows commands with both hands. Unable to speak 2/2 ETT.  Cranial nerves:   II: visual fields are full to confrontation. pupils equally round and reactive to light,   III, IV, VI: EOMI without nystagmus  VII: no facial droop  Motor: Normal bulk and tone, able to lift both arms and hold with good strength, able to lift both legs.  Sensation: intact to light touch.   Reflexes: downgoing toes bilaterally  Gait: deferred        LABS:                        10.5   9.26  )-----------( 121      ( 10 Darvin 2019 05:07 )             33.3     06-10    139  |  107  |  13  ----------------------------<  84  4.0   |  22  |  0.97    Ca    7.9<L>      10 Darvin 2019 05:07  Phos  2.4     06-10  Mg     1.9     06-10      PT/INR - ( 08 Jun 2019 15:57 )   PT: 14.6 sec;   INR: 1.28          PTT - ( 08 Jun 2019 15:57 )  PTT:37.9 sec      RADIOLOGY & ADDITIONAL TESTS:  < from: CT Head No Cont (06.08.19 @ 15:26) >  IMPRESSION:    1. No acute intracranial hemorrhage or acute transcortical infarct.  2. Moderate to severe chronic microangiopathic disease and   age-appropriate volume loss, unchanged from 6/5/2019.    < end of copied text >        Assessment and Plan  84y Male former smoker, PMH HTN, HLD, NIDDM, hypothyroidism, prostate CA s/p seeding, Aflutter s/p ablation on Eliquis, bifascicular heart block s/p PPM, COPD, CAD, chronic diastolic heart failure, presented with 2 days of slurred speech. Also found to have bilateral carotid stenosis, now s/p left CEA done 6/7. Post op course complicated by respiratory distress reintubated on 6/8, also concern for hematoma but was drained on 6/8 and found to have serosanguinous fluid. CT done 6/8 with no changes, no hemorrhage. MOHINI done showed PFO. Today off sedation, still intubated, no focal deficits.     1)Secondary stroke prevention  -ASA suppository. Consider anticoagulation once cleared from surgical perspective for Aflutter  -Atorvastatin 80 when can swallow    2) Stroke risk factors  -Aflutter  -HTN  -HLD  -NIDDM  -possible from PFO - structural heart was consulted    3) Further workup   -Plan per SICU - possible extubation today  -On zosyn for left lower lobe infiltrate  -MRI when stable and if PPM is compatible  -New T9 compression fracture - consider ortho consult    DVT prophylaxis   -Heparin SQ TID and SCDs Neurology Stroke Progress Note    INTERVAL HPI/OVERNIGHT EVENTS:  Patient seen and examined.   ETT in place, pt awake, indicating with his hands that he wants the ETT taken out.  Follows commands with both hands.    MEDICATIONS  (STANDING):  ALBUTerol/ipratropium for Nebulization 3 milliLiter(s) Nebulizer every 6 hours  aspirin Suppository 300 milliGRAM(s) Rectal daily  dexmedetomidine Infusion 0.03 MICROgram(s)/kG/Hr (0.722 mL/Hr) IV Continuous <Continuous>  dextrose 50% Injectable 25 Gram(s) IV Push once  dextrose 50% Injectable 25 Gram(s) IV Push once  glycopyrrolate Injectable 0.2 milliGRAM(s) IV Push once  glycopyrrolate Injectable 0.2 milliGRAM(s) IV Push once  heparin  Injectable 5000 Unit(s) SubCutaneous every 8 hours  insulin lispro (HumaLOG) corrective regimen sliding scale   SubCutaneous Before meals and at bedtime  levothyroxine Injectable 25 MICROGram(s) IV Push <User Schedule>  norepinephrine Infusion 0.05 MICROgram(s)/kG/Min (9.019 mL/Hr) IV Continuous <Continuous>  piperacillin/tazobactam IVPB. 3.375 Gram(s) IV Intermittent every 6 hours    MEDICATIONS  (PRN):      Allergies    No Known Allergies    Intolerances    narcotic analgesics (Nausea; Vomiting)      ROS: As per HPI, otherwise negative    Vital Signs Last 24 Hrs  T(C): 37.2 (10 Darvin 2019 00:33), Max: 37.6 (09 Jun 2019 21:45)  T(F): 98.9 (10 Darvin 2019 00:33), Max: 99.7 (09 Jun 2019 21:45)  HR: 98 (10 Darvin 2019 09:00) (58 - 106)  BP: 141/78 (10 Darvin 2019 09:00) (99/57 - 141/83)  BP(mean): 90 (10 Darvin 2019 09:00) (69 - 98)  RR: 11 (10 Darvin 2019 09:00) (9 - 37)  SpO2: 94% (10 Darvin 2019 09:00) (93% - 100%)    Physical exam:  General: awake and alert, sitting comfortably, no acute distress    Neurologic:  Mental status: awake, alert, intubated. Follows commands with both hands. Unable to speak 2/2 ETT.  Cranial nerves:   II: visual fields are full to confrontation. pupils equally round and reactive to light,   III, IV, VI: EOMI without nystagmus  VII: no facial droop  Motor: Normal bulk and tone, able to lift both arms and hold with good strength, able to lift both legs.  Sensation: intact to light touch.   Reflexes: downgoing toes bilaterally  Gait: deferred        LABS:                        10.5   9.26  )-----------( 121      ( 10 Darvin 2019 05:07 )             33.3     06-10    139  |  107  |  13  ----------------------------<  84  4.0   |  22  |  0.97    Ca    7.9<L>      10 Darvin 2019 05:07  Phos  2.4     06-10  Mg     1.9     06-10      PT/INR - ( 08 Jun 2019 15:57 )   PT: 14.6 sec;   INR: 1.28          PTT - ( 08 Jun 2019 15:57 )  PTT:37.9 sec      RADIOLOGY & ADDITIONAL TESTS:  < from: CT Head No Cont (06.08.19 @ 15:26) >  IMPRESSION:    1. No acute intracranial hemorrhage or acute transcortical infarct.  2. Moderate to severe chronic microangiopathic disease and   age-appropriate volume loss, unchanged from 6/5/2019.    < end of copied text >        Assessment and Plan  84y Male former smoker, PMH HTN, HLD, NIDDM, hypothyroidism, prostate CA s/p seeding, Aflutter s/p ablation on Eliquis, bifascicular heart block s/p PPM, COPD, CAD, chronic diastolic heart failure, presented with 2 days of slurred speech. Also found to have bilateral carotid stenosis, now s/p left CEA done 6/7. Post op course complicated by respiratory distress reintubated on 6/8, also concern for hematoma but was drained on 6/8 and found to have serosanguinous fluid. CT done 6/8 with no changes, no hemorrhage. MOHINI done showed PFO. Extubated today 6/10.     1)Secondary stroke prevention  -ASA suppository. Consider anticoagulation once cleared from surgical perspective for Aflutter  -Atorvastatin 80 when can swallow    2) Stroke risk factors  -Aflutter  -HTN  -HLD  -NIDDM  -possible from PFO - structural heart was consulted    3) Further workup   -Plan per SICU - extubated today 6/10  -On zosyn for left lower lobe infiltrate  -New T9 compression fracture - consider ortho consult  -Swallow eval  -For sundowning, would recommend Olanzapine 2.5 at 6 pm and another dose later if still agitated. If pt unable to take pills, ok with low dose haldol.     DVT prophylaxis   -Heparin SQ TID and SCDs

## 2019-06-11 LAB
ANION GAP SERPL CALC-SCNC: 16 MMOL/L — SIGNIFICANT CHANGE UP (ref 5–17)
BUN SERPL-MCNC: 17 MG/DL — SIGNIFICANT CHANGE UP (ref 7–23)
CALCIUM SERPL-MCNC: 8.6 MG/DL — SIGNIFICANT CHANGE UP (ref 8.4–10.5)
CHLORIDE SERPL-SCNC: 101 MMOL/L — SIGNIFICANT CHANGE UP (ref 96–108)
CO2 SERPL-SCNC: 19 MMOL/L — LOW (ref 22–31)
CREAT SERPL-MCNC: 0.81 MG/DL — SIGNIFICANT CHANGE UP (ref 0.5–1.3)
GLUCOSE BLDC GLUCOMTR-MCNC: 142 MG/DL — HIGH (ref 70–99)
GLUCOSE BLDC GLUCOMTR-MCNC: 147 MG/DL — HIGH (ref 70–99)
GLUCOSE BLDC GLUCOMTR-MCNC: 148 MG/DL — HIGH (ref 70–99)
GLUCOSE BLDC GLUCOMTR-MCNC: 153 MG/DL — HIGH (ref 70–99)
GLUCOSE SERPL-MCNC: 152 MG/DL — HIGH (ref 70–99)
HCT VFR BLD CALC: 36.7 % — LOW (ref 39–50)
HGB BLD-MCNC: 11.7 G/DL — LOW (ref 13–17)
MAGNESIUM SERPL-MCNC: 2.1 MG/DL — SIGNIFICANT CHANGE UP (ref 1.6–2.6)
MCHC RBC-ENTMCNC: 31.9 GM/DL — LOW (ref 32–36)
MCHC RBC-ENTMCNC: 32.1 PG — SIGNIFICANT CHANGE UP (ref 27–34)
MCV RBC AUTO: 100.5 FL — HIGH (ref 80–100)
NRBC # BLD: 0 /100 WBCS — SIGNIFICANT CHANGE UP (ref 0–0)
PHOSPHATE SERPL-MCNC: 3.1 MG/DL — SIGNIFICANT CHANGE UP (ref 2.5–4.5)
PLATELET # BLD AUTO: 154 K/UL — SIGNIFICANT CHANGE UP (ref 150–400)
POTASSIUM SERPL-MCNC: 4.1 MMOL/L — SIGNIFICANT CHANGE UP (ref 3.5–5.3)
POTASSIUM SERPL-SCNC: 4.1 MMOL/L — SIGNIFICANT CHANGE UP (ref 3.5–5.3)
RBC # BLD: 3.65 M/UL — LOW (ref 4.2–5.8)
RBC # FLD: 13.8 % — SIGNIFICANT CHANGE UP (ref 10.3–14.5)
SODIUM SERPL-SCNC: 136 MMOL/L — SIGNIFICANT CHANGE UP (ref 135–145)
WBC # BLD: 6.18 K/UL — SIGNIFICANT CHANGE UP (ref 3.8–10.5)
WBC # FLD AUTO: 6.18 K/UL — SIGNIFICANT CHANGE UP (ref 3.8–10.5)

## 2019-06-11 PROCEDURE — 99233 SBSQ HOSP IP/OBS HIGH 50: CPT

## 2019-06-11 PROCEDURE — 99233 SBSQ HOSP IP/OBS HIGH 50: CPT | Mod: GC

## 2019-06-11 RX ORDER — SIMVASTATIN 20 MG/1
40 TABLET, FILM COATED ORAL AT BEDTIME
Refills: 0 | Status: DISCONTINUED | OUTPATIENT
Start: 2019-06-11 | End: 2019-06-15

## 2019-06-11 RX ORDER — PANTOPRAZOLE SODIUM 20 MG/1
40 TABLET, DELAYED RELEASE ORAL DAILY
Refills: 0 | Status: DISCONTINUED | OUTPATIENT
Start: 2019-06-11 | End: 2019-06-13

## 2019-06-11 RX ORDER — HALOPERIDOL DECANOATE 100 MG/ML
0.5 INJECTION INTRAMUSCULAR ONCE
Refills: 0 | Status: COMPLETED | OUTPATIENT
Start: 2019-06-11 | End: 2019-06-11

## 2019-06-11 RX ORDER — ASPIRIN/CALCIUM CARB/MAGNESIUM 324 MG
81 TABLET ORAL DAILY
Refills: 0 | Status: DISCONTINUED | OUTPATIENT
Start: 2019-06-11 | End: 2019-06-12

## 2019-06-11 RX ORDER — LEVOTHYROXINE SODIUM 125 MCG
50 TABLET ORAL DAILY
Refills: 0 | Status: DISCONTINUED | OUTPATIENT
Start: 2019-06-11 | End: 2019-06-12

## 2019-06-11 RX ORDER — FUROSEMIDE 40 MG
40 TABLET ORAL DAILY
Refills: 0 | Status: DISCONTINUED | OUTPATIENT
Start: 2019-06-11 | End: 2019-06-12

## 2019-06-11 RX ORDER — HALOPERIDOL DECANOATE 100 MG/ML
3 INJECTION INTRAMUSCULAR ONCE
Refills: 0 | Status: COMPLETED | OUTPATIENT
Start: 2019-06-11 | End: 2019-06-11

## 2019-06-11 RX ADMIN — PIPERACILLIN AND TAZOBACTAM 200 GRAM(S): 4; .5 INJECTION, POWDER, LYOPHILIZED, FOR SOLUTION INTRAVENOUS at 17:34

## 2019-06-11 RX ADMIN — PANTOPRAZOLE SODIUM 40 MILLIGRAM(S): 20 TABLET, DELAYED RELEASE ORAL at 11:27

## 2019-06-11 RX ADMIN — HEPARIN SODIUM 5000 UNIT(S): 5000 INJECTION INTRAVENOUS; SUBCUTANEOUS at 15:20

## 2019-06-11 RX ADMIN — HEPARIN SODIUM 5000 UNIT(S): 5000 INJECTION INTRAVENOUS; SUBCUTANEOUS at 05:52

## 2019-06-11 RX ADMIN — Medication 50 MICROGRAM(S): at 15:20

## 2019-06-11 RX ADMIN — Medication 300 MILLIGRAM(S): at 11:27

## 2019-06-11 RX ADMIN — PIPERACILLIN AND TAZOBACTAM 200 GRAM(S): 4; .5 INJECTION, POWDER, LYOPHILIZED, FOR SOLUTION INTRAVENOUS at 23:13

## 2019-06-11 RX ADMIN — Medication 102 MILLIGRAM(S): at 07:00

## 2019-06-11 RX ADMIN — PIPERACILLIN AND TAZOBACTAM 200 GRAM(S): 4; .5 INJECTION, POWDER, LYOPHILIZED, FOR SOLUTION INTRAVENOUS at 05:51

## 2019-06-11 RX ADMIN — Medication 3 MILLILITER(S): at 17:13

## 2019-06-11 RX ADMIN — HALOPERIDOL DECANOATE 3 MILLIGRAM(S): 100 INJECTION INTRAMUSCULAR at 20:29

## 2019-06-11 RX ADMIN — Medication 2: at 06:06

## 2019-06-11 RX ADMIN — Medication 1 MILLIGRAM(S): at 20:30

## 2019-06-11 RX ADMIN — HEPARIN SODIUM 5000 UNIT(S): 5000 INJECTION INTRAVENOUS; SUBCUTANEOUS at 22:59

## 2019-06-11 RX ADMIN — Medication 25 MICROGRAM(S): at 06:06

## 2019-06-11 RX ADMIN — HALOPERIDOL DECANOATE 0.5 MILLIGRAM(S): 100 INJECTION INTRAMUSCULAR at 20:14

## 2019-06-11 RX ADMIN — Medication 3 MILLILITER(S): at 09:03

## 2019-06-11 RX ADMIN — Medication 3 MILLILITER(S): at 23:02

## 2019-06-11 RX ADMIN — PIPERACILLIN AND TAZOBACTAM 200 GRAM(S): 4; .5 INJECTION, POWDER, LYOPHILIZED, FOR SOLUTION INTRAVENOUS at 11:27

## 2019-06-11 RX ADMIN — Medication 3 MILLILITER(S): at 05:50

## 2019-06-11 RX ADMIN — Medication 40 MILLIGRAM(S): at 15:20

## 2019-06-11 NOTE — PROGRESS NOTE ADULT - SUBJECTIVE AND OBJECTIVE BOX
Physical Medicine and Rehabilitation Progress Note:    Patient is a 84y old  Male who presents with a chief complaint of Slurred Speech (11 Jun 2019 09:54)      HPI:  84F y/o male with PMHx former smoker, HTN, HLD, NIDDM, hypothyroidism, prostate CA s/p seeding, atrial flutter s/p ablation, on Eliquis (last dose 2/3/18), bifasicular heart block s/p pacemaker, COPD not on home O2, CAD s/p CABGx3 March 2017 with Dr. Funez [facility], and chronic diastolic heart failure presenting with two days of slurred speech.  One day PTA and on day of arrival, pt started having speech difficulties at around 12 PM.  He was told by his girlfriend to come to the ED.  Denies HA, weakness, fevers, chills, CP, SOB.  Denies numbness and tingling of extremities or difficulties with balance.    ED Course:  initial /88 92 97.9 97%RA.  Stroke code called.  CT brain stroke protocol with Several patchy hypodense areas in the periventricular white matter of bilateral cerebral hemispheres.  CT perfusion nml.  CTA head and neck with calcified plaques noted in the aortic arch, bifurcations of both common carotid arteries and bilateral carotid siphons, 90% stenosis at the origins of both internal carotids arteries, minimal stenosis in the distal portion of the left vertebral artery.Given lisinopril 40 mg, 2 duonebs, resumed home eliquis dosing.  Admitted to stroke service for further CVA workup. (05 Jun 2019 18:20)                            11.7   6.18  )-----------( 154      ( 11 Jun 2019 05:14 )             36.7       06-11    136  |  101  |  17  ----------------------------<  152<H>  4.1   |  19<L>  |  0.81    Ca    8.6      11 Jun 2019 05:14  Phos  3.1     06-11  Mg     2.1     06-11      Vital Signs Last 24 Hrs  T(C): 36.5 (11 Jun 2019 16:20), Max: 37.2 (10 Darivn 2019 17:59)  T(F): 97.7 (11 Jun 2019 16:20), Max: 99 (10 Darvin 2019 17:59)  HR: 96 (11 Jun 2019 15:00) (88 - 118)  BP: 146/76 (11 Jun 2019 15:00) (109/45 - 170/90)  BP(mean): 96 (11 Jun 2019 15:00) (78 - 138)  RR: 27 (11 Jun 2019 15:00) (18 - 45)  SpO2: 95% (11 Jun 2019 15:00) (92% - 100%)    MEDICATIONS  (STANDING):  ALBUTerol/ipratropium for Nebulization 3 milliLiter(s) Nebulizer every 6 hours  aspirin  chewable 81 milliGRAM(s) Oral daily  dextrose 50% Injectable 25 Gram(s) IV Push once  dextrose 50% Injectable 25 Gram(s) IV Push once  furosemide    Tablet 40 milliGRAM(s) Oral daily  heparin  Injectable 5000 Unit(s) SubCutaneous every 8 hours  insulin lispro (HumaLOG) corrective regimen sliding scale   SubCutaneous Before meals and at bedtime  levothyroxine 50 MICROGram(s) Oral daily  pantoprazole  Injectable 40 milliGRAM(s) IV Push daily  piperacillin/tazobactam IVPB. 3.375 Gram(s) IV Intermittent every 6 hours  simvastatin 40 milliGRAM(s) Oral at bedtime    MEDICATIONS  (PRN):    Currently Undergoing Physical Therapy at bedside.    Functional Status Assessment:    Cognitive/Perceptual/Neuro  Cognitive/Neuro/Behavioral [WDL Definition: Alert; opens eyes spontaneously; arouses to voice or touch; oriented x 4; follows commands; speech spontaneous, logical; purposeful motor response; behavior appropriate to situation]: WDL except  Level of Consciousness: confused  Arousal Level: opens eyes spontaneously  Orientation: disoriented to;  time;  place;  knows he's in hospital knows month when given options, does not know year  Speech: slurred  Mood/Behavior: calm;  cooperative;  behavior appropriate to situation    Therapeutic Interventions      Sit-Stand Transfer Training  Transfer Training Sit-to-Stand Transfer: moderate assist (50% patient effort);  2 person assist;  set-up required;  supervision;  verbal cues;  weight-bearing as tolerated   B Hand Held Assist   Transfer Training Stand-to-Sit Transfer: moderate assist (50% patient effort);  2 person assist;  set-up required;  supervision;  verbal cues;  weight-bearing as tolerated   B Hand Held Assist   Sit-to-Stand Transfer Training Transfer Safety Analysis: decreased weight-shifting ability;  decreased balance;  decreased strength;  impaired balance;  cognitive, decreased safety awareness    Gait Training  Gait Training: moderate assist (50% patient effort);  2 person assist;  set-up required;  supervision;  verbal cues;  weight-bearing as tolerated   B Hand Held Assist and O2;  30 feet  Gait Analysis: swing-to gait   decreased jazlyn;  increased time in double stance;  decreased hip/knee flexion;  decreased velocity of limb motion;  shuffling;  decreased step length;  decreased stride length;  decreased toe clearance;  LLE ;  decreased trunk rotation;  decreased weight-shifting ability;  impaired balance;  decreased strength;  30 feet;  B Hand Held Assist     Therapeutic Exercise  Therapeutic Exercise Detail: MMT - Left hip flexion 3+/5 Right hip flexion 4/5, B LE knee flex/ext 4/5, B LE DF/PF 4/5           PM&R Impression: as above    Disposition Plan Recommendations: subacute rehab placement

## 2019-06-11 NOTE — PROGRESS NOTE ADULT - SUBJECTIVE AND OBJECTIVE BOX
STATUS POST:  6/7 L CEA, 6/8 RTOR for wound exploration     SUBJECTIVE: This morning, he feels well; his pain is well-controlled. No nausea or vomiting. No acute complaints.    heparin  Injectable 5000 Unit(s) SubCutaneous every 8 hours  piperacillin/tazobactam IVPB. 3.375 Gram(s) IV Intermittent every 6 hours      Vital Signs Last 24 Hrs  T(C): 36.6 (11 Jun 2019 06:03), Max: 37.8 (10 Darvin 2019 14:26)  T(F): 97.8 (11 Jun 2019 06:03), Max: 100.1 (10 Darvin 2019 14:26)  HR: 104 (11 Jun 2019 08:00) (74 - 122)  BP: 168/93 (11 Jun 2019 08:00) (115/78 - 170/90)  BP(mean): 125 (11 Jun 2019 08:00) (90 - 138)  RR: 22 (11 Jun 2019 08:00) (11 - 45)  SpO2: 97% (11 Jun 2019 08:00) (92% - 100%)  I&O's Detail    10 Darvin 2019 07:01  -  11 Jun 2019 07:00  --------------------------------------------------------  IN:    dexmedetomidine Infusion: 100 mL    IV PiggyBack: 300 mL  Total IN: 400 mL    OUT:    Bulb: 20 mL    Indwelling Catheter - Urethral: 1265 mL  Total OUT: 1285 mL    Total NET: -885 mL      11 Jun 2019 07:01  -  11 Jun 2019 08:42  --------------------------------------------------------  IN:  Total IN: 0 mL    OUT:    Indwelling Catheter - Urethral: 35 mL  Total OUT: 35 mL    Total NET: -35 mL          General: NAD, resting comfortably in bed  HEENT: L incision CDI with sutures in place, no surrounding erythema or induration, NICOLE serous; soft, no hematoma  C/V: NSR on monitor  Pulm: Nonlabored breathing, no respiratory distress, oral secretions  Extrem: WWP, no edema  Neuro: no focal deficits, CN2-12 intact, disoriented to place ("downstairs", "special kind of place"), oriented to person, neighborhood ([place]), year        LABS:                        11.7   6.18  )-----------( 154      ( 11 Jun 2019 05:14 )             36.7     06-11    136  |  101  |  17  ----------------------------<  152<H>  4.1   |  19<L>  |  0.81    Ca    8.6      11 Jun 2019 05:14  Phos  3.1     06-11  Mg     2.1     06-11

## 2019-06-11 NOTE — PROGRESS NOTE ADULT - ASSESSMENT
84F y/o male with PMHx former smoker, HTN, HLD, NIDDM, hypothyroidism, prostate CA s/p seeding, atrial flutter s/p ablation, on Eliquis (last dose 2/3/18), bifasicular heart block s/p pacemaker, COPD not on home O2, CAD s/p CABGx3 March 2017 with Dr. Funez, [facility], and chronic diastolic heart failure presenting with two days of slurred speech. CT showed 90% stenosis of bilateral carotid arteries now s/p left carotid endarterectomy c/b hypotension and respiratory  failure post-op, requiring intubation and take back to OR for neck exploration on 6/8    NEURO: tylenol PRN,   CV: SBP goal 110-160, ASA AZ  PULM: on high flow - wean to nasal canual today.   GI/FEN: NPO pending S&S. s/p eval by ENT with scope at bedside yesterday, found circumferential collapse, recommended decadron 10q8h (started over night).   : dc cook.   ENDO: ISS, synthroid 25mg IV  HEME: ASA, hold SQH for now.   ID: presumed aspiration PNA: Zosyn (6/8-6/15)  PPX: SCDs, holding SQH,   LINES: PIVs, A-line  WOUNDS/DRAINS: left neck incision. Neck JPX1 84F y/o male with PMHx former smoker, HTN, HLD, NIDDM, hypothyroidism, prostate CA s/p seeding, atrial flutter s/p ablation, on Eliquis (last dose 2/3/18), bifasicular heart block s/p pacemaker, COPD not on home O2, CAD s/p CABGx3 March 2017 with Dr. Funez, [facility], and chronic diastolic heart failure presenting with two days of slurred speech. CT showed 90% stenosis of bilateral carotid arteries now s/p left carotid endarterectomy c/b hypotension and respiratory  failure post-op, requiring intubation and take back to OR for neck exploration on 6/8    NEURO: tylenol PRN,   CV: SBP goal 110-160, ASA MI  PULM: on high flow - wean to nasal canual today.   GI/FEN: NPO pending S&S. s/p eval by ENT with scope at bedside yesterday, found circumferential collapse, recommended decadron 10q8h (started over night).   : dc cook.   ENDO: ISS, synthroid 25mg IV  HEME: ASA, SQH  ID: presumed aspiration PNA: Zosyn (6/8-6/15)  PPX: SCDs, SQH,   LINES: PIVs, A-line  WOUNDS/DRAINS: left neck incision. Neck JPX1

## 2019-06-11 NOTE — PROGRESS NOTE ADULT - SUBJECTIVE AND OBJECTIVE BOX
S: No new issues/events overnight, no new med c/o    O: ICU Vital Signs Last 24 Hrs  T(F): 97.8 (06-11-19 @ 06:03), Max: 100.1 (06-10-19 @ 14:26)  HR: 108 (06-11-19 @ 09:00) (74 - 122)  BP: 164/84 (06-11-19 @ 09:00) (115/78 - 170/90)  BP(mean): 111 (06-11-19 @ 09:00) (94 - 138)  ABP: 130/74 (06-10-19 @ 21:00)  RR: 29 (06-11-19 @ 09:00) (18 - 45)  SpO2: 97% (06-11-19 @ 09:00) (92% - 100%)    PHYSICAL EXAM:     Neurological: AAOx3, CNII-XII intact,  strength 5/5 b/l  ENT: left CEA incision clean, with mild oozing this morning, mild hematoma, ecchymosis at chin.  Cardiovascular: RRR  Respiratory: CTA  Gastrointestinal: soft, NT, ND, BS+  Extremities: warm, no dependent edema  Vascular: no cyanosis/erythema    LABS:    06-11    136  |  101  |  17  ----------------------------<  152<H>  4.1   |  19<L>  |  0.81    Ca    8.6      11 Jun 2019 05:14  Phos  3.1     06-11  Mg     2.1     06-11                          11.7   6.18  )-----------( 154      ( 11 Jun 2019 05:14 )             36.7     CAPILLARY BLOOD GLUCOSE      POCT Blood Glucose.: 153 mg/dL (11 Jun 2019 05:46)  POCT Blood Glucose.: 114 mg/dL (10 Darvin 2019 21:26)  POCT Blood Glucose.: 126 mg/dL (10 Darvin 2019 21:07)  POCT Blood Glucose.: 92 mg/dL (10 Darvin 2019 16:29)  POCT Blood Glucose.: 80 mg/dL (10 Darvin 2019 11:35)    MEDICATIONS  (STANDING):  ALBUTerol/ipratropium for Nebulization 3 milliLiter(s) Nebulizer every 6 hours  aspirin Suppository 300 milliGRAM(s) Rectal daily  dextrose 50% Injectable 25 Gram(s) IV Push once  dextrose 50% Injectable 25 Gram(s) IV Push once  glycopyrrolate Injectable 0.2 milliGRAM(s) IV Push once  heparin  Injectable 5000 Unit(s) SubCutaneous every 8 hours  insulin lispro (HumaLOG) corrective regimen sliding scale   SubCutaneous Before meals and at bedtime  levothyroxine Injectable 25 MICROGram(s) IV Push <User Schedule>  pantoprazole  Injectable 40 milliGRAM(s) IV Push daily  piperacillin/tazobactam IVPB. 3.375 Gram(s) IV Intermittent every 6 hours    MEDICATIONS  (PRN):      Randle:	  [ ] None	[x ] Daily Randle Order Placed	   Indication:	  [ x] Strict I and O's    [ ] Obstruction     [ ] Incontinence + Stage 3 or 4 Decubitus  Central Line:  [x ] None	   [ ]  Medication / TPN Administration     [ ] No Peripheral IV

## 2019-06-11 NOTE — PROGRESS NOTE ADULT - ASSESSMENT
84F y/o male with PMHx former smoker, HTN, HLD, NIDDM, hypothyroidism, prostate CA s/p seeding, atrial flutter s/p ablation, on Eliquis (last dose 2/3/18), bifasicular heart block s/p pacemaker, COPD not on home O2, CAD s/p CABGx3 March 2017 with Dr. Funez, [facility], and chronic diastolic heart failure presenting with two days of slurred speech. CT showed 90% stenosis of bilateral carotid arteries now s/p left carotid endarterectomy c/b hypotension and respiratory  failure post-op, requiring intubation and take back to OR for neck exploration on 6/8    NEURO: tylenol PRN,   CV: SBP goal 110-160, ASA MI  PULM: on high flow - wean to nasal canual today.   GI/FEN: NPO pending S&S. s/p eval by ENT with scope at bedside yesterday, found circumferential collapse, recommended decadron 10q8h (started over night).   : dc cook.   ENDO: ISS, synthroid 25mg IV  HEME: ASA, SQH  ID: presumed aspiration PNA: Zosyn (6/8-6/15)  PPX: SCDs, SQH,   LINES: PIVs, A-line  WOUNDS/DRAINS: left neck incision. Neck JPX1

## 2019-06-11 NOTE — PROGRESS NOTE ADULT - SUBJECTIVE AND OBJECTIVE BOX
Neurology Stroke Progress Note    INTERVAL HPI/OVERNIGHT EVENTS:  Patient seen and examined this afternoon. NICOLE drain was removed, neck remains with significant bruising but is improved. Passed speech and swallow today, no complaints. Patient is mildly confused/ disinhibited picking at lines with one to one at bedside.     MEDICATIONS  (STANDING):  ALBUTerol/ipratropium for Nebulization 3 milliLiter(s) Nebulizer every 6 hours  aspirin  chewable 81 milliGRAM(s) Oral daily  dextrose 50% Injectable 25 Gram(s) IV Push once  dextrose 50% Injectable 25 Gram(s) IV Push once  furosemide    Tablet 40 milliGRAM(s) Oral daily  heparin  Injectable 5000 Unit(s) SubCutaneous every 8 hours  insulin lispro (HumaLOG) corrective regimen sliding scale   SubCutaneous Before meals and at bedtime  levothyroxine 50 MICROGram(s) Oral daily  pantoprazole  Injectable 40 milliGRAM(s) IV Push daily  piperacillin/tazobactam IVPB. 3.375 Gram(s) IV Intermittent every 6 hours  simvastatin 40 milliGRAM(s) Oral at bedtime    MEDICATIONS  (PRN):      Allergies    No Known Allergies    Intolerances    narcotic analgesics (Nausea; Vomiting)      ROS: As per HPI, otherwise negative    Vital Signs Last 24 Hrs  T(C): 36.5 (11 Jun 2019 16:20), Max: 37.2 (10 Darvin 2019 17:59)  T(F): 97.7 (11 Jun 2019 16:20), Max: 99 (10 Darvin 2019 17:59)  HR: 96 (11 Jun 2019 15:00) (88 - 118)  BP: 146/76 (11 Jun 2019 15:00) (109/45 - 170/90)  BP(mean): 96 (11 Jun 2019 15:00) (78 - 138)  RR: 27 (11 Jun 2019 15:00) (18 - 45)  SpO2: 95% (11 Jun 2019 15:00) (92% - 100%)    Physical exam:  General: No acute distress, awake and alert  Neurologic:  -Mental status: Awake, alert, oriented to person, place, and time. Mild confusion/disinhibition, picking at lines and monitors.  Speech is dysarthric with intact naming and comprehension. Multiples 5x7, calls pen a pencil. Knows Trump as president.   -Cranial nerves:   II: Visual fields are full to finger counting. Pupils equally round and reactive to light  III, IV, VI: Extraocular movements are intact without nystagmus  V:  Facial sensation V1-V3 equal and intact   VII: Face is symmetric with normal eye closure and smile. No facial droop  VIII: Hearing is grossly intact  Motor: Normal bulk and tone, strength 5/5 throughout in bilateral arms and legs  No pronator drift  Sensation: Intact to light touch. No neglect.  Coordination: No dysmetria on finger-to-nose       LABS:                        11.7   6.18  )-----------( 154      ( 11 Jun 2019 05:14 )             36.7     06-11    136  |  101  |  17  ----------------------------<  152<H>  4.1   |  19<L>  |  0.81    Ca    8.6      11 Jun 2019 05:14  Phos  3.1     06-11  Mg     2.1     06-11            RADIOLOGY & ADDITIONAL TESTS:  no new imaging    Assessment and Plan  84y Male former smoker, PMH HTN, HLD, NIDDM, hypothyroidism, prostate CA s/p seeding, Aflutter s/p ablation on Eliquis, bifascicular heart block s/p PPM, COPD, CAD, chronic diastolic heart failure, presented with 2 days of slurred speech. Also found to have bilateral carotid stenosis, now s/p left CEA done 6/7. Post op course complicated by respiratory distress reintubated on 6/8, also concern for hematoma but was drained on 6/8 and found to have serosanguinous fluid. CT done 6/8 with no changes, no hemorrhage. MOHINI done showed PFO. Extubated 6/10, NICOLE drain removed 6/11, swallow was passed and is now on a [facility] soft diet with nectar thick liquids.     1)Secondary stroke prevention  -ASA 81mg daily. Consider anticoagulation once cleared from surgical perspective for Aflutter  -Atorvastatin 80mg    2) Stroke risk factors  -Aflutter  -HTN  -HLD  -NIDDM  -possible from PFO - structural heart was consulted    3) Further workup   -Plan per SICU  -On zosyn for left lower lobe infiltrate  -New T9 compression fracture - consider ortho consult  -For sundowning, would recommend Olanzapine 2.5 at 6 pm and another dose later if still agitated. If pt unable to take pills, ok with low dose haldol.     DVT Prophylaxis:   - Bilateral SCDs   - Heparin subq     Kristin Linda  Neurology Stroke NP  [phone] Neurology Stroke Progress Note    INTERVAL HPI/OVERNIGHT EVENTS:  Patient seen and examined this afternoon. NICOLE drain was removed, neck remains with significant bruising but is improved. Passed speech and swallow today, no complaints. Patient is mildly confused/ disinhibited picking at lines with one to one at bedside.     MEDICATIONS  (STANDING):  ALBUTerol/ipratropium for Nebulization 3 milliLiter(s) Nebulizer every 6 hours  aspirin  chewable 81 milliGRAM(s) Oral daily  dextrose 50% Injectable 25 Gram(s) IV Push once  dextrose 50% Injectable 25 Gram(s) IV Push once  furosemide    Tablet 40 milliGRAM(s) Oral daily  heparin  Injectable 5000 Unit(s) SubCutaneous every 8 hours  insulin lispro (HumaLOG) corrective regimen sliding scale   SubCutaneous Before meals and at bedtime  levothyroxine 50 MICROGram(s) Oral daily  pantoprazole  Injectable 40 milliGRAM(s) IV Push daily  piperacillin/tazobactam IVPB. 3.375 Gram(s) IV Intermittent every 6 hours  simvastatin 40 milliGRAM(s) Oral at bedtime    Allergies  No Known Allergies    Intolerances  narcotic analgesics (Nausea; Vomiting)      ROS: As per HPI, otherwise negative    Vital Signs Last 24 Hrs  T(C): 36.5 (11 Jun 2019 16:20), Max: 37.2 (10 Darvin 2019 17:59)  T(F): 97.7 (11 Jun 2019 16:20), Max: 99 (10 Darvin 2019 17:59)  HR: 96 (11 Jun 2019 15:00) (88 - 118)  BP: 146/76 (11 Jun 2019 15:00) (109/45 - 170/90)  BP(mean): 96 (11 Jun 2019 15:00) (78 - 138)  RR: 27 (11 Jun 2019 15:00) (18 - 45)  SpO2: 95% (11 Jun 2019 15:00) (92% - 100%)    Physical exam:  General: No acute distress, awake and alert  Neurologic:  -Mental status: Awake, alert, oriented to person, place, and time. Mild confusion/disinhibition, picking at lines and monitors.  Speech is dysarthric with intact naming and comprehension. Multiples 5x7, calls pen a pencil. Knows Trump as president.   -Cranial nerves:   II: Visual fields are full to finger counting. Pupils equally round and reactive to light  III, IV, VI: Extraocular movements are intact without nystagmus  V:  Facial sensation V1-V3 equal and intact   VII: Face is symmetric with normal eye closure and smile. No facial droop  VIII: Hearing is grossly intact  Motor: Normal bulk and tone, strength 5/5 throughout in bilateral arms and legs  No pronator drift  Sensation: Intact to light touch. No neglect.  Coordination: No dysmetria on finger-to-nose       LABS:                        11.7   6.18  )-----------( 154      ( 11 Jun 2019 05:14 )             36.7     06-11    136  |  101  |  17  ----------------------------<  152<H>  4.1   |  19<L>  |  0.81    Ca    8.6      11 Jun 2019 05:14  Phos  3.1     06-11  Mg     2.1     06-11            RADIOLOGY & ADDITIONAL TESTS:  no new imaging    Assessment and Plan  84y Male former smoker, PMH HTN, HLD, NIDDM, hypothyroidism, prostate CA s/p seeding, Aflutter s/p ablation on Eliquis, bifascicular heart block s/p PPM, COPD, CAD, chronic diastolic heart failure, presented with 2 days of slurred speech. Also found to have bilateral carotid stenosis, now s/p left CEA done 6/7. Post op course complicated by respiratory distress reintubated on 6/8, also concern for hematoma but was drained on 6/8 and found to have serosanguinous fluid. CT done 6/8 with no changes, no hemorrhage. MOHINI done showed PFO. Extubated 6/10, NICOLE drain removed 6/11, swallow was passed and is now on a [facility] soft diet with nectar thick liquids.     1)Secondary stroke prevention  -ASA 81mg daily. Consider anticoagulation once cleared from surgical perspective for Aflutter  -Atorvastatin 80mg    2) Stroke risk factors  -Aflutter  -HTN  -HLD  -NIDDM  -possible from PFO - structural heart was consulted    3) Further workup   -Plan per SICU  -On zosyn for left lower lobe infiltrate  -New T9 compression fracture - consider ortho consult  -For sundowning, would recommend Olanzapine 2.5 at 6 pm and another dose later if still agitated. If pt unable to take pills, ok with low dose haldol.     DVT Prophylaxis:   - Bilateral SCDs   - Heparin subq     Kristin A Notley  Neurology Stroke NP  [phone]

## 2019-06-11 NOTE — PROGRESS NOTE ADULT - ASSESSMENT
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    Care per SICU primary.  Agree with Zosyn.  Vascular will continue to follow. 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    NICOLE removed by Vascular today.  Care per SICU primary.  Agree with Zosyn.  Vascular will continue to follow.

## 2019-06-12 LAB
ANION GAP SERPL CALC-SCNC: 16 MMOL/L — SIGNIFICANT CHANGE UP (ref 5–17)
BASE EXCESS BLDA CALC-SCNC: -1.2 MMOL/L — SIGNIFICANT CHANGE UP (ref -2–3)
BUN SERPL-MCNC: 27 MG/DL — HIGH (ref 7–23)
CA-I BLDA-SCNC: 1.1 MMOL/L — LOW (ref 1.12–1.3)
CALCIUM SERPL-MCNC: 9.1 MG/DL — SIGNIFICANT CHANGE UP (ref 8.4–10.5)
CHLORIDE SERPL-SCNC: 102 MMOL/L — SIGNIFICANT CHANGE UP (ref 96–108)
CO2 SERPL-SCNC: 20 MMOL/L — LOW (ref 22–31)
COHGB MFR BLDA: 0 % — SIGNIFICANT CHANGE UP
CREAT SERPL-MCNC: 1.13 MG/DL — SIGNIFICANT CHANGE UP (ref 0.5–1.3)
GLUCOSE BLDC GLUCOMTR-MCNC: 135 MG/DL — HIGH (ref 70–99)
GLUCOSE BLDC GLUCOMTR-MCNC: 147 MG/DL — HIGH (ref 70–99)
GLUCOSE BLDC GLUCOMTR-MCNC: 175 MG/DL — HIGH (ref 70–99)
GLUCOSE BLDC GLUCOMTR-MCNC: 179 MG/DL — HIGH (ref 70–99)
GLUCOSE BLDC GLUCOMTR-MCNC: 182 MG/DL — HIGH (ref 70–99)
GLUCOSE SERPL-MCNC: 186 MG/DL — HIGH (ref 70–99)
HCO3 BLDA-SCNC: 24 MMOL/L — SIGNIFICANT CHANGE UP (ref 21–28)
HCT VFR BLD CALC: 38.1 % — LOW (ref 39–50)
HGB BLD-MCNC: 12 G/DL — LOW (ref 13–17)
HGB BLDA-MCNC: 11.2 G/DL — LOW (ref 13–17)
MAGNESIUM SERPL-MCNC: 2.1 MG/DL — SIGNIFICANT CHANGE UP (ref 1.6–2.6)
MCHC RBC-ENTMCNC: 31.5 GM/DL — LOW (ref 32–36)
MCHC RBC-ENTMCNC: 32.1 PG — SIGNIFICANT CHANGE UP (ref 27–34)
MCV RBC AUTO: 101.9 FL — HIGH (ref 80–100)
METHGB MFR BLDA: 0.3 % — SIGNIFICANT CHANGE UP
NRBC # BLD: 0 /100 WBCS — SIGNIFICANT CHANGE UP (ref 0–0)
O2 CT VFR BLDA CALC: SIGNIFICANT CHANGE UP (ref 15–23)
OXYHGB MFR BLDA: 99 % — SIGNIFICANT CHANGE UP (ref 94–100)
PCO2 BLDA: 41 MMHG — SIGNIFICANT CHANGE UP (ref 35–48)
PH BLDA: 7.38 — SIGNIFICANT CHANGE UP (ref 7.35–7.45)
PHOSPHATE SERPL-MCNC: 3.8 MG/DL — SIGNIFICANT CHANGE UP (ref 2.5–4.5)
PLATELET # BLD AUTO: 165 K/UL — SIGNIFICANT CHANGE UP (ref 150–400)
PO2 BLDA: 156 MMHG — HIGH (ref 83–108)
POTASSIUM BLDA-SCNC: 3.3 MMOL/L — LOW (ref 3.5–4.9)
POTASSIUM SERPL-MCNC: 3.9 MMOL/L — SIGNIFICANT CHANGE UP (ref 3.5–5.3)
POTASSIUM SERPL-SCNC: 3.9 MMOL/L — SIGNIFICANT CHANGE UP (ref 3.5–5.3)
RBC # BLD: 3.74 M/UL — LOW (ref 4.2–5.8)
RBC # FLD: 14.1 % — SIGNIFICANT CHANGE UP (ref 10.3–14.5)
SAO2 % BLDA: 99 % — SIGNIFICANT CHANGE UP (ref 95–100)
SODIUM BLDA-SCNC: 135 MMOL/L — LOW (ref 138–146)
SODIUM SERPL-SCNC: 138 MMOL/L — SIGNIFICANT CHANGE UP (ref 135–145)
WBC # BLD: 9.21 K/UL — SIGNIFICANT CHANGE UP (ref 3.8–10.5)
WBC # FLD AUTO: 9.21 K/UL — SIGNIFICANT CHANGE UP (ref 3.8–10.5)

## 2019-06-12 PROCEDURE — 99291 CRITICAL CARE FIRST HOUR: CPT

## 2019-06-12 PROCEDURE — 71045 X-RAY EXAM CHEST 1 VIEW: CPT | Mod: 26

## 2019-06-12 RX ORDER — POTASSIUM CHLORIDE 20 MEQ
20 PACKET (EA) ORAL ONCE
Refills: 0 | Status: DISCONTINUED | OUTPATIENT
Start: 2019-06-12 | End: 2019-06-12

## 2019-06-12 RX ORDER — EPINEPHRINE 11.25MG/ML
4 SOLUTION, NON-ORAL INHALATION ONCE
Refills: 0 | Status: COMPLETED | OUTPATIENT
Start: 2019-06-12 | End: 2019-06-12

## 2019-06-12 RX ORDER — NALOXONE HYDROCHLORIDE 4 MG/.1ML
0.2 SPRAY NASAL ONCE
Refills: 0 | Status: COMPLETED | OUTPATIENT
Start: 2019-06-12 | End: 2019-06-12

## 2019-06-12 RX ORDER — FUROSEMIDE 40 MG
40 TABLET ORAL ONCE
Refills: 0 | Status: COMPLETED | OUTPATIENT
Start: 2019-06-12 | End: 2019-06-12

## 2019-06-12 RX ORDER — FUROSEMIDE 40 MG
40 TABLET ORAL ONCE
Refills: 0 | Status: DISCONTINUED | OUTPATIENT
Start: 2019-06-12 | End: 2019-06-12

## 2019-06-12 RX ORDER — POTASSIUM CHLORIDE 20 MEQ
10 PACKET (EA) ORAL ONCE
Refills: 0 | Status: COMPLETED | OUTPATIENT
Start: 2019-06-12 | End: 2019-06-12

## 2019-06-12 RX ORDER — METOPROLOL TARTRATE 50 MG
2.5 TABLET ORAL ONCE
Refills: 0 | Status: COMPLETED | OUTPATIENT
Start: 2019-06-12 | End: 2019-06-12

## 2019-06-12 RX ORDER — LEVOTHYROXINE SODIUM 125 MCG
25 TABLET ORAL ONCE
Refills: 0 | Status: COMPLETED | OUTPATIENT
Start: 2019-06-12 | End: 2019-06-12

## 2019-06-12 RX ORDER — IPRATROPIUM/ALBUTEROL SULFATE 18-103MCG
3 AEROSOL WITH ADAPTER (GRAM) INHALATION ONCE
Refills: 0 | Status: COMPLETED | OUTPATIENT
Start: 2019-06-12 | End: 2019-06-12

## 2019-06-12 RX ORDER — LEVOTHYROXINE SODIUM 125 MCG
50 TABLET ORAL DAILY
Refills: 0 | Status: DISCONTINUED | OUTPATIENT
Start: 2019-06-13 | End: 2019-06-15

## 2019-06-12 RX ORDER — ASPIRIN/CALCIUM CARB/MAGNESIUM 324 MG
300 TABLET ORAL ONCE
Refills: 0 | Status: COMPLETED | OUTPATIENT
Start: 2019-06-12 | End: 2019-06-12

## 2019-06-12 RX ORDER — OLANZAPINE 15 MG/1
2.5 TABLET, FILM COATED ORAL ONCE
Refills: 0 | Status: COMPLETED | OUTPATIENT
Start: 2019-06-12 | End: 2019-06-12

## 2019-06-12 RX ORDER — LEVOTHYROXINE SODIUM 125 MCG
25 TABLET ORAL AT BEDTIME
Refills: 0 | Status: DISCONTINUED | OUTPATIENT
Start: 2019-06-12 | End: 2019-06-12

## 2019-06-12 RX ORDER — ASPIRIN/CALCIUM CARB/MAGNESIUM 324 MG
81 TABLET ORAL DAILY
Refills: 0 | Status: DISCONTINUED | OUTPATIENT
Start: 2019-06-13 | End: 2019-06-15

## 2019-06-12 RX ADMIN — Medication 2: at 12:03

## 2019-06-12 RX ADMIN — PANTOPRAZOLE SODIUM 40 MILLIGRAM(S): 20 TABLET, DELAYED RELEASE ORAL at 11:06

## 2019-06-12 RX ADMIN — Medication 300 MILLIGRAM(S): at 12:58

## 2019-06-12 RX ADMIN — Medication 3 MILLILITER(S): at 16:04

## 2019-06-12 RX ADMIN — Medication 4 MILLILITER(S): at 01:22

## 2019-06-12 RX ADMIN — Medication 2.5 MILLIGRAM(S): at 18:35

## 2019-06-12 RX ADMIN — Medication 2.5 MILLIGRAM(S): at 18:46

## 2019-06-12 RX ADMIN — Medication 2: at 07:56

## 2019-06-12 RX ADMIN — Medication 25 MICROGRAM(S): at 11:05

## 2019-06-12 RX ADMIN — Medication 3 MILLILITER(S): at 09:46

## 2019-06-12 RX ADMIN — HEPARIN SODIUM 5000 UNIT(S): 5000 INJECTION INTRAVENOUS; SUBCUTANEOUS at 05:50

## 2019-06-12 RX ADMIN — PIPERACILLIN AND TAZOBACTAM 200 GRAM(S): 4; .5 INJECTION, POWDER, LYOPHILIZED, FOR SOLUTION INTRAVENOUS at 23:59

## 2019-06-12 RX ADMIN — NALOXONE HYDROCHLORIDE 0.2 MILLIGRAM(S): 4 SPRAY NASAL at 05:50

## 2019-06-12 RX ADMIN — Medication 40 MILLIGRAM(S): at 01:21

## 2019-06-12 RX ADMIN — Medication 3 MILLILITER(S): at 01:27

## 2019-06-12 RX ADMIN — PIPERACILLIN AND TAZOBACTAM 200 GRAM(S): 4; .5 INJECTION, POWDER, LYOPHILIZED, FOR SOLUTION INTRAVENOUS at 11:05

## 2019-06-12 RX ADMIN — Medication 40 MILLIGRAM(S): at 01:00

## 2019-06-12 RX ADMIN — Medication 3 MILLILITER(S): at 05:44

## 2019-06-12 RX ADMIN — HEPARIN SODIUM 5000 UNIT(S): 5000 INJECTION INTRAVENOUS; SUBCUTANEOUS at 22:00

## 2019-06-12 RX ADMIN — PIPERACILLIN AND TAZOBACTAM 200 GRAM(S): 4; .5 INJECTION, POWDER, LYOPHILIZED, FOR SOLUTION INTRAVENOUS at 05:50

## 2019-06-12 RX ADMIN — Medication 100 MILLIEQUIVALENT(S): at 11:46

## 2019-06-12 RX ADMIN — HEPARIN SODIUM 5000 UNIT(S): 5000 INJECTION INTRAVENOUS; SUBCUTANEOUS at 12:58

## 2019-06-12 RX ADMIN — OLANZAPINE 2.5 MILLIGRAM(S): 15 TABLET, FILM COATED ORAL at 17:42

## 2019-06-12 RX ADMIN — PIPERACILLIN AND TAZOBACTAM 200 GRAM(S): 4; .5 INJECTION, POWDER, LYOPHILIZED, FOR SOLUTION INTRAVENOUS at 17:12

## 2019-06-12 NOTE — CHART NOTE - NSCHARTNOTEFT_GEN_A_CORE
Admitting Diagnosis:   Patient is a 84y old  Male who presents with a chief complaint of Slurred Speech (12 Jun 2019 09:40)    PAST MEDICAL & SURGICAL HISTORY:  High cholesterol  Diabetes  Prostate cancer: in remission  Hypertension  COPD (chronic obstructive pulmonary disease)  H/O aortic valve replacement  S/P CABG x 3  Artificial pacemaker  History of cataract surgery: b/l  History of knee replacement: b/l    Current Nutrition Order: Dysphagia 2 mechanical soft, NTL    PO Intake: Good (%) [   ]  Fair (50-75%) [   ] Poor (<25%) [  X ]    GI Issues: abdomen soft/non-tender, no stool per flow sheet, unable to evaluate for N/V at this time    Pain: Unable to assess at this time 2/2 medical status    Skin Integrity: Chris score 12 per flow sheet    Labs:   06-12    138  |  102  |  27<H>  ----------------------------<  186<H>  3.9   |  20<L>  |  1.13    Ca    9.1      12 Jun 2019 05:17  Phos  3.8     06-12  Mg     2.1     06-12    CAPILLARY BLOOD GLUCOSE    POCT Blood Glucose.: 179 mg/dL (12 Jun 2019 07:52)  POCT Blood Glucose.: 182 mg/dL (12 Jun 2019 06:17)  POCT Blood Glucose.: 142 mg/dL (11 Jun 2019 21:48)  POCT Blood Glucose.: 147 mg/dL (11 Jun 2019 16:30)    Medications:  MEDICATIONS  (STANDING):  ALBUTerol/ipratropium for Nebulization 3 milliLiter(s) Nebulizer every 6 hours  aspirin Suppository 300 milliGRAM(s) Rectal once  dextrose 50% Injectable 25 Gram(s) IV Push once  dextrose 50% Injectable 25 Gram(s) IV Push once  heparin  Injectable 5000 Unit(s) SubCutaneous every 8 hours  insulin lispro (HumaLOG) corrective regimen sliding scale   SubCutaneous Before meals and at bedtime  pantoprazole  Injectable 40 milliGRAM(s) IV Push daily  piperacillin/tazobactam IVPB. 3.375 Gram(s) IV Intermittent every 6 hours  potassium chloride  10 mEq/100 mL IVPB 10 milliEquivalent(s) IV Intermittent once  simvastatin 40 milliGRAM(s) Oral at bedtime    MEDICATIONS  (PRN):    Weight:  101.2kg (6/12 bed scale)    Weight Change: Please continue to trend weights for further assessment    Nutrition Focused Physical Exam: Completed [   ]  Not Pertinent [ X  ]  Muscle Wasting- Temporal [   ]  Clavicle/Pectoral [   ]  Shoulder/Deltoid [   ]  Scapula [   ]  Interosseous [   ]  Quadriceps [   ]  Gastrocnemius [   ]  Fat Wasting- Orbital [   ]  Buccal [   ]  Triceps [   ]  Rib [   ]  Suspect [PCM] 2/2 to physical assessment, [poor intake], and [wt loss]; please see malnutrition chart note.    Estimated energy needs:   Ht 167.64cm; Wt 96.2Kg, IBW 64.5Kg; %%, BMI 34.1  Utilized IBW to calculate needs, pt >120% of IBW. Adjusted for post-op/age. Fluids per team 2/2 HF.    8590-3238 kcal (25-30 kcal/kg), 77-90 gm (1.2-1.4 gm/kg)    Subjective: 84F y/o male with PMHx former smoker, HTN, HLD, NIDDM, hypothyroidism, prostate CA s/p seeding, atrial flutter s/p ablation, on Eliquis (last dose 2/3/18), bifasicular heart block s/p pacemaker, COPD not on home O2, CAD s/p CABGx3 March 2017 with [facility], and chronic diastolic heart failure presenting with two days of slurred speech. CT showed 90% stenosis of bilateral carotid arteries now s/p left carotid endarterectomy c/b hypotension and respiratory  failure post-op, requiring intubation (pt extubated 6/7) and take back to OR for neck exploration on 6/8. Pt remains confused, now on Bipap. SLP recommending puree diet with NTL. Per RN, pt consumed >80% of dinner last night, food now being held as pt on Bipap. Pt not appropriate for education at this time. See below for full nutritional recs- d/w team. RD to monitor and f/u per high risk protocol.    Previous Nutrition Diagnosis:  Inadequate oral intake RT NPO status AEB pt meeting 0% of EER    Active [   ]  Resolved [   ]- N/A    If resolved, new PES: Inadequate oral intake RT decreased ability to consume sufficient energy AEB pt meeting <50% of EER as pt on/off bipap    Goal: Pt to consistently consume >/=75% EER with good tolerance.    Recommendations:  1. Recommend continue Dysphagia 2 mechanical soft, NTL or per SLP when medically appropriate. Recommend add CSTCHO, DASH/TLC, monitor need for diet liberalization and/or supplements based on PO intake.  2. Monitor labs and trend weekly weights for further assessment.    Education: N/A at this time 2/2 pt's medical status    Risk Level: High [ X  ] Moderate [   ] Low [   ]

## 2019-06-12 NOTE — SWALLOW BEDSIDE ASSESSMENT ADULT - SWALLOW EVAL: RECOMMENDED FEEDING/EATING TECHNIQUES
oral hygiene/minimize distractions, break from eating with labored breathing/position upright (90 degrees)/small sips/bites
position upright (90 degrees)

## 2019-06-12 NOTE — PROGRESS NOTE ADULT - ATTENDING COMMENTS
Above reviewed with PETER Dove acting as my scribe.  The patient had some redirectable agitation overnight with some difficulty handling secretions. Neurologic exam remianed with equal reactive pupils, EOMI, slight left tongue deviation and symmetrical 5+ strength. His wound site had minimal swelling. We tried to help his secretions with trial of glycopyrrholate and help his delirium with precedex and low dose haldol. At around 12:30 PM his secretions had not abated and he began to have desaturation to 90 with more labored breathing. His wound was still soft but more swollen with ecchymosis extending past midline, no tracheal deviation. Anesthesia intubated him easily for acute hypoxemci respiratory failure with no evidence of obstruction but + secretions. After intubation he required fluids and pressors to maintain BP. Dr. Alvarez took him for wound exploration to OR. We will continue to monitor.  Critical care time rendered 90 minutes
Patient seen and examined with NP.  Agree with above.  Patient is doing well with stitches along left neck post CEA.  No new weakness or numbness.  Some confusion but seems to be his baseline.  Plan as per primary team in terms of when to start anticoagulation given atrial flutter.
Patient seen and examined; Events noted from last night; By PM rounds patient much improved; Will follow Psychiatry recommendation regarding sedation;
Yesterday AM patient was having increased secretions and difficulty clearing them    Was also suffering with delirium    He was intubated emergently, and there was concern from the ICU that there was a neck hematoma    As such, we took him to the OR for exploration    Findings were negative, and the neck was irrigated, closed, and a NICOLE was left in place    Overnight abx switched to zosyn in case of HAP, given color of sputum and CXR findings    Today patient remains I&V, neck soft, still lots of seceretions, afebrile    Plan to keep I&V, NICOLE to remain, continue ABX
Critical care time rendered 40 minutes

## 2019-06-12 NOTE — PROVIDER CONTACT NOTE (CHANGE IN STATUS NOTIFICATION) - ASSESSMENT
pt hemodynamically comprised  requiring increased dose waldemar
Pt started to go into raspatory distress(0000HRS), use of accessory muscles, paradoxical breathing and O2 sat. as low as 70's

## 2019-06-12 NOTE — PROGRESS NOTE ADULT - SUBJECTIVE AND OBJECTIVE BOX
S: agitated delirium last night, received haldol 3.5mg and ativan 1mg at 8pm, pt became lethargic/sedated, had respiratory distress with abdominal breathing and hypoxia to O2 80s% around 1AM, given racemic epi, albuterol, and solumedrol 40 and eventually placed on BiPAP.   pt seen this morning, still lethargic, arousible only with painful stimuli, but then falls asleep, on BiPAP, with good oxygenation, no tachypnea, no stridor, no wheezing while on BiPAP.     O: ICU Vital Signs Last 24 Hrs  T(F): 97.8 (06-12-19 @ 06:00), Max: 98.8 (06-11-19 @ 10:10)  HR: 94 (06-12-19 @ 09:00) (88 - 116)  BP: 118/74 (06-12-19 @ 09:00) (105/56 - 172/86)  BP(mean): 88 (06-12-19 @ 09:00) (77 - 118)  ABP: --  RR: 11 (06-12-19 @ 09:00) (11 - 32)  SpO2: 100% (06-12-19 @ 09:00) (88% - 100%)    PHYSICAL EXAM:     Neurological: arousible only with painful stimuli, but falls asleep again.  CNII-XII intact,  strength 5/5 b/l  Cardiovascular: RRR  Respiratory: CTA  Gastrointestinal: soft, NT, ND, BS+  Extremities: warm, no dependent edema  Vascular: no cyanosis/erythema    LABS:    06-12    138  |  102  |  27<H>  ----------------------------<  186<H>  3.9   |  20<L>  |  1.13    Ca    9.1      12 Jun 2019 05:17  Phos  3.8     06-12  Mg     2.1     06-12                          12.0   9.21  )-----------( 165      ( 12 Jun 2019 05:17 )             38.1     ABG - ( 12 Jun 2019 03:35 )  pH, Arterial: 7.38  pH, Blood: x     /  pCO2: 41    /  pO2: 156   / HCO3: 24    / Base Excess: -1.2  /  SaO2: x               CAPILLARY BLOOD GLUCOSE      POCT Blood Glucose.: 179 mg/dL (12 Jun 2019 07:52)  POCT Blood Glucose.: 182 mg/dL (12 Jun 2019 06:17)  POCT Blood Glucose.: 142 mg/dL (11 Jun 2019 21:48)  POCT Blood Glucose.: 147 mg/dL (11 Jun 2019 16:30)  POCT Blood Glucose.: 148 mg/dL (11 Jun 2019 11:00)    MEDICATIONS  (STANDING):  ALBUTerol/ipratropium for Nebulization 3 milliLiter(s) Nebulizer every 6 hours  aspirin  chewable 81 milliGRAM(s) Oral daily  heparin  Injectable 5000 Unit(s) SubCutaneous every 8 hours  insulin lispro (HumaLOG) corrective regimen sliding scale   SubCutaneous Before meals and at bedtime  levothyroxine Injectable 25 MICROGram(s) IV Push once  pantoprazole  Injectable 40 milliGRAM(s) IV Push daily  piperacillin/tazobactam IVPB. 3.375 Gram(s) IV Intermittent every 6 hours  potassium chloride   Powder 20 milliEquivalent(s) Oral once  simvastatin 40 milliGRAM(s) Oral at bedtime    MEDICATIONS  (PRN):      Randle:	  [ x] None	[ ] Daily Randle Order Placed	   Indication:	  [ ] Strict I and O's    [ ] Obstruction     [ ] Incontinence + Stage 3 or 4 Decubitus  Central Line:  [ x] None	   [ ]  Medication / TPN Administration     [ ] No Peripheral IV

## 2019-06-12 NOTE — PROVIDER CONTACT NOTE (CHANGE IN STATUS NOTIFICATION) - SITUATION
pt post left CEA yesterday  pt becoming more agitated with airway obstruction and desaturation
Pt is having Paradoxical breathing. O2 Sat (70's-80')

## 2019-06-12 NOTE — PROGRESS NOTE ADULT - ASSESSMENT
84F y/o male with PMHx former smoker, HTN, HLD, NIDDM, hypothyroidism, prostate CA s/p seeding, atrial flutter s/p ablation, on Eliquis (last dose 2/3/18), bifasicular heart block s/p pacemaker, COPD not on home O2, CAD s/p CABGx3 March 2017 with Dr. Funez, [facility], and chronic diastolic heart failure presenting with two days of slurred speech. CT showed 90% stenosis of bilateral carotid arteries now s/p left carotid endarterectomy c/b hypotension and respiratory  failure post-op, requiring intubation and take back to OR for neck exploration on 6/8    NEURO: s/p haldol 3.5mg and ativan last night, became lethargic afterwards and still sedated this morning. will avoid giving too much sedating meds.   CV: SBP goal 110-160, cont ASA, hemodynamically stable at this time   PULM: s/p eval by ENT with scope at bedside 6/10, found circumferential collapse, completed 2 days of decadron 10q8h with improvement in airway yesterday during day time, tolerated nasal canula without respiratory distress during daytime yesterday. reviewed overnight events -- appears to be an obstructive airway pattern, suspect due to sedation. currently no distress while on CPAP. continue CPAP for now until pt awake and alert, then wean down to nasal canula when pt awake alert.   GI/FEN: passed S&S for soft with nectar thick yesterday. but sedated last night, hold off on PO's until pt awake alert.   : voids.   ENDO: ISS, synthroid 25mg IV  HEME: ASA, SQH  ID: presumed aspiration PNA: Zosyn (6/8-6/15)  PPX: SCDs, SQH,   LINES: PIVs, A-line  WOUNDS/DRAINS: left neck incision. Neck JPX1

## 2019-06-12 NOTE — PROVIDER CONTACT NOTE (CHANGE IN STATUS NOTIFICATION) - BACKGROUND
pt delirious overnight ands respiratory status deteriorating  pt uncooperative  unable to maintain airway
2 days post CEA, history of COPD

## 2019-06-13 ENCOUNTER — RX RENEWAL (OUTPATIENT)
Age: 84
End: 2019-06-13

## 2019-06-13 ENCOUNTER — TRANSCRIPTION ENCOUNTER (OUTPATIENT)
Age: 84
End: 2019-06-13

## 2019-06-13 DIAGNOSIS — I65.22 OCCLUSION AND STENOSIS OF LEFT CAROTID ARTERY: ICD-10-CM

## 2019-06-13 LAB
ANION GAP SERPL CALC-SCNC: 8 MMOL/L — SIGNIFICANT CHANGE UP (ref 5–17)
BUN SERPL-MCNC: 24 MG/DL — HIGH (ref 7–23)
CALCIUM SERPL-MCNC: 8.9 MG/DL — SIGNIFICANT CHANGE UP (ref 8.4–10.5)
CHLORIDE SERPL-SCNC: 104 MMOL/L — SIGNIFICANT CHANGE UP (ref 96–108)
CO2 SERPL-SCNC: 28 MMOL/L — SIGNIFICANT CHANGE UP (ref 22–31)
CREAT SERPL-MCNC: 0.97 MG/DL — SIGNIFICANT CHANGE UP (ref 0.5–1.3)
GLUCOSE BLDC GLUCOMTR-MCNC: 103 MG/DL — HIGH (ref 70–99)
GLUCOSE BLDC GLUCOMTR-MCNC: 107 MG/DL — HIGH (ref 70–99)
GLUCOSE BLDC GLUCOMTR-MCNC: 112 MG/DL — HIGH (ref 70–99)
GLUCOSE BLDC GLUCOMTR-MCNC: 123 MG/DL — HIGH (ref 70–99)
GLUCOSE SERPL-MCNC: 133 MG/DL — HIGH (ref 70–99)
HCT VFR BLD CALC: 35.6 % — LOW (ref 39–50)
HGB BLD-MCNC: 11.3 G/DL — LOW (ref 13–17)
MAGNESIUM SERPL-MCNC: 2 MG/DL — SIGNIFICANT CHANGE UP (ref 1.6–2.6)
MCHC RBC-ENTMCNC: 31.7 GM/DL — LOW (ref 32–36)
MCHC RBC-ENTMCNC: 32.2 PG — SIGNIFICANT CHANGE UP (ref 27–34)
MCV RBC AUTO: 101.4 FL — HIGH (ref 80–100)
NRBC # BLD: 0 /100 WBCS — SIGNIFICANT CHANGE UP (ref 0–0)
PHOSPHATE SERPL-MCNC: 2.6 MG/DL — SIGNIFICANT CHANGE UP (ref 2.5–4.5)
PLATELET # BLD AUTO: 169 K/UL — SIGNIFICANT CHANGE UP (ref 150–400)
POTASSIUM SERPL-MCNC: 3.5 MMOL/L — SIGNIFICANT CHANGE UP (ref 3.5–5.3)
POTASSIUM SERPL-SCNC: 3.5 MMOL/L — SIGNIFICANT CHANGE UP (ref 3.5–5.3)
RBC # BLD: 3.51 M/UL — LOW (ref 4.2–5.8)
RBC # FLD: 14.2 % — SIGNIFICANT CHANGE UP (ref 10.3–14.5)
SODIUM SERPL-SCNC: 140 MMOL/L — SIGNIFICANT CHANGE UP (ref 135–145)
WBC # BLD: 12.39 K/UL — HIGH (ref 3.8–10.5)
WBC # FLD AUTO: 12.39 K/UL — HIGH (ref 3.8–10.5)

## 2019-06-13 PROCEDURE — 71045 X-RAY EXAM CHEST 1 VIEW: CPT | Mod: 26

## 2019-06-13 PROCEDURE — 99232 SBSQ HOSP IP/OBS MODERATE 35: CPT | Mod: GC

## 2019-06-13 RX ORDER — OLANZAPINE 15 MG/1
2.5 TABLET, FILM COATED ORAL
Refills: 0 | Status: DISCONTINUED | OUTPATIENT
Start: 2019-06-13 | End: 2019-06-15

## 2019-06-13 RX ORDER — SIMVASTATIN 40 MG/1
40 TABLET, FILM COATED ORAL
Qty: 90 | Refills: 3 | Status: ACTIVE | COMMUNITY
Start: 2018-06-18 | End: 1900-01-01

## 2019-06-13 RX ORDER — POTASSIUM CHLORIDE 20 MEQ
10 PACKET (EA) ORAL
Refills: 0 | Status: COMPLETED | OUTPATIENT
Start: 2019-06-13 | End: 2019-06-13

## 2019-06-13 RX ORDER — POTASSIUM PHOSPHATE, MONOBASIC POTASSIUM PHOSPHATE, DIBASIC 236; 224 MG/ML; MG/ML
15 INJECTION, SOLUTION INTRAVENOUS ONCE
Refills: 0 | Status: COMPLETED | OUTPATIENT
Start: 2019-06-13 | End: 2019-06-13

## 2019-06-13 RX ADMIN — Medication 3 MILLILITER(S): at 00:52

## 2019-06-13 RX ADMIN — OLANZAPINE 2.5 MILLIGRAM(S): 15 TABLET, FILM COATED ORAL at 17:37

## 2019-06-13 RX ADMIN — Medication 81 MILLIGRAM(S): at 11:32

## 2019-06-13 RX ADMIN — Medication 50 MICROGRAM(S): at 07:19

## 2019-06-13 RX ADMIN — Medication 3 MILLILITER(S): at 17:36

## 2019-06-13 RX ADMIN — HEPARIN SODIUM 5000 UNIT(S): 5000 INJECTION INTRAVENOUS; SUBCUTANEOUS at 21:09

## 2019-06-13 RX ADMIN — Medication 100 MILLIEQUIVALENT(S): at 10:13

## 2019-06-13 RX ADMIN — Medication 100 MILLIEQUIVALENT(S): at 08:11

## 2019-06-13 RX ADMIN — SIMVASTATIN 40 MILLIGRAM(S): 20 TABLET, FILM COATED ORAL at 21:09

## 2019-06-13 RX ADMIN — PIPERACILLIN AND TAZOBACTAM 200 GRAM(S): 4; .5 INJECTION, POWDER, LYOPHILIZED, FOR SOLUTION INTRAVENOUS at 11:32

## 2019-06-13 RX ADMIN — Medication 3 MILLILITER(S): at 12:35

## 2019-06-13 RX ADMIN — HEPARIN SODIUM 5000 UNIT(S): 5000 INJECTION INTRAVENOUS; SUBCUTANEOUS at 13:43

## 2019-06-13 RX ADMIN — PIPERACILLIN AND TAZOBACTAM 200 GRAM(S): 4; .5 INJECTION, POWDER, LYOPHILIZED, FOR SOLUTION INTRAVENOUS at 17:37

## 2019-06-13 RX ADMIN — POTASSIUM PHOSPHATE, MONOBASIC POTASSIUM PHOSPHATE, DIBASIC 63.75 MILLIMOLE(S): 236; 224 INJECTION, SOLUTION INTRAVENOUS at 08:25

## 2019-06-13 RX ADMIN — HEPARIN SODIUM 5000 UNIT(S): 5000 INJECTION INTRAVENOUS; SUBCUTANEOUS at 06:00

## 2019-06-13 RX ADMIN — PIPERACILLIN AND TAZOBACTAM 200 GRAM(S): 4; .5 INJECTION, POWDER, LYOPHILIZED, FOR SOLUTION INTRAVENOUS at 06:00

## 2019-06-13 RX ADMIN — Medication 100 MILLIEQUIVALENT(S): at 09:12

## 2019-06-13 NOTE — PROGRESS NOTE ADULT - ASSESSMENT
84F y/o male with PMHx former smoker, HTN, HLD, NIDDM, hypothyroidism, prostate CA s/p seeding, atrial flutter s/p ablation, on Eliquis (last dose 2/3/18), bifasicular heart block s/p pacemaker, COPD not on home O2, CAD s/p CABGx3 March 2017 with Dr. Funez, [facility], and chronic diastolic heart failure presenting with two days of slurred speech. CT showed 90% stenosis of bilateral carotid arteries now s/p left carotid endarterectomy c/b hypotension and respiratory  failure post-op, requiring intubation and take back to OR for neck exploration on 6/8    NEURO: started low dose zyprexa 2.5mg daily at 6pm last night, sensorium much improved last night and this morning. cont low dose zyprexa  CV: SBP goal 110-160, cont ASA, hemodynamically stable at this time, transient rapid afib up to 's last night, improved after lopressor 5, converted back to sinus by this morning. plan to starting eliquis Monday.   PULM: s/p eval by ENT with scope at bedside 6/10, found circumferential collapse, completed 3 doses of decadron 10q8h with improvement in airway, appears to have obstructive airway disease - would ideally need CPAP at night while sleeping, but pt refused CPAP last night and pulled off mask repeatedly. but he agrees to nasal canula. will change to High flow nasal canula when sleeping at night.   GI/FEN: passed S&S for soft with nectar thick  : voids.   ENDO: ISS, synthroid  HEME: ASA, SQH  ID: presumed aspiration PNA: Zosyn (6/8-6/15)  PPX: SCDs, SQH,   LINES: PIVs,   transfer to Stepdown Unit   WOUNDS/DRAINS: left neck incision. Neck JPX1

## 2019-06-13 NOTE — SWALLOW BEDSIDE ASSESSMENT ADULT - SWALLOW EVAL: DIAGNOSIS
At least mild pharyngeal dysphagia AEB +cough inconsistently with thin liquids indicating aspiration of same.
Persistent mild pharyngeal dysphagia characterized by +throat clear after thin liquids indicating penetration and/or aspiration. Swallow does appear to be mildly improved from initial eval on 6/11; however Pt remains confused & impulsive and would benefit from STRICT SUPERVISION with meals to reduce risk of aspiration if eating while distracted or with rapid rate & large bites.
Pt p/w atleast mild pharyngeal dysphagia, based on limited trials 2/2 agitation.

## 2019-06-13 NOTE — DISCHARGE NOTE PROVIDER - NSDCFUADDINST_GEN_ALL_CORE_FT
Follow up with  in 1-2 weeks.   Call the office at  to schedule your appointment.   You may shower; soap and water over incision site. Do not scrub. Pat dry when done.  Ambulate as tolerated, but no heavy lifting (>10lbs) or strenuous exercise.  NO sharp neck turns. NO driving until you see surgeon in office.   You may resume regular diet.   Call the office if you experience increasing neck pain, persistent headache that is not improved with Tylenol, redness, swelling or drainage from incision site, chills or temperature >101.4F. Follow up with  in 1-2 weeks.   Call the office at  to schedule your appointment.   You may shower; soap and water over incision site. Do not scrub. Pat dry when done. Steristrips will fall off on their own.  Ambulate as tolerated, but no heavy lifting (>10lbs) or strenuous exercise.  NO sharp neck turns.  You may resume regular diet.   Call the office if you experience increasing neck pain, persistent headache that is not improved with Tylenol, redness, swelling or drainage from incision site, chills or temperature >101.4F.    Please restart your Eliquis for a-fib on 6/17 (Monday). You have started on Metoprolol 25mg PO for your a-fib as well.    Please follow-up with your PCP 1-2 weeks after discharge. Follow up with  in 1-2 weeks.   Call the office at  to schedule your appointment.   You may shower; soap and water over incision site. Do not scrub. Pat dry when done. Steristrips will fall off on their own.  Ambulate as tolerated, but no heavy lifting (>10lbs) or strenuous exercise.  NO sharp neck turns.  Call the office if you experience increasing neck pain, persistent headache that is not improved with Tylenol, redness, swelling or drainage from incision site, chills or temperature >101.4F.    Please restart your Eliquis for a-fib on 6/17 (Monday). You have started on Metoprolol 25mg PO for your a-fib as well.    Please follow-up with your PCP 1-2 weeks after discharge.

## 2019-06-13 NOTE — DISCHARGE NOTE PROVIDER - NSDCACTIVITY_GEN_ALL_CORE
Walking - Outdoors allowed/Showering allowed/Walking - Indoors allowed/No heavy lifting/straining/No restrictions

## 2019-06-13 NOTE — SWALLOW BEDSIDE ASSESSMENT ADULT - NS SPL SWALLOW CLINIC TRIAL FT
Pt has baseline cough, rough voice & confusion making clinical assessment of swallow difficult, however he appears to be tolerating an oral diet for the past 2 days and tolerating modified texture diet on this exam. Pt appears safe to resume oral diet provided he is given 1:1 supervision with meals.
Hyolaryngeal excursion palpated during swallow trigger & appears brisk & timely. Suspect incomplete &/or mistimed airway protection & reduced bolus control given pharyngeal/laryngeal edema s/p CEA are contributing to +signs of aspiration with thin liquids. This svc will continue to follow.
Pt with wet, immediate cough with tsp thin liquid, indicating aspiration. With encouragement pt was able to cough up phlegm following suspected aspiration. Further PO trials were discontinued as pt's agitation heightened. This svc will follow up tomorrow.

## 2019-06-13 NOTE — DISCHARGE NOTE PROVIDER - NSDCCPCAREPLAN_GEN_ALL_CORE_FT
Daily Note     Today's date: 2018  Patient name: Lisbeth Montague  : 1989  MRN: [id number]  Referring provider: Judd Lin MD  Dx:   Encounter Diagnosis     ICD-10-CM    1  S/P ORIF (open reduction internal fixation) fracture Z96 7     Z87 81    2   Right knee pain, unspecified chronicity M25 [id number]                   Subjective: Pt  reports she has hafsa working a lot and her knee and ankle are sore and "crunching "      Objective: See treatment diary below  Manual     Flexion/Extension PROM to right knee  NP  10' 10 min NP NP                                                                 Exercise Diary     Nu Step L8 10 min L6 x10' L7 10 min L7 x10'   L7 10'   Quad Set  10x 10" 10" x10 10x 10"  10x10" 10"x10   Hip Add with Ball 20x 5" 5" x20 5" 20x 5''20x 5"x20   Clamshell 20x5"  Grey  Grey 5''20x Heavy Green  20x 5" Grey 20x 5" Grey 5"x20   Bridges  Declined  Pt defers Defers  defer  defers   SLR 4 way  3x10  1 5#  Flex, Abd 3x10 Flex/ABD 1 5# 3x10  Flex/Abd  1 5 # 3x10 flex/abd 1 5# 1 5# 3x10 flex and abd   Step Up  "B" 3x10 NP as/pt  "B" 3x10 "B" 3x10 B 3x10   SLS 3x 30"  3x30" b/l 3x30" B/L 3x30" bilat  3x30"   HR/TR  30x x30 30x 30x x30   Mini Squat 2x10  Biodex  2x10 Biodex 2x10 Biodex 2x10 biodex 2x10 biodex, cues   Weight Shifting on BD  Static  Fwd/Back, S/S 60x ea NP as/pt  Static  Fwd/Back, S/S  60x ea Statis fwd/back, S/S 60x ea Statis fwd/back, S/S 60x ea    standing hamstring curl 2x10  2x10  2x10   2x10  2x10   TKE 2x10  Blue    Blue TB  2x10 5" hold  Blue 2x10 blue  2x10 blue    LAQ/ SAQ  1 5# 3x10  Bilateral    1 5 # 3x10 Bilateral each  1 5# 3x10  b/l 1 5# 3x10 bilateral   1 5 # 3x10 Bilateral     Lunges Bosu Lunges  10x B/L  Bosu 10x b/l  Bosu lunges x 10 b/l  Bosu lunges x 10 b/l  Bosu lunges x 10 b/l    TMill walk 5 min  1 6 mph  3% incline   5 min  1 6 mph  3% incline    5' @ 1 5 mph Cueing   5' @ 1 6 mph Cueing 3%                                                            Modalities                                                                Assessment: Tolerated treatment well  Patient demonstrated fatigue post treatment, exhibited good technique with therapeutic exercises and would benefit from continued PT  Rx modified this day as/pt  secondary to fatigue  Will resume as samina  Plan: Progress treatment as tolerated  PRINCIPAL DISCHARGE DIAGNOSIS  Diagnosis: Stenosis of left carotid artery  Assessment and Plan of Treatment:

## 2019-06-13 NOTE — SWALLOW BEDSIDE ASSESSMENT ADULT - SLP GENERAL OBSERVATIONS
+cough at baseline
Pt received awake and alert with severely gurgling. RN at bedside reported pt refused suctioning. Pt noncompliant throughout evaluation.

## 2019-06-13 NOTE — PROGRESS NOTE ADULT - ASSESSMENT
84F y/o male with PMHx former smoker, HTN, HLD, NIDDM, hypothyroidism, prostate CA s/p seeding, atrial flutter s/p ablation, on Eliquis (last dose 2/3/18), bifasicular heart block s/p pacemaker, COPD not on home O2, CAD s/p CABGx3 March 2017 with Dr. Funez, [facility], and chronic diastolic heart failure presenting with two days of slurred speech. CT showed 90% stenosis of bilateral carotid arteries now s/p left carotid endarterectomy c/b hypotension and respiratory  failure post-op, requiring intubation and take back to OR for neck exploration on 6/8    NEURO: started low dose zyprexa 2.5mg daily at 6pm last night, sensorium much improved last night and this morning. cont low dose zyprexa  CV: SBP goal 110-160, cont ASA, hemodynamically stable at this time, transient rapid afib up to 's last night, improved after lopressor 5, converted back to sinus by this morning. plan to starting eliquis Monday.   PULM: s/p eval by ENT with scope at bedside 6/10, found circumferential collapse, completed 3 doses of decadron 10q8h with improvement in airway, appears to have obstructive airway disease - would ideally need CPAP at night while sleeping, but pt refused CPAP last night and pulled off mask repeatedly. but he agrees to nasal canula. will change to High flow nasal canula when sleeping at night.   GI/FEN: passed S&S for soft with nectar thick  : voids.   ENDO: ISS, synthroid  HEME: ASA, SQH  ID: presumed aspiration PNA: Zosyn (6/8-6/15)  PPX: SCDs, SQH,   LINES: PIVs,

## 2019-06-13 NOTE — PROGRESS NOTE ADULT - SUBJECTIVE AND OBJECTIVE BOX
S: was slightly agitated last night, but did not require haldol or any other meds  attempted to place CPAP for sleep, but pt refused and pulled off mask.   No new issues/events overnight, no new med c/o    O: ICU Vital Signs Last 24 Hrs  T(F): 98.4 (06-13-19 @ 08:59), Max: 98.4 (06-13-19 @ 08:59)  HR: 102 (06-13-19 @ 11:25) (86 - 145)  BP: 153/85 (06-13-19 @ 11:25) (101/61 - 163/96)  BP(mean): 107 (06-13-19 @ 11:25) (77 - 118)  ABP: --  RR: 23 (06-13-19 @ 11:25) (14 - 36)  SpO2: 98% (06-13-19 @ 11:25) (80% - 99%)    PHYSICAL EXAM:     Neurological: awake alert, calm, coversive, CNII-XII intact,  strength 5/5 b/l  ENT: chin ecchymosis improves from before. left neck incision clean, no bleeding, no pus.   Cardiovascular: RRR  Respiratory: CTA  Gastrointestinal: soft, NT, ND, BS+  Extremities: warm, no dependent edema  Vascular: no cyanosis/erythema    LABS:    06-13    140  |  104  |  24<H>  ----------------------------<  133<H>  3.5   |  28  |  0.97    Ca    8.9      13 Jun 2019 05:17  Phos  2.6     06-13  Mg     2.0     06-13                          11.3   12.39 )-----------( 169      ( 13 Jun 2019 05:17 )             35.6     ABG - ( 12 Jun 2019 03:35 )  pH, Arterial: 7.38  pH, Blood: x     /  pCO2: 41    /  pO2: 156   / HCO3: 24    / Base Excess: -1.2  /  SaO2: x               CAPILLARY BLOOD GLUCOSE      POCT Blood Glucose.: 123 mg/dL (13 Jun 2019 10:27)  POCT Blood Glucose.: 107 mg/dL (13 Jun 2019 06:36)  POCT Blood Glucose.: 147 mg/dL (12 Jun 2019 21:43)  POCT Blood Glucose.: 135 mg/dL (12 Jun 2019 16:51)    MEDICATIONS  (STANDING):  ALBUTerol/ipratropium for Nebulization 3 milliLiter(s) Nebulizer every 6 hours  aspirin  chewable 81 milliGRAM(s) Oral daily  dextrose 50% Injectable 25 Gram(s) IV Push once  dextrose 50% Injectable 25 Gram(s) IV Push once  heparin  Injectable 5000 Unit(s) SubCutaneous every 8 hours  insulin lispro (HumaLOG) corrective regimen sliding scale   SubCutaneous Before meals and at bedtime  levothyroxine 50 MICROGram(s) Oral daily  piperacillin/tazobactam IVPB. 3.375 Gram(s) IV Intermittent every 6 hours  simvastatin 40 milliGRAM(s) Oral at bedtime    MEDICATIONS  (PRN):      Randle:	  [x ] None	[ ] Daily Randle Order Placed	   Indication:	  [ ] Strict I and O's    [ ] Obstruction     [ ] Incontinence + Stage 3 or 4 Decubitus  Central Line:  [x ] None	   [ ]  Medication / TPN Administration     [ ] No Peripheral IV

## 2019-06-13 NOTE — SWALLOW BEDSIDE ASSESSMENT ADULT - SWALLOW EVAL: RECOMMENDED DIET
Continue mechanical soft / nectar-thick liquids
Mech soft solids / nectar -thick liquid
NPO allow ice chips in moderation

## 2019-06-13 NOTE — DISCHARGE NOTE PROVIDER - HOSPITAL COURSE
84 M with CAD s/p CABGx3, AS s/p TAVR, bifasicular heart block s/p PPM, a-fib on Eliquis), HTN, HLD, DM, COPD, right hand dominance, who presented to [facility] ED on 6/5/19 after two episodes of word finding difficulty lasting 10-15 minutes each. Stroke code was activated on arrival, including CT head showing no acute hemorrhage and CTA showing bilateral ICA stenosis of 90%. He was admitted to the Neurology service for further work up. He had another witnessed episode of word finding difficulty and slurred speech the following morning. Vascular Surgery was consulted on 6/6/19 for further recommendations. On exam, VS were normal, pt was alert with intact speech and no focal neurologic deficits. Labs were unremarkable. His evening dose of Eliquis was held and he was started on heparin gtt, due to concern for crescendo TIA and need for surgery the following morning. On 6/7/19, he underwent left carotid endarterectomy and patch angioplasty. EBL was 200 cc. Post-operatively, he required phenylephrine to maintain goal SBP > 110 for which he was transferred to the SICU. He had slight left tongue deviation but no other focal deficits. He had copious secretions but breathing was unlabored and he was saturating % on supplemental O2 by nasal cannula. In the morning of POD#1, he was briefly started on Precedex due to concern for agitation/delirium. This was discontinued after ~ 30 minutes as there was no clinical improvement. At ~ 12 pm, he received Haldol. Shortly after, he had increased work of breathing, cyanosis, and agitation. It was also reported that he had increasing swelling and firmness in the left neck, crossing midline. He was emergently but easily intubated with a glidescope. On exam, his neck was full as per baseline, without a firm or palpable hematoma, but with ecchymosis extending to the midline. He was then taken to the OR emergently for neck exploration, revealing ~ 5 cc of serosanguinous fluid around the carotid patch repair but no active bleeding or hematoma. A NICOLE drain was secured in place. EBL was 10 cc. He then went from OR to CT where a non-contrast scan was negative for acute intracranial hemorrhage. He returned to the SICU intubated and sedated. He remained arousable and following commands on POD#2/1. He was successfully extubated on POD#3/2. He was evaluated by ENT with FFL showing circumferential airway edema similar in appearance to severe SHAQUILLE. The true vocal folds were not visualized due to edema. He was started on steroids and passed speech and swallow and advanced to regular diet. Patient remained stable and extubated on biPAP. 84 M with CAD s/p CABGx3, AS s/p TAVR, bifasicular heart block s/p PPM, a-fib on Eliquis), HTN, HLD, DM, COPD, right hand dominance, who presented to [facility] ED on 6/5/19 after two episodes of word finding difficulty lasting 10-15 minutes each. Stroke code was activated on arrival, including CT head showing no acute hemorrhage and CTA showing bilateral ICA stenosis of 90%. He was admitted to the Neurology service for further work up. He had another witnessed episode of word finding difficulty and slurred speech the following morning. Vascular Surgery was consulted on 6/6/19 for further recommendations. On exam, VS were normal, pt was alert with intact speech and no focal neurologic deficits. Labs were unremarkable. His evening dose of Eliquis was held and he was started on heparin gtt, due to concern for crescendo TIA and need for surgery the following morning. On 6/7/19, he underwent left carotid endarterectomy and patch angioplasty. EBL was 200 cc. Post-operatively, he required phenylephrine to maintain goal SBP > 110 for which he was transferred to the SICU. He had slight left tongue deviation but no other focal deficits. He had copious secretions but breathing was unlabored and he was saturating % on supplemental O2 by nasal cannula. In the morning of POD#1, he was briefly started on Precedex due to concern for agitation/delirium. This was discontinued after ~ 30 minutes as there was no clinical improvement. At ~ 12 pm, he received Haldol. Shortly after, he had increased work of breathing, cyanosis, and agitation. It was also reported that he had increasing swelling and firmness in the left neck, crossing midline. He was emergently but easily intubated with a glidescope. On exam, his neck was full as per baseline, without a firm or palpable hematoma, but with ecchymosis extending to the midline. He was then taken to the OR emergently for neck exploration, revealing ~ 5 cc of serosanguinous fluid around the carotid patch repair but no active bleeding or hematoma. A NICOLE drain was secured in place. EBL was 10 cc. He then went from OR to CT where a non-contrast scan was negative for acute intracranial hemorrhage. He returned to the SICU intubated and sedated. He remained arousable and following commands on POD#2/1. He was successfully extubated on POD#3/2. He was evaluated by ENT with FFL showing circumferential airway edema similar in appearance to severe SHAQUILLE. The true vocal folds were not visualized due to edema. He was started on steroids and passed speech and swallow and advanced to regular diet. Patient remained stable and extubated on biPAP. He was stepped down to the floors on 6/13. Due to continued episodes of a-fib, Cardiology was consulted who recommended restarting Amiodarone and starting Metoprolol 25mg BID. The patient has been in sinus rhythm 84 M with CAD s/p CABGx3, AS s/p TAVR, bifasicular heart block s/p PPM, a-fib on Eliquis), HTN, HLD, DM, COPD, right hand dominance, who presented to [facility] ED on 6/5/19 after two episodes of word finding difficulty lasting 10-15 minutes each. Stroke code was activated on arrival, including CT head showing no acute hemorrhage and CTA showing bilateral ICA stenosis of 90%. He was admitted to the Neurology service for further work up. He had another witnessed episode of word finding difficulty and slurred speech the following morning. Vascular Surgery was consulted on 6/6/19 for further recommendations. On exam, VS were normal, pt was alert with intact speech and no focal neurologic deficits. Labs were unremarkable. His evening dose of Eliquis was held and he was started on heparin gtt, due to concern for crescendo TIA and need for surgery the following morning. On 6/7/19, he underwent left carotid endarterectomy and patch angioplasty. EBL was 200 cc. Post-operatively, he required phenylephrine to maintain goal SBP > 110 for which he was transferred to the SICU. He had slight left tongue deviation but no other focal deficits. He had copious secretions but breathing was unlabored and he was saturating % on supplemental O2 by nasal cannula. In the morning of POD#1, he was briefly started on Precedex due to concern for agitation/delirium. This was discontinued after ~ 30 minutes as there was no clinical improvement. At ~ 12 pm, he received Haldol. Shortly after, he had increased work of breathing, cyanosis, and agitation. It was also reported that he had increasing swelling and firmness in the left neck, crossing midline. He was emergently but easily intubated with a glidescope. On exam, his neck was full as per baseline, without a firm or palpable hematoma, but with ecchymosis extending to the midline. He was then taken to the OR emergently for neck exploration, revealing ~ 5 cc of serosanguinous fluid around the carotid patch repair but no active bleeding or hematoma. A NICOLE drain was secured in place. EBL was 10 cc. He then went from OR to CT where a non-contrast scan was negative for acute intracranial hemorrhage. He returned to the SICU intubated and sedated. He remained arousable and following commands on POD#2/1. He was successfully extubated on POD#3/2. He was evaluated by ENT with FFL showing circumferential airway edema similar in appearance to severe SHAQUILLE. The true vocal folds were not visualized due to edema. He was started on steroids and passed speech and swallow and advanced to regular diet. Patient remained stable and extubated on biPAP. He was stepped down to the floors on 6/13. Due to continued episodes of a-fib, Cardiology was consulted who recommended restarting Amiodarone and starting Metoprolol 25mg BID. The patient has been in sinus rhythm. EP was consulted to evaluate the pacemaker, which showed episodes of a-flutter and a functioning pacemaker. The patient has been sporadically delirious/agitated throughout his hospital course requiring haldol, but he has been re-orientable. On day of discharge he has been afebrile, VSS. He will be discharged with plans for outpatient follow-up.

## 2019-06-13 NOTE — SWALLOW BEDSIDE ASSESSMENT ADULT - SPECIFY REASON(S)
Assess for safest, least restrictive PO diet
Assess for safety of PO intake
Assess swallow function for safest, least restrictive PO intake.

## 2019-06-13 NOTE — SWALLOW BEDSIDE ASSESSMENT ADULT - COMMENTS
Received awake & alert, remains mildly confused (Pt said, "That was a great party. Where you there on Monday?") , but Ox3. Voice is "rumbly"/rough-sounding at baseline.
Received awake & alert but confused. Sitting OOB to chair. Can make basic wants/needs known, but thoughts are tangential in more complex conversation.

## 2019-06-13 NOTE — SWALLOW BEDSIDE ASSESSMENT ADULT - SWALLOW EVAL: FEEDING ASSISTANCE
frequent cues/help required
supervision to prevent Pt from being impulsive/minimal assistance required

## 2019-06-13 NOTE — PROGRESS NOTE ADULT - ASSESSMENT
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    Care per SICU primary.  Vascular will continue to follow.

## 2019-06-13 NOTE — DISCHARGE NOTE PROVIDER - CARE PROVIDER_API CALL
Marck Alvarez)  Vascular Surgery  [address]  [place], [place] [address]  Phone: [phone]  Fax: [phone]  Follow Up Time:

## 2019-06-13 NOTE — SWALLOW BEDSIDE ASSESSMENT ADULT - PHARYNGEAL PHASE
Hyolaryngeal excursion palpated during swallow trigger. Suspect poor bolus control &/or mistimed airway closure with thin liquids AEB +throat clear with same. No overt signs of aspiration with NTL, puree or mech soft.
with cup sips of thin liquid only./Cough post oral intake
Cough post oral intake

## 2019-06-13 NOTE — PROGRESS NOTE ADULT - SUBJECTIVE AND OBJECTIVE BOX
Physical Medicine and Rehabilitation Progress Note:    Patient is a 84y old  Male who presents with a chief complaint of Slurred Speech (13 Jun 2019 13:03)      HPI:  84F y/o male with PMHx former smoker, HTN, HLD, NIDDM, hypothyroidism, prostate CA s/p seeding, atrial flutter s/p ablation, on Eliquis (last dose 2/3/18), bifasicular heart block s/p pacemaker, COPD not on home O2, CAD s/p CABGx3 March 2017 with Dr. Funez [facility], and chronic diastolic heart failure presenting with two days of slurred speech.  One day PTA and on day of arrival, pt started having speech difficulties at around 12 PM.  He was told by his girlfriend to come to the ED.  Denies HA, weakness, fevers, chills, CP, SOB.  Denies numbness and tingling of extremities or difficulties with balance.    ED Course:  initial /88 92 97.9 97%RA.  Stroke code called.  CT brain stroke protocol with Several patchy hypodense areas in the periventricular white matter of bilateral cerebral hemispheres.  CT perfusion nml.  CTA head and neck with calcified plaques noted in the aortic arch, bifurcations of both common carotid arteries and bilateral carotid siphons, 90% stenosis at the origins of both internal carotids arteries, minimal stenosis in the distal portion of the left vertebral artery.Given lisinopril 40 mg, 2 duonebs, resumed home eliquis dosing.  Admitted to stroke service for further CVA workup. (05 Jun 2019 18:20)                            11.3   12.39 )-----------( 169      ( 13 Jun 2019 05:17 )             35.6       06-13    140  |  104  |  24<H>  ----------------------------<  133<H>  3.5   |  28  |  0.97    Ca    8.9      13 Jun 2019 05:17  Phos  2.6     06-13  Mg     2.0     06-13      Vital Signs Last 24 Hrs  T(C): 36.8 (13 Jun 2019 13:00), Max: 36.9 (13 Jun 2019 08:59)  T(F): 98.3 (13 Jun 2019 13:00), Max: 98.4 (13 Jun 2019 08:59)  HR: 101 (13 Jun 2019 15:30) (86 - 145)  BP: 137/93 (13 Jun 2019 15:30) (101/61 - 163/96)  BP(mean): 104 (13 Jun 2019 14:00) (77 - 118)  RR: 22 (13 Jun 2019 15:30) (14 - 36)  SpO2: 93% (13 Jun 2019 15:30) (80% - 99%)    MEDICATIONS  (STANDING):  ALBUTerol/ipratropium for Nebulization 3 milliLiter(s) Nebulizer every 6 hours  aspirin  chewable 81 milliGRAM(s) Oral daily  dextrose 50% Injectable 25 Gram(s) IV Push once  dextrose 50% Injectable 25 Gram(s) IV Push once  heparin  Injectable 5000 Unit(s) SubCutaneous every 8 hours  insulin lispro (HumaLOG) corrective regimen sliding scale   SubCutaneous Before meals and at bedtime  levothyroxine 50 MICROGram(s) Oral daily  OLANZapine 2.5 milliGRAM(s) Oral <User Schedule>  piperacillin/tazobactam IVPB. 3.375 Gram(s) IV Intermittent every 6 hours  simvastatin 40 milliGRAM(s) Oral at bedtime    MEDICATIONS  (PRN):    Currently Undergoing Physical Therapy at bedside.    Functional Status Assessment:    Therapeutic Interventions      Bed Mobility  Bed Mobility Training Scooting: contact guard;  supervision;  verbal cues;  1 person assist  Bed Mobility Training Bridging: supervision;  supervision;  verbal cues  Bed Mobility Training Sit-to-Supine: minimum assist (75% patient effort);  1 person assist;  verbal cues;  supervision;  bed rails  Bed Mobility Training Limitations: impaired ability to control trunk for mobility;  impaired balance    Sit-Stand Transfer Training  Transfer Training Sit-to-Stand Transfer: minimum assist (75% patient effort);  1 person assist;  supervision;  verbal cues;  weight-bearing as tolerated   B Hand Held Assist   Transfer Training Stand-to-Sit Transfer: minimum assist (75% patient effort);  supervision;  verbal cues;  1 person assist;  weight-bearing as tolerated   B Hand Held Assist   Sit-to-Stand Transfer Training Transfer Safety Analysis: decreased balance;  decreased cognition;  decreased weight-shifting ability;  decreased strength;  impaired balance;  cognitive, decreased safety awareness;  B Hand Held Assist     Gait Training  Gait Training: minimum assist (75% patient effort);  moderate assist (50% patient effort);  1 person assist;  supervision;  verbal cues;  weight-bearing as tolerated   B Hand Held Assist ;  25 feet  Gait Analysis: swing-to gait   decreased jazlyn;  increased time in double stance;  decreased hip/knee flexion;  decreased velocity of limb motion;  decreased step length;  decreased stride length;  decreased toe clearance;  decreased weight-shifting ability;  decreased strength;  impaired balance;  25 feet;  B Hand Held Assist           PM&R Impression: as above    Disposition Plan Recommendations: subacute rehab placement

## 2019-06-13 NOTE — SWALLOW BEDSIDE ASSESSMENT ADULT - SWALLOW EVAL: CURRENT DIET
NPO
NPO, was seen by this svc on 6/7 & PO intake was deemed premature d/t poor secretion mgmt, confusion.
NPO

## 2019-06-14 LAB
ANION GAP SERPL CALC-SCNC: 10 MMOL/L — SIGNIFICANT CHANGE UP (ref 5–17)
BUN SERPL-MCNC: 20 MG/DL — SIGNIFICANT CHANGE UP (ref 7–23)
CALCIUM SERPL-MCNC: 9.1 MG/DL — SIGNIFICANT CHANGE UP (ref 8.4–10.5)
CHLORIDE SERPL-SCNC: 101 MMOL/L — SIGNIFICANT CHANGE UP (ref 96–108)
CO2 SERPL-SCNC: 29 MMOL/L — SIGNIFICANT CHANGE UP (ref 22–31)
CREAT SERPL-MCNC: 1.1 MG/DL — SIGNIFICANT CHANGE UP (ref 0.5–1.3)
GLUCOSE BLDC GLUCOMTR-MCNC: 114 MG/DL — HIGH (ref 70–99)
GLUCOSE BLDC GLUCOMTR-MCNC: 115 MG/DL — HIGH (ref 70–99)
GLUCOSE BLDC GLUCOMTR-MCNC: 115 MG/DL — HIGH (ref 70–99)
GLUCOSE BLDC GLUCOMTR-MCNC: 136 MG/DL — HIGH (ref 70–99)
GLUCOSE SERPL-MCNC: 134 MG/DL — HIGH (ref 70–99)
HCT VFR BLD CALC: 37.2 % — LOW (ref 39–50)
HGB BLD-MCNC: 11.8 G/DL — LOW (ref 13–17)
MAGNESIUM SERPL-MCNC: 1.8 MG/DL — SIGNIFICANT CHANGE UP (ref 1.6–2.6)
MCHC RBC-ENTMCNC: 31.7 GM/DL — LOW (ref 32–36)
MCHC RBC-ENTMCNC: 32 PG — SIGNIFICANT CHANGE UP (ref 27–34)
MCV RBC AUTO: 100.8 FL — HIGH (ref 80–100)
NRBC # BLD: 0 /100 WBCS — SIGNIFICANT CHANGE UP (ref 0–0)
PHOSPHATE SERPL-MCNC: 2.1 MG/DL — LOW (ref 2.5–4.5)
PLATELET # BLD AUTO: 194 K/UL — SIGNIFICANT CHANGE UP (ref 150–400)
POTASSIUM SERPL-MCNC: 3.8 MMOL/L — SIGNIFICANT CHANGE UP (ref 3.5–5.3)
POTASSIUM SERPL-SCNC: 3.8 MMOL/L — SIGNIFICANT CHANGE UP (ref 3.5–5.3)
RBC # BLD: 3.69 M/UL — LOW (ref 4.2–5.8)
RBC # FLD: 14.1 % — SIGNIFICANT CHANGE UP (ref 10.3–14.5)
SODIUM SERPL-SCNC: 140 MMOL/L — SIGNIFICANT CHANGE UP (ref 135–145)
SURGICAL PATHOLOGY STUDY: SIGNIFICANT CHANGE UP
TSH SERPL-MCNC: 26.84 UIU/ML — HIGH (ref 0.35–4.94)
WBC # BLD: 10.75 K/UL — HIGH (ref 3.8–10.5)
WBC # FLD AUTO: 10.75 K/UL — HIGH (ref 3.8–10.5)

## 2019-06-14 PROCEDURE — 93288 INTERROG EVL PM/LDLS PM IP: CPT | Mod: 26

## 2019-06-14 PROCEDURE — 71045 X-RAY EXAM CHEST 1 VIEW: CPT | Mod: 26

## 2019-06-14 PROCEDURE — 99222 1ST HOSP IP/OBS MODERATE 55: CPT

## 2019-06-14 RX ORDER — METOPROLOL TARTRATE 50 MG
5 TABLET ORAL ONCE
Refills: 0 | Status: COMPLETED | OUTPATIENT
Start: 2019-06-14 | End: 2019-06-14

## 2019-06-14 RX ORDER — METOPROLOL TARTRATE 50 MG
25 TABLET ORAL
Refills: 0 | Status: DISCONTINUED | OUTPATIENT
Start: 2019-06-14 | End: 2019-06-15

## 2019-06-14 RX ORDER — HALOPERIDOL DECANOATE 100 MG/ML
0.5 INJECTION INTRAMUSCULAR ONCE
Refills: 0 | Status: COMPLETED | OUTPATIENT
Start: 2019-06-14 | End: 2019-06-14

## 2019-06-14 RX ORDER — SODIUM,POTASSIUM PHOSPHATES 278-250MG
1 POWDER IN PACKET (EA) ORAL ONCE
Refills: 0 | Status: COMPLETED | OUTPATIENT
Start: 2019-06-14 | End: 2019-06-14

## 2019-06-14 RX ORDER — AMIODARONE HYDROCHLORIDE 400 MG/1
200 TABLET ORAL DAILY
Refills: 0 | Status: DISCONTINUED | OUTPATIENT
Start: 2019-06-14 | End: 2019-06-15

## 2019-06-14 RX ORDER — FUROSEMIDE 40 MG
20 TABLET ORAL DAILY
Refills: 0 | Status: DISCONTINUED | OUTPATIENT
Start: 2019-06-14 | End: 2019-06-15

## 2019-06-14 RX ORDER — MAGNESIUM SULFATE 500 MG/ML
1 VIAL (ML) INJECTION ONCE
Refills: 0 | Status: COMPLETED | OUTPATIENT
Start: 2019-06-14 | End: 2019-06-14

## 2019-06-14 RX ORDER — POTASSIUM CHLORIDE 20 MEQ
10 PACKET (EA) ORAL ONCE
Refills: 0 | Status: COMPLETED | OUTPATIENT
Start: 2019-06-14 | End: 2019-06-14

## 2019-06-14 RX ORDER — LISINOPRIL 2.5 MG/1
40 TABLET ORAL DAILY
Refills: 0 | Status: DISCONTINUED | OUTPATIENT
Start: 2019-06-14 | End: 2019-06-15

## 2019-06-14 RX ADMIN — Medication 20 MILLIGRAM(S): at 15:22

## 2019-06-14 RX ADMIN — Medication 3 MILLILITER(S): at 00:27

## 2019-06-14 RX ADMIN — Medication 100 MILLIEQUIVALENT(S): at 10:43

## 2019-06-14 RX ADMIN — Medication 5 MILLIGRAM(S): at 07:01

## 2019-06-14 RX ADMIN — Medication 100 GRAM(S): at 10:44

## 2019-06-14 RX ADMIN — Medication 25 MILLIGRAM(S): at 15:22

## 2019-06-14 RX ADMIN — Medication 1 PACKET(S): at 23:10

## 2019-06-14 RX ADMIN — PIPERACILLIN AND TAZOBACTAM 200 GRAM(S): 4; .5 INJECTION, POWDER, LYOPHILIZED, FOR SOLUTION INTRAVENOUS at 05:38

## 2019-06-14 RX ADMIN — AMIODARONE HYDROCHLORIDE 200 MILLIGRAM(S): 400 TABLET ORAL at 15:22

## 2019-06-14 RX ADMIN — LISINOPRIL 40 MILLIGRAM(S): 2.5 TABLET ORAL at 15:22

## 2019-06-14 RX ADMIN — PIPERACILLIN AND TAZOBACTAM 200 GRAM(S): 4; .5 INJECTION, POWDER, LYOPHILIZED, FOR SOLUTION INTRAVENOUS at 00:27

## 2019-06-14 RX ADMIN — PIPERACILLIN AND TAZOBACTAM 200 GRAM(S): 4; .5 INJECTION, POWDER, LYOPHILIZED, FOR SOLUTION INTRAVENOUS at 23:41

## 2019-06-14 RX ADMIN — Medication 5 MILLIGRAM(S): at 02:36

## 2019-06-14 RX ADMIN — Medication 1 PACKET(S): at 19:51

## 2019-06-14 RX ADMIN — HALOPERIDOL DECANOATE 0.5 MILLIGRAM(S): 100 INJECTION INTRAMUSCULAR at 22:26

## 2019-06-14 RX ADMIN — SIMVASTATIN 40 MILLIGRAM(S): 20 TABLET, FILM COATED ORAL at 23:12

## 2019-06-14 RX ADMIN — Medication 3 MILLILITER(S): at 05:39

## 2019-06-14 RX ADMIN — OLANZAPINE 2.5 MILLIGRAM(S): 15 TABLET, FILM COATED ORAL at 17:45

## 2019-06-14 RX ADMIN — HEPARIN SODIUM 5000 UNIT(S): 5000 INJECTION INTRAVENOUS; SUBCUTANEOUS at 13:32

## 2019-06-14 RX ADMIN — Medication 50 MICROGRAM(S): at 05:38

## 2019-06-14 RX ADMIN — Medication 5 MILLIGRAM(S): at 05:41

## 2019-06-14 RX ADMIN — HEPARIN SODIUM 5000 UNIT(S): 5000 INJECTION INTRAVENOUS; SUBCUTANEOUS at 05:38

## 2019-06-14 RX ADMIN — HEPARIN SODIUM 5000 UNIT(S): 5000 INJECTION INTRAVENOUS; SUBCUTANEOUS at 23:08

## 2019-06-14 RX ADMIN — PIPERACILLIN AND TAZOBACTAM 200 GRAM(S): 4; .5 INJECTION, POWDER, LYOPHILIZED, FOR SOLUTION INTRAVENOUS at 13:31

## 2019-06-14 NOTE — PROVIDER CONTACT NOTE (OTHER) - SITUATION
Pt confused, agitated, took monitor off , oxygen off and trying to pull IV out,wants to get dressed and leave hospital

## 2019-06-14 NOTE — PROVIDER CONTACT NOTE (OTHER) - RECOMMENDATIONS
Administer 12.5grams/ 25ml Dextrose 50% and re-assess.
Enhance supervision with sitter
Evaluate.
Inform vascular PA
Inform vascular team

## 2019-06-14 NOTE — PROVIDER CONTACT NOTE (OTHER) - ASSESSMENT
Pt resting comfortably on NC high flow, no distress noted. SBP in 159-168, latest BP at midnight 164/90. As per order SBP goal between 110-160

## 2019-06-14 NOTE — CONSULT NOTE ADULT - SUBJECTIVE AND OBJECTIVE BOX
REASON FOR CONSULT:    HISTORY OF PRESENT ILLNESS:    84M PMHx former smoker, HTN, HLD, NIDDM, atrial flutter s/p ablation, on Eliquis (last dose 2/3/18), bifasicular heart block s/p pacemaker, COPD not on home O2, CAD s/p CABGx, 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    PAST MEDICAL & SURGICAL HISTORY:  High cholesterol  Diabetes  Prostate cancer: in remission  Hypertension  COPD (chronic obstructive pulmonary disease)  H/O aortic valve replacement  S/P CABG x 3  Artificial pacemaker  History of cataract surgery: b/l  History of knee replacement: b/l      [ ] Diabetes   [ ] Hypertension  [ ] Hyperlipidemia  [ ] CAD  [ ] PCI  [ ] CABG    PREVIOUS DIAGNOSTIC TESTING:    [ ] Echocardiogram:  [ ]  Catheterization:  [ ] Stress Test:  	    MEDICATIONS:    piperacillin/tazobactam IVPB. 3.375 Gram(s) IV Intermittent every 6 hours    ALBUTerol/ipratropium for Nebulization 3 milliLiter(s) Nebulizer every 6 hours    OLANZapine 2.5 milliGRAM(s) Oral <User Schedule>      dextrose 50% Injectable 25 Gram(s) IV Push once  dextrose 50% Injectable 25 Gram(s) IV Push once  insulin lispro (HumaLOG) corrective regimen sliding scale   SubCutaneous Before meals and at bedtime  levothyroxine 50 MICROGram(s) Oral daily  simvastatin 40 milliGRAM(s) Oral at bedtime    aspirin  chewable 81 milliGRAM(s) Oral daily  heparin  Injectable 5000 Unit(s) SubCutaneous every 8 hours      FAMILY HISTORY:  Family history of acute myocardial infarction (Father)      SOCIAL HISTORY:    [ x] Non-smoker  [ ] Smoker  [ ] Alcohol    FAMILY HX: NC    Allergies    No Known Allergies    Intolerances    narcotic analgesics (Nausea; Vomiting)  	    REVIEW OF SYSTEMS:    [x] as per HPI  CONSTITUTIONAL: No fever, weight loss, or fatigue  ENT:  No difficulty hearing, tinnitus, vertigo; No sinus or throat pain  RESPIRATORY: No cough, wheezing, chills or hemoptysis; No Shortness of Breath  CARDIOVASCULAR: No chest pain, palpitations, dizziness, or leg swelling  GASTROINTESTINAL: No abdominal or epigastric pain. No nausea, vomiting, or hematemesis; No diarrhea or constipation. No melena or hematochezia.  GENITOURINARY: No dysuria, frequency, hematuria, or incontinence  NEUROLOGICAL: No headaches, memory loss, loss of strength, numbness, or tremors  MUSCULOSKELETAL: No joint pain or swelling; No muscle, back, or extremity pain  [x] All others negative	  [ ] Unable to obtain    PHYSICAL EXAM:  T(C): 36.3 (06-14-19 @ 10:23), Max: 36.5 (06-13-19 @ 19:00)  HR: 98 (06-14-19 @ 08:40) (98 - 138)  BP: 144/87 (06-14-19 @ 08:40) (133/82 - 168/86)  RR: 20 (06-14-19 @ 08:40) (20 - 27)  SpO2: 96% (06-14-19 @ 08:40) (93% - 99%)  Wt(kg): --  I&O's Summary    13 Jun 2019 07:01  -  14 Jun 2019 07:00  --------------------------------------------------------  IN: 200 mL / OUT: 825 mL / NET: -625 mL    14 Jun 2019 07:01  -  14 Jun 2019 13:31  --------------------------------------------------------  IN: 60 mL / OUT: 0 mL / NET: 60 mL        Appearance: Normal	  HEENT:   Normal oral mucosa, PERRL, EOMI	  Lymphatic: No lymphadenopathy  Cardiovascular: Normal S1 S2, No JVD, No murmurs, No edema  Respiratory: Lungs clear to auscultation	  Psychiatry: A & O x 3, Mood & affect appropriate  Gastrointestinal:  Soft, Non-tender, + BS	  Skin: No rashes, No ecchymoses, No cyanosis	  Neurologic: Non-focal  Extremities: Normal range of motion, No clubbing, cyanosis or edema  Vascular: Peripheral pulses palpable 2+ bilaterally    TELEMETRY: 	  V-paced, RVR   ECG:  < from: 12 Lead ECG (06.08.19 @ 10:36) >  Diagnosis Line Electronic demand pacing    < end of copied text >    ECHO: < from: [facility] Echo Doppler w/Cont (06.06.19 @ 15:06) >  Interpretation Summary  Indication: CVANo clot seen in the left atriumor in the left atrial   appendage.   Left atrial appendage emptying velocities are normal.  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  Injection of contrast documented an interatrial   shunt. A patent foramen ovale is present.  Meagan valve in aortic   position. No   aortic regurgitation noted.There is mild mitral valve thickening. There   is   mild mitral annular calcification. There is trace mitral regurgitation.   Structurally normal tricuspid valve. No tricuspid regurgitation   noted.Structurally normal pulmonic valve. There is trace pulmonic   regurgitation.The right ventricular systolic function is normal.The left   ventricular ejection fraction is estimated to be 55-60%Severe   atherosclerotic   plaque(s) in the descending aorta.Moderate atherosclerotic plaque(s) in   the   aortic arch.There is no pericardial effusion.    < end of copied text >    STRESS: < from: Pharm Thallium Stress (Lexiscan) -LEXMIB (07.24.18 @ 14:07) >  Myocardial perfusion imaging is normal. Overall left ventricular systolic   function is normal without regional wall motion abnormalities. The EKG   stress test is normal.    < end of copied text >    CATH:  	  RADIOLOGY:  CXR: < from: Xray Chest 1 View-PORTABLE IMMEDIATE (06.12.19 @ 02:11) >  FINDINGS: Poststernotomy and TAVR. Improvement in right-sided pleural   effusion with possible new left-sided pleural effusion. No pneumothorax.   Visualized lung zones are otherwise clear. Left-sided dual-lead permanent   pacemaker unchanged. Interval extubation.    < end of copied text >    CT:  US:   	  	  LABS:	 	    CARDIAC MARKERS:                                  11.8   10.75 )-----------( 194      ( 14 Jun 2019 06:25 )             37.2     06-14    140  |  101  |  20  ----------------------------<  134<H>  3.8   |  29  |  1.10    Ca    9.1      14 Jun 2019 06:25  Phos  2.1     06-14  Mg     1.8     06-14      proBNP:   Lipid Profile:   HgA1c:   TSH:     ASSESSMENT/PLAN: 	    Volume overloaded on exam - +JVD, _rales  recommend start lasix 20 pO q daily    Afib -   Pt was on amiodarone 200mg q daily - please resume  Start Lopressor 25mg po q 12 hours  augment with MTP IVP PRN RVR  pt was on CCB prior but is tolerating BB s wheezing, continue BB therapy  Resume AC - apixiban for elevated chadsvasc    CAD s/p OPCAB 2017  R&L Heart Cath 3/2: dLM 60% lesion, pLAD 75% calcified lesion, 50% ramus, ostial RCA 95%, pRCA 80-90% calcified lesion. LVEF 60%, EDP 21 mmHG. RA: 9, RV: 32/6 (10), PAP:32/6 (19), PCWP: 11.   3VCAD including 60% LM disease.  OR on 3/6/17 for OPCABx3.-Echo (3/3/17): LVH, LVEF normal, moderate to severe AS (RONAN: 1.06 cm2, peak pressure gradient 46 mmHG).     Aortic stenosis  s/p TAVR on 2/6/18.  He developed CHB post TAVR   resume amiodarone 200   initiate PO BB therapy    HTN  Goal SBP <130mmHg  Amiodarone 200  MTP 25 bid  resume home lisinopril  lasix 20mg po qdaily

## 2019-06-14 NOTE — CONSULT NOTE ADULT - CONSULT REQUESTED DATE/TIME
05-Jun-2019 15:23
06-Jun-2019 05:30
06-Jun-2019 14:22
06-Jun-2019 22:30
10-Darvin-2019 15:54
07-Jun-2019 14:15
14-Jun-2019 13:31

## 2019-06-14 NOTE — PROGRESS NOTE ADULT - SUBJECTIVE AND OBJECTIVE BOX
EPS Device interrogation    Indication: post procedure    Device model: MDT Advisa 			Implanting Physician:     Functioning Mode: DDD			    Underlying Rhythm: AS/    Pacemaker dependency:  No    Battery status: 3.01 V 8 years  Interrogating parameters:   				RA			RV			LV    Sense:                                          3.5  mV                       15.1  mV    Threshold:                                      0.5 V @ 0.4 ms       0.5 V@ 0.4 ms    Pacing Impedance:                             380om                    456  om    Shock Impedance:                                               n/a    Events/Alert:  episodes of pAFlutter    Parameter change: 	none    Normal function PPM    [ ]EPS attending: Interrogation reviewed. Agree with above.

## 2019-06-14 NOTE — PROGRESS NOTE ADULT - SUBJECTIVE AND OBJECTIVE BOX
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      pain/nausea control without narcotics, Zyprexa  aspirin  IS/OOB, chest PT, night CPAP  mechanical soft with nectar thickened liquids  ISS, synthroid  SCDs, HSQ   pneumonia: Zosyn (6/8-6/15) O/N: Rapid Afib 130-150s, gave Metoprolol 5mg hr decr low 100s asymp on HFNC     SUBJECTIVE: This morning, he feels well; his pain is well-controlled. No nausea or vomiting. No chest pain or dyspnea. No acute complaints.    aspirin  chewable 81 milliGRAM(s) Oral daily  heparin  Injectable 5000 Unit(s) SubCutaneous every 8 hours  piperacillin/tazobactam IVPB. 3.375 Gram(s) IV Intermittent every 6 hours      Vital Signs Last 24 Hrs  T(C): 36.2 (14 Jun 2019 05:00), Max: 36.9 (13 Jun 2019 08:59)  T(F): 97.1 (14 Jun 2019 05:00), Max: 98.4 (13 Jun 2019 08:59)  HR: 111 (14 Jun 2019 06:49) (94 - 138)  BP: 154/74 (14 Jun 2019 06:46) (133/82 - 168/86)  BP(mean): 104 (13 Jun 2019 14:00) (96 - 107)  RR: 22 (14 Jun 2019 06:49) (17 - 36)  SpO2: 95% (14 Jun 2019 06:49) (90% - 99%)  I&O's Detail    13 Jun 2019 07:01  -  14 Jun 2019 07:00  --------------------------------------------------------  IN:    IV PiggyBack: 200 mL  Total IN: 200 mL    OUT:    Voided: 825 mL  Total OUT: 825 mL    Total NET: -625 mL      General: NAD, resting comfortably in bed  HEENT: L neck incision CDI with sutures in place, no surrounding erythema or induration  C/V: tachycardic, regular rhythm on monitor  Pulm: Nonlabored breathing, no respiratory distress  Extrem: WWP, no edema  Neuro: no focal deficits        LABS:                        11.8   10.75 )-----------( 194      ( 14 Jun 2019 06:25 )             37.2     06-14    140  |  101  |  20  ----------------------------<  134<H>  3.8   |  29  |  1.10    Ca    9.1      14 Jun 2019 06:25  Phos  2.1     06-14  Mg     1.8     06-14        84M PMHx former smoker, HTN, HLD, NIDDM, atrial flutter s/p ablation, on Eliquis (last dose 2/3/18), bifasicular heart block s/p pacemaker, COPD not on home O2, CAD s/p CABGx, 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      pain/nausea control without narcotics, Zyprexa  aspirin  IS/OOB, chest PT, night CPAP  mechanical soft with nectar thickened liquids  ISS, synthroid  SCDs, HSQ   pneumonia: Zosyn (6/8-6/15)  cards consult for rapid afib

## 2019-06-14 NOTE — CONSULT NOTE ADULT - CONSULT REASON
Rehab evaluation
Stroke Code
airway safety post extubation
carotic stenosis
hx of TAVR
afib
hypotension

## 2019-06-15 ENCOUNTER — TRANSCRIPTION ENCOUNTER (OUTPATIENT)
Age: 84
End: 2019-06-15

## 2019-06-15 VITALS
OXYGEN SATURATION: 95 % | SYSTOLIC BLOOD PRESSURE: 131 MMHG | RESPIRATION RATE: 16 BRPM | HEART RATE: 101 BPM | DIASTOLIC BLOOD PRESSURE: 76 MMHG

## 2019-06-15 LAB
GLUCOSE BLDC GLUCOMTR-MCNC: 83 MG/DL — SIGNIFICANT CHANGE UP (ref 70–99)
GLUCOSE BLDC GLUCOMTR-MCNC: 95 MG/DL — SIGNIFICANT CHANGE UP (ref 70–99)

## 2019-06-15 PROCEDURE — 84295 ASSAY OF SERUM SODIUM: CPT

## 2019-06-15 PROCEDURE — 74018 RADEX ABDOMEN 1 VIEW: CPT

## 2019-06-15 PROCEDURE — 72070 X-RAY EXAM THORAC SPINE 2VWS: CPT

## 2019-06-15 PROCEDURE — 93005 ELECTROCARDIOGRAM TRACING: CPT

## 2019-06-15 PROCEDURE — 88304 TISSUE EXAM BY PATHOLOGIST: CPT

## 2019-06-15 PROCEDURE — 82330 ASSAY OF CALCIUM: CPT

## 2019-06-15 PROCEDURE — 88311 DECALCIFY TISSUE: CPT

## 2019-06-15 PROCEDURE — 83735 ASSAY OF MAGNESIUM: CPT

## 2019-06-15 PROCEDURE — 84100 ASSAY OF PHOSPHORUS: CPT

## 2019-06-15 PROCEDURE — 84443 ASSAY THYROID STIM HORMONE: CPT

## 2019-06-15 PROCEDURE — 86901 BLOOD TYPING SEROLOGIC RH(D): CPT

## 2019-06-15 PROCEDURE — 81003 URINALYSIS AUTO W/O SCOPE: CPT

## 2019-06-15 PROCEDURE — 86140 C-REACTIVE PROTEIN: CPT

## 2019-06-15 PROCEDURE — 99291 CRITICAL CARE FIRST HOUR: CPT | Mod: 25

## 2019-06-15 PROCEDURE — 82553 CREATINE MB FRACTION: CPT

## 2019-06-15 PROCEDURE — 70498 CT ANGIOGRAPHY NECK: CPT

## 2019-06-15 PROCEDURE — 80048 BASIC METABOLIC PNL TOTAL CA: CPT

## 2019-06-15 PROCEDURE — 97530 THERAPEUTIC ACTIVITIES: CPT

## 2019-06-15 PROCEDURE — 85018 HEMOGLOBIN: CPT

## 2019-06-15 PROCEDURE — 84484 ASSAY OF TROPONIN QUANT: CPT

## 2019-06-15 PROCEDURE — 84481 FREE ASSAY (FT-3): CPT

## 2019-06-15 PROCEDURE — 94660 CPAP INITIATION&MGMT: CPT

## 2019-06-15 PROCEDURE — 85730 THROMBOPLASTIN TIME PARTIAL: CPT

## 2019-06-15 PROCEDURE — 85652 RBC SED RATE AUTOMATED: CPT

## 2019-06-15 PROCEDURE — 97164 PT RE-EVAL EST PLAN CARE: CPT

## 2019-06-15 PROCEDURE — 94002 VENT MGMT INPAT INIT DAY: CPT

## 2019-06-15 PROCEDURE — C8925: CPT

## 2019-06-15 PROCEDURE — 71045 X-RAY EXAM CHEST 1 VIEW: CPT | Mod: 26

## 2019-06-15 PROCEDURE — 86850 RBC ANTIBODY SCREEN: CPT

## 2019-06-15 PROCEDURE — 82962 GLUCOSE BLOOD TEST: CPT

## 2019-06-15 PROCEDURE — 92610 EVALUATE SWALLOWING FUNCTION: CPT

## 2019-06-15 PROCEDURE — 70496 CT ANGIOGRAPHY HEAD: CPT

## 2019-06-15 PROCEDURE — 80061 LIPID PANEL: CPT

## 2019-06-15 PROCEDURE — 83605 ASSAY OF LACTIC ACID: CPT

## 2019-06-15 PROCEDURE — C9399: CPT

## 2019-06-15 PROCEDURE — C1889: CPT

## 2019-06-15 PROCEDURE — 97116 GAIT TRAINING THERAPY: CPT

## 2019-06-15 PROCEDURE — 84132 ASSAY OF SERUM POTASSIUM: CPT

## 2019-06-15 PROCEDURE — 71045 X-RAY EXAM CHEST 1 VIEW: CPT

## 2019-06-15 PROCEDURE — 94640 AIRWAY INHALATION TREATMENT: CPT

## 2019-06-15 PROCEDURE — 85025 COMPLETE CBC W/AUTO DIFF WBC: CPT

## 2019-06-15 PROCEDURE — 94003 VENT MGMT INPAT SUBQ DAY: CPT

## 2019-06-15 PROCEDURE — 70450 CT HEAD/BRAIN W/O DYE: CPT

## 2019-06-15 PROCEDURE — 72100 X-RAY EXAM L-S SPINE 2/3 VWS: CPT

## 2019-06-15 PROCEDURE — 83036 HEMOGLOBIN GLYCOSYLATED A1C: CPT

## 2019-06-15 PROCEDURE — 80053 COMPREHEN METABOLIC PANEL: CPT

## 2019-06-15 PROCEDURE — 36415 COLL VENOUS BLD VENIPUNCTURE: CPT

## 2019-06-15 PROCEDURE — 84480 ASSAY TRIIODOTHYRONINE (T3): CPT

## 2019-06-15 PROCEDURE — 84436 ASSAY OF TOTAL THYROXINE: CPT

## 2019-06-15 PROCEDURE — 85027 COMPLETE CBC AUTOMATED: CPT

## 2019-06-15 PROCEDURE — 97161 PT EVAL LOW COMPLEX 20 MIN: CPT

## 2019-06-15 PROCEDURE — 86900 BLOOD TYPING SEROLOGIC ABO: CPT

## 2019-06-15 PROCEDURE — 97162 PT EVAL MOD COMPLEX 30 MIN: CPT

## 2019-06-15 PROCEDURE — 84439 ASSAY OF FREE THYROXINE: CPT

## 2019-06-15 PROCEDURE — 97110 THERAPEUTIC EXERCISES: CPT

## 2019-06-15 PROCEDURE — 85610 PROTHROMBIN TIME: CPT

## 2019-06-15 PROCEDURE — 82550 ASSAY OF CK (CPK): CPT

## 2019-06-15 PROCEDURE — 0042T: CPT

## 2019-06-15 PROCEDURE — 82803 BLOOD GASES ANY COMBINATION: CPT

## 2019-06-15 RX ORDER — METOPROLOL TARTRATE 50 MG
1 TABLET ORAL
Qty: 0 | Refills: 0 | DISCHARGE
Start: 2019-06-15

## 2019-06-15 RX ORDER — OLANZAPINE 15 MG/1
1 TABLET, FILM COATED ORAL
Qty: 0 | Refills: 0 | DISCHARGE
Start: 2019-06-15

## 2019-06-15 RX ORDER — LEVOTHYROXINE SODIUM 125 MCG
1 TABLET ORAL
Qty: 0 | Refills: 0 | DISCHARGE
Start: 2019-06-15

## 2019-06-15 RX ORDER — APIXABAN 2.5 MG/1
1 TABLET, FILM COATED ORAL
Qty: 0 | Refills: 0 | DISCHARGE

## 2019-06-15 RX ORDER — IPRATROPIUM/ALBUTEROL SULFATE 18-103MCG
3 AEROSOL WITH ADAPTER (GRAM) INHALATION
Qty: 0 | Refills: 0 | DISCHARGE
Start: 2019-06-15

## 2019-06-15 RX ORDER — AMIODARONE HYDROCHLORIDE 400 MG/1
1 TABLET ORAL
Qty: 0 | Refills: 0 | DISCHARGE
Start: 2019-06-15

## 2019-06-15 RX ORDER — ASPIRIN/CALCIUM CARB/MAGNESIUM 324 MG
1 TABLET ORAL
Qty: 0 | Refills: 0 | DISCHARGE
Start: 2019-06-15

## 2019-06-15 RX ORDER — METOPROLOL TARTRATE 50 MG
5 TABLET ORAL ONCE
Refills: 0 | Status: COMPLETED | OUTPATIENT
Start: 2019-06-15 | End: 2019-06-15

## 2019-06-15 RX ADMIN — Medication 3 MILLILITER(S): at 11:22

## 2019-06-15 RX ADMIN — HEPARIN SODIUM 5000 UNIT(S): 5000 INJECTION INTRAVENOUS; SUBCUTANEOUS at 06:43

## 2019-06-15 RX ADMIN — AMIODARONE HYDROCHLORIDE 200 MILLIGRAM(S): 400 TABLET ORAL at 06:43

## 2019-06-15 RX ADMIN — PIPERACILLIN AND TAZOBACTAM 200 GRAM(S): 4; .5 INJECTION, POWDER, LYOPHILIZED, FOR SOLUTION INTRAVENOUS at 06:45

## 2019-06-15 RX ADMIN — Medication 5 MILLIGRAM(S): at 01:28

## 2019-06-15 RX ADMIN — Medication 50 MICROGRAM(S): at 06:43

## 2019-06-15 RX ADMIN — Medication 81 MILLIGRAM(S): at 11:22

## 2019-06-15 RX ADMIN — Medication 3 MILLILITER(S): at 01:01

## 2019-06-15 RX ADMIN — Medication 20 MILLIGRAM(S): at 06:43

## 2019-06-15 RX ADMIN — Medication 25 MILLIGRAM(S): at 06:43

## 2019-06-15 NOTE — PROGRESS NOTE ADULT - REASON FOR ADMISSION
Slurred Speech

## 2019-06-15 NOTE — DISCHARGE NOTE NURSING/CASE MANAGEMENT/SOCIAL WORK - NSDCDPATPORTLINK_GEN_ALL_CORE
You can access the [facility], offered by Health system, by registering with the following website: [email or web address]

## 2019-06-15 NOTE — PROGRESS NOTE ADULT - SUBJECTIVE AND OBJECTIVE BOX
24 hr events  O/N: Pt combative, mittens & Haldol .5 given, [id number] Metop 5 IV given  6/14: disoriented, cards consulted rec EP, EP interrigated pacemaker; per Cards, started Amiodarone and Lopressor; restarted Lasix and Lisinopril    Subjective:  Patient drowsy in the morning but arousable. Denies pain or headache.    Vital Signs Last 24 Hrs  T(C): 37 (15 Darvin 2019 10:00), Max: 37 (15 Darvin 2019 10:00)  T(F): 98.6 (15 Darvin 2019 10:00), Max: 98.6 (15 Darvin 2019 10:00)  HR: 100 (15 Darvin 2019 09:18) (89 - 133)  BP: 116/64 (15 Darvin 2019 09:06) (116/64 - 160/84)  BP(mean): --  RR: 16 (15 Darvin 2019 09:06) (14 - 20)  SpO2: 95% (15 Darvin 2019 09:18) (90% - 100%)    I&O's Summary  14 Jun 2019 07:01  -  15 Darvin 2019 07:00  --------------------------------------------------------  IN: 440 mL / OUT: 350 mL / NET: 90 mL    Physical Exam:  General: NAD, resting comfortably  Neck: L CEA incision c/d/i with healing ridge, no surrounding erythema or underlying fluctuance  Pulmonary: normal resp effort  Neuro: Drowsy but arousable; responds to commands; motor/sensation grossly intact in b/l upper and lower extremities    LABS:                    11.8   10.75 )-----------( 194      ( 14 Jun 2019 06:25 )             37.2     06-14  140  |  101  |  20  ----------------------------<  134<H>  3.8   |  29  |  1.10    Ca    9.1      14 Jun 2019 06:25  Phos  2.1     06-14  Mg     1.8     06-14    CAPILLARY BLOOD GLUCOSE  POCT Blood Glucose.: 95 mg/dL (15 Darvin 2019 06:45)  POCT Blood Glucose.: 115 mg/dL (14 Jun 2019 22:50)  POCT Blood Glucose.: 115 mg/dL (14 Jun 2019 16:22)  POCT Blood Glucose.: 114 mg/dL (14 Jun 2019 11:06)

## 2019-06-15 NOTE — PROGRESS NOTE ADULT - ASSESSMENT
84M PMHx former smoker, HTN, HLD, NIDDM, hypothyroidism, prostate CA, atrial flutter s/p ablation on Eliquis, bifasicular heart block s/p pacemaker, COPD not on home O2, CAD s/p CABGx3 and dCHF a/w 90% stenosis of bilateral carotid arteries now s/p left CEA (6/7) c/b hypotension and respiratory failure requiring intubation and take back to OR for neck exploration (6/8).    pain/nausea control without narcotics, Zyprexa  aspirin, home meds  IS/OOB, chest PT, night CPAP  mechanical soft with nectar thickened liquids  ISS, synthroid  SCDs, HSQ   pneumonia: Zosyn (6/8-6/15)  Dispo: SUSSY alfaro

## 2019-06-18 ENCOUNTER — INBOUND DOCUMENT (OUTPATIENT)
Age: 84
End: 2019-06-18

## 2019-06-18 ENCOUNTER — APPOINTMENT (OUTPATIENT)
Dept: ORTHOPEDIC SURGERY | Facility: CLINIC | Age: 84
End: 2019-06-18

## 2019-06-18 DIAGNOSIS — L76.34 POSTPROCEDURAL SEROMA OF SKIN AND SUBCUTANEOUS TISSUE FOLLOWING OTHER PROCEDURE: ICD-10-CM

## 2019-06-18 DIAGNOSIS — I63.233 CEREBRAL INFARCTION DUE TO UNSPECIFIED OCCLUSION OR STENOSIS OF BILATERAL CAROTID ARTERIES: ICD-10-CM

## 2019-06-18 DIAGNOSIS — I48.91 UNSPECIFIED ATRIAL FIBRILLATION: ICD-10-CM

## 2019-06-18 DIAGNOSIS — J44.9 CHRONIC OBSTRUCTIVE PULMONARY DISEASE, UNSPECIFIED: ICD-10-CM

## 2019-06-18 DIAGNOSIS — Z95.0 PRESENCE OF CARDIAC PACEMAKER: ICD-10-CM
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
## 2019-06-18 DIAGNOSIS — E03.9 HYPOTHYROIDISM, UNSPECIFIED: ICD-10-CM

## 2019-06-18 DIAGNOSIS — I50.32 CHRONIC DIASTOLIC (CONGESTIVE) HEART FAILURE: ICD-10-CM
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
## 2019-06-18 DIAGNOSIS — E11.9 TYPE 2 DIABETES MELLITUS WITHOUT COMPLICATIONS: ICD-10-CM

## 2019-06-18 DIAGNOSIS — R47.81 SLURRED SPEECH: ICD-10-CM

## 2019-06-18 DIAGNOSIS — E78.5 HYPERLIPIDEMIA, UNSPECIFIED: ICD-10-CM

## 2019-06-18 DIAGNOSIS — I95.81 POSTPROCEDURAL HYPOTENSION: ICD-10-CM

## 2019-06-18 DIAGNOSIS — J98.8 OTHER SPECIFIED RESPIRATORY DISORDERS: ICD-10-CM

## 2019-06-18 DIAGNOSIS — J69.0 PNEUMONITIS DUE TO INHALATION OF FOOD AND VOMIT: ICD-10-CM

## 2019-06-18 DIAGNOSIS — I25.10 ATHEROSCLEROTIC HEART DISEASE OF NATIVE CORONARY ARTERY WITHOUT ANGINA PECTORIS: ICD-10-CM

## 2019-06-18 DIAGNOSIS — G81.91 HEMIPLEGIA, UNSPECIFIED AFFECTING RIGHT DOMINANT SIDE: ICD-10-CM

## 2019-06-18 DIAGNOSIS — Q21.1 ATRIAL SEPTAL DEFECT: ICD-10-CM

## 2019-06-18 DIAGNOSIS — Z95.2 PRESENCE OF PROSTHETIC HEART VALVE: ICD-10-CM

## 2019-06-18 DIAGNOSIS — J96.01 ACUTE RESPIRATORY FAILURE WITH HYPOXIA: ICD-10-CM

## 2019-06-18 DIAGNOSIS — R41.0 DISORIENTATION, UNSPECIFIED: ICD-10-CM

## 2019-06-18 DIAGNOSIS — I11.0 HYPERTENSIVE HEART DISEASE WITH HEART FAILURE: ICD-10-CM

## 2019-06-18 DIAGNOSIS — R47.1 DYSARTHRIA AND ANARTHRIA: ICD-10-CM

## 2019-06-18 DIAGNOSIS — Z79.01 LONG TERM (CURRENT) USE OF ANTICOAGULANTS: ICD-10-CM

## 2019-06-25 ENCOUNTER — APPOINTMENT (OUTPATIENT)
Dept: HEART AND VASCULAR | Facility: CLINIC | Age: 84
End: 2019-06-25

## 2019-06-25 ENCOUNTER — APPOINTMENT (OUTPATIENT)
Dept: VASCULAR SURGERY | Facility: CLINIC | Age: 84
End: 2019-06-25
Payer: COMMERCIAL

## 2019-06-25 PROCEDURE — 93880 EXTRACRANIAL BILAT STUDY: CPT | Mod: 79

## 2019-06-25 PROCEDURE — 99024 POSTOP FOLLOW-UP VISIT: CPT

## 2019-06-26 NOTE — DISCUSSION/SUMMARY
[FreeTextEntry1] : 85 yo M with recent CVA who was found to have bilateral ICA stenoses, 90% on L ICA requiring L CEA returns for a post-op visit.\par Patient is at rehab facility and he is slowly improving.\par L CEA site appears well healed, with minimal surrounding erythema without signs of infection.\par Carotid duplex was done in the office demonstrating patent L CEA site and not hemodynamically significant R ICA fibrocalcific plaque.\par The instructions were given to clean surgical site with Hydrogen Peroxide daily.\par Patient should start having regular food and continue with physical therapy.\par F/u in 3 weeks.

## 2019-06-26 NOTE — PHYSICAL EXAM
[Carotid Bruits] : carotid bruit  [Normal Breath Sounds] : Normal breath sounds [Normal Heart Sounds] : normal heart sounds [Right Carotid Bruit] : right carotid bruit heard [2+] : right 2+ [Ankle Swelling Bilaterally] : bilaterally  [Ankle Swelling (On Exam)] : present [Ankle Swelling On The Left] : moderate [No Rash or Lesion] : No rash or lesion [Alert] : alert [Calm] : calm [JVD] : no jugular venous distention  [Left Carotid Bruit] : no bruit heard over the left carotid [Varicose Veins Of Lower Extremities] : not present [] : not present [Abdomen Tenderness] : ~T ~M No abdominal tenderness [de-identified] : WN/WD, NAD [de-identified] : NC/AT [de-identified] : L side of neck: well healed incision, mild surrounding erythema, no signs of infection [de-identified] : decreased ROM b/l LEs [de-identified] : motor/sensation grossly intact in b/l upper and lower extremities

## 2019-06-26 NOTE — REVIEW OF SYSTEMS
[Lower Ext Edema] : lower extremity edema [Limb Swelling] : limb swelling [Difficulty Walking] : difficulty walking [Limb Weakness] : limb weakness [Negative] : Heme/Lymph [Fever] : no fever [Chills] : no chills [Leg Claudication] : no intermittent leg claudication [Limb Pain] : no limb pain [Dizziness] : no dizziness [Fainting] : no fainting

## 2019-06-26 NOTE — REASON FOR VISIT
[Family Member] : family member [de-identified] : L CEA [de-identified] : 6/7/19 [de-identified] : 84 M with CAD s/p CABGx3, AS s/p TAVR, bifasicular heart block s/p PPM, Afib on Eliquis), HTN, HLD, DM, COPD, right hand dominance, who presented to [facility] ED on 6/5/19 after two episodes of word finding difficulty lasting 10-15 minutes each.  CT head showing no acute hemorrhage and CTA showing bilateral ICA stenosis of 90%. He was admitted and  had another witnessed episode of word finding difficulty and slurred speech the following morning. Vascular Surgery was consulted on 6/6/19 for further recommendations and he underwent left carotid endarterectomy w/ patch angioplasty on 6/7/19. On POD#1, he developed increasing swelling \par and firmness in the left neck, crossing midline. He was emergently but easily intubated and taken to the OR emergently for neck exploration, revealing 5 cc of serosanguineous fluid around the carotid patch repair but no active bleeding or hematoma. A NICOLE drain was secured in place. \par Patient returns today for a post-op. He continues to be at Rehab and slowly recovering. His speech and swallowing are slowly improving, but he continues to have difficulty standing, ambulating, performing his daily activities. He denies pain at the site, no fever, chills.\par \par

## 2019-06-30 ENCOUNTER — EMERGENCY (EMERGENCY)
Facility: HOSPITAL | Age: 84
LOS: 1 days | Discharge: HOME | End: 2019-06-30
Attending: EMERGENCY MEDICINE | Admitting: EMERGENCY MEDICINE
Payer: MEDICARE

## 2019-06-30 VITALS
SYSTOLIC BLOOD PRESSURE: 132 MMHG | TEMPERATURE: 98 F | OXYGEN SATURATION: 96 % | RESPIRATION RATE: 16 BRPM | HEART RATE: 99 BPM | DIASTOLIC BLOOD PRESSURE: 75 MMHG

## 2019-06-30 VITALS
SYSTOLIC BLOOD PRESSURE: 106 MMHG | RESPIRATION RATE: 16 BRPM | TEMPERATURE: 98 F | OXYGEN SATURATION: 97 % | DIASTOLIC BLOOD PRESSURE: 65 MMHG | HEART RATE: 96 BPM

## 2019-06-30 DIAGNOSIS — Z98.49 CATARACT EXTRACTION STATUS, UNSPECIFIED EYE: Chronic | ICD-10-CM

## 2019-06-30 DIAGNOSIS — Z95.2 PRESENCE OF PROSTHETIC HEART VALVE: Chronic | ICD-10-CM

## 2019-06-30 DIAGNOSIS — Z96.659 PRESENCE OF UNSPECIFIED ARTIFICIAL KNEE JOINT: Chronic | ICD-10-CM

## 2019-06-30 DIAGNOSIS — Z95.0 PRESENCE OF CARDIAC PACEMAKER: Chronic | ICD-10-CM

## 2019-06-30 DIAGNOSIS — Z95.1 PRESENCE OF AORTOCORONARY BYPASS GRAFT: Chronic | ICD-10-CM

## 2019-06-30 LAB
ALBUMIN SERPL ELPH-MCNC: 3.4 G/DL — SIGNIFICANT CHANGE UP (ref 3.3–5)
ALP SERPL-CCNC: 57 U/L — SIGNIFICANT CHANGE UP (ref 40–120)
ALT FLD-CCNC: 11 U/L — SIGNIFICANT CHANGE UP (ref 10–45)
ANION GAP SERPL CALC-SCNC: 12 MMOL/L — SIGNIFICANT CHANGE UP (ref 5–17)
APPEARANCE UR: CLEAR — SIGNIFICANT CHANGE UP
APTT BLD: 30.1 SEC — SIGNIFICANT CHANGE UP (ref 27.5–36.3)
AST SERPL-CCNC: 27 U/L — SIGNIFICANT CHANGE UP (ref 10–40)
BASOPHILS # BLD AUTO: 0.04 K/UL — SIGNIFICANT CHANGE UP (ref 0–0.2)
BASOPHILS NFR BLD AUTO: 0.4 % — SIGNIFICANT CHANGE UP (ref 0–2)
BILIRUB SERPL-MCNC: 0.7 MG/DL — SIGNIFICANT CHANGE UP (ref 0.2–1.2)
BILIRUB UR-MCNC: NEGATIVE — SIGNIFICANT CHANGE UP
BUN SERPL-MCNC: 28 MG/DL — HIGH (ref 7–23)
CALCIUM SERPL-MCNC: 8.9 MG/DL — SIGNIFICANT CHANGE UP (ref 8.4–10.5)
CHLORIDE SERPL-SCNC: 104 MMOL/L — SIGNIFICANT CHANGE UP (ref 96–108)
CO2 SERPL-SCNC: 31 MMOL/L — SIGNIFICANT CHANGE UP (ref 22–31)
COLOR SPEC: YELLOW — SIGNIFICANT CHANGE UP
CREAT SERPL-MCNC: 1.93 MG/DL — HIGH (ref 0.5–1.3)
DIFF PNL FLD: NEGATIVE — SIGNIFICANT CHANGE UP
EOSINOPHIL # BLD AUTO: 0.08 K/UL — SIGNIFICANT CHANGE UP (ref 0–0.5)
EOSINOPHIL NFR BLD AUTO: 0.9 % — SIGNIFICANT CHANGE UP (ref 0–6)
GLUCOSE SERPL-MCNC: 124 MG/DL — HIGH (ref 70–99)
GLUCOSE UR QL: NEGATIVE — SIGNIFICANT CHANGE UP
HCT VFR BLD CALC: 37.3 % — LOW (ref 39–50)
HGB BLD-MCNC: 11.6 G/DL — LOW (ref 13–17)
IMM GRANULOCYTES NFR BLD AUTO: 0.4 % — SIGNIFICANT CHANGE UP (ref 0–1.5)
INR BLD: 1.9 — HIGH (ref 0.88–1.16)
KETONES UR-MCNC: NEGATIVE — SIGNIFICANT CHANGE UP
LEUKOCYTE ESTERASE UR-ACNC: NEGATIVE — SIGNIFICANT CHANGE UP
LYMPHOCYTES # BLD AUTO: 1.16 K/UL — SIGNIFICANT CHANGE UP (ref 1–3.3)
LYMPHOCYTES # BLD AUTO: 12.5 % — LOW (ref 13–44)
MCHC RBC-ENTMCNC: 31.1 GM/DL — LOW (ref 32–36)
MCHC RBC-ENTMCNC: 32 PG — SIGNIFICANT CHANGE UP (ref 27–34)
MCV RBC AUTO: 102.8 FL — HIGH (ref 80–100)
MONOCYTES # BLD AUTO: 0.86 K/UL — SIGNIFICANT CHANGE UP (ref 0–0.9)
MONOCYTES NFR BLD AUTO: 9.3 % — SIGNIFICANT CHANGE UP (ref 2–14)
NEUTROPHILS # BLD AUTO: 7.08 K/UL — SIGNIFICANT CHANGE UP (ref 1.8–7.4)
NEUTROPHILS NFR BLD AUTO: 76.5 % — SIGNIFICANT CHANGE UP (ref 43–77)
NITRITE UR-MCNC: NEGATIVE — SIGNIFICANT CHANGE UP
NRBC # BLD: 0 /100 WBCS — SIGNIFICANT CHANGE UP (ref 0–0)
PH UR: 6.5 — SIGNIFICANT CHANGE UP (ref 5–8)
PLATELET # BLD AUTO: 180 K/UL — SIGNIFICANT CHANGE UP (ref 150–400)
POTASSIUM SERPL-MCNC: 3.6 MMOL/L — SIGNIFICANT CHANGE UP (ref 3.5–5.3)
POTASSIUM SERPL-SCNC: 3.6 MMOL/L — SIGNIFICANT CHANGE UP (ref 3.5–5.3)
PROT SERPL-MCNC: 7.1 G/DL — SIGNIFICANT CHANGE UP (ref 6–8.3)
PROT UR-MCNC: ABNORMAL MG/DL
PROTHROM AB SERPL-ACNC: 21.9 SEC — HIGH (ref 10–12.9)
RBC # BLD: 3.63 M/UL — LOW (ref 4.2–5.8)
RBC # FLD: 15.2 % — HIGH (ref 10.3–14.5)
SODIUM SERPL-SCNC: 147 MMOL/L — HIGH (ref 135–145)
SP GR SPEC: 1.02 — SIGNIFICANT CHANGE UP (ref 1–1.03)
UROBILINOGEN FLD QL: 1 E.U./DL — SIGNIFICANT CHANGE UP
WBC # BLD: 9.26 K/UL — SIGNIFICANT CHANGE UP (ref 3.8–10.5)
WBC # FLD AUTO: 9.26 K/UL — SIGNIFICANT CHANGE UP (ref 3.8–10.5)

## 2019-06-30 PROCEDURE — 93005 ELECTROCARDIOGRAM TRACING: CPT

## 2019-06-30 PROCEDURE — 70450 CT HEAD/BRAIN W/O DYE: CPT

## 2019-06-30 PROCEDURE — 71045 X-RAY EXAM CHEST 1 VIEW: CPT | Mod: 26

## 2019-06-30 PROCEDURE — 87086 URINE CULTURE/COLONY COUNT: CPT

## 2019-06-30 PROCEDURE — 99284 EMERGENCY DEPT VISIT MOD MDM: CPT

## 2019-06-30 PROCEDURE — 71045 X-RAY EXAM CHEST 1 VIEW: CPT

## 2019-06-30 PROCEDURE — 93010 ELECTROCARDIOGRAM REPORT: CPT

## 2019-06-30 PROCEDURE — 85730 THROMBOPLASTIN TIME PARTIAL: CPT

## 2019-06-30 PROCEDURE — 99284 EMERGENCY DEPT VISIT MOD MDM: CPT | Mod: 25

## 2019-06-30 PROCEDURE — 80053 COMPREHEN METABOLIC PANEL: CPT

## 2019-06-30 PROCEDURE — 85025 COMPLETE CBC W/AUTO DIFF WBC: CPT

## 2019-06-30 PROCEDURE — 81001 URINALYSIS AUTO W/SCOPE: CPT

## 2019-06-30 PROCEDURE — 36415 COLL VENOUS BLD VENIPUNCTURE: CPT

## 2019-06-30 PROCEDURE — 70450 CT HEAD/BRAIN W/O DYE: CPT | Mod: 26

## 2019-06-30 PROCEDURE — 85610 PROTHROMBIN TIME: CPT

## 2019-06-30 RX ORDER — SODIUM CHLORIDE 9 MG/ML
1000 INJECTION INTRAMUSCULAR; INTRAVENOUS; SUBCUTANEOUS ONCE
Refills: 0 | Status: COMPLETED | OUTPATIENT
Start: 2019-06-30 | End: 2019-06-30

## 2019-06-30 RX ORDER — LISINOPRIL 2.5 MG/1
1 TABLET ORAL
Qty: 0 | Refills: 0 | DISCHARGE

## 2019-06-30 RX ORDER — FUROSEMIDE 40 MG
1 TABLET ORAL
Qty: 0 | Refills: 0 | DISCHARGE

## 2019-06-30 RX ORDER — APIXABAN 2.5 MG/1
1 TABLET, FILM COATED ORAL
Qty: 0 | Refills: 0 | DISCHARGE

## 2019-06-30 RX ADMIN — SODIUM CHLORIDE 1000 MILLILITER(S): 9 INJECTION INTRAMUSCULAR; INTRAVENOUS; SUBCUTANEOUS at 13:50
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
## 2019-06-30 NOTE — ED PROVIDER NOTE - CARE PLAN
Principal Discharge DX:	Altered mental status, unspecified altered mental status type  Secondary Diagnosis:	Dehydration
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
## 2019-06-30 NOTE — ED PROVIDER NOTE - NSFOLLOWUPINSTRUCTIONS_ED_ALL_ED_FT
Please follow up with your primary physician in 1-2 days for re evaluation.  Please return to ER immediately should your symptoms worsen or if you have any concern prior to this recommended follow up.     :  [facility]             DEHYDRATION - AfterCare(R) Instructions(ER/ED)     Dehydration    WHAT YOU NEED TO KNOW:    Dehydration is a condition that develops when your body does not have enough fluid. You may become dehydrated if you do not drink enough water or lose too much fluid. Fluid loss may also cause loss of electrolytes (minerals), such as sodium.    DISCHARGE INSTRUCTIONS:    Return to the emergency department if:     You have a seizure.      You are confused or cannot think clearly.      You are extremely sleepy, or another person cannot wake you.       You become dizzy or faint when you stand.      You are not able to urinate.      You have trouble breathing.      You have a fast or irregular heartbeat.      Your hands or feet are cold, or your face is pale.     Contact your healthcare provider if:     You have trouble drinking liquids because you are vomiting.      Your symptoms get worse.      You have a fever.       You feel very weak or tired.      You have questions or concerns about your condition or care.    Follow up with your healthcare provider as directed: Write down your questions so you remember to ask them during your visits.     Prevent or manage dehydration:     Drink liquids as directed. Liquids that contain water, sugar, and minerals can help your body hold in fluid and help prevent dehydration. Drink liquids throughout the day, not just when you feel thirsty. Men should drink about 3 liters (13 eight-ounce cups) of liquid each day. Women should drink about 2 liters (9 eight-ounce cups) of liquid each day. Drink even more liquid if you will be outdoors, in the sun for a long time, or exercising.       Stay cool. Limit the time you spend outdoors during the hottest part of the day. Dress in lightweight clothes.       Keep track of how often you urinate. If you urinate less than usual or your urine is darker, drink more liquids.         © Copyright [employer] 2019 All illustrations and images included in CareNotes are the copyrighted property of [employer] or [employer].      back to top                      © Copyright [employer] 2019

## 2019-06-30 NOTE — ED ADULT NURSE REASSESSMENT NOTE - NS ED NURSE REASSESS COMMENT FT1
Pt is stable to be transported back to nursing home , Pt still confused, IV fluid provided, his MD aggred with transfer back to same nursing home. Pt agitated at times. Ambulance ETA 6 pm, Continue monitoring

## 2019-06-30 NOTE — ED PROVIDER NOTE - NEUROLOGICAL, MLM
Alert and oriented only to person, no focal deficits, no motor or sensory deficits.  Normal movement x 4 extremities against gravity and external force.  Following simple commands.

## 2019-06-30 NOTE — ED ADULT NURSE NOTE - NSIMPLEMENTINTERV_GEN_ALL_ED
Implemented All Fall with Harm Risk Interventions:  [place] to call system. Call bell, personal items and telephone within reach. Instruct patient to call for assistance. Room bathroom lighting operational. Non-slip footwear when patient is off stretcher. Physically safe environment: no spills, clutter or unnecessary equipment. Stretcher in lowest position, wheels locked, appropriate side rails in place. Provide visual cue, wrist band, yellow gown, etc. Monitor gait and stability. Monitor for mental status changes and reorient to person, place, and time. Review medications for side effects contributing to fall risk. Reinforce activity limits and safety measures with patient and family. Provide visual clues: red socks.
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
## 2019-07-01 LAB
CULTURE RESULTS: NO GROWTH — SIGNIFICANT CHANGE UP
SPECIMEN SOURCE: SIGNIFICANT CHANGE UP

## 2019-07-03 ENCOUNTER — APPOINTMENT (OUTPATIENT)
Dept: ORTHOPEDIC SURGERY | Facility: HOSPITAL | Age: 84
End: 2019-07-03

## 2019-07-04 DIAGNOSIS — I10 ESSENTIAL (PRIMARY) HYPERTENSION: ICD-10-CM

## 2019-07-04 DIAGNOSIS — F03.90 UNSPECIFIED DEMENTIA WITHOUT BEHAVIORAL DISTURBANCE: ICD-10-CM

## 2019-07-04 DIAGNOSIS — Z79.899 OTHER LONG TERM (CURRENT) DRUG THERAPY: ICD-10-CM

## 2019-07-04 DIAGNOSIS — Z88.5 ALLERGY STATUS TO NARCOTIC AGENT: ICD-10-CM

## 2019-07-04 DIAGNOSIS — R41.0 DISORIENTATION, UNSPECIFIED: ICD-10-CM

## 2019-07-04 DIAGNOSIS — Z79.84 LONG TERM (CURRENT) USE OF ORAL HYPOGLYCEMIC DRUGS: ICD-10-CM

## 2019-07-04 DIAGNOSIS — E78.5 HYPERLIPIDEMIA, UNSPECIFIED: ICD-10-CM

## 2019-07-04 DIAGNOSIS — R41.82 ALTERED MENTAL STATUS, UNSPECIFIED: ICD-10-CM

## 2019-07-04 DIAGNOSIS — E86.0 DEHYDRATION: ICD-10-CM

## 2019-07-04 DIAGNOSIS — E11.9 TYPE 2 DIABETES MELLITUS WITHOUT COMPLICATIONS: ICD-10-CM

## 2019-07-14 ENCOUNTER — INPATIENT (INPATIENT)
Facility: HOSPITAL | Age: 84
LOS: 3 days | DRG: 871 | End: 2019-07-18
Attending: INTERNAL MEDICINE | Admitting: INTERNAL MEDICINE
Payer: MEDICARE

## 2019-07-14 VITALS
SYSTOLIC BLOOD PRESSURE: 107 MMHG | OXYGEN SATURATION: 98 % | WEIGHT: 194.45 LBS | TEMPERATURE: 103 F | HEART RATE: 104 BPM | DIASTOLIC BLOOD PRESSURE: 77 MMHG | RESPIRATION RATE: 22 BRPM

## 2019-07-14 DIAGNOSIS — Z95.2 PRESENCE OF PROSTHETIC HEART VALVE: Chronic | ICD-10-CM

## 2019-07-14 DIAGNOSIS — Z96.659 PRESENCE OF UNSPECIFIED ARTIFICIAL KNEE JOINT: Chronic | ICD-10-CM

## 2019-07-14 DIAGNOSIS — Z95.0 PRESENCE OF CARDIAC PACEMAKER: Chronic | ICD-10-CM

## 2019-07-14 DIAGNOSIS — Z98.49 CATARACT EXTRACTION STATUS, UNSPECIFIED EYE: Chronic | ICD-10-CM

## 2019-07-14 DIAGNOSIS — Z98.890 OTHER SPECIFIED POSTPROCEDURAL STATES: Chronic | ICD-10-CM

## 2019-07-14 DIAGNOSIS — Z95.1 PRESENCE OF AORTOCORONARY BYPASS GRAFT: Chronic | ICD-10-CM

## 2019-07-14 LAB
ALBUMIN SERPL ELPH-MCNC: 2.6 G/DL — LOW (ref 3.3–5)
ALP SERPL-CCNC: 55 U/L — SIGNIFICANT CHANGE UP (ref 40–120)
ALT FLD-CCNC: 29 U/L — SIGNIFICANT CHANGE UP (ref 10–45)
ANION GAP SERPL CALC-SCNC: 21 MMOL/L — HIGH (ref 5–17)
APPEARANCE UR: ABNORMAL
APTT BLD: 37.3 SEC — HIGH (ref 27.5–36.3)
AST SERPL-CCNC: 82 U/L — HIGH (ref 10–40)
BASE EXCESS BLDA CALC-SCNC: -11 MMOL/L — LOW (ref -2–3)
BASE EXCESS BLDA CALC-SCNC: SIGNIFICANT CHANGE UP MMOL/L (ref -2–3)
BASOPHILS # BLD AUTO: 0 K/UL — SIGNIFICANT CHANGE UP (ref 0–0.2)
BASOPHILS NFR BLD AUTO: 0 % — SIGNIFICANT CHANGE UP (ref 0–2)
BILIRUB SERPL-MCNC: 0.8 MG/DL — SIGNIFICANT CHANGE UP (ref 0.2–1.2)
BILIRUB UR-MCNC: ABNORMAL
BUN SERPL-MCNC: 39 MG/DL — HIGH (ref 7–23)
CALCIUM SERPL-MCNC: 8.4 MG/DL — SIGNIFICANT CHANGE UP (ref 8.4–10.5)
CHLORIDE SERPL-SCNC: 99 MMOL/L — SIGNIFICANT CHANGE UP (ref 96–108)
CO2 SERPL-SCNC: 21 MMOL/L — LOW (ref 22–31)
COLOR SPEC: YELLOW — SIGNIFICANT CHANGE UP
CREAT SERPL-MCNC: 3.23 MG/DL — HIGH (ref 0.5–1.3)
DIFF PNL FLD: NEGATIVE — SIGNIFICANT CHANGE UP
EOSINOPHIL # BLD AUTO: 0 K/UL — SIGNIFICANT CHANGE UP (ref 0–0.5)
EOSINOPHIL NFR BLD AUTO: 0 % — SIGNIFICANT CHANGE UP (ref 0–6)
GLUCOSE BLDC GLUCOMTR-MCNC: 123 MG/DL — HIGH (ref 70–99)
GLUCOSE BLDC GLUCOMTR-MCNC: 42 MG/DL — CRITICAL LOW (ref 70–99)
GLUCOSE BLDC GLUCOMTR-MCNC: 79 MG/DL — SIGNIFICANT CHANGE UP (ref 70–99)
GLUCOSE SERPL-MCNC: 47 MG/DL — LOW (ref 70–99)
GLUCOSE UR QL: NEGATIVE — SIGNIFICANT CHANGE UP
HCO3 BLDA-SCNC: 12 MMOL/L — LOW (ref 21–28)
HCO3 BLDA-SCNC: SIGNIFICANT CHANGE UP MMOL/L (ref 21–28)
HCT VFR BLD CALC: 41.2 % — SIGNIFICANT CHANGE UP (ref 39–50)
HGB BLD-MCNC: 13.2 G/DL — SIGNIFICANT CHANGE UP (ref 13–17)
INR BLD: 2.91 — HIGH (ref 0.88–1.16)
KETONES UR-MCNC: ABNORMAL MG/DL
LACTATE SERPL-SCNC: 8.6 MMOL/L — CRITICAL HIGH (ref 0.5–2)
LACTATE SERPL-SCNC: 9.4 MMOL/L — CRITICAL HIGH (ref 0.5–2)
LEUKOCYTE ESTERASE UR-ACNC: NEGATIVE — SIGNIFICANT CHANGE UP
LYMPHOCYTES # BLD AUTO: 0.6 K/UL — LOW (ref 1–3.3)
LYMPHOCYTES # BLD AUTO: 2.6 % — LOW (ref 13–44)
MCHC RBC-ENTMCNC: 32 GM/DL — SIGNIFICANT CHANGE UP (ref 32–36)
MCHC RBC-ENTMCNC: 33.1 PG — SIGNIFICANT CHANGE UP (ref 27–34)
MCV RBC AUTO: 103.3 FL — HIGH (ref 80–100)
MONOCYTES # BLD AUTO: 0 K/UL — SIGNIFICANT CHANGE UP (ref 0–0.9)
MONOCYTES NFR BLD AUTO: 0 % — LOW (ref 2–14)
NEUTROPHILS # BLD AUTO: 20.76 K/UL — HIGH (ref 1.8–7.4)
NEUTROPHILS NFR BLD AUTO: 57 % — SIGNIFICANT CHANGE UP (ref 43–77)
NITRITE UR-MCNC: NEGATIVE — SIGNIFICANT CHANGE UP
PCO2 BLDA: 23 MMHG — LOW (ref 35–48)
PCO2 BLDA: SIGNIFICANT CHANGE UP MMHG (ref 35–48)
PH BLDA: 7.36 — SIGNIFICANT CHANGE UP (ref 7.35–7.45)
PH BLDA: SIGNIFICANT CHANGE UP (ref 7.35–7.45)
PH UR: 5 — SIGNIFICANT CHANGE UP (ref 5–8)
PLATELET # BLD AUTO: 103 K/UL — LOW (ref 150–400)
PO2 BLDA: 314 MMHG — HIGH (ref 83–108)
PO2 BLDA: SIGNIFICANT CHANGE UP MMHG (ref 83–108)
POTASSIUM SERPL-MCNC: 3.9 MMOL/L — SIGNIFICANT CHANGE UP (ref 3.5–5.3)
POTASSIUM SERPL-SCNC: 3.9 MMOL/L — SIGNIFICANT CHANGE UP (ref 3.5–5.3)
PROT SERPL-MCNC: 6.6 G/DL — SIGNIFICANT CHANGE UP (ref 6–8.3)
PROT UR-MCNC: 30 MG/DL
PROTHROM AB SERPL-ACNC: 34 SEC — HIGH (ref 10–12.9)
RBC # BLD: 3.99 M/UL — LOW (ref 4.2–5.8)
RBC # FLD: 16.2 % — HIGH (ref 10.3–14.5)
SAO2 % BLDA: 100 % — SIGNIFICANT CHANGE UP (ref 95–100)
SAO2 % BLDA: SIGNIFICANT CHANGE UP % (ref 95–100)
SODIUM SERPL-SCNC: 141 MMOL/L — SIGNIFICANT CHANGE UP (ref 135–145)
SP GR SPEC: >=1.03 — SIGNIFICANT CHANGE UP (ref 1–1.03)
TSH SERPL-MCNC: 18.41 UIU/ML — HIGH (ref 0.35–4.94)
UROBILINOGEN FLD QL: 0.2 E.U./DL — SIGNIFICANT CHANGE UP
WBC # BLD: 23.2 K/UL — HIGH (ref 3.8–10.5)
WBC # FLD AUTO: 23.2 K/UL — HIGH (ref 3.8–10.5)

## 2019-07-14 PROCEDURE — 71045 X-RAY EXAM CHEST 1 VIEW: CPT | Mod: 26

## 2019-07-14 PROCEDURE — 99284 EMERGENCY DEPT VISIT MOD MDM: CPT

## 2019-07-14 PROCEDURE — 93010 ELECTROCARDIOGRAM REPORT: CPT

## 2019-07-14 PROCEDURE — 99223 1ST HOSP IP/OBS HIGH 75: CPT | Mod: GC

## 2019-07-14 RX ORDER — NOREPINEPHRINE BITARTRATE/D5W 8 MG/250ML
0.05 PLASTIC BAG, INJECTION (ML) INTRAVENOUS
Qty: 8 | Refills: 0 | Status: DISCONTINUED | OUTPATIENT
Start: 2019-07-14 | End: 2019-07-14

## 2019-07-14 RX ORDER — PIPERACILLIN AND TAZOBACTAM 4; .5 G/20ML; G/20ML
3.38 INJECTION, POWDER, LYOPHILIZED, FOR SOLUTION INTRAVENOUS ONCE
Refills: 0 | Status: COMPLETED | OUTPATIENT
Start: 2019-07-14 | End: 2019-07-14

## 2019-07-14 RX ORDER — DEXTROSE 50 % IN WATER 50 %
50 SYRINGE (ML) INTRAVENOUS ONCE
Refills: 0 | Status: COMPLETED | OUTPATIENT
Start: 2019-07-14 | End: 2019-07-14

## 2019-07-14 RX ORDER — NOREPINEPHRINE BITARTRATE/D5W 8 MG/250ML
0.05 PLASTIC BAG, INJECTION (ML) INTRAVENOUS
Qty: 8 | Refills: 0 | Status: DISCONTINUED | OUTPATIENT
Start: 2019-07-14 | End: 2019-07-15

## 2019-07-14 RX ORDER — SODIUM CHLORIDE 9 MG/ML
1000 INJECTION INTRAMUSCULAR; INTRAVENOUS; SUBCUTANEOUS ONCE
Refills: 0 | Status: COMPLETED | OUTPATIENT
Start: 2019-07-14 | End: 2019-07-14

## 2019-07-14 RX ORDER — DEXTROSE 50 % IN WATER 50 %
25 SYRINGE (ML) INTRAVENOUS ONCE
Refills: 0 | Status: COMPLETED | OUTPATIENT
Start: 2019-07-14 | End: 2019-07-14

## 2019-07-14 RX ORDER — GLUCAGON INJECTION, SOLUTION 0.5 MG/.1ML
1 INJECTION, SOLUTION SUBCUTANEOUS ONCE
Refills: 0 | Status: DISCONTINUED | OUTPATIENT
Start: 2019-07-14 | End: 2019-07-18

## 2019-07-14 RX ORDER — PANTOPRAZOLE SODIUM 20 MG/1
40 TABLET, DELAYED RELEASE ORAL DAILY
Refills: 0 | Status: DISCONTINUED | OUTPATIENT
Start: 2019-07-14 | End: 2019-07-18

## 2019-07-14 RX ORDER — DEXTROSE 50 % IN WATER 50 %
25 SYRINGE (ML) INTRAVENOUS ONCE
Refills: 0 | Status: DISCONTINUED | OUTPATIENT
Start: 2019-07-14 | End: 2019-07-18

## 2019-07-14 RX ORDER — CHLORHEXIDINE GLUCONATE 213 G/1000ML
1 SOLUTION TOPICAL
Refills: 0 | Status: DISCONTINUED | OUTPATIENT
Start: 2019-07-14 | End: 2019-07-18

## 2019-07-14 RX ORDER — DEXTROSE 50 % IN WATER 50 %
12.5 SYRINGE (ML) INTRAVENOUS ONCE
Refills: 0 | Status: DISCONTINUED | OUTPATIENT
Start: 2019-07-14 | End: 2019-07-18

## 2019-07-14 RX ORDER — ACETAMINOPHEN 500 MG
650 TABLET ORAL ONCE
Refills: 0 | Status: COMPLETED | OUTPATIENT
Start: 2019-07-14 | End: 2019-07-14

## 2019-07-14 RX ORDER — SODIUM BICARBONATE 1 MEQ/ML
50 SYRINGE (ML) INTRAVENOUS
Refills: 0 | Status: DISCONTINUED | OUTPATIENT
Start: 2019-07-14 | End: 2019-07-14

## 2019-07-14 RX ORDER — IPRATROPIUM/ALBUTEROL SULFATE 18-103MCG
3 AEROSOL WITH ADAPTER (GRAM) INHALATION EVERY 6 HOURS
Refills: 0 | Status: DISCONTINUED | OUTPATIENT
Start: 2019-07-14 | End: 2019-07-18

## 2019-07-14 RX ORDER — SODIUM CHLORIDE 9 MG/ML
1000 INJECTION, SOLUTION INTRAVENOUS
Refills: 0 | Status: DISCONTINUED | OUTPATIENT
Start: 2019-07-14 | End: 2019-07-16

## 2019-07-14 RX ORDER — VANCOMYCIN HCL 1 G
1000 VIAL (EA) INTRAVENOUS ONCE
Refills: 0 | Status: COMPLETED | OUTPATIENT
Start: 2019-07-14 | End: 2019-07-14

## 2019-07-14 RX ORDER — PIPERACILLIN AND TAZOBACTAM 4; .5 G/20ML; G/20ML
2.25 INJECTION, POWDER, LYOPHILIZED, FOR SOLUTION INTRAVENOUS EVERY 6 HOURS
Refills: 0 | Status: DISCONTINUED | OUTPATIENT
Start: 2019-07-14 | End: 2019-07-15

## 2019-07-14 RX ORDER — SODIUM CHLORIDE 9 MG/ML
1000 INJECTION, SOLUTION INTRAVENOUS
Refills: 0 | Status: DISCONTINUED | OUTPATIENT
Start: 2019-07-14 | End: 2019-07-18

## 2019-07-14 RX ORDER — SODIUM BICARBONATE 1 MEQ/ML
50 SYRINGE (ML) INTRAVENOUS
Refills: 0 | Status: DISCONTINUED | OUTPATIENT
Start: 2019-07-14 | End: 2019-07-15

## 2019-07-14 RX ORDER — INSULIN LISPRO 100/ML
VIAL (ML) SUBCUTANEOUS
Refills: 0 | Status: DISCONTINUED | OUTPATIENT
Start: 2019-07-14 | End: 2019-07-18

## 2019-07-14 RX ORDER — DEXTROSE 50 % IN WATER 50 %
15 SYRINGE (ML) INTRAVENOUS ONCE
Refills: 0 | Status: DISCONTINUED | OUTPATIENT
Start: 2019-07-14 | End: 2019-07-18

## 2019-07-14 RX ADMIN — PIPERACILLIN AND TAZOBACTAM 200 GRAM(S): 4; .5 INJECTION, POWDER, LYOPHILIZED, FOR SOLUTION INTRAVENOUS at 23:12

## 2019-07-14 RX ADMIN — Medication 650 MILLIGRAM(S): at 19:26

## 2019-07-14 RX ADMIN — SODIUM CHLORIDE 60 MILLILITER(S): 9 INJECTION, SOLUTION INTRAVENOUS at 23:34

## 2019-07-14 RX ADMIN — Medication 3 MILLILITER(S): at 23:13

## 2019-07-14 RX ADMIN — SODIUM CHLORIDE 1000 MILLILITER(S): 9 INJECTION INTRAMUSCULAR; INTRAVENOUS; SUBCUTANEOUS at 22:18

## 2019-07-14 RX ADMIN — Medication 8.27 MICROGRAM(S)/KG/MIN: at 23:13

## 2019-07-14 RX ADMIN — Medication 250 MILLIGRAM(S): at 17:29

## 2019-07-14 RX ADMIN — SODIUM CHLORIDE 1000 MILLILITER(S): 9 INJECTION INTRAMUSCULAR; INTRAVENOUS; SUBCUTANEOUS at 16:41

## 2019-07-14 RX ADMIN — Medication 8.27 MICROGRAM(S)/KG/MIN: at 19:53

## 2019-07-14 RX ADMIN — SODIUM CHLORIDE 1000 MILLILITER(S): 9 INJECTION INTRAMUSCULAR; INTRAVENOUS; SUBCUTANEOUS at 17:45

## 2019-07-14 RX ADMIN — SODIUM CHLORIDE 1000 MILLILITER(S): 9 INJECTION INTRAMUSCULAR; INTRAVENOUS; SUBCUTANEOUS at 23:03

## 2019-07-14 RX ADMIN — PIPERACILLIN AND TAZOBACTAM 200 GRAM(S): 4; .5 INJECTION, POWDER, LYOPHILIZED, FOR SOLUTION INTRAVENOUS at 16:40

## 2019-07-14 RX ADMIN — Medication 650 MILLIGRAM(S): at 16:40

## 2019-07-14 RX ADMIN — Medication 50 MILLILITER(S): at 22:18

## 2019-07-14 RX ADMIN — Medication 25 MILLILITER(S): at 23:25

## 2019-07-14 NOTE — ED ADULT NURSE NOTE - NSIMPLEMENTINTERV_GEN_ALL_ED
Implemented All Fall with Harm Risk Interventions:  [employer] to call system. Call bell, personal items and telephone within reach. Instruct patient to call for assistance. Room bathroom lighting operational. Non-slip footwear when patient is off stretcher. Physically safe environment: no spills, clutter or unnecessary equipment. Stretcher in lowest position, wheels locked, appropriate side rails in place. Provide visual cue, wrist band, yellow gown, etc. Monitor gait and stability. Monitor for mental status changes and reorient to person, place, and time. Review medications for side effects contributing to fall risk. Reinforce activity limits and safety measures with patient and family. Provide visual clues: red socks.

## 2019-07-14 NOTE — H&P ADULT - HISTORY OF PRESENT ILLNESS
84M former smoker with MHx of HTN, HLD, DM 2, hypothyroidism, prostate CA s/p seeding, atrial flutter s/p ablation, on Eliquis, bifascicular heart block s/p pacemaker, COPD (not on home air), CAD s/p CABGx3 (3/17), CVA s/p L enarderectomy (6/19) presents from NH for AMS and increased sleepiness x1 day. Interval history from friend collected from friend and Guardian, Martha. At baseline patient is A&Ox3 and able to perform most ADLs and iADLS. Yesterday, pt was his baseline mental status and friend went to go visit today and noticed pt was laying in bed and lethargic, AAOx0 and babbling incoherent words. He was not responding appropriately to questions and not acting himself. Per friend, unsure if pt had neuro event, aspiration, recent illness, changes in medication, or other inciting event. Per Martha, patient has been doing well since his June hospitalization He has been on thickened liquids at home, but says he often refuses the  powder so she is not sure how adherent he is to it. Additionally, she noted that during his previous CVA he was briefly incoherent.     In ED pt tachycardic to 110s, temp 103, RADHA (Cr 3.23), leukocytosis to 23. Lactate 8.6. CXR with b/l infiltrates and pulmonary  edema. Pt given 2L IVF bolus, tylenol, vanc/zosyn. Pt seen in ED. AAOx0 (Unable to get interval history or ROS), in respiratory distress with accessory muscle use and abdominal breathing. Satting 88-90% on RA. HR 50-60s. SBP evidently in 80s-90s systolic. Responsive to voice but not following commands or answering questions appropriately.

## 2019-07-14 NOTE — CONSULT NOTE ADULT - SUBJECTIVE AND OBJECTIVE BOX
ICU CONSULT    84M with PMHx former smoker, HTN, HLD, DM 2, hypothyroidism, prostate CA s/p seeding, atrial flutter s/p ablation, on Eliquis, bifascicular heart block s/p pacemaker, COPD not on home O2, CAD s/p CABGx3 March 2017, recent admission to [facility] for CVA s/p L enarderectomy presents from NH for AMS and increased sleepiness x1 day. Interval history from friend at bedside as pt AAOx0 and babbling incoherent sentences. Per friend, pt is usually AAOx3 and able to have coherent conversations without difficulty. Yesterday, pt was his baseline mental status and friend went to go visit today and noticed pt was laying in bed and lethargic and mumbling incoherent words. He was not responding appropriately to questions and not acting himself. Per friend, unsure if pt had neuro event, aspiration, recent illness, changes in medication, or other inciting event. She does not know the specifics of his medical conditions and only knows he had recent endarterectomy for CVA in the beginning of June 2019.     In ED pt tachycardic to 110s, temp 103, RADHA, leukocytosis to 23. Lactate 8.6. CXR with b/l infiltrates and pulmonary  edema. Pt given 2L IVF bolus, tylenol, vanc/zosyn. ICU consulted for severe sepsis and hemodynamic monitoring. Pt seen in ED. AAOx0. Unable to get interval gistory or ROS. Pt appearing in respiratory distress with accessory muscle use and abdominal breathing. Satting 88-90% on RA. HR 50-60s. SBP evidently in 80s-90s systolic. Responsive to voice but not following commands or answering questions appropriately. Blabbering.     Discussion had over the phone with guardian who states pt has living will. Living will stating pt is DNR and DNI status is based     PMHx -   PSx -   Meds -   Allergies -   FHx -   Sx -       PHYSICAL EXAM   Vital Signs Last 24 Hrs  T(C): 39.3 (14 Jul 2019 17:27), Max: 39.4 (14 Jul 2019 16:19)  T(F): 102.8 (14 Jul 2019 17:27), Max: 102.9 (14 Jul 2019 16:19)  HR: 61 (14 Jul 2019 19:16) (60 - 117)  BP: 104/57 (14 Jul 2019 18:50) (80/44 - 107/77)  BP(mean): --  RR: 24 (14 Jul 2019 19:16) (22 - 24)  SpO2: 97% (14 Jul 2019 19:16) (94% - 98%)      General -   HEENT -   CV -   Resp -   Abdomen -   Extremities -   Skin -       LABS                        13.2   23.20 )-----------( 103      ( 14 Jul 2019 16:43 )             41.2     07-14    141  |  99  |  39<H>  ----------------------------<  47<L>  3.9   |  21<L>  |  3.23<H>    Ca    8.4      14 Jul 2019 16:43    TPro  6.6  /  Alb  2.6<L>  /  TBili  0.8  /  DBili  x   /  AST  82<H>  /  ALT  29  /  AlkPhos  55  07-14    PT/INR - ( 14 Jul 2019 16:43 )   PT: 34.0 sec;   INR: 2.91          PTT - ( 14 Jul 2019 16:43 )  PTT:37.3 sec  Lactate, Blood: 9.4 mmoL/L (07-14-19 @ 18:38)  Lactate, Blood: 8.6 mmoL/L (07-14-19 @ 16:42)    Urinalysis Basic - ( 14 Jul 2019 17:24 )    Color: Yellow / Appearance: SL Cloudy / SG: >=1.030 / pH: x  Gluc: x / Ketone: Trace mg/dL  / Bili: Small / Urobili: 0.2 E.U./dL   Blood: x / Protein: 30 mg/dL / Nitrite: NEGATIVE   Leuk Esterase: NEGATIVE / RBC: < 5 /HPF / WBC < 5 /HPF   Sq Epi: x / Non Sq Epi: 5-10 /HPF / Bacteria: Present /HPF            IMAGING   CXR -   EKG - ICU CONSULT    84M with PMHx former smoker, HTN, HLD, DM 2, hypothyroidism, prostate CA s/p seeding, atrial flutter s/p ablation, on Eliquis, bifascicular heart block s/p pacemaker, COPD not on home O2, CAD s/p CABGx3 March 2017, recent admission to [facility] for CVA s/p L enarderectomy presents from NH for AMS and increased sleepiness x1 day. Interval history from friend at bedside as pt AAOx0 and babbling incoherent sentences. Per friend, pt is usually AAOx3 and able to have coherent conversations without difficulty. Yesterday, pt was his baseline mental status and friend went to go visit today and noticed pt was laying in bed and lethargic and mumbling incoherent words. He was not responding appropriately to questions and not acting himself. Per friend, unsure if pt had neuro event, aspiration, recent illness, changes in medication, or other inciting event. She does not know the specifics of his medical conditions and only knows he had recent endarterectomy for CVA in the beginning of June 2019.     In ED pt tachycardic to 110s, temp 103, RADHA, leukocytosis to 23. Lactate 8.6. CXR with b/l infiltrates and pulmonary  edema. Pt given 2L IVF bolus, tylenol, vanc/zosyn. ICU consulted for severe sepsis and hemodynamic monitoring. Pt seen in ED. AAOx0. Unable to get interval gistory or ROS. Pt appearing in respiratory distress with accessory muscle use and abdominal breathing. Satting 88-90% on RA. HR 50-60s. SBP evidently in 80s-90s systolic. Responsive to voice but not following commands or answering questions appropriately. Blabbering.     Discussion had over the phone with guardian who states pt has living will. Living will stating pt is DNR and DNI if status is irreversible and terminal. MOLST and Living will in chart. Guardian Mila Fernandez can be reached at: [phone] or [phone]. Guardian agreeing to pressor support, ABX, IVF, and noninvasive ventilation at this time.     PMHx - as per HPI  PSx - as per HPI  Meds - Eliquis 5 BID, synthroid 50 mcg, amio 200 mg qd, asa 81, duoneb, lasix 20 qd, lisinopril 40 qd, metformin 500 BID, Toprol 25 mg qd, simvastatin 40  Allergies - NKDA  FHx - unknown  Sx - unknown as pt altered      PHYSICAL EXAM   Vital Signs Last 24 Hrs  T(C): 39.3 (14 Jul 2019 17:27), Max: 39.4 (14 Jul 2019 16:19)  T(F): 102.8 (14 Jul 2019 17:27), Max: 102.9 (14 Jul 2019 16:19)  HR: 61 (14 Jul 2019 19:16) (60 - 117)  BP: 104/57 (14 Jul 2019 18:50) (80/44 - 107/77)  BP(mean): --  RR: 24 (14 Jul 2019 19:16) (22 - 24)  SpO2: 97% (14 Jul 2019 19:16) (94% - 98%)      General - using accessory muscles, belly breathing, blabbering, satting 88-90% on monitor on RA  HEENT - EOMI, PEERL, MM dry with dried blood  at the corners of the mouth, neck supple, unable to appreciate JVD   CV - RRR, no M/R/G  Resp - b/l wheezing and rhonchi, crackles at the bases b/l   Abdomen - soft, distended, nontender, no rebound or guarding, BS intact throughout   Extremities - b/l LE 3+ pitting edema to  below the knees  Skin - no lesions      LABS                        13.2   23.20 )-----------( 103      ( 14 Jul 2019 16:43 )             41.2     07-14    141  |  99  |  39<H>  ----------------------------<  47<L>  3.9   |  21<L>  |  3.23<H>    Ca    8.4      14 Jul 2019 16:43    TPro  6.6  /  Alb  2.6<L>  /  TBili  0.8  /  DBili  x   /  AST  82<H>  /  ALT  29  /  AlkPhos  55  07-14    PT/INR - ( 14 Jul 2019 16:43 )   PT: 34.0 sec;   INR: 2.91          PTT - ( 14 Jul 2019 16:43 )  PTT:37.3 sec  Lactate, Blood: 9.4 mmoL/L (07-14-19 @ 18:38)  Lactate, Blood: 8.6 mmoL/L (07-14-19 @ 16:42)    Urinalysis Basic - ( 14 Jul 2019 17:24 )    Color: Yellow / Appearance: SL Cloudy / SG: >=1.030 / pH: x  Gluc: x / Ketone: Trace mg/dL  / Bili: Small / Urobili: 0.2 E.U./dL   Blood: x / Protein: 30 mg/dL / Nitrite: NEGATIVE   Leuk Esterase: NEGATIVE / RBC: < 5 /HPF / WBC < 5 /HPF   Sq Epi: x / Non Sq Epi: 5-10 /HPF / Bacteria: Present /HPF            IMAGING   CXR - b/l trace effusions vs infiltrate; cephalization, increased pulmonary vascular congestion  EKG - sinus tach with RBBB

## 2019-07-14 NOTE — H&P ADULT - NSHPSOCIALHISTORY_GEN_ALL_CORE
Tobacco use:   EtOH use:  Illicit drug use:    Living situation: Tobacco use: Former Smoker per family  EtOH use: Unable to assess  Illicit drug use: Unable to assess    Living situation: Currently in nursing home

## 2019-07-14 NOTE — ED PROVIDER NOTE - CLINICAL SUMMARY MEDICAL DECISION MAKING FREE TEXT BOX
84M PMH COPD, DM, HTN, HLD, TAVR, CAD w/ CABG, afib, PPM, recent L CEA for stroke symptoms presents from NH for fevers, AMS, hypotension. Pt unable to provide any additional info. Tachycardic and febrile, other vitals wnl. Exam as above.  ddx: Sepsis  CBC, cmp, lactate, blood cx, UA/cx, CXR, IVf, empiric abx, tylenol, reassess.

## 2019-07-14 NOTE — H&P ADULT - NSHPLABSRESULTS_GEN_ALL_CORE
.  LABS:                         13.2   23.20 )-----------( 103      ( 14 Jul 2019 16:43 )             41.2     07-14    141  |  99  |  39<H>  ----------------------------<  47<L>  3.9   |  21<L>  |  3.23<H>    Ca    8.4      14 Jul 2019 16:43    TPro  6.6  /  Alb  2.6<L>  /  TBili  0.8  /  DBili  x   /  AST  82<H>  /  ALT  29  /  AlkPhos  55  07-14    PT/INR - ( 14 Jul 2019 16:43 )   PT: 34.0 sec;   INR: 2.91          PTT - ( 14 Jul 2019 16:43 )  PTT:37.3 sec  Urinalysis Basic - ( 14 Jul 2019 17:24 )    Color: Yellow / Appearance: SL Cloudy / SG: >=1.030 / pH: x  Gluc: x / Ketone: Trace mg/dL  / Bili: Small / Urobili: 0.2 E.U./dL   Blood: x / Protein: 30 mg/dL / Nitrite: NEGATIVE   Leuk Esterase: NEGATIVE / RBC: < 5 /HPF / WBC < 5 /HPF   Sq Epi: x / Non Sq Epi: 5-10 /HPF / Bacteria: Present /HPF            Lactate, Blood: 9.4 mmoL/L (07-14 @ 18:38)  Lactate, Blood: 8.6 mmoL/L (07-14 @ 16:42)      RADIOLOGY, EKG & ADDITIONAL TESTS: Reviewed. LABS:                         13.2   23.20 )-----------( 103      ( 14 Jul 2019 16:43 )             41.2     07-14    141  |  99  |  39<H>  ----------------------------<  47<L>  3.9   |  21<L>  |  3.23<H>    Ca    8.4      14 Jul 2019 16:43    TPro  6.6  /  Alb  2.6<L>  /  TBili  0.8  /  DBili  x   /  AST  82<H>  /  ALT  29  /  AlkPhos  55  07-14    PT/INR - ( 14 Jul 2019 16:43 )   PT: 34.0 sec;   INR: 2.91          PTT - ( 14 Jul 2019 16:43 )  PTT:37.3 sec  Urinalysis Basic - ( 14 Jul 2019 17:24 )    Color: Yellow / Appearance: SL Cloudy / SG: >=1.030 / pH: x  Gluc: x / Ketone: Trace mg/dL  / Bili: Small / Urobili: 0.2 E.U./dL   Blood: x / Protein: 30 mg/dL / Nitrite: NEGATIVE   Leuk Esterase: NEGATIVE / RBC: < 5 /HPF / WBC < 5 /HPF   Sq Epi: x / Non Sq Epi: 5-10 /HPF / Bacteria: Present /HPF            Lactate, Blood: 9.4 mmoL/L (07-14 @ 18:38)  Lactate, Blood: 8.6 mmoL/L (07-14 @ 16:42)      RADIOLOGY, EKG & ADDITIONAL TESTS: Reviewed.

## 2019-07-14 NOTE — H&P ADULT - NSICDXFAMILYHX_GEN_ALL_CORE_FT
FAMILY HISTORY:  Father  Still living? No  Family history of acute myocardial infarction, Age at diagnosis: 51-60

## 2019-07-14 NOTE — H&P ADULT - NSICDXPASTSURGICALHX_GEN_ALL_CORE_FT
PAST SURGICAL HISTORY:  Artificial pacemaker     H/O aortic valve replacement     H/O carotid endarterectomy     History of cataract surgery b/l    History of knee replacement b/l    S/P CABG x 3

## 2019-07-14 NOTE — CONSULT NOTE ADULT - ASSESSMENT
84M with PMHx former smoker, HTN, HLD, DM 2, hypothyroidism, prostate CA s/p seeding, atrial flutter s/p ablation, on Eliquis, bifascicular heart block s/p pacemaker, COPD not on home O2, CAD s/p CABGx3 March 2017, recent admission to [facility] for CVA s/p L endarterectomy presents from NH for AMS and increased sleepiness x1 day, found to have septic shock 2/2 likely aspiration PNA and acute respiratory failure with pulmonary edema.    #Acute respiratory failure  -on exam with accessory muscle use and belly breathing  -CXR showing b/l  lower lobe infiltrates and increased pulmonary vascular congestion 84M with PMHx former smoker, HTN, HLD, DM 2, hypothyroidism, prostate CA s/p seeding, atrial flutter s/p ablation, on Eliquis, bifascicular heart block s/p pacemaker, COPD not on home O2, CAD s/p CABGx3 March 2017, recent admission to [facility] for CVA s/p L endarterectomy presents from NH for AMS and increased sleepiness x1 day, found to have septic shock 2/2 likely aspiration PNA and acute respiratory failure with pulmonary edema.    #Acute respiratory failure  -on exam with accessory muscle use and belly breathing  -CXR showing b/l  lower lobe infiltrates and increased pulmonary vascular congestion   -per living will and HCP, pt is DNR (if terminal and irreversible status, DNI)  -BiPAP for now   -NPO on BiPAP  -check ABG  -spoke to HCP who states intubation is an option if pt not improving on BiPAP, consents to pressors, IV ABX, and IVF as well as a-line and TLC     #Septic shock  -in setting of recent CVA, likely 2/2 aspiration PNA   -trend lactate to peak  -s/p IVF resuscitation  -c/w levo gtt  -f/u blood cx  -sputum cx  -c/w vanc/zosyn  -holding BP meds   -holding lasix given sepsis     Dispo: Admit to MICU  Discussed concepcion Cordova

## 2019-07-14 NOTE — ED PROVIDER NOTE - PROGRESS NOTE DETAILS
Klepfish: BP dropped. Getting 2nd L IVF. Labs notable for elevated lactate, leukocytosis, RADHA. UA pending. ICU consulted. [place]pfish: increasing WOB. increasing lactate. will start on bipap. accepted to icu.   though pt abd exam is consistently soft, given elevated lactate and unreliable exam (2/2 AMS) can consider possible further abd imaging if pt stabilizes. Likely non-surgical candidate.

## 2019-07-14 NOTE — H&P ADULT - NSHPPHYSICALEXAM_GEN_ALL_CORE
VITAL SIGNS:  T(C): 39.3 (07-14-19 @ 17:27), Max: 39.4 (07-14-19 @ 16:19)  T(F): 102.8 (07-14-19 @ 17:27), Max: 102.9 (07-14-19 @ 16:19)  HR: 61 (07-14-19 @ 19:16) (60 - 117)  BP: 104/57 (07-14-19 @ 18:50) (80/44 - 107/77)  BP(mean): --  RR: 24 (07-14-19 @ 19:16) (22 - 24)  SpO2: 97% (07-14-19 @ 19:16) (94% - 98%)  Wt(kg): --    PHYSICAL EXAM:    Constitutional: WDWN resting comfortably in bed; NAD  Head: NC/AT  Eyes: EOMI, anicteric sclera  ENT: no nasal discharge  Neck: supple  Respiratory: CTA b/l, no increased work of breathing  Cardiac: +S1/S2, regular rate  Gastrointestinal: soft, NT/ND; no rebound or guarding; +BS  Extremities: WWP, no peripheral edema, 2+ pulses Radial and DP  Musculoskeletal: NROM x4  Dermatologic: skin warm, dry  Neurologic: Alert and oriented, no focal deficits appreciated  Psychiatric: affect and characteristics of appearance, verbalizations, behaviors are appropriate VITAL SIGNS:  T(C): 39.3 (07-14-19 @ 17:27), Max: 39.4 (07-14-19 @ 16:19)  T(F): 102.8 (07-14-19 @ 17:27), Max: 102.9 (07-14-19 @ 16:19)  HR: 61 (07-14-19 @ 19:16) (60 - 117)  BP: 104/57 (07-14-19 @ 18:50) (80/44 - 107/77)  BP(mean): --  RR: 24 (07-14-19 @ 19:16) (22 - 24)  SpO2: 97% (07-14-19 @ 19:16) (94% - 98%)  Wt(kg): --    PHYSICAL EXAM:    Constitutional: WDWN resting comfortably in bed; BiPAP on  Head: NC/AT  Eyes: EOMI, anicteric sclera  ENT: no nasal discharge  Neck: supple  Respiratory: Bilateral lower lobe crackles, no increased work of breathing  Cardiac: +S1/S2, regular rate  Gastrointestinal: soft, NT/ND; no rebound or guarding; +BS  Extremities: WWP, no peripheral edema, 2+ pulses Radial and 1+ DP  Dermatologic: skin warm, dry  Neurologic: A&Ox0, no focal deficits appreciated

## 2019-07-14 NOTE — H&P ADULT - NSICDXPASTMEDICALHX_GEN_ALL_CORE_FT
PAST MEDICAL HISTORY:  Cerebrovascular accident (CVA) 6/2019    COPD (chronic obstructive pulmonary disease)     Diabetes     High cholesterol     Hypertension     Prostate cancer in remission

## 2019-07-14 NOTE — ED ADULT TRIAGE NOTE - CHIEF COMPLAINT QUOTE
patient BIBA from nursing home for fever and tachycardia. patient recently discharged from [facility].

## 2019-07-14 NOTE — H&P ADULT - ASSESSMENT
PLAN:    PULMONARY    NEURO    CARDIOVASCULAR    RENAL    HEME    ENDOCRINE    GASTEROINTESTINAL    ID    GI/FEN  F:  E: Replete K<4 Mg < 2  N: NPO    DVT PPx -   GI PPx-    LINES -     CODE - Discussion had over the phone with guardian who states pt has living will. Living will stating pt is DNR and DNI if status is irreversible and terminal. MOLST and Living will in chart. Guardian Mila Fernandez can be reached at: [phone] or [phone]. Guardian agreeing to pressor support, ABX, IVF, and noninvasive ventilation at this time.       DISPO - continue intensive level of care in CCM/MICU service PLAN:    PULMONARY  #COPD  Not on Home O2. Satting 88-90% on admission.  -Continue with BiPAP, and adjust setting as needed- Currently 13/8 FiO2 100%  Duoneb q6    NEURO  #CVA 6/2019 s/p CEA  Per family and Neuro notes, patient has swallowing deficits from previous CVA, but otherwise no other gross deficits.   -In setting of NPO holding ASA 81mg and simvastatin 40mg    CARDIOVASCULAR  #HTN  #CHF EF 55%  -In setting of NPO holding home lisinopril 40mg, lasix 20mg, metoprolol 25mg BID    RENAL  #RADHA  Although BUN:Cr ratio <20, in the setting of sepsis a pre-renal etiology  is most like likely    ENDOCRINE  #DM  -A1c 6.3  -Hold home metformin and start SSI    GASTEROINTESTINAL    ID    GI/FEN  F: With IV medications  E: Replete K<4 Mg < 2  N: NPO    DVT PPx - Heparin ggt  GI PPx- protonix 40mg IV daily (ICU patient with sepsis meets criteria)    LINES - Arterial line (    ), Central line (____)    CODE - Discussion had over the phone with guardian who states pt has living will. Living will stating pt is DNR and DNI if status is irreversible and terminal. MOLST and Living will in chart. Guardian Mila Fernandez (in [place]) can be reached at: [phone] or [phone]. Guardian agreeing to pressor support, ABX, IVF, and noninvasive ventilation at this time.     DISPO - continue intensive level of care in CCM/MICU service PLAN:    PULMONARY  #COPD  Not on Home O2. Satting 88-90% on admission.  -Continue with BiPAP, and adjust setting as needed- Currently 13/8 FiO2 40%  -Duoneb q6    NEURO  #CVA 6/2019 s/p CEA  Per family and Neuro notes, patient has swallowing deficits from previous CVA, but otherwise no other gross deficits.   -In setting of NPO holding ASA 81mg and simvastatin 40mg    CARDIOVASCULAR  #Septic Shock like source pneumonia  Patients met SIRS criteria on admission (HR>90, temp >100.4, WBC > 12,000, RR>19) with elevated lactate and SBP<90. Crackles on exam and CXR + for bilateral lower lobe infiltrate  -Started on empirical Vanc/Zosyn  -f/u Blood cultures  -started on BiPAP  -Bicarb 12, give 3 amps to also help with RR  -Norepi 8mg Goal SBP > 90      #HTN  #CHF EF 55%  -In setting of NPO holding home lisinopril 40mg, lasix 20mg, metoprolol 25mg BID    RENAL  #RADHA  Although BUN:Cr ratio <20, in the setting of sepsis a pre-renal etiology  is most like likely  -s/p 2 L in ED  -Give 2 L NS    ENDOCRINE  #DM  -A1c 6.3  -Hold home metformin and start SSI    Hypothyroidism  -In setting of NPO hold home synthroid  -f/u TSH    GI/FEN  F: NS bolus x 2  E: Replete K<4 Mg < 2  N: NPO    DVT PPx - Heparin ggt tomorrow  GI PPx- protonix 40mg IV daily (ICU patient with sepsis meets criteria)    LINES - Bilateral peripheral lines on wrist, Planned Arterial and Central line    CODE - Discussion had over the phone with guardian who states pt has living will. Living will stating pt is DNR and DNI if status is irreversible and terminal. MOLST and Living will in chart. Guardian Mila Fernandez (in [place]) can be reached at: [phone] or [phone]. Guardian agreeing to pressor support, ABX, IVF, and noninvasive ventilation at this time.     DISPO - continue intensive level of care in MICU service PLAN:    PULMONARY  #COPD  Not on Home O2. Satting 88-90% on admission.  -Continue with BiPAP, and adjust setting as needed- Currently 13/8 FiO2 40%  -Duoneb q6    NEURO  #CVA 6/2019 s/p CEA  Per family and Neuro notes, patient has swallowing deficits from previous CVA, but otherwise no other gross deficits.   -In setting of NPO holding ASA 81mg and simvastatin 40mg    CARDIOVASCULAR  #Septic Shock like source pneumonia  Patients met SIRS criteria on admission (HR>90, temp >100.4, WBC > 12,000, RR>19) with elevated lactate and SBP<90. Crackles on exam and CXR + for bilateral lower lobe infiltrate  -Started on empirical Vanc/Zosyn  -f/u Blood cultures  -started on BiPAP  -Bicarb 12, give 3 amps to also help with RR  -Norepi 8mg Goal SBP > 90      #HTN  #CHF EF 55%  -In setting of NPO holding home lisinopril 40mg, lasix 20mg, metoprolol 25mg BID    RENAL  #RADHA  Although BUN:Cr ratio <20, in the setting of sepsis a pre-renal etiology  is most like likely  -s/p 2 L in ED  -Give 2 L NS    ENDOCRINE  #DM  -A1c 6.3  -Hold home metformin and start SSI    Hypothyroidism  -In setting of NPO hold home synthroid  -TSH= 18.4  -Likely mean patient getting inadequate dose or not taking home meds. F/u if he is taking. If he is increase home dose    GI/FEN  F: NS bolus x 2  E: Replete K<4 Mg < 2  N: NPO    DVT PPx - Heparin ggt tomorrow  GI PPx- protonix 40mg IV daily (ICU patient with sepsis meets criteria)    LINES - Bilateral peripheral lines on wrist, Planned Arterial and Central line. Randle catheter    CODE - Discussion had over the phone with guardian who states pt has living will. Living will stating pt is DNR and DNI if status is irreversible and terminal. MOLST and Living will in chart. Guardian Mila Fernandez (in [place]) can be reached at: [phone] or [phone]. Guardian agreeing to pressor support, ABX, IVF, and noninvasive ventilation at this time.     DISPO - continue intensive level of care in MICU service Mr Fernandez is an 84M former smoker with MHx of HTN, HLD, DM 2, hypothyroidism, prostate CA s/p seeding, atrial flutter s/p ablation, bifascicular heart block s/p pacemaker, COPD (not on home air), CAD s/p CABGx3 (3/17), CVA s/p L CEA (6/19) who presents with AMS, tachycardia, fever, and leukocytosis likely a result of septic shock with a source of pneumonia.    PLAN:    PULMONARY  #COPD  Not on Home O2. Satting 88-90% on admission.  -Continue with BiPAP, and adjust setting as needed- Currently 13/8 FiO2 40%  -Duoneb q6    NEURO  #CVA 6/2019 s/p CEA  Per family and Neuro notes, patient has swallowing deficits from previous CVA, but otherwise no other gross deficits.   -In setting of NPO holding ASA 81mg and simvastatin 40mg    CARDIOVASCULAR  #Septic Shock like source pneumonia  Patients met SIRS criteria on admission (HR>90, temp >100.4, WBC > 12,000, RR>19) with elevated lactate and SBP<90. Crackles on exam and CXR + for bilateral lower lobe infiltrate  -Started on empirical Vanc/Zosyn  -f/u Blood cultures  -started on BiPAP  -Bicarb 12, give 3 amps to also help with RR  -Norepi 8mg Goal SBP > 90      #HTN  #CHF EF 55%  -In setting of NPO holding home lisinopril 40mg, lasix 20mg, metoprolol 25mg BID    RENAL  #RADHA  Although BUN:Cr ratio <20, in the setting of sepsis a pre-renal etiology  is most like likely  -s/p 2 L in ED  -Give 2 L NS    ENDOCRINE  #DM  -A1c 6.3  -Hold home metformin and start SSI    Hypothyroidism  -In setting of NPO hold home synthroid  -TSH= 18.4  -Likely mean patient getting inadequate dose or not taking home meds. F/u if he is taking. If he is increase home dose    GI/FEN  F: NS bolus x 2  E: Replete K<4 Mg < 2  N: NPO    DVT PPx - Heparin ggt tomorrow  GI PPx- protonix 40mg IV daily (ICU patient with sepsis meets criteria)    LINES - Bilateral peripheral lines on wrist, Central line (R. IJ) Planned Arterial. Randle catheter    CODE - Discussion had over the phone with guardian who states pt has living will. Living will stating pt is DNR and DNI if status is irreversible and terminal. MOLST and Living will in chart. Guardian Mila Fernandez (in [place]) can be reached at: [phone] or [phone]. Guardian agreeing to pressor support, ABX, IVF, and noninvasive ventilation at this time.     DISPO - continue intensive level of care in MICU service Mr Fernandez is an 84M former smoker with MHx of HTN, HLD, DM 2, hypothyroidism, prostate CA s/p seeding, atrial flutter s/p ablation, bifascicular heart block s/p pacemaker, COPD (not on home air), CAD s/p CABGx3 (3/17), CVA s/p L CEA (6/19) who presents with AMS, tachycardia, fever, and leukocytosis likely a result of septic shock with a source of pneumonia.    PLAN:    PULMONARY  #COPD  Not on Home O2. Satting 88-90% on admission.  -Continue with BiPAP, and adjust setting as needed- Currently 13/8 FiO2 40%  -Duoneb q6    NEURO  #CVA 6/2019 s/p CEA  Per family and Neuro notes, patient has swallowing deficits from previous CVA, but otherwise no other gross deficits.   -In setting of NPO holding ASA 81mg and simvastatin 40mg    CARDIOVASCULAR  #Septic Shock like source pneumonia  Patients met SIRS criteria on admission (HR>90, temp >100.4, WBC > 12,000, RR>19) with elevated lactate and SBP<90. Crackles on exam and CXR + for bilateral lower lobe infiltrate  -Started on empirical Vanc/Zosyn  -f/u Blood cultures  -started on BiPAP  -Bicarb 12, give 3 amps to also help with RR  -Norepi 8mg Goal SBP > 90    #HTN  #CHF EF 55%  -In setting of NPO holding home lisinopril 40mg, lasix 20mg, metoprolol 25mg BID    RENAL  #RADHA  Although BUN:Cr ratio <20, in the setting of sepsis a pre-renal etiology is most like likely. Also consider ATN because patient is currently not making urine.  -s/p 2 L in ED  -Give 2 L NS    HEME  #Thrombocytopenia  Patient had Plt count WNL on recent admission, may be low 2/2 to dilution.   -Continue to monitor  -trend CBC    ENDOCRINE  #DM  -A1c 6.3  -Hold home metformin and start SSI    Hypothyroidism  -In setting of NPO hold home synthroid  -TSH= 18.4  -Likely mean patient getting inadequate dose or not taking home meds. F/u if he is taking. If he is increase home dose    GI/FEN  F: NS bolus x 2  E: Replete K<4 Mg < 2  N: NPO    DVT PPx - Heparin ggt tomorrow  GI PPx- protonix 40mg IV daily (ICU patient with sepsis meets criteria)    LINES - Bilateral peripheral lines on wrist, Central line (R. IJ) Planned Arterial. Randle catheter    CODE - Discussion had over the phone with guardian who states pt has living will. Living will stating pt is DNR and DNI if status is irreversible and terminal. MOLST and Living will in chart. Guardian Mila Fernandez (in [place]) can be reached at: [phone] or [phone]. Guardian agreeing to pressor support, ABX, IVF, and noninvasive ventilation at this time.     DISPO - continue intensive level of care in MICU service Mr Fernandez is an 84M former smoker with MHx of HTN, HLD, DM 2, hypothyroidism, prostate CA s/p seeding, atrial flutter s/p ablation, bifascicular heart block s/p pacemaker, COPD (not on home air), CAD s/p CABGx3 (3/17), CVA s/p L CEA (6/19) who presents with AMS, tachycardia, fever, and leukocytosis likely a result of septic shock with a source of pneumonia.    PLAN:    PULMONARY  #COPD  Not on Home O2. Satting 88-90% on admission.  -Continue with BiPAP, and adjust setting as needed- Currently 13/8 FiO2 40%  -Duoneb q6    NEURO  #CVA 6/2019 s/p CEA  Per family and Neuro notes, patient has swallowing deficits from previous CVA, but otherwise no other gross deficits.   -In setting of NPO holding ASA 81mg and simvastatin 40mg    CARDIOVASCULAR  #Septic Shock like source pneumonia  Patients met SIRS criteria on admission (HR>90, temp >100.4, WBC > 12,000, RR>19) with elevated lactate and SBP<90. Crackles on exam and CXR + for bilateral lower lobe infiltrate  -Started on empirical Vanc/Zosyn  -f/u Blood cultures  -started on BiPAP  -Bicarb 12, give 3 amps to also help with RR  -Norepi 8mg Goal SBP > 90    #HTN  #CHF EF 55%  -In setting of NPO holding home lisinopril 40mg, lasix 20mg, metoprolol 25mg BID    RENAL  #RADHA  Although BUN:Cr ratio <20, in the setting of sepsis a pre-renal etiology is most like likely. Also consider ATN because patient is currently not making urine.  -s/p 2 L in ED  -Give 2 L NS    HEME  #Thrombocytopenia  Patient had Plt count WNL on recent admission, may be low 2/2 to dilution.   -Continue to monitor  -trend CBC    #Macrocytic Anemia  #Spurr Cells on smear  In setting of decreased PO intake recently, patient at risk for a folate deficiency  -f/u folate  -spurr cells likely artifact, but if persistent consider w/u      ENDOCRINE  #DM  -A1c 6.3  -Hold home metformin and start SSI    Hypothyroidism  -In setting of NPO hold home synthroid  -TSH= 18.4  -Likely mean patient getting inadequate dose or not taking home meds. F/u if he is taking. If he is increase home dose    GI/FEN  F: NS bolus x 2  E: Replete K<4 Mg < 2  N: NPO    DVT PPx - Heparin ggt tomorrow  GI PPx- protonix 40mg IV daily (ICU patient with sepsis meets criteria)    LINES - Bilateral peripheral lines on wrist, Central line (R. IJ) Planned Arterial. Randle catheter    CODE - Discussion had over the phone with guardian who states pt has living will. Living will stating pt is DNR and DNI if status is irreversible and terminal. MOLST and Living will in chart. Guardian Mila Fernandez (in [place]) can be reached at: [phone] or [phone]. Guardian agreeing to pressor support, ABX, IVF, and noninvasive ventilation at this time.     DISPO - continue intensive level of care in MICU service Mr Fernandez is an 84M former smoker with MHx of HTN, HLD, DM 2, hypothyroidism, prostate CA s/p seeding, atrial flutter s/p ablation, bifascicular heart block s/p pacemaker, COPD (not on home air), CAD s/p CABGx3 (3/17), CVA s/p L CEA (6/19) who presents with AMS, tachycardia, fever, and leukocytosis likely a result of septic shock with a source of pneumonia.    PLAN:    PULMONARY  #COPD  Not on Home O2. Satting 88-90% on admission.  -Continue with BiPAP, and adjust setting as needed- Currently 13/8 FiO2 40%  -Duoneb q6    NEURO  #CVA 6/2019 s/p CEA  Per family and Neuro notes, patient has swallowing deficits from previous CVA, but otherwise no other gross deficits.   -In setting of NPO holding ASA 81mg and simvastatin 40mg    CARDIOVASCULAR  #Septic Shock like source pneumonia  Patients with qSOFA score 3 and met SIRS criteria on admission (HR>90, temp >100.4, WBC > 12,000, RR>19) with elevated lactate and SBP<90. Crackles on exam and CXR + for bilateral lower lobe infiltrate  -Started on empirical Vanc/Zosyn  -f/u Blood cultures  -started on BiPAP  -Bicarb 12, give 3 amps to also help with RR  -Norepi 8mg Goal SBP > 90    #HTN  #CHF EF 55%  -In setting of NPO holding home lisinopril 40mg, lasix 20mg, metoprolol 25mg BID    RENAL  #RADHA  Although BUN:Cr ratio <20, in the setting of sepsis a pre-renal etiology is most like likely. Also consider ATN because patient is currently not making urine.  -s/p 2 L in ED  -Give 2 L NS    HEME  #Thrombocytopenia  Patient had Plt count WNL on recent admission, may be low 2/2 to dilution.   -Continue to monitor  -trend CBC    #Macrocytic Anemia  #Spurr Cells on smear  In setting of decreased PO intake recently, patient at risk for a folate deficiency  -f/u folate  -spurr cells likely artifact, but if persistent consider w/u      ENDOCRINE  #DM  -A1c 6.3  -Hold home metformin and start SSI    Hypothyroidism  -In setting of NPO hold home synthroid  -TSH= 18.4  -Likely mean patient getting inadequate dose or not taking home meds. F/u if he is taking. If he is increase home dose    GI/FEN  F: NS bolus x 2  E: Replete K<4 Mg < 2  N: NPO    DVT PPx - Heparin ggt tomorrow  GI PPx- protonix 40mg IV daily (ICU patient with sepsis meets criteria)    LINES - Bilateral peripheral lines on wrist, Central line (R. IJ) Planned Arterial. Randle catheter    CODE - Discussion had over the phone with guardian who states pt has living will. Living will stating pt is DNR and DNI if status is irreversible and terminal. MOLST and Living will in chart. Guardian Mila Fernandez (in [place]) can be reached at: [phone] or [phone]. Guardian agreeing to pressor support, ABX, IVF, and noninvasive ventilation at this time.     DISPO - continue intensive level of care in MICU service

## 2019-07-14 NOTE — ED ADULT NURSE NOTE - OBJECTIVE STATEMENT
84y M, new onset AMS, brought in from Nursing home for fever and AMS since yesterday. Noted pt responsive to verbal and tactile stimuli, states "yes " to when name called. Pt unable to follow commands. PT upgraded to Dr. Gilbert for sepsis concern. Pt IV placed and fluids infusing, noted 24g from NH placed prior to arrival with fluids NS0.9% infusing. Placed on cardiac monitor with oyxgen. Unable to auscultate lungs sounds due to pt condition. Antibiotics infusing, tylenol given OR, pt care provided.. Straight cath per order. On cardiac monitor. Will continue to monitor. 84y M, new onset AMS, normally alert to person and ambulatory with assitance, brought in from Nursing home for fever and AMS since yesterday. Noted pt responsive to verbal and tactile stimuli, states "yes " to when name called. Pt unable to follow commands. PT upgraded to Dr. Gilbert for sepsis concern. Pt IV placed and fluids infusing, noted 24g from NH placed prior to arrival with fluids NS0.9% infusing. Placed on cardiac monitor with oyxgen. Unable to auscultate lungs sounds due to pt condition. Antibiotics infusing, tylenol given IN, pt care provided.. Straight cath per order. On cardiac monitor. Will continue to monitor.

## 2019-07-14 NOTE — ED PROVIDER NOTE - OBJECTIVE STATEMENT
Friend and sister in law at bedside. Hx provided by them and EMS and prior charts  84M PMH COPD, DM, HTN, HLD, TAVR, CAD w/ CABG, afib, PPM, recent L CEA for stroke symptoms presents from NH for fevers, AMS, hypotension. Pt unable to provide any additional info.   Baseline: A and O x1, can talk, minimally ambulatory w/ assistance. Since yesterday pt has not been talking and increasingly sleepy.   I d/w Guardian Mila Fernandez [phone] confirmed DNR/DNI  meds: amiodarone, apixaban, asa, d5 IVF, enema, duoneb, lasix, synthroid, lisinopril, metformin, metoprolol, olanzapine, zofran, simvastatin  completed [facility] 6/29

## 2019-07-14 NOTE — ED ADULT NURSE NOTE - CHIEF COMPLAINT QUOTE
patient BIBA from nursing home for fever and tachycardia. patient recently discharged from [facility].

## 2019-07-15 LAB
ALBUMIN SERPL ELPH-MCNC: 2 G/DL — LOW (ref 3.3–5)
ALBUMIN SERPL ELPH-MCNC: 2.1 G/DL — LOW (ref 3.3–5)
ALP SERPL-CCNC: 36 U/L — LOW (ref 40–120)
ALP SERPL-CCNC: 37 U/L — LOW (ref 40–120)
ALT FLD-CCNC: 29 U/L — SIGNIFICANT CHANGE UP (ref 10–45)
ALT FLD-CCNC: 30 U/L — SIGNIFICANT CHANGE UP (ref 10–45)
ANION GAP SERPL CALC-SCNC: 23 MMOL/L — HIGH (ref 5–17)
ANION GAP SERPL CALC-SCNC: 25 MMOL/L — HIGH (ref 5–17)
ANION GAP SERPL CALC-SCNC: 26 MMOL/L — HIGH (ref 5–17)
APTT BLD: 127.6 SEC — CRITICAL HIGH (ref 27.5–36.3)
APTT BLD: 46.5 SEC — HIGH (ref 27.5–36.3)
APTT BLD: 48.3 SEC — HIGH (ref 27.5–36.3)
APTT BLD: >200 SEC — CRITICAL HIGH (ref 27.5–36.3)
AST SERPL-CCNC: 83 U/L — HIGH (ref 10–40)
AST SERPL-CCNC: 85 U/L — HIGH (ref 10–40)
BASE EXCESS BLDA CALC-SCNC: -10.1 MMOL/L — LOW (ref -2–3)
BASE EXCESS BLDA CALC-SCNC: -13.1 MMOL/L — LOW (ref -2–3)
BASE EXCESS BLDA CALC-SCNC: -14.4 MMOL/L — LOW (ref -2–3)
BASE EXCESS BLDA CALC-SCNC: -16.1 MMOL/L — LOW (ref -2–3)
BASE EXCESS BLDA CALC-SCNC: -9.6 MMOL/L — LOW (ref -2–3)
BASE EXCESS BLDMV CALC-SCNC: -11.1 MMOL/L — SIGNIFICANT CHANGE UP
BASOPHILS # BLD AUTO: 0 K/UL — SIGNIFICANT CHANGE UP (ref 0–0.2)
BASOPHILS NFR BLD AUTO: 0 % — SIGNIFICANT CHANGE UP (ref 0–2)
BILIRUB DIRECT SERPL-MCNC: 0.4 MG/DL — HIGH (ref 0–0.2)
BILIRUB INDIRECT FLD-MCNC: 0.2 MG/DL — SIGNIFICANT CHANGE UP (ref 0.2–1)
BILIRUB SERPL-MCNC: 0.6 MG/DL — SIGNIFICANT CHANGE UP (ref 0.2–1.2)
BILIRUB SERPL-MCNC: 0.7 MG/DL — SIGNIFICANT CHANGE UP (ref 0.2–1.2)
BLD GP AB SCN SERPL QL: NEGATIVE — SIGNIFICANT CHANGE UP
BUN SERPL-MCNC: 38 MG/DL — HIGH (ref 7–23)
BUN SERPL-MCNC: 39 MG/DL — HIGH (ref 7–23)
BUN SERPL-MCNC: 41 MG/DL — HIGH (ref 7–23)
CALCIUM SERPL-MCNC: 6.7 MG/DL — LOW (ref 8.4–10.5)
CALCIUM SERPL-MCNC: 6.8 MG/DL — LOW (ref 8.4–10.5)
CALCIUM SERPL-MCNC: 6.9 MG/DL — LOW (ref 8.4–10.5)
CHLORIDE SERPL-SCNC: 101 MMOL/L — SIGNIFICANT CHANGE UP (ref 96–108)
CHLORIDE SERPL-SCNC: 104 MMOL/L — SIGNIFICANT CHANGE UP (ref 96–108)
CHLORIDE SERPL-SCNC: 106 MMOL/L — SIGNIFICANT CHANGE UP (ref 96–108)
CO2 SERPL-SCNC: 11 MMOL/L — LOW (ref 22–31)
CO2 SERPL-SCNC: 13 MMOL/L — LOW (ref 22–31)
CO2 SERPL-SCNC: 14 MMOL/L — LOW (ref 22–31)
CREAT SERPL-MCNC: 3.48 MG/DL — HIGH (ref 0.5–1.3)
CREAT SERPL-MCNC: 3.6 MG/DL — HIGH (ref 0.5–1.3)
CREAT SERPL-MCNC: 3.81 MG/DL — HIGH (ref 0.5–1.3)
CULTURE RESULTS: SIGNIFICANT CHANGE UP
EOSINOPHIL # BLD AUTO: 0 K/UL — SIGNIFICANT CHANGE UP (ref 0–0.5)
EOSINOPHIL NFR BLD AUTO: 0 % — SIGNIFICANT CHANGE UP (ref 0–6)
GAS PNL BLDA: SIGNIFICANT CHANGE UP
GAS PNL BLDMV: SIGNIFICANT CHANGE UP
GAS PNL BLDV: SIGNIFICANT CHANGE UP
GLUCOSE BLDC GLUCOMTR-MCNC: 106 MG/DL — HIGH (ref 70–99)
GLUCOSE BLDC GLUCOMTR-MCNC: 131 MG/DL — HIGH (ref 70–99)
GLUCOSE BLDC GLUCOMTR-MCNC: 162 MG/DL — HIGH (ref 70–99)
GLUCOSE SERPL-MCNC: 128 MG/DL — HIGH (ref 70–99)
GLUCOSE SERPL-MCNC: 132 MG/DL — HIGH (ref 70–99)
GLUCOSE SERPL-MCNC: 204 MG/DL — HIGH (ref 70–99)
GRAM STN FLD: SIGNIFICANT CHANGE UP
GRAM STN FLD: SIGNIFICANT CHANGE UP
HCO3 BLDA-SCNC: 10 MMOL/L — LOW (ref 21–28)
HCO3 BLDA-SCNC: 12 MMOL/L — LOW (ref 21–28)
HCO3 BLDA-SCNC: 12 MMOL/L — LOW (ref 21–28)
HCO3 BLDA-SCNC: 14 MMOL/L — LOW (ref 21–28)
HCO3 BLDA-SCNC: 14 MMOL/L — LOW (ref 21–28)
HCO3 BLDMV-SCNC: 15 MMOL/L — SIGNIFICANT CHANGE UP
HCT VFR BLD CALC: 34.5 % — LOW (ref 39–50)
HCT VFR BLD CALC: 36.8 % — LOW (ref 39–50)
HGB BLD-MCNC: 10.6 G/DL — LOW (ref 13–17)
HGB BLD-MCNC: 11.3 G/DL — LOW (ref 13–17)
INR BLD: 2.79 — HIGH (ref 0.88–1.16)
INR BLD: 2.99 — HIGH (ref 0.88–1.16)
LACTATE SERPL-SCNC: 10.1 MMOL/L — CRITICAL HIGH (ref 0.5–2)
LACTATE SERPL-SCNC: 10.8 MMOL/L — CRITICAL HIGH (ref 0.5–2)
LACTATE SERPL-SCNC: 10.8 MMOL/L — CRITICAL HIGH (ref 0.5–2)
LACTATE SERPL-SCNC: 9.6 MMOL/L — CRITICAL HIGH (ref 0.5–2)
LACTATE SERPL-SCNC: 9.9 MMOL/L — CRITICAL HIGH (ref 0.5–2)
LDH SERPL L TO P-CCNC: 444 U/L — HIGH (ref 50–242)
LYMPHOCYTES # BLD AUTO: 0.27 K/UL — LOW (ref 1–3.3)
LYMPHOCYTES # BLD AUTO: 0.9 % — LOW (ref 13–44)
MAGNESIUM SERPL-MCNC: 0.9 MG/DL — CRITICAL LOW (ref 1.6–2.6)
MAGNESIUM SERPL-MCNC: 1.8 MG/DL — SIGNIFICANT CHANGE UP (ref 1.6–2.6)
MCHC RBC-ENTMCNC: 30.7 GM/DL — LOW (ref 32–36)
MCHC RBC-ENTMCNC: 30.7 GM/DL — LOW (ref 32–36)
MCHC RBC-ENTMCNC: 32 PG — SIGNIFICANT CHANGE UP (ref 27–34)
MCHC RBC-ENTMCNC: 32.4 PG — SIGNIFICANT CHANGE UP (ref 27–34)
MCV RBC AUTO: 104.2 FL — HIGH (ref 80–100)
MCV RBC AUTO: 105.4 FL — HIGH (ref 80–100)
METHOD TYPE: SIGNIFICANT CHANGE UP
MONOCYTES # BLD AUTO: 1.83 K/UL — HIGH (ref 0–0.9)
MONOCYTES NFR BLD AUTO: 6.2 % — SIGNIFICANT CHANGE UP (ref 2–14)
MRSA SPEC QL CULT: SIGNIFICANT CHANGE UP
NEUTROPHILS # BLD AUTO: 26.59 K/UL — HIGH (ref 1.8–7.4)
NEUTROPHILS NFR BLD AUTO: 60.5 % — SIGNIFICANT CHANGE UP (ref 43–77)
NRBC # BLD: 0 /100 WBCS — SIGNIFICANT CHANGE UP (ref 0–0)
O2 CT VFR BLD CALC: 47 MMHG — SIGNIFICANT CHANGE UP (ref 30–65)
PCO2 BLDA: 25 MMHG — LOW (ref 35–48)
PCO2 BLDA: 25 MMHG — LOW (ref 35–48)
PCO2 BLDA: 26 MMHG — LOW (ref 35–48)
PCO2 BLDA: 27 MMHG — LOW (ref 35–48)
PCO2 BLDA: 29 MMHG — LOW (ref 35–48)
PCO2 BLDMV: 34 MMHG — SIGNIFICANT CHANGE UP (ref 30–65)
PH BLDA: 7.22 — CRITICAL LOW (ref 7.35–7.45)
PH BLDA: 7.22 — CRITICAL LOW (ref 7.35–7.45)
PH BLDA: 7.28 — LOW (ref 7.35–7.45)
PH BLDA: 7.34 — LOW (ref 7.35–7.45)
PH BLDA: 7.36 — SIGNIFICANT CHANGE UP (ref 7.35–7.45)
PH BLDMV: 7.26 — SIGNIFICANT CHANGE UP (ref 7.2–7.45)
PHOSPHATE SERPL-MCNC: 4.6 MG/DL — HIGH (ref 2.5–4.5)
PHOSPHATE SERPL-MCNC: 4.8 MG/DL — HIGH (ref 2.5–4.5)
PLATELET # BLD AUTO: 43 K/UL — LOW (ref 150–400)
PLATELET # BLD AUTO: 55 K/UL — LOW (ref 150–400)
PO2 BLDA: 103 MMHG — SIGNIFICANT CHANGE UP (ref 83–108)
PO2 BLDA: 106 MMHG — SIGNIFICANT CHANGE UP (ref 83–108)
PO2 BLDA: 124 MMHG — HIGH (ref 83–108)
PO2 BLDA: 90 MMHG — SIGNIFICANT CHANGE UP (ref 83–108)
PO2 BLDA: 95 MMHG — SIGNIFICANT CHANGE UP (ref 83–108)
POTASSIUM SERPL-MCNC: 3.6 MMOL/L — SIGNIFICANT CHANGE UP (ref 3.5–5.3)
POTASSIUM SERPL-MCNC: 3.8 MMOL/L — SIGNIFICANT CHANGE UP (ref 3.5–5.3)
POTASSIUM SERPL-MCNC: 4.1 MMOL/L — SIGNIFICANT CHANGE UP (ref 3.5–5.3)
POTASSIUM SERPL-SCNC: 3.6 MMOL/L — SIGNIFICANT CHANGE UP (ref 3.5–5.3)
POTASSIUM SERPL-SCNC: 3.8 MMOL/L — SIGNIFICANT CHANGE UP (ref 3.5–5.3)
POTASSIUM SERPL-SCNC: 4.1 MMOL/L — SIGNIFICANT CHANGE UP (ref 3.5–5.3)
PROT SERPL-MCNC: 5 G/DL — LOW (ref 6–8.3)
PROT SERPL-MCNC: 5 G/DL — LOW (ref 6–8.3)
PROTHROM AB SERPL-ACNC: 32.5 SEC — HIGH (ref 10–12.9)
PROTHROM AB SERPL-ACNC: 35 SEC — HIGH (ref 10–12.9)
RBC # BLD: 3.31 M/UL — LOW (ref 4.2–5.8)
RBC # BLD: 3.49 M/UL — LOW (ref 4.2–5.8)
RBC # FLD: 16.6 % — HIGH (ref 10.3–14.5)
RBC # FLD: 16.7 % — HIGH (ref 10.3–14.5)
RH IG SCN BLD-IMP: NEGATIVE — SIGNIFICANT CHANGE UP
SAO2 % BLDA: 96 % — SIGNIFICANT CHANGE UP (ref 95–100)
SAO2 % BLDA: 97 % — SIGNIFICANT CHANGE UP (ref 95–100)
SAO2 % BLDA: 98 % — SIGNIFICANT CHANGE UP (ref 95–100)
SAO2 % BLDA: 98 % — SIGNIFICANT CHANGE UP (ref 95–100)
SAO2 % BLDA: 99 % — SIGNIFICANT CHANGE UP (ref 95–100)
SAO2 % BLDMV: 74 % — SIGNIFICANT CHANGE UP
SODIUM SERPL-SCNC: 140 MMOL/L — SIGNIFICANT CHANGE UP (ref 135–145)
SODIUM SERPL-SCNC: 141 MMOL/L — SIGNIFICANT CHANGE UP (ref 135–145)
SODIUM SERPL-SCNC: 142 MMOL/L — SIGNIFICANT CHANGE UP (ref 135–145)
SPECIMEN SOURCE: SIGNIFICANT CHANGE UP
VANCOMYCIN FLD-MCNC: 10.7 UG/ML — SIGNIFICANT CHANGE UP
VANCOMYCIN FLD-MCNC: 14.8 UG/ML — SIGNIFICANT CHANGE UP
WBC # BLD: 27.12 K/UL — HIGH (ref 3.8–10.5)
WBC # BLD: 29.45 K/UL — HIGH (ref 3.8–10.5)
WBC # FLD AUTO: 27.12 K/UL — HIGH (ref 3.8–10.5)
WBC # FLD AUTO: 29.45 K/UL — HIGH (ref 3.8–10.5)

## 2019-07-15 PROCEDURE — 76700 US EXAM ABDOM COMPLETE: CPT | Mod: 26

## 2019-07-15 PROCEDURE — 99291 CRITICAL CARE FIRST HOUR: CPT | Mod: 25

## 2019-07-15 PROCEDURE — 93306 TTE W/DOPPLER COMPLETE: CPT | Mod: 26

## 2019-07-15 PROCEDURE — 36620 INSERTION CATHETER ARTERY: CPT | Mod: GC

## 2019-07-15 PROCEDURE — 71045 X-RAY EXAM CHEST 1 VIEW: CPT | Mod: 26

## 2019-07-15 PROCEDURE — 71045 X-RAY EXAM CHEST 1 VIEW: CPT | Mod: 26,77

## 2019-07-15 PROCEDURE — 36556 INSERT NON-TUNNEL CV CATH: CPT | Mod: GC

## 2019-07-15 PROCEDURE — 99222 1ST HOSP IP/OBS MODERATE 55: CPT | Mod: GC

## 2019-07-15 RX ORDER — VANCOMYCIN HCL 1 G
500 VIAL (EA) INTRAVENOUS ONCE
Refills: 0 | Status: COMPLETED | OUTPATIENT
Start: 2019-07-15 | End: 2019-07-15

## 2019-07-15 RX ORDER — VASOPRESSIN 20 [USP'U]/ML
0.01 INJECTION INTRAVENOUS
Qty: 50 | Refills: 0 | Status: DISCONTINUED | OUTPATIENT
Start: 2019-07-15 | End: 2019-07-15

## 2019-07-15 RX ORDER — PROPOFOL 10 MG/ML
10 INJECTION, EMULSION INTRAVENOUS
Qty: 1000 | Refills: 0 | Status: DISCONTINUED | OUTPATIENT
Start: 2019-07-15 | End: 2019-07-16

## 2019-07-15 RX ORDER — ACETAMINOPHEN 500 MG
1000 TABLET ORAL ONCE
Refills: 0 | Status: COMPLETED | OUTPATIENT
Start: 2019-07-15 | End: 2019-07-15

## 2019-07-15 RX ORDER — NOREPINEPHRINE BITARTRATE/D5W 8 MG/250ML
0.05 PLASTIC BAG, INJECTION (ML) INTRAVENOUS
Qty: 16 | Refills: 0 | Status: DISCONTINUED | OUTPATIENT
Start: 2019-07-15 | End: 2019-07-18

## 2019-07-15 RX ORDER — TOBRAMYCIN SULFATE 40 MG/ML
130 VIAL (ML) INJECTION ONCE
Refills: 0 | Status: COMPLETED | OUTPATIENT
Start: 2019-07-15 | End: 2019-07-15

## 2019-07-15 RX ORDER — MAGNESIUM SULFATE 500 MG/ML
4 VIAL (ML) INJECTION ONCE
Refills: 0 | Status: COMPLETED | OUTPATIENT
Start: 2019-07-15 | End: 2019-07-15

## 2019-07-15 RX ORDER — HEPARIN SODIUM 5000 [USP'U]/ML
1500 INJECTION INTRAVENOUS; SUBCUTANEOUS
Qty: 25000 | Refills: 0 | Status: DISCONTINUED | OUTPATIENT
Start: 2019-07-15 | End: 2019-07-16

## 2019-07-15 RX ORDER — SODIUM CHLORIDE 9 MG/ML
1000 INJECTION INTRAMUSCULAR; INTRAVENOUS; SUBCUTANEOUS ONCE
Refills: 0 | Status: COMPLETED | OUTPATIENT
Start: 2019-07-15 | End: 2019-07-15

## 2019-07-15 RX ORDER — SODIUM BICARBONATE 1 MEQ/ML
50 SYRINGE (ML) INTRAVENOUS
Refills: 0 | Status: COMPLETED | OUTPATIENT
Start: 2019-07-15 | End: 2019-07-15

## 2019-07-15 RX ORDER — MICAFUNGIN SODIUM 100 MG/1
INJECTION, POWDER, LYOPHILIZED, FOR SOLUTION INTRAVENOUS
Refills: 0 | Status: DISCONTINUED | OUTPATIENT
Start: 2019-07-15 | End: 2019-07-15

## 2019-07-15 RX ORDER — MICAFUNGIN SODIUM 100 MG/1
100 INJECTION, POWDER, LYOPHILIZED, FOR SOLUTION INTRAVENOUS ONCE
Refills: 0 | Status: COMPLETED | OUTPATIENT
Start: 2019-07-15 | End: 2019-07-15

## 2019-07-15 RX ORDER — HYDROCORTISONE 20 MG
50 TABLET ORAL EVERY 6 HOURS
Refills: 0 | Status: DISCONTINUED | OUTPATIENT
Start: 2019-07-15 | End: 2019-07-17

## 2019-07-15 RX ORDER — VANCOMYCIN HCL 1 G
250 VIAL (EA) INTRAVENOUS ONCE
Refills: 0 | Status: COMPLETED | OUTPATIENT
Start: 2019-07-15 | End: 2019-07-15

## 2019-07-15 RX ORDER — CHLORHEXIDINE GLUCONATE 213 G/1000ML
15 SOLUTION TOPICAL EVERY 12 HOURS
Refills: 0 | Status: DISCONTINUED | OUTPATIENT
Start: 2019-07-15 | End: 2019-07-18

## 2019-07-15 RX ORDER — LEVOTHYROXINE SODIUM 125 MCG
25 TABLET ORAL AT BEDTIME
Refills: 0 | Status: DISCONTINUED | OUTPATIENT
Start: 2019-07-15 | End: 2019-07-18

## 2019-07-15 RX ORDER — PIPERACILLIN AND TAZOBACTAM 4; .5 G/20ML; G/20ML
2.25 INJECTION, POWDER, LYOPHILIZED, FOR SOLUTION INTRAVENOUS EVERY 8 HOURS
Refills: 0 | Status: DISCONTINUED | OUTPATIENT
Start: 2019-07-16 | End: 2019-07-16

## 2019-07-15 RX ORDER — VANCOMYCIN HCL 1 G
250 VIAL (EA) INTRAVENOUS ONCE
Refills: 0 | Status: DISCONTINUED | OUTPATIENT
Start: 2019-07-15 | End: 2019-07-15

## 2019-07-15 RX ORDER — VASOPRESSIN 20 [USP'U]/ML
0.02 INJECTION INTRAVENOUS
Qty: 50 | Refills: 0 | Status: DISCONTINUED | OUTPATIENT
Start: 2019-07-15 | End: 2019-07-16

## 2019-07-15 RX ORDER — ACETAMINOPHEN 500 MG
650 TABLET ORAL ONCE
Refills: 0 | Status: DISCONTINUED | OUTPATIENT
Start: 2019-07-15 | End: 2019-07-15

## 2019-07-15 RX ADMIN — Medication 400 MILLIGRAM(S): at 03:14

## 2019-07-15 RX ADMIN — PROPOFOL 5.53 MICROGRAM(S)/KG/MIN: 10 INJECTION, EMULSION INTRAVENOUS at 05:57

## 2019-07-15 RX ADMIN — Medication 50 MILLIEQUIVALENT(S): at 11:58

## 2019-07-15 RX ADMIN — Medication 3 MILLILITER(S): at 21:26

## 2019-07-15 RX ADMIN — CHLORHEXIDINE GLUCONATE 1 APPLICATION(S): 213 SOLUTION TOPICAL at 06:55

## 2019-07-15 RX ADMIN — PIPERACILLIN AND TAZOBACTAM 200 GRAM(S): 4; .5 INJECTION, POWDER, LYOPHILIZED, FOR SOLUTION INTRAVENOUS at 05:57

## 2019-07-15 RX ADMIN — CHLORHEXIDINE GLUCONATE 15 MILLILITER(S): 213 SOLUTION TOPICAL at 16:13

## 2019-07-15 RX ADMIN — Medication 100 MILLIGRAM(S): at 07:31

## 2019-07-15 RX ADMIN — VASOPRESSIN 2.4 UNIT(S)/MIN: 20 INJECTION INTRAVENOUS at 16:49

## 2019-07-15 RX ADMIN — Medication 100 GRAM(S): at 03:30

## 2019-07-15 RX ADMIN — SODIUM CHLORIDE 1000 MILLILITER(S): 9 INJECTION INTRAMUSCULAR; INTRAVENOUS; SUBCUTANEOUS at 04:41

## 2019-07-15 RX ADMIN — Medication 50 MILLIEQUIVALENT(S): at 11:39

## 2019-07-15 RX ADMIN — PIPERACILLIN AND TAZOBACTAM 200 GRAM(S): 4; .5 INJECTION, POWDER, LYOPHILIZED, FOR SOLUTION INTRAVENOUS at 16:23

## 2019-07-15 RX ADMIN — Medication 50 MILLIEQUIVALENT(S): at 10:51

## 2019-07-15 RX ADMIN — Medication 4.32 MICROGRAM(S)/KG/MIN: at 09:53

## 2019-07-15 RX ADMIN — PIPERACILLIN AND TAZOBACTAM 200 GRAM(S): 4; .5 INJECTION, POWDER, LYOPHILIZED, FOR SOLUTION INTRAVENOUS at 10:52

## 2019-07-15 RX ADMIN — HEPARIN SODIUM 15 UNIT(S)/HR: 5000 INJECTION INTRAVENOUS; SUBCUTANEOUS at 12:01

## 2019-07-15 RX ADMIN — Medication 100 MILLIGRAM(S): at 23:06

## 2019-07-15 RX ADMIN — Medication 50 MILLIEQUIVALENT(S): at 11:18

## 2019-07-15 RX ADMIN — Medication 50 MILLIGRAM(S): at 21:38

## 2019-07-15 RX ADMIN — Medication 3 MILLILITER(S): at 11:06

## 2019-07-15 RX ADMIN — Medication 50 MILLIGRAM(S): at 09:52

## 2019-07-15 RX ADMIN — VASOPRESSIN 0.6 UNIT(S)/MIN: 20 INJECTION INTRAVENOUS at 05:38

## 2019-07-15 RX ADMIN — Medication 1000 MILLIGRAM(S): at 03:56

## 2019-07-15 RX ADMIN — Medication 8.27 MICROGRAM(S)/KG/MIN: at 03:15

## 2019-07-15 RX ADMIN — Medication 206.5 MILLIGRAM(S): at 09:54

## 2019-07-15 RX ADMIN — Medication 4.32 MICROGRAM(S)/KG/MIN: at 16:49

## 2019-07-15 RX ADMIN — Medication 3 MILLILITER(S): at 16:23

## 2019-07-15 RX ADMIN — Medication 3 MILLILITER(S): at 03:05

## 2019-07-15 RX ADMIN — MICAFUNGIN SODIUM 105 MILLIGRAM(S): 100 INJECTION, POWDER, LYOPHILIZED, FOR SOLUTION INTRAVENOUS at 09:54

## 2019-07-15 RX ADMIN — Medication 50 MILLIGRAM(S): at 16:23

## 2019-07-15 RX ADMIN — Medication 25 MICROGRAM(S): at 21:38

## 2019-07-15 RX ADMIN — SODIUM CHLORIDE 60 MILLILITER(S): 9 INJECTION, SOLUTION INTRAVENOUS at 16:22

## 2019-07-15 RX ADMIN — PANTOPRAZOLE SODIUM 40 MILLIGRAM(S): 20 TABLET, DELAYED RELEASE ORAL at 10:52

## 2019-07-15 RX ADMIN — SODIUM CHLORIDE 1000 MILLILITER(S): 9 INJECTION INTRAMUSCULAR; INTRAVENOUS; SUBCUTANEOUS at 00:51

## 2019-07-15 NOTE — DIETITIAN INITIAL EVALUATION ADULT. - ENTERAL
Once pt is hemodynamically stable w/MAP >65 and pressors downtrending, consider placing NGT and initiating EN.

## 2019-07-15 NOTE — DIETITIAN INITIAL EVALUATION ADULT. - OTHER INFO
83 yo/male with PMHx HTN, HLD, DM, hypothyroidism, prostate cancer, Aflutter, COPD, CAD, CVA, s/p recent L. CEA, presented from NH with AMS, tachycardia, fever, and leukocytosis. Pt admitted w/septic shock possibly 2/2 pna, vs. abdominal source. Pt intubated 2/2 increased work of breathing. C/f mesenteric ischemia- surgery recommending CT A/P once pt stable. Pt seen in room and discussed during MICU rounds. Pt intubated on VC/AC mode, sedated on propofol @ 16.6mL/hr (438kcal/day from lipids). MAP 64- requiring levophed and vasopressin for BP support. No NGT in place yet, no plan to start TF. No BM recorded since admit. NFKA. Skin intact pressure-wise. Unable to obtain nutrition hx at this time 2/2 ETT and sedation. Will continue to monitor for goals of care and keep nutrition aligned at all times. Will follow per RD protocol.

## 2019-07-15 NOTE — CONSULT NOTE ADULT - ASSESSMENT
84M with PMH of HTN, HLD, DM, hypothyroidism, prostate CA A-flutter (s/p ablation, on Eliquis), COPD, CAD (s/p CABG x3), CVA s/p L CEA (6/19) presents to [facility] for AMS and increased sleepiness x1 day, admitted to the MICU for septic shock 2/2 MRSA bacteremia.    - Recommend CTA Neck/Chest/Abdomen/Pelvix to evaluate for other possible sources bacteremia  - Agree with abx  - No acute vascular surgery intervention at this time  - Neck incision looks clean, no signs of active infection  - Remaining care per MICU  - Call [phone] with questions or acute changes 84M with PMH of HTN, HLD, DM, hypothyroidism, prostate CA A-flutter (s/p ablation, on Eliquis), COPD, CAD (s/p CABG x3), CVA s/p L CEA (6/19) presents to [facility] for AMS and increased sleepiness x1 day, admitted to the MICU for septic shock 2/2 MRSA bacteremia.    - Recommend CTA Neck/Chest/Abdomen/Pelvix to evaluate for other possible sources bacteremia or mesenteric ischemia  - Agree with abx  - No acute vascular surgery intervention at this time  - Neck incision looks clean, no signs of active infection  - Remaining care per MICU  - Call [phone] with questions or acute changes

## 2019-07-15 NOTE — PROGRESS NOTE ADULT - SUBJECTIVE AND OBJECTIVE BOX
*** NOTE IN PROGRESS ***    INTERVAL HPI/OVERNIGHT EVENTS:    SUBJECTIVE: Patient seen and examined at bedside.    OBJECTIVE:    VITAL SIGNS:  ICU Vital Signs Last 24 Hrs  T(C): 36.6 (15 Jul 2019 06:17), Max: 39.4 (14 Jul 2019 16:19)  T(F): 97.9 (15 Jul 2019 06:17), Max: 102.9 (14 Jul 2019 16:19)  HR: 82 (15 Jul 2019 10:00) (60 - 117)  BP: 99/51 (15 Jul 2019 04:00) (66/34 - 107/77)  BP(mean): 67 (15 Jul 2019 04:00) (42 - 73)  ABP: 116/44 (15 Jul 2019 10:00) (78/40 - 120/54)  ABP(mean): 66 (15 Jul 2019 10:00) (56 - 76)  RR: 24 (15 Jul 2019 10:00) (9 - 31)  SpO2: 100% (15 Jul 2019 10:00) (80% - 100%)    Mode: AC/ CMV (Assist Control/ Continuous Mandatory Ventilation), RR (machine): 24, TV (machine): 500, FiO2: 50, PEEP: 5, MAP: 8.9, PIP: 17    07-14 @ 07:01  -  07-15 @ 07:00  --------------------------------------------------------  IN: 5777.4 mL / OUT: 0 mL / NET: 5777.4 mL    07-15 @ 07:01  -  07-15 @ 11:24  --------------------------------------------------------  IN: 916 mL / OUT: 0 mL / NET: 916 mL      CAPILLARY BLOOD GLUCOSE      POCT Blood Glucose.: 106 mg/dL (15 Jul 2019 05:26)      PHYSICAL EXAM:    General: NAD  HEENT: NC/AT; PERRL, clear conjunctiva  Neck: supple  Respiratory: CTA b/l  Cardiovascular: +S1/S2; RRR  Abdomen: soft, NT/ND; +BS x4  Extremities: WWP, 2+ peripheral pulses b/l; no LE edema  Skin: normal color and turgor; no rash  Neurological:     MEDICATIONS:  MEDICATIONS  (STANDING):  ALBUTerol/ipratropium for Nebulization 3 milliLiter(s) Nebulizer every 6 hours  chlorhexidine 0.12% Liquid 15 milliLiter(s) Oral Mucosa every 12 hours  chlorhexidine 2% Cloths 1 Application(s) Topical <User Schedule>  dextrose 5%. 1000 milliLiter(s) (50 mL/Hr) IV Continuous <Continuous>  dextrose 5%. 1000 milliLiter(s) (60 mL/Hr) IV Continuous <Continuous>  dextrose 50% Injectable 12.5 Gram(s) IV Push once  dextrose 50% Injectable 25 Gram(s) IV Push once  dextrose 50% Injectable 25 Gram(s) IV Push once  hydrocortisone sodium succinate Injectable 50 milliGRAM(s) IV Push every 6 hours  insulin lispro (HumaLOG) corrective regimen sliding scale   SubCutaneous Before meals and at bedtime  levothyroxine Injectable 25 MICROGram(s) IV Push at bedtime  norepinephrine Infusion 0.05 MICROgram(s)/kG/Min (4.322 mL/Hr) IV Continuous <Continuous>  pantoprazole  Injectable 40 milliGRAM(s) IV Push daily  piperacillin/tazobactam IVPB.. 2.25 Gram(s) IV Intermittent every 6 hours  propofol Infusion 10 MICROgram(s)/kG/Min (5.532 mL/Hr) IV Continuous <Continuous>  sodium bicarbonate  Injectable 50 milliEquivalent(s) IV Push every 10 minutes  vasopressin Infusion 0.04 Unit(s)/Min (2.4 mL/Hr) IV Continuous <Continuous>    MEDICATIONS  (PRN):  dextrose 40% Gel 15 Gram(s) Oral once PRN Blood Glucose LESS THAN 70 milliGRAM(s)/deciliter  glucagon  Injectable 1 milliGRAM(s) IntraMuscular once PRN Glucose LESS THAN 70 milligrams/deciliter      ALLERGIES:  Allergies    No Known Allergies    Intolerances    narcotic analgesics (Nausea; Vomiting)      LABS:                        11.3   29.45 )-----------( 43       ( 15 Jul 2019 05:54 )             36.8     07-15    142  |  106  |  38<H>  ----------------------------<  128<H>  3.8   |  11<L>  |  3.60<H>    Ca    6.8<L>      15 Jul 2019 05:54  Phos  4.6     07-15  Mg     1.8     07-15    TPro  5.0<L>  /  Alb  2.0<L>  /  TBili  0.6  /  DBili  0.4<H>  /  AST  85<H>  /  ALT  30  /  AlkPhos  36<L>  07-15    PT/INR - ( 15 Jul 2019 05:54 )   PT: 32.5 sec;   INR: 2.79          PTT - ( 15 Jul 2019 05:54 )  PTT:48.3 sec  Urinalysis Basic - ( 14 Jul 2019 17:24 )    Color: Yellow / Appearance: SL Cloudy / SG: >=1.030 / pH: x  Gluc: x / Ketone: Trace mg/dL  / Bili: Small / Urobili: 0.2 E.U./dL   Blood: x / Protein: 30 mg/dL / Nitrite: NEGATIVE   Leuk Esterase: NEGATIVE / RBC: < 5 /HPF / WBC < 5 /HPF   Sq Epi: x / Non Sq Epi: 5-10 /HPF / Bacteria: Present /HPF        RADIOLOGY & ADDITIONAL TESTS: Reviewed. *** NOTE IN PROGRESS ***    INTERVAL HPI/OVERNIGHT EVENTS: 84 y.o male with PMH HTN, HLD, hypothyroidism, DM 2, prostate CA s/p seeding, a flutter s/p ablation on eliquis, bifascicular heart block s/p pacemaker, COPD, CAD s/p CABGx3 (2017), CVA s/p L CEA presents from [facility] with AMS x1 day/ Patient was reportedly "babbling". Patient met sepsis criteria and workup for sepsis was started. Patient was on initially on BiPap, but had increased work of breathing, decrease in mental status, and worseing ABG so he was intubated at 0525 on 7/15. Systolic blood pressure was in the 80s-90s, was started on levophed 40, vasopressin 2.4. Central line in the right IJ and R radial arterial line placed. Lactate increased to 10.8. Magnesium level 0.9, was repleted: now 1.8. Potassium 3.6, was repleted: now 3.8. Patient has had no urine output since admission. Bladder scan was performed, and showed no urine in bladder. BUN 38, Cr 3.6 which is increased from previous admission in 6/19 (BUN 20 Cr 1.1). Patient also found to have thrombocytopenia ( Plt 43), consistent with sepsis, and microcytic anemia.     SUBJECTIVE: Patient seen and examined at bedside. Patient intubated, PEEP 5, FiO2 50%, Vt 500 mL, RR 24.     OBJECTIVE:    VITAL SIGNS:  ICU Vital Signs Last 24 Hrs  T(C): 36.6 (15 Jul 2019 06:17), Max: 39.4 (14 Jul 2019 16:19)  T(F): 97.9 (15 Jul 2019 06:17), Max: 102.9 (14 Jul 2019 16:19)  HR: 82 (15 Jul 2019 10:00) (60 - 117)  BP: 99/51 (15 Jul 2019 04:00) (66/34 - 107/77)  BP(mean): 67 (15 Jul 2019 04:00) (42 - 73)  ABP: 116/44 (15 Jul 2019 10:00) (78/40 - 120/54)  ABP(mean): 66 (15 Jul 2019 10:00) (56 - 76)  RR: 24 (15 Jul 2019 10:00) (9 - 31)  SpO2: 100% (15 Jul 2019 10:00) (80% - 100%)    Mode: AC/ CMV (Assist Control/ Continuous Mandatory Ventilation), RR (machine): 24, TV (machine): 500, FiO2: 50, PEEP: 5, MAP: 8.9, PIP: 17    07-14 @ 07:01  -  07-15 @ 07:00  --------------------------------------------------------  IN: 5777.4 mL / OUT: 0 mL / NET: 5777.4 mL    07-15 @ 07:01  -  07-15 @ 11:24  --------------------------------------------------------  IN: 916 mL / OUT: 0 mL / NET: 916 mL      CAPILLARY BLOOD GLUCOSE    POCT Blood Glucose.: 106 mg/dL (15 Jul 2019 05:26)      PHYSICAL EXAM:  General: patient intubated and on sedation.   HEENT: Bitemporal wasting, atraumatic; PERRL, clear conjunctiva. Doll's eyes (+), slight yellow discharge around the eyes b/l  Neck: supple  Respiratory: CTA b/l, diminished breath sounds in lower lobes of lungs. No wheezes, rales, rhonchi  Cardiovascular: +S1/S2; RRR, no ecchymosis noted on chest.  Abdomen: soft, NT/ND; +BS x4, no echymosis noted  Extremities: WWP, 2+ peripheral pulses b/l; no LE edema  Skin: ecchymosis noted on right arm, possibly from IV puncture sites   Neurological: (+) babinski sign b/l. Sedated, no response to pain.    MEDICATIONS:  MEDICATIONS  (STANDING):  ALBUTerol/ipratropium for Nebulization 3 milliLiter(s) Nebulizer every 6 hours  chlorhexidine 0.12% Liquid 15 milliLiter(s) Oral Mucosa every 12 hours  chlorhexidine 2% Cloths 1 Application(s) Topical <User Schedule>  dextrose 5%. 1000 milliLiter(s) (50 mL/Hr) IV Continuous <Continuous>  dextrose 5%. 1000 milliLiter(s) (60 mL/Hr) IV Continuous <Continuous>  dextrose 50% Injectable 12.5 Gram(s) IV Push once  dextrose 50% Injectable 25 Gram(s) IV Push once  dextrose 50% Injectable 25 Gram(s) IV Push once  hydrocortisone sodium succinate Injectable 50 milliGRAM(s) IV Push every 6 hours  insulin lispro (HumaLOG) corrective regimen sliding scale   SubCutaneous Before meals and at bedtime  levothyroxine Injectable 25 MICROGram(s) IV Push at bedtime  norepinephrine Infusion 0.05 MICROgram(s)/kG/Min (4.322 mL/Hr) IV Continuous <Continuous>  pantoprazole  Injectable 40 milliGRAM(s) IV Push daily  piperacillin/tazobactam IVPB.. 2.25 Gram(s) IV Intermittent every 6 hours  propofol Infusion 10 MICROgram(s)/kG/Min (5.532 mL/Hr) IV Continuous <Continuous>  sodium bicarbonate  Injectable 50 milliEquivalent(s) IV Push every 10 minutes  vasopressin Infusion 0.04 Unit(s)/Min (2.4 mL/Hr) IV Continuous <Continuous>    MEDICATIONS  (PRN):  dextrose 40% Gel 15 Gram(s) Oral once PRN Blood Glucose LESS THAN 70 milliGRAM(s)/deciliter  glucagon  Injectable 1 milliGRAM(s) IntraMuscular once PRN Glucose LESS THAN 70 milligrams/deciliter      ALLERGIES:  Allergies    No Known Allergies    Intolerances    narcotic analgesics (Nausea; Vomiting)      LABS:                        11.3   29.45 )-----------( 43       ( 15 Jul 2019 05:54 )             36.8     07-15    142  |  106  |  38<H>  ----------------------------<  128<H>  3.8   |  11<L>  |  3.60<H>    Ca    6.8<L>      15 Jul 2019 05:54  Phos  4.6     07-15  Mg     1.8     07-15    TPro  5.0<L>  /  Alb  2.0<L>  /  TBili  0.6  /  DBili  0.4<H>  /  AST  85<H>  /  ALT  30  /  AlkPhos  36<L>  07-15    PT/INR - ( 15 Jul 2019 05:54 )   PT: 32.5 sec;   INR: 2.79          PTT - ( 15 Jul 2019 05:54 )  PTT:48.3 sec  Urinalysis Basic - ( 14 Jul 2019 17:24 )    Color: Yellow / Appearance: SL Cloudy / SG: >=1.030 / pH: x  Gluc: x / Ketone: Trace mg/dL  / Bili: Small / Urobili: 0.2 E.U./dL   Blood: x / Protein: 30 mg/dL / Nitrite: NEGATIVE   Leuk Esterase: NEGATIVE / RBC: < 5 /HPF / WBC < 5 /HPF   Sq Epi: x / Non Sq Epi: 5-10 /HPF / Bacteria: Present /HPF        RADIOLOGY & ADDITIONAL TESTS: Reviewed. INTERVAL HPI/OVERNIGHT EVENTS:   SUBJECTIVE: Patient seen and examined at bedside. Patient sedated intubated unresponsive to commands or questions. Unable to assess review of systems.    OBJECTIVE:    VITAL SIGNS:  ICU Vital Signs Last 24 Hrs  T(C): 36.6 (15 Jul 2019 06:17), Max: 39.4 (14 Jul 2019 16:19)  T(F): 97.9 (15 Jul 2019 06:17), Max: 102.9 (14 Jul 2019 16:19)  HR: 82 (15 Jul 2019 10:00) (60 - 117)  BP: 99/51 (15 Jul 2019 04:00) (66/34 - 107/77)  BP(mean): 67 (15 Jul 2019 04:00) (42 - 73)  ABP: 116/44 (15 Jul 2019 10:00) (78/40 - 120/54)  ABP(mean): 66 (15 Jul 2019 10:00) (56 - 76)  RR: 24 (15 Jul 2019 10:00) (9 - 31)  SpO2: 100% (15 Jul 2019 10:00) (80% - 100%)    Mode: AC/ VC PEEP 5, FiO2 50%, Vt 500 mL, RR 24.   07-14 @ 07:01  -  07-15 @ 07:00  --------------------------------------------------------  IN: 5777.4 mL / OUT: 0 mL / NET: 5777.4 mL    07-15 @ 07:01  -  07-15 @ 11:24  --------------------------------------------------------  IN: 916 mL / OUT: 0 mL / NET: 916 mL      CAPILLARY BLOOD GLUCOSE    POCT Blood Glucose.: 106 mg/dL (15 Jul 2019 05:26)      PHYSICAL EXAM:  General: patient intubated and on sedation, unresponsive to stimuli  HEENT: Bitemporal wasting, atraumatic; pupils minimally reactive to light, clear conjunctiva. Doll's eyes (+), slight yellow discharge around the eyes b/l  Respiratory: CTA b/l, diminished breath sounds in lower lobes of lungs. No wheezes, rales, rhonchi  Cardiovascular: +S1/S2; RRR, no ecchymosis noted on chest.  Abdomen: soft, NT/ND; +BS x4, no ecchymosis noted  : Randle catheter, small blood around meatus  Extremities: WWP, 2+ peripheral pulses b/l; no LE edema  Skin: ecchymosis noted on right arm, possibly from IV puncture sites   Neurological: (+) babinski sign b/l. Sedated, no response to pain.    MEDICATIONS:  MEDICATIONS  (STANDING):  ALBUTerol/ipratropium for Nebulization 3 milliLiter(s) Nebulizer every 6 hours  chlorhexidine 0.12% Liquid 15 milliLiter(s) Oral Mucosa every 12 hours  chlorhexidine 2% Cloths 1 Application(s) Topical <User Schedule>  dextrose 5%. 1000 milliLiter(s) (50 mL/Hr) IV Continuous <Continuous>  dextrose 5%. 1000 milliLiter(s) (60 mL/Hr) IV Continuous <Continuous>  dextrose 50% Injectable 12.5 Gram(s) IV Push once  dextrose 50% Injectable 25 Gram(s) IV Push once  dextrose 50% Injectable 25 Gram(s) IV Push once  hydrocortisone sodium succinate Injectable 50 milliGRAM(s) IV Push every 6 hours  insulin lispro (HumaLOG) corrective regimen sliding scale   SubCutaneous Before meals and at bedtime  levothyroxine Injectable 25 MICROGram(s) IV Push at bedtime  norepinephrine Infusion 0.05 MICROgram(s)/kG/Min (4.322 mL/Hr) IV Continuous <Continuous>  pantoprazole  Injectable 40 milliGRAM(s) IV Push daily  piperacillin/tazobactam IVPB.. 2.25 Gram(s) IV Intermittent every 6 hours  propofol Infusion 10 MICROgram(s)/kG/Min (5.532 mL/Hr) IV Continuous <Continuous>  sodium bicarbonate  Injectable 50 milliEquivalent(s) IV Push every 10 minutes  vasopressin Infusion 0.04 Unit(s)/Min (2.4 mL/Hr) IV Continuous <Continuous>    MEDICATIONS  (PRN):  dextrose 40% Gel 15 Gram(s) Oral once PRN Blood Glucose LESS THAN 70 milliGRAM(s)/deciliter  glucagon  Injectable 1 milliGRAM(s) IntraMuscular once PRN Glucose LESS THAN 70 milligrams/deciliter      ALLERGIES:  Allergies    No Known Allergies    Intolerances    narcotic analgesics (Nausea; Vomiting)      LABS:                        11.3   29.45 )-----------( 43       ( 15 Jul 2019 05:54 )             36.8     07-15    142  |  106  |  38<H>  ----------------------------<  128<H>  3.8   |  11<L>  |  3.60<H>    Ca    6.8<L>      15 Jul 2019 05:54  Phos  4.6     07-15  Mg     1.8     07-15    TPro  5.0<L>  /  Alb  2.0<L>  /  TBili  0.6  /  DBili  0.4<H>  /  AST  85<H>  /  ALT  30  /  AlkPhos  36<L>  07-15    PT/INR - ( 15 Jul 2019 05:54 )   PT: 32.5 sec;   INR: 2.79          PTT - ( 15 Jul 2019 05:54 )  PTT:48.3 sec  Urinalysis Basic - ( 14 Jul 2019 17:24 )    Color: Yellow / Appearance: SL Cloudy / SG: >=1.030 / pH: x  Gluc: x / Ketone: Trace mg/dL  / Bili: Small / Urobili: 0.2 E.U./dL   Blood: x / Protein: 30 mg/dL / Nitrite: NEGATIVE   Leuk Esterase: NEGATIVE / RBC: < 5 /HPF / WBC < 5 /HPF   Sq Epi: x / Non Sq Epi: 5-10 /HPF / Bacteria: Present /HPF        RADIOLOGY & ADDITIONAL TESTS: Reviewed.

## 2019-07-15 NOTE — CONSULT NOTE ADULT - SUBJECTIVE AND OBJECTIVE BOX
COLLINSGINGER BARNES      Patient is a 84y old  Male who presents with a chief complaint of severe sepsis (15 Jul 2019 11:23)      HPI:  84M former smoker with MHx of HTN, HLD, DM 2, hypothyroidism, prostate CA s/p seeding, atrial flutter s/p ablation, on Eliquis, bifascicular heart block s/p pacemaker, COPD (not on home air), CAD s/p CABGx3 (3/17), CVA s/p L enarderectomy (6/19) presents from [facility] for AMS and increased sleepiness x1 day. Interval history from friend collected from friend and Guardian, Martha. At baseline patient is A&Ox3 and able to perform most ADLs and iADLS. Yesterday, pt was his baseline mental status and friend went to go visit today and noticed pt was laying in bed and lethargic, AAOx0 and babbling incoherent words. He was not responding appropriately to questions and not acting himself. Per friend, unsure if pt had neuro event, aspiration, recent illness, changes in medication, or other inciting event. Per Martha, patient has been doing well since his June hospitalization He has been on thickened liquids at home, but says he often refuses the  powder so she is not sure how adherent he is to it. Additionally, she noted that during his previous CVA he was briefly incoherent.     In ED pt tachycardic to 110s, temp 103, RADHA (Cr 3.23), leukocytosis to 23. Lactate 8.6. CXR with b/l infiltrates and pulmonary  edema. Pt given 2L IVF bolus, tylenol, vanc/zosyn. Pt seen in ED. AAOx0 (Unable to get interval history or ROS), in respiratory distress with accessory muscle use and abdominal breathing. Satting 88-90% on RA. HR 50-60s. SBP evidently in 80s-90s systolic. Responsive to voice but not following commands or answering questions appropriately. (14 Jul 2019 20:11)      Addl  Medical issues:       HEALTH ISSUES - PROBLEM Dx:            MEDICATIONS  (STANDING):  ALBUTerol/ipratropium for Nebulization 3 milliLiter(s) Nebulizer every 6 hours  chlorhexidine 0.12% Liquid 15 milliLiter(s) Oral Mucosa every 12 hours  chlorhexidine 2% Cloths 1 Application(s) Topical <User Schedule>  dextrose 5%. 1000 milliLiter(s) (50 mL/Hr) IV Continuous <Continuous>  dextrose 5%. 1000 milliLiter(s) (60 mL/Hr) IV Continuous <Continuous>  dextrose 50% Injectable 12.5 Gram(s) IV Push once  dextrose 50% Injectable 25 Gram(s) IV Push once  dextrose 50% Injectable 25 Gram(s) IV Push once  heparin  Infusion 1500 Unit(s)/Hr (15 mL/Hr) IV Continuous <Continuous>  hydrocortisone sodium succinate Injectable 50 milliGRAM(s) IV Push every 6 hours  insulin lispro (HumaLOG) corrective regimen sliding scale   SubCutaneous Before meals and at bedtime  levothyroxine Injectable 25 MICROGram(s) IV Push at bedtime  norepinephrine Infusion 0.05 MICROgram(s)/kG/Min (4.322 mL/Hr) IV Continuous <Continuous>  pantoprazole  Injectable 40 milliGRAM(s) IV Push daily  piperacillin/tazobactam IVPB.. 2.25 Gram(s) IV Intermittent every 6 hours  propofol Infusion 10 MICROgram(s)/kG/Min (5.532 mL/Hr) IV Continuous <Continuous>  vasopressin Infusion 0.04 Unit(s)/Min (2.4 mL/Hr) IV Continuous <Continuous>    MEDICATIONS  (PRN):  dextrose 40% Gel 15 Gram(s) Oral once PRN Blood Glucose LESS THAN 70 milliGRAM(s)/deciliter  glucagon  Injectable 1 milliGRAM(s) IntraMuscular once PRN Glucose LESS THAN 70 milligrams/deciliter          PAST MEDICAL & SURGICAL HISTORY:  Cerebrovascular accident (CVA): 6/2019  High cholesterol  Diabetes  Prostate cancer: in remission  Hypertension  COPD (chronic obstructive pulmonary disease)  H/O carotid endarterectomy  H/O aortic valve replacement  S/P CABG x 3  Artificial pacemaker  History of cataract surgery: b/l  History of knee replacement: b/l      REVIEW OF SYSTEMS:  [x] As per HPI          Reviewed   no change                            Changes noted  CONSTITUTIONAL:  RESPIRATORY vent  CARDIOVASCULAR: No chest pain, palpitations, dizziness, or leg swelling  GASTROINTESTINAL: No abdominal or epigastric pain. No nausea, vomiting, or hematemesis; No diarrhea or constipation. No melena or hematochezia.  MUSCULOSKELETAL: No joint pain or swelling; No muscle, back, or extremity pain  Neuro:   Grossly  Negative  Psych      [  [ ###] Unable to obtain      Vital Signs Last 24 Hrs  T(C): 37.6 (15 Jul 2019 11:00), Max: 39.4 (14 Jul 2019 16:19)  T(F): 99.6 (15 Jul 2019 11:00), Max: 102.9 (14 Jul 2019 16:19)  HR: 84 (15 Jul 2019 13:00) (60 - 117)  BP: 99/51 (15 Jul 2019 04:00) (66/34 - 107/77)  BP(mean): 67 (15 Jul 2019 04:00) (42 - 73)  RR: 24 (15 Jul 2019 13:00) (9 - 31)  SpO2: 100% (15 Jul 2019 13:00) (80% - 100%)    PHYSICAL EXAM:  PHYSICAL EXAM: on vent      Constitutional: NAD, well-groomed, well-developed  HEENT: PERRLA, EOMI, Normal Hearing, MMM  Neck: No LAD, No JVD  Back: Normal spine flexure, No CVA tenderness  Respiratory: bilat rhonchi    Cardiovascular: S1 and S2, MU  Gastrointestinal: BS+, soft, NT/ND  Extremities: edema  Vascular: 2+ peripheral pulses  Neurological: A/O x 3, no focal deficits  Psychiatric: intubated  Musculoskeletal: decr MS str  Skin: No rashes                                  11.3   29.45 )-----------( 43       ( 15 Jul 2019 05:54 )             36.8     07-15    142  |  106  |  38<H>  ----------------------------<  128<H>  3.8   |  11<L>  |  3.60<H>    Ca    6.8<L>      15 Jul 2019 05:54  Phos  4.6     07-15  Mg     1.8     07-15    TPro  5.0<L>  /  Alb  2.0<L>  /  TBili  0.6  /  DBili  0.4<H>  /  AST  85<H>  /  ALT  30  /  AlkPhos  36<L>  07-15        PT/INR - ( 15 Jul 2019 05:54 )   PT: 32.5 sec;   INR: 2.79          PTT - ( 15 Jul 2019 05:54 )  PTT:48.3 sec  CAPILLARY BLOOD GLUCOSE      POCT Blood Glucose.: 106 mg/dL (15 Jul 2019 05:26)  POCT Blood Glucose.: 79 mg/dL (14 Jul 2019 23:05)  POCT Blood Glucose.: 123 mg/dL (14 Jul 2019 21:46)  POCT Blood Glucose.: 42 mg/dL (14 Jul 2019 21:21)    Urinalysis Basic - ( 14 Jul 2019 17:24 )    Color: Yellow / Appearance: SL Cloudy / SG: >=1.030 / pH: x  Gluc: x / Ketone: Trace mg/dL  / Bili: Small / Urobili: 0.2 E.U./dL   Blood: x / Protein: 30 mg/dL / Nitrite: NEGATIVE   Leuk Esterase: NEGATIVE / RBC: < 5 /HPF / WBC < 5 /HPF   Sq Epi: x / Non Sq Epi: 5-10 /HPF / Bacteria: Present /HPF      I&O's Summary    14 Jul 2019 07:01  -  15 Jul 2019 07:00  --------------------------------------------------------  IN: 5777.4 mL / OUT: 0 mL / NET: 5777.4 mL    15 Jul 2019 07:01  -  15 Jul 2019 13:33  --------------------------------------------------------  IN: 1318 mL / OUT: 0 mL / NET: 1318 mL        Mode: AC/ CMV (Assist Control/ Continuous Mandatory Ventilation)  RR (machine): 24  TV (machine): 500  FiO2: 50  PEEP: 5  MAP: 9.2  PIP: 18      ASSESSMENT/PLAN/RECOMMENDATIONS

## 2019-07-15 NOTE — DIETITIAN INITIAL EVALUATION ADULT. - ADD RECOMMEND
1. Once pt is HD stable, consider starting EN. 2. Monitor lytes and replete prn. POC BG q6hrs 3. Pain and bowel regimens per MD. 4. Keep nutrition aligned with GOC at all times

## 2019-07-15 NOTE — DIETITIAN INITIAL EVALUATION ADULT. - NAME AND PHONE
Samantha Gitlin, RD, CDN, [facility], [id number] or available on [email or web address]

## 2019-07-15 NOTE — CONSULT NOTE ADULT - SUBJECTIVE AND OBJECTIVE BOX
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 Interval history from friend collected from friend and Guardian, Martha. At baseline patient is A&Ox3 and able to perform most ADLs and iADLS. Yesterday, pt was his baseline mental status and friend went to go visit today and noticed pt was laying in bed and lethargic, AAOx0 and babbling incoherent words. He was not responding appropriately to questions and not acting himself. Per friend, unsure if pt had neuro event, aspiration, recent illness, changes in medication, or other inciting event. Per Martha, patient has been doing well since his June hospitalization He has been on thickened liquids at home, but says he often refuses the  powder so she is not sure how adherent he is to it. Additionally, she noted that during his previous CVA he was briefly incoherent.     In ED pt tachycardic to 110s, temp 103, RADHA (Cr 3.23), leukocytosis to 23. Lactate 8.6. CXR with b/l infiltrates and pulmonary  edema. Pt given 2L IVF bolus, tylenol, vanc/zosyn. Pt seen in ED. AAOx0 (Unable to get interval history or ROS), in respiratory distress with accessory muscle use and abdominal breathing. Satting 88-90% on RA. HR 50-60s. SBP evidently in 80s-90s systolic. Responsive to voice but not following commands or answering questions appropriately. (14 Jul 2019 20:11)      ADDITIONAL MEDICINE HPI:  Patient had L carotid endarterectomy last admission in June 2019. Patient was noted to be fully functional in his ADLs, having dinners with his girlfriend, but of recent noted just suddenly extremely lethargic and poor mentation/altered mental status. Patient had a R antecubital line placed in nursing home, and when he arrived, also had a R peripheral IV line.    REVIEW OF SYSTEMS:   Otherwise negative except as specified in HPI    FAMILY HISTORY:  Family history of acute myocardial infarction in father    SOCIAL HISTORY:  Documented as former smoker, unable to ask about alcohol/illicit drug use given patient's clinical condition    MEDICATIONS:  MEDICATIONS  (STANDING):  ALBUTerol/ipratropium for Nebulization 3 milliLiter(s) Nebulizer every 6 hours  chlorhexidine 0.12% Liquid 15 milliLiter(s) Oral Mucosa every 12 hours  chlorhexidine 2% Cloths 1 Application(s) Topical <User Schedule>  dextrose 5%. 1000 milliLiter(s) (50 mL/Hr) IV Continuous <Continuous>  dextrose 5%. 1000 milliLiter(s) (60 mL/Hr) IV Continuous <Continuous>  dextrose 50% Injectable 12.5 Gram(s) IV Push once  dextrose 50% Injectable 25 Gram(s) IV Push once  dextrose 50% Injectable 25 Gram(s) IV Push once  heparin  Infusion 1500 Unit(s)/Hr (15 mL/Hr) IV Continuous <Continuous>  hydrocortisone sodium succinate Injectable 50 milliGRAM(s) IV Push every 6 hours  insulin lispro (HumaLOG) corrective regimen sliding scale   SubCutaneous Before meals and at bedtime  levothyroxine Injectable 25 MICROGram(s) IV Push at bedtime  norepinephrine Infusion 0.05 MICROgram(s)/kG/Min (4.322 mL/Hr) IV Continuous <Continuous>  pantoprazole  Injectable 40 milliGRAM(s) IV Push daily  piperacillin/tazobactam IVPB.. 2.25 Gram(s) IV Intermittent every 6 hours  propofol Infusion 10 MICROgram(s)/kG/Min (5.532 mL/Hr) IV Continuous <Continuous>  vasopressin Infusion 0.04 Unit(s)/Min (2.4 mL/Hr) IV Continuous <Continuous>    MEDICATIONS  (PRN):  dextrose 40% Gel 15 Gram(s) Oral once PRN Blood Glucose LESS THAN 70 milliGRAM(s)/deciliter  glucagon  Injectable 1 milliGRAM(s) IntraMuscular once PRN Glucose LESS THAN 70 milligrams/deciliter      ALLERGIES:  Allergies    No Known Allergies    Intolerances    narcotic analgesics (Nausea; Vomiting)      VITAL SIGNS:  Vital Signs Last 24 Hrs  T(C): 37 (15 Jul 2019 14:54), Max: 39.4 (14 Jul 2019 16:19)  T(F): 98.6 (15 Jul 2019 14:54), Max: 102.9 (14 Jul 2019 16:19)  HR: 82 (15 Jul 2019 15:00) (60 - 117)  BP: 99/51 (15 Jul 2019 04:00) (66/34 - 107/77)  BP(mean): 67 (15 Jul 2019 04:00) (42 - 73)  RR: 24 (15 Jul 2019 15:00) (9 - 31)  SpO2: 98% (15 Jul 2019 15:00) (80% - 100%)    07-14-19 @ 07:01  -  07-15-19 @ 07:00  --------------------------------------------------------  IN:    dextrose 5%.: 480 mL    IV PiggyBack: 400 mL    norepinephrine Infusion: 707 mL    norepinephrine Infusion: 150 mL    propofol Infusion: 33.2 mL    Sodium Chloride 0.9% IV Bolus: 4000 mL    vasopressin Infusion: 2.4 mL    vasopressin Infusion: 4.8 mL  Total IN: 5777.4 mL    OUT:  Total OUT: 0 mL    Total NET: 5777.4 mL      07-15-19 @ 07:01  -  07-15-19 @ 15:46  --------------------------------------------------------  IN:    dextrose 5%.: 420 mL    heparin Infusion: 45 mL    IV PiggyBack: 400 mL    norepinephrine Infusion: 240 mL    norepinephrine Infusion: 80 mL    propofol Infusion: 116.2 mL    vasopressin Infusion: 16.8 mL  Total IN: 1318 mL    OUT:  Total OUT: 0 mL    Total NET: 1318 mL    PHYSICAL EXAM:  Constitutional: Intubated, sedated, chronically ill appearing male lying in bed  HEENT: RIJ central line, pupils 2mm minimally responsive b/l, neck supple  Respiratory: CTA B/L; no W/R/R, ETT in place, on VC-AC  Cardiac: +S1/S2; RRR  Gastrointestinal: Obese abdomen soft, NT/ND; no rebound or guarding; +BSx4  Extremities: WWP, no clubbing or cyanosis; trace peripheral pitting edema b/l LE  Vascular: 2+ radial, femoral, DP/PT pulses B/L  Neurologic: AAOx0, sedated, intubated, does not withdraw or localize to pain    LABS:                        11.3   29.45 )-----------( 43       ( 15 Jul 2019 05:54 )             36.8     07-15    142  |  106  |  38<H>  ----------------------------<  128<H>  3.8   |  11<L>  |  3.60<H>    Ca    6.8<L>      15 Jul 2019 05:54  Phos  4.6     07-15  Mg     1.8     07-15    TPro  5.0<L>  /  Alb  2.0<L>  /  TBili  0.6  /  DBili  0.4<H>  /  AST  85<H>  /  ALT  30  /  AlkPhos  36<L>  07-15    PT/INR - ( 15 Jul 2019 05:54 )   PT: 32.5 sec;   INR: 2.79          PTT - ( 15 Jul 2019 05:54 )  PTT:48.3 sec  Urinalysis Basic - ( 14 Jul 2019 17:24 )    Color: Yellow / Appearance: SL Cloudy / SG: >=1.030 / pH: x  Gluc: x / Ketone: Trace mg/dL  / Bili: Small / Urobili: 0.2 E.U./dL   Blood: x / Protein: 30 mg/dL / Nitrite: NEGATIVE   Leuk Esterase: NEGATIVE / RBC: < 5 /HPF / WBC < 5 /HPF   Sq Epi: x / Non Sq Epi: 5-10 /HPF / Bacteria: Present /HPF      CAPILLARY BLOOD GLUCOSE      POCT Blood Glucose.: 106 mg/dL (15 Jul 2019 05:26)  POCT Blood Glucose.: 79 mg/dL (14 Jul 2019 23:05)  POCT Blood Glucose.: 123 mg/dL (14 Jul 2019 21:46)  POCT Blood Glucose.: 42 mg/dL (14 Jul 2019 21:21)    Culture - Blood (07.15.19 @ 00:31)    Gram Stain:   Aerobic Bottle: Gram Positive Cocci in Clusters  Result called to and read back by_ Dr. MIKAL Johnson 07/15/2019 10:12:03    Specimen Source: .Blood Blood-Peripheral    Culture Results:   Culture in progress        Culture - Blood (07.15.19 @ 00:31)    -  Methicillin resistant Staphylococcus aureus (MRSA): Detec critTYPE:(C=Critical, N=Notification, A=Abnormal) C  TESTS: BCID  DATE/TIME CALLED: 07/15/2019 10:39  CALLED TO: Dr. EDDIE Claros  READ BACK (2 Patient Identifiers)(Y/N): Y  READ BACK VALUES (Y/N): Y  CALLED BY: [facility] [place]    Gram Stain:   Aerobic and Anaerobic Bottle: Gram Positive Cocci in Clusters  Result called to and read back by_ Dr. MIKAL Johnson 07/15/2019 10:12:03  ***Blood Panel PCR results on this specimen are available  approximately 3 hours after the Gram stain result.***  Gram stain, PCR, and/or culture results may not always  correspond due to difference in methodologies.  ************************************************************  This PCR assay was performed using [employer].  The following targets are testedfor: Enterococcus,  vancomycin resistant enterococci, Listeria monocytogenes,  coagulase negative staphylococci, S. aureus,  methicillin resistant S. aureus, Streptococcus agalactiae  (Group B), S. pneumoniae, S. pyogenes (Group A),  Acinetobacter baumannii, Enterobacter cloacae, E. coli,  Klebsiella oxytoca, K. pneumoniae, Proteus sp.,  Serratia marcescens, Haemophilus influenzae,  Neisseria meningitidis, Pseudomonas aeruginosa, Candida  albicans, C. glabrata, C krusei, C parapsilosis,  C. tropicalis and the KPC resistance gene.  "Due to technical problems, Proteus sp. will Not be reported as part of  the BCID panel until further notice"    Specimen Source: .Blood Blood-Peripheral    Organism: Blood Culture PCR    Culture Results:   Culture in progress    Organism Identification: Blood Culture PCR    Method Type: PCR    RADIOLOGY & ADDITIONAL TESTS:  < from: Echocardiogram (07.15.19 @ 15:06) >  INTERPRETATION:  Patient Height: 166.0 cm  Patient Weight: 92.0 kg  BSA: 2.0 m^2  Interpretation Summary  Left ventricular hypertrophy presentThe left ventricular ejection   fraction is   estimated to be 50%Probably normal right ventricular size and   function.The   left atrial size is normal.Meagan valve in aortic position.No aortic   regurgitation noted.The peak pressure gradient is 21 mmHg.The mean   pressure   gradient is 15 mmHg.Mitral annular calcification noted.Mitral valve   thickening   noted.There is trace mitral regurgitation.There is trace tricuspid   regurgitation.The pulmonary artery systolic pressure is estimated to be   36   mmHg.There is trace pulmonic regurgitation.There is no pericardial   effusion.Left pleural effusion noted.On the parasternal view, mobile   echogenicity is seen extending from the Meagan valve to the  LVOT,   differential includes vegetation.  Procedure Details  A complete two-dimensional transthoracic echocardiogram was performed (2D,  M-mode, spectral and color flow doppler).  Study Quality: Poor.  Left Ventricle  Left ventricular hypertrophy present  The left ventricular ejection fraction is estimatedto be 50%  Left Atrium  The left atrial size is normal.  Right Atrium  Right atrial size is normal.  Right Ventricle  The right ventricle is not well visualized.  Probably normal right ventricular size and function.  Aortic Valve  Meagan valve in aortic position.  On the parasternal view, mobile echogenicity is seen extending from the  Meagan valve to the  LVOT, differential includes vegetation.

## 2019-07-15 NOTE — CHART NOTE - NSCHARTNOTEFT_GEN_A_CORE
MICU consult to general surgery to r/o mesenteric ischemia.    Pt seen and examined at bedside. Concern for septic shock with high dose pressor support. Since hospitalization has been started on broad spectrum IV abx, 6L of crystalloid bolused for resuscitation and now on dual pressor support. In discussion with Mila Matthews, his mental status had been declining for several days to the point where yesterday evening in the rehab facility he was minimally responsive and subsequently transferred to the [facility] ED for further evaluation. No recent complaints of abdominal pain, nausea or diarrhea. Poor appetite recently.    In the MICU he remains intubated, sedated and on dual pressor support. Left neck CEA incision is well healed without any fullness or induration. Abd is soft, mildly distended without any guarding/rigidity. Rectal with normal tone and no blood. Peripherally warm with mild generalized edema.  Thorough skin exam did not reveal any wound or cellulitic changes.     Labs significant for severe lactic acidosis with considerable leukocytosis. Prelim cultures with possible MRSA bacteremia.    A/P: 84M with significant CAD/afib/HTN/HLD/DM/vasculopathy now with septic shock, presumed MRSA bacteremia with unknown source.    - Intra-abdominal source with high likelihood given clinical presentation however abd exam remains soft without any hematochezia.  - Would obtain CT neck/chest/abd/pelvis with IV angio study to eval for mesenteric ischemic event  - Trend lactates  - Continue supportive care.  - Discussed possible operative intervention with Mila Matthews who reports that Mr Fernandez did not want any extraordinary measures taken that ultimately would leave him in either a prolonged ICU stay or rehab facility. Currently given clinical picture any operative intervention at this point in time would have a high risk of mortality with a considerable risk of associated morbidity.   - Recommendations discussed with Dr. Cook, Dr. Alvarez, Dr. Humphreys and the MICU staff. MICU consult to general surgery to r/o mesenteric ischemia.    Pt seen and examined at bedside. Concern for septic shock with high dose pressor support. Since hospitalization has been started on broad spectrum IV abx, 6L of crystalloid bolused for resuscitation and now on dual pressor support. In discussion with Mila Matthews, his mental status had been declining for several days to the point where yesterday evening in the rehab facility he was minimally responsive and subsequently transferred to the [facility] ED for further evaluation. No recent complaints of abdominal pain, nausea or diarrhea. Poor appetite recently.    In the MICU he remains intubated, sedated and on dual pressor support. Left neck CEA incision is well healed without any fullness or induration. Abd is soft, mildly distended without any guarding/rigidity. Rectal with normal tone and no blood. Peripherally warm with mild generalized edema.  Thorough skin exam did not reveal any wound or cellulitic changes.     Labs significant for severe lactic acidosis with considerable leukocytosis. Prelim cultures with possible MRSA bacteremia.    A/P: 84M with significant CAD/afib/HTN/HLD/DM/vasculopathy now with septic shock, presumed MRSA bacteremia with unknown source.    - Intra-abdominal source with high likelihood given clinical presentation however abd exam remains soft without any hematochezia.  - Would obtain CT neck/chest/abd/pelvis with IV angio study to eval for mesenteric ischemic event  - Trend lactates  - hep gtt  - Continue supportive care.  - Discussed possible operative intervention with Mila Matthews who reports that Mr Fernandez did not want any extraordinary measures taken that ultimately would leave him in either a prolonged ICU stay or rehab facility. Currently given clinical picture any operative intervention at this point in time would have a high risk of mortality with a considerable risk of associated morbidity.   - Recommendations discussed with Dr. Cook, Dr. Alvarez, Dr. Humphreys and the MICU staff.

## 2019-07-15 NOTE — PROVIDER CONTACT NOTE (CRITICAL VALUE NOTIFICATION) - ASSESSMENT
MAP > 65 maintained on Levo 40mcg/min and Vaso 0.04 units/min. HR 60. Fingerstick site revealed prolonged bleeding requiring pressure dressing.

## 2019-07-15 NOTE — PATIENT PROFILE ADULT - NSPROHMCARDIOMGMTSTRAT_GEN_A_NUR
activity/coping strategies/medical device/medication therapy/diet modification/routine screening/adequate rest/fluid modification

## 2019-07-15 NOTE — CONSULT NOTE ADULT - ASSESSMENT
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 TTE with mobile echogenicity on george valve to LVOT, possible vegetation. ID consulted for MRSA bacteremia management.    - Patient has MRSA bacteremia, source likely from line sepsis, as patient does not have a significant infiltrate seen on CXR. Patient has a known interarterial septal aneurysm, interarterial shunt w/ patient foramen ovale, and a george aortic valve, now found to have a mobile echogenicity suspicious for vegetation, extending into the left ventricular outflow tract. 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 TTE with mobile echogenicity on george valve to LVOT, possible vegetation. ID consulted for MRSA bacteremia management.    - Patient has MRSA bacteremia, source likely from line sepsis, as patient does not have a significant infiltrate seen on CXR. Patient has a known interarterial septal aneurysm, interarterial shunt w/ patient foramen ovale, and a george aortic valve, now found to have a mobile echogenicity suspicious for vegetation, extending into the left ventricular outflow tract. Given patient's extremely critically ill condition, there is a low threshold to treat empirically for prosthetic valve endocarditis in setting of MRSA bacteremia.  - MOHINI for formal evaluation of the echogenicity  - Please continue with vancomycin, trough 15-20, dose by level given acute renal failure likely 2/2 from septic emboli  - Primary team aware, ID team 1 following  - Case discussed with Dr. 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 TTE with mobile echogenicity on george valve to LVOT, possible vegetation. ID consulted for MRSA bacteremia management.    - Patient has MRSA bacteremia, source likely from line sepsis, as patient does not have a significant infiltrate seen on CXR. Patient has a known interarterial septal aneurysm, interarterial shunt w/ patient foramen ovale, and a george aortic valve, now found to have a mobile echogenicity suspicious for vegetation, extending into the left ventricular outflow tract. 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 TTE with mobile echogenicity on george valve to LVOT, possible vegetation. ID consulted for MRSA bacteremia management.    - Patient has MRSA bacteremia, source likely from line sepsis, as patient does not have a significant infiltrate seen on CXR. Patient has a known interarterial septal aneurysm, interarterial shunt w/ patient foramen ovale, and a george aortic valve, now found to have a mobile echogenicity suspicious for vegetation, extending into the left ventricular outflow tract. 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  - Please start rifampin 300 mg q8h  - Please continue with vancomycin, trough 15-20, dose by level given acute renal failure from septic shock, low perfusion  - Gentamicin is usually part of the treatment regimen for MRSA prosthetic valve endocarditis, but in setting of acute renal failure, treatment may cause his decline to accelerate quicker than his current clinical state, as he is also not hemodialysis amenable given living will & healthcare proxy  - Though primary etiology of sepsis is PVE, in view of concern for mesenteric ischemia, agree with pip-tazo 2.25 g IV q8h  - Prognosis is poor  - Primary team aware, ID team 1 following  - Case discussed with Dr. Crawford, attending physician

## 2019-07-15 NOTE — CONSULT NOTE ADULT - SUBJECTIVE AND OBJECTIVE BOX
Attending: Antonio    HPI:  84M with PMH of HTN, HLD, DM, hypothyroidism, prostate CA A-flutter (s/p ablation, on Eliquis), COPD, CAD (s/p CABG x3), CVA s/p L CEA (6/19) presents to [facility] for AMS and increased sleepiness x1 day. He was found laying in bed and lethargic, A&Ox0. He has been on thickened liquids at home, friends are unsure how compliant he has been. In the ED he had a T103, , Cr 3.23, WBC 23, Lactate 8.6. CXR showed b/l infiltrates and pulmonary edema. He was started on Vanc/Zosyn and fluid resuscitated.    Today, patient's WBC 30. Lactate to 10. Cr 3.6. Admission blood cultures growing MRSA bacteremia. Patient currently on Levophed, Vasopressin.    PAST MEDICAL & SURGICAL HISTORY:  Cerebrovascular accident (CVA): 6/2019  High cholesterol  Diabetes  Prostate cancer: in remission  Hypertension  COPD (chronic obstructive pulmonary disease)  H/O carotid endarterectomy  H/O aortic valve replacement  S/P CABG x 3  Artificial pacemaker  History of cataract surgery: b/l  History of knee replacement: b/l    MEDICATIONS  (STANDING):  ALBUTerol/ipratropium for Nebulization 3 milliLiter(s) Nebulizer every 6 hours  chlorhexidine 0.12% Liquid 15 milliLiter(s) Oral Mucosa every 12 hours  chlorhexidine 2% Cloths 1 Application(s) Topical <User Schedule>  dextrose 5%. 1000 milliLiter(s) (50 mL/Hr) IV Continuous <Continuous>  dextrose 5%. 1000 milliLiter(s) (60 mL/Hr) IV Continuous <Continuous>  dextrose 50% Injectable 12.5 Gram(s) IV Push once  dextrose 50% Injectable 25 Gram(s) IV Push once  dextrose 50% Injectable 25 Gram(s) IV Push once  hydrocortisone sodium succinate Injectable 50 milliGRAM(s) IV Push every 6 hours  insulin lispro (HumaLOG) corrective regimen sliding scale   SubCutaneous Before meals and at bedtime  levothyroxine Injectable 25 MICROGram(s) IV Push at bedtime  norepinephrine Infusion 0.05 MICROgram(s)/kG/Min (4.322 mL/Hr) IV Continuous <Continuous>  pantoprazole  Injectable 40 milliGRAM(s) IV Push daily  piperacillin/tazobactam IVPB.. 2.25 Gram(s) IV Intermittent every 6 hours  propofol Infusion 10 MICROgram(s)/kG/Min (5.532 mL/Hr) IV Continuous <Continuous>  sodium bicarbonate  Injectable 50 milliEquivalent(s) IV Push every 10 minutes  vasopressin Infusion 0.04 Unit(s)/Min (2.4 mL/Hr) IV Continuous <Continuous>    MEDICATIONS  (PRN):  dextrose 40% Gel 15 Gram(s) Oral once PRN Blood Glucose LESS THAN 70 milliGRAM(s)/deciliter  glucagon  Injectable 1 milliGRAM(s) IntraMuscular once PRN Glucose LESS THAN 70 milligrams/deciliter    Allergies  No Known Allergies    Intolerances  narcotic analgesics (Nausea; Vomiting)    FAMILY HISTORY:  Family history of acute myocardial infarction (Father)    Vital Signs Last 24 Hrs  T(C): 36.6 (15 Jul 2019 06:17), Max: 39.4 (14 Jul 2019 16:19)  T(F): 97.9 (15 Jul 2019 06:17), Max: 102.9 (14 Jul 2019 16:19)  HR: 82 (15 Jul 2019 10:00) (60 - 117)  BP: 99/51 (15 Jul 2019 04:00) (66/34 - 107/77)  BP(mean): 67 (15 Jul 2019 04:00) (42 - 73)  RR: 24 (15 Jul 2019 10:00) (9 - 31)  SpO2: 100% (15 Jul 2019 10:00) (80% - 100%)    I&O's Summary  14 Jul 2019 07:01  -  15 Jul 2019 07:00  --------------------------------------------------------  IN: 5777.4 mL / OUT: 0 mL / NET: 5777.4 mL    15 Jul 2019 07:01  -  15 Jul 2019 11:12  --------------------------------------------------------  IN: 916 mL / OUT: 0 mL / NET: 916 mL    Physical Exam:  General: NAD  Neck: R CEA incision c/d/i, no fluctuance, induration, or surrounding erythema  Pulmonary: on Bipap  Abdominal: soft, non-distended, cannot evaluate tenderness    LABS:             11.3   29.45 )-----------( 43       ( 15 Jul 2019 05:54 )             36.8     07-15  142  |  106  |  38<H>  ----------------------------<  128<H>  3.8   |  11<L>  |  3.60<H>    Ca    6.8<L>      15 Jul 2019 05:54  Phos  4.6     07-15  Mg     1.8     07-15    TPro  5.0<L>  /  Alb  2.0<L>  /  TBili  0.6  /  DBili  0.4<H>  /  AST  85<H>  /  ALT  30  /  AlkPhos  36<L>  07-15    PT/INR - ( 15 Jul 2019 05:54 )   PT: 32.5 sec;   INR: 2.79     PTT - ( 15 Jul 2019 05:54 )  PTT:48.3 sec    Urinalysis Basic - ( 14 Jul 2019 17:24 )  Color: Yellow / Appearance: SL Cloudy / SG: >=1.030 / pH: x  Gluc: x / Ketone: Trace mg/dL  / Bili: Small / Urobili: 0.2 E.U./dL   Blood: x / Protein: 30 mg/dL / Nitrite: NEGATIVE   Leuk Esterase: NEGATIVE / RBC: < 5 /HPF / WBC < 5 /HPF   Sq Epi: x / Non Sq Epi: 5-10 /HPF / Bacteria: Present /HPF    CAPILLARY BLOOD GLUCOSE  POCT Blood Glucose.: 106 mg/dL (15 Jul 2019 05:26)  POCT Blood Glucose.: 79 mg/dL (14 Jul 2019 23:05)  POCT Blood Glucose.: 123 mg/dL (14 Jul 2019 21:46)  POCT Blood Glucose.: 42 mg/dL (14 Jul 2019 21:21)    LIVER FUNCTIONS - ( 15 Jul 2019 05:54 )  Alb: 2.0 g/dL / Pro: 5.0 g/dL / ALK PHOS: 36 U/L / ALT: 30 U/L / AST: 85 U/L / GGT: x           Cultures:  Culture Results:   Culture in progress (07-15 @ 00:31)  Culture Results:   Culture in progress (07-15 @ 00:31)      Assessment: 84y Male    Recommendations:  -   - discussed with Chief on call  - call x [phone] with questions

## 2019-07-15 NOTE — PROGRESS NOTE ADULT - ASSESSMENT
Mr Fernandez is an 84M former smoker with MHx of HTN, HLD, DM 2, hypothyroidism, prostate CA s/p seeding, atrial flutter s/p ablation, bifascicular heart block s/p pacemaker, COPD (not on home air), CAD s/p CABGx3 (3/17), CVA s/p L CEA (6/19) who presents with AMS, tachycardia, fever, and leukocytosis likely a result of septic shock with a source of pneumonia.    PULMONARY  #COPD  Not on Home O2. Satting 88-90% on admission.  -Continue with BiPAP, and adjust setting as needed- Currently 13/8 FiO2 40%  -Duoneb q6    NEURO  #CVA 6/2019 s/p CEA  Per family and Neuro notes, patient has swallowing deficits from previous CVA, but otherwise no other gross deficits.   -In setting of NPO holding ASA 81mg and simvastatin 40mg    CARDIOVASCULAR  #Septic Shock like source pneumonia  Patients with qSOFA score 3 and met SIRS criteria on admission (HR>90, temp >100.4, WBC > 12,000, RR>19) with elevated lactate and SBP<90. Crackles on exam and CXR + for bilateral lower lobe infiltrate  -Started on empirical Vanc/Zosyn  -f/u Blood cultures  -started on BiPAP  -Bicarb 12, give 3 amps to also help with RR  -Norepi 8mg Goal SBP > 90    #HTN  #CHF EF 55%  -In setting of NPO holding home lisinopril 40mg, lasix 20mg, metoprolol 25mg BID    RENAL  #RADHA  Although BUN:Cr ratio <20, in the setting of sepsis a pre-renal etiology is most like likely. Also consider ATN because patient is currently not making urine.  -s/p 2 L in ED  -Give 2 L NS    HEME  #Thrombocytopenia  Patient had Plt count WNL on recent admission, may be low 2/2 to dilution.   -Continue to monitor  -trend CBC    #Macrocytic Anemia  #Spurr Cells on smear  In setting of decreased PO intake recently, patient at risk for a folate deficiency  -f/u folate  -spurr cells likely artifact, but if persistent consider w/u      ENDOCRINE  #DM  -A1c 6.3  -Hold home metformin and start SSI    Hypothyroidism  -In setting of NPO hold home synthroid  -TSH= 18.4  -Likely mean patient getting inadequate dose or not taking home meds. F/u if he is taking. If he is increase home dose    GI/FEN  F: NS bolus x 2  E: Replete K<4 Mg < 2  N: NPO    DVT PPx - Heparin ggt tomorrow  GI PPx- protonix 40mg IV daily (ICU patient with sepsis meets criteria)    LINES - Bilateral peripheral lines on wrist, Central line (R. IJ) Planned Arterial. Randle catheter    CODE - Discussion had over the phone with guardian who states pt has living will. Living will stating pt is DNR and DNI if status is irreversible and terminal. MOLST and Living will in chart. Guardian Mila Fernandez (in [place]) can be reached at: [phone] or [phone]. Guardian agreeing to pressor support, ABX, IVF, and noninvasive ventilation at this time.     DISPO - continue intensive level of care in MICU service Mr Fernandez is an 84M former smoker with MHx of HTN, HLD, DM 2, hypothyroidism, prostate CA s/p seeding, atrial flutter s/p ablation, bifascicular heart block s/p pacemaker, COPD (not on home air), CAD s/p CABGx3 (3/17), CVA s/p L CEA (6/19) who presents with AMS, tachycardia, fever, and leukocytosis likely a result of septic shock with a source of MRSA.    PULMONARY  #COPD  Not on Home O2. Satting 88-90% on admission.  -Intubated 7/15. FiO2 50%, PEEP 5, RR 24, Vt 50.   -Duoneb q6    NEURO  #Altered Mental Status most likely 2/2 MRSA sepsis  - Treat with Vancomycin  #CVA 6/2019 s/p CEA. Per family and Neuro notes, patient has swallowing deficits from previous CVA, but otherwise no other gross deficits.   -In setting of NPO holding ASA 81mg and simvastatin 40mg  - In setting of pressor use, CT head to rule out CVA can not be performed    CARDIOVASCULAR  #Septic Shock: source MRSA in blood  Patient with qSOFA score 3 and met SIRS criteria on admission (HR>90, temp >100.4, WBC > 12,000, RR>19) with elevated lactate and SBP<90.   -Started on empirical Vanc/Zosyn, 1 dose tobramycin given for double Pseudomonas coverage  - Blood cultures showed growth of MRSA  -started on BiPAP, now intubated. 7/15 0935 ABG pH 7.22 CO2 25 HCO3 10 base excess -16.1  -Bicarb 12, 4 amps bicarb given today  -Repeat lactate 10.8 @ 0928. f/u q4h  -Levophed @ 40mL/hr , Vasopressin 2.4 mL/hr with Goal SBP > 90  #HTN  #CHF EF 55%  -In setting of NPO holding home lisinopril 40mg, lasix 20mg, metoprolol 25mg BID      ID  #Septic Shock: Patient with qSOFA score 3 and met SIRS criteria on admission (HR>90, temp >100.4, WBC > 12,000, RR>19) with elevated lactate and SBP<90.   - Blood Cx: MRSA (+)  - 1 dose vanco 500mg IV given  - ID consulted for recs once sensitivities return     RENAL  #RADHA  Although BUN:Cr ratio <20, in the setting of sepsis a pre-renal etiology is most like likely. Also consider ATN because patient is currently not making urine.  -s/p 2 L in ED  - Total intake of 5.7 L, no urine output  - abdominal ultrasound ordered   - Spoke to proxy Martha, who decided that hemodialysis is not something the patient would want.     GI  #Suspected mesenteric ischemia in setting of high lactate 10.8  - Surgery consulted. As per surgery recs, once patient's pressor requirements have decreased, get CT chest/abd/pelvis with contrast  - abdominal u/s ordered    HEME  #Thrombocytopenia  Patient had Plt count WNL on recent admission, possibly 2/2 to sepsis or dilution  -Continue to monitor/trend CBC    #Coagulopathy. PT 32.5 INR 2.79 PTT 48.3  - f/u coag studies    #Macrocytic Anemia - Spurr Cells on smear  -spurr cells likely artifact, but if persistent consider w/u  -In setting of decreased PO intake recently, patient at risk for a folate deficiency  -f/u folate    ENDOCRINE  #DM  -A1c 6.3  #Possible metformin toxicity: Elevated lactate + renal failure  -Hold home metformin and start SSI    Hypothyroidism  -In setting of NPO hold home synthroid  -TSH= 18.4, downtrending from previous visits. On last admission TSH 26.8  - patient likely getting inadequate dose or not taking home meds. F/u if he is taking. If he is increase home dose once not NPO    FEN  F: d5 @ 60 mL/hr  E: Replete K<4 Mg < 2  N: NPO    DVT PPx - Heparin drip @ 15 mL/hour  GI PPx- protonix 40mg IV daily (ICU patient with sepsis meets criteria)    LINES - Bilateral peripheral lines on wrist, Central line (R. IJ) Planned Arterial. Randle catheter    CODE - Discussion had over the phone with guardian who states pt has living will. Living will stating pt is DNR and DNI if status is irreversible and terminal. MOLST and Living will in chart. Guardian Mila Fernandez (in [place]) can be reached at: [phone] or [phone]. Guardian agreeing to pressor support, ABX, IVF, and noninvasive ventilation at this time.     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 Admitted to MICU for septic shock 2/2 MRSA requiring intubation, pressors, and antibiotics.    PULMONARY  #COPD  Not on Home O2. Satting 88-90% on admission.  - Intubated 7/15. FiO2 50%, PEEP 5, RR 24, Vt 50.   - Duoneb q6  - repeat ABG and VBG    NEURO  #Altered Mental Status most likely 2/2 MRSA sepsis  - Treat with Vancomycin    #CVA 6/2019 s/p CEA. Per family and Neuro notes, patient has swallowing deficits from previous CVA, but otherwise no other gross deficits.   -In setting of NPO holding ASA 81mg and simvastatin 40mg  - In setting of pressor use, CT head to rule out CVA can not be performed    CARDIOVASCULAR  #Septic Shock: source MRSA in blood  -See Infectious Disease below    #HTN  - Hold home blood pressure medications in setting of shock    #CHF EF 55%  -In setting of NPO holding home lisinopril 40mg, lasix 20mg, metoprolol 25mg BID    Infectious Disease  #Septic Shock: source MRSA in blood  Patient with qSOFA score 3 and met SIRS criteria on admission (HR>90, temp >100.4, WBC > 12,000, RR>19) with elevated lactate and SBP<90.   -Started on empirical Vanc/Zosyn, 1 dose tobramycin given for double Pseudomonas coverage  - Blood Cx 3 of 4 positive, gram + cocci in clusters: MRSA (+), f/u with sensitivities by 7/17  -started on BiPAP, now intubated. 7/15 0935 ABG pH 7.22 CO2 25 HCO3 10 base excess -16.1  -Heparin drip started  -Bicarb 12, 4 amps bicarb given today  -Repeat lactate 10.8 @ 0928. f/u q4h  -Levophed @ 40mL/hr , Vasopressin 2.4 mL/hr with Goal SBP > 90  -Echocardiogram r/o infective endocarditis    RENAL  #RADHA  Although BUN:Cr ratio <20, in the setting of sepsis a pre-renal etiology is most like likely. Also consider ATN because patient is currently not making urine.  -s/p 2 L in ED  - Total intake of 5.7 L, no urine output  - abdominal ultrasound ordered, r/o infected renal cysts   - Spoke to proxy Martha, who decided that hemodialysis is not something the patient would want.     GI  #Suspected mesenteric ischemia in setting of high lactate 10.8  - Surgery consulted. As per surgery recs, once patient's pressor requirements have decreased, get CT chest/abd/pelvis with contrast  - abdominal u/s ordered, f/u results    HEME  #Thrombocytopenia  Patient had Plt count WNL on recent admission, possibly 2/2 to sepsis or dilution  -Continue to monitor/trend CBC    #Coagulopathy. PT 32.5 INR 2.79 PTT 48.3  - f/u coag studies    #Macrocytic Anemia - Spurr Cells on smear  -spurr cells likely artifact, but if persistent consider w/u  -In setting of decreased PO intake recently, patient at risk for a folate deficiency  -f/u folate    ENDOCRINE  #DM  -A1c 6.3  #Possible metformin toxicity: Elevated lactate + renal failure  -Hold home metformin and start SSI    Hypothyroidism  -In setting of NPO hold home synthroid  - TSH= 18.4, downtrending from previous visits. On last admission TSH 26.8  - patient likely getting inadequate dose or not taking home meds. F/u if he is taking. If he is increase home dose once not NPO    FEN  F: d5 @ 60 mL/hr  E: Replete K<4 Mg < 2  N: NPO    DVT PPx - Heparin drip @ 15 mL/hour  GI PPx- protonix 40mg IV daily (ICU patient with sepsis meets criteria)    LINES - Bilateral peripheral lines on wrist, Central line (R. IJ) Arterial line. Randle catheter    CODE - Discussion had over the phone with guardian who states pt has living will. Living will stating pt is DNR and DNI if status is irreversible and terminal. MOLST and Living will in chart. Guardian Mila Fernandez (in [place]) can be reached at: [phone] or [phone]. Guardian agreeing to pressor support, ABX, IVF, and noninvasive ventilation at this time.     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 Admitted to MICU for septic shock 2/2 MRSA requiring intubation, pressors, and antibiotics.      NEURO  #Altered Mental Status most likely 2/2 MRSA sepsis  - Treat with Vancomycin    #CVA 6/2019 s/p CEA. Per family and Neuro notes, patient has swallowing deficits from previous CVA, but otherwise no other gross deficits.   -In setting of NPO holding ASA 81mg and simvastatin 40mg  - In setting of pressor use, CT head to rule out CVA can not be performed    PULMONARY  #COPD  Not on Home O2. Satting 88-90% on admission.  - Intubated 7/15. FiO2 50%, PEEP 5, RR 24, Vt 50.   - Duoneb q6  - repeat ABG and VBG    CARDIOVASCULAR  #Septic Shock: source MRSA in blood  -See Infectious Disease below    #HTN  - Hold home blood pressure medications in setting of shock    #CHF EF 55%  -In setting of NPO holding home lisinopril 40mg, lasix 20mg, metoprolol 25mg BID    Infectious Disease  #Septic Shock: source MRSA in blood  Patient with qSOFA score 3 and met SIRS criteria on admission (HR>90, temp >100.4, WBC > 12,000, RR>19) with elevated lactate and SBP<90.   -Started on empirical Vanc/Zosyn, 1 dose tobramycin given for double Pseudomonas coverage  - Blood Cx 3 of 4 positive, gram + cocci in clusters: MRSA (+), f/u with sensitivities by 7/17  -started on BiPAP, now intubated. 7/15 0935 ABG pH 7.22 CO2 25 HCO3 10 base excess -16.1  -Heparin drip started  -Bicarb 12, 4 amps bicarb given today  -Repeat lactate 10.8 @ 0928. f/u q4h  -Levophed @ 40mL/hr , Vasopressin 2.4 mL/hr with Goal SBP > 90  -Echocardiogram r/o infective endocarditis  -f/u ID recs    RENAL  #RADHA  Although BUN:Cr ratio <20, in the setting of sepsis a pre-renal etiology is most like likely. Also consider ATN because patient is currently not making urine.  -s/p 2 L in ED  - Total intake of 5.7 L, no urine output  - abdominal ultrasound ordered, r/o infected renal cysts   - Spoke to proxy Martha, who decided that hemodialysis is not something the patient would want.     GI  #Suspected mesenteric ischemia in setting of high lactate 10.8  - Surgery consulted. As per surgery recs, once patient's pressor requirements have decreased, get CT chest/abd/pelvis with contrast  - abdominal u/s ordered, f/u results    HEME  #Thrombocytopenia  Patient had Plt count WNL on recent admission, possibly 2/2 to sepsis or dilution  -Continue to monitor/trend CBC    #Coagulopathy. PT 32.5 INR 2.79 PTT 48.3  - f/u coag studies    #Macrocytic Anemia - Spurr Cells on smear  -spurr cells likely artifact, but if persistent consider w/u  -In setting of decreased PO intake recently, patient at risk for a folate deficiency  -f/u folate    ENDOCRINE  #DM  -A1c 6.3  #Possible metformin toxicity: Elevated lactate + renal failure  -Hold home metformin and start SSI    Hypothyroidism  -In setting of NPO hold home synthroid  - TSH= 18.4, downtrending from previous visits. On last admission TSH 26.8  - patient likely getting inadequate dose or not taking home meds. F/u if he is taking. If he is increase home dose once not NPO    FEN  F: d5 @ 60 mL/hr  E: Replete K<4 Mg < 2  N: NPO    DVT PPx - Heparin drip @ 15 mL/hour  GI PPx- protonix 40mg IV daily (ICU patient with sepsis meets criteria)    LINES - Bilateral peripheral lines on wrist, Central line (R. IJ) Arterial line. Randle catheter    CODE - Discussion had over the phone with guardian who states pt has living will. Living will stating pt is DNR and DNI if status is irreversible and terminal. MOLST and Living will in chart. Guardian Mila Fernandez (in [place]) can be reached at: [phone] or [phone]. Guardian agreeing to pressor support, ABX, IVF, and noninvasive ventilation at this time.     DISPO - continue intensive level of care in MICU service

## 2019-07-15 NOTE — PROCEDURE NOTE - NSPROCDETAILS_GEN_ALL_CORE
sterile dressing applied/ultrasound guidance/sterile technique, catheter placed/guidewire recovered/lumen(s) aspirated and flushed
location identified, draped/prepped, sterile technique used, needle inserted/introduced/ultrasound guidance/positive blood return obtained via catheter/connected to a pressurized flush line/hemostasis with direct pressure, dressing applied/all materials/supplies accounted for at end of procedure/sutured in place

## 2019-07-15 NOTE — DIETITIAN INITIAL EVALUATION ADULT. - ENERGY NEEDS
Height 65"; .3#; #; 149%IBW  BMI 33.3  Ideal body weight used for calculations as pt >120% of IBW. Needs estimated for maintenance in older adults; increased needs for vent, sepsis

## 2019-07-15 NOTE — PROVIDER CONTACT NOTE (CRITICAL VALUE NOTIFICATION) - ASSESSMENT
Pt hypotensive with high pressor requirements. Levo gtt at 70-75 mcg/min to maintain SBP > 90. Pt hypotensive with high pressor requirements. Levo gtt at 70-75 mcg/min to maintain SBP > 90. K 3.6

## 2019-07-15 NOTE — CONSULT NOTE ADULT - ATTENDING COMMENTS
SEPSIS  resp failure'  SEV PVD  Cerebrovasc disease  on vent  Pressors  AB--zosyn  Sedation
I have reviewed the medical record, including laboratory and radiographic studies, examined the patient and discussed the plan with Dr. Day, the ID Resident.  Agree with above. He has PVE seen on TTE, is in septic shock with concern for mesenteric ischemia in setting of hypotension. He is anuric. His prognosis is very poor.  Will follow with you – ID Team 1.

## 2019-07-15 NOTE — PROGRESS NOTE ADULT - ASSESSMENT
Assessment: 84y Male hx of afib, CAD s/p CABG, CVA s/p L enarderectomy (6/19) presents from NH for AMS and increased sleepiness x1 day now s/p intubation due to acute respiratory failure 2/2 metabolic acidosis. Pt presented with temp of 103 F, wbc 23, and lactate 8.6. Pts lactate continues to be elevated at 10.8 and WBC 29.     Recommendations:  - CT angio of abdomen/pelvis is ideal however pending pt stability   - Anticoagulation  - Broad spectrum abx  - Continue to follow pt clinically  - Follow lactate  - Will re-examine for possible OR  - discussed with Chief on call and with attending

## 2019-07-15 NOTE — CONSULT NOTE ADULT - CONSULT REASON
sepsis  under my care at [facility]
MRSA bacteremia
severe sepsis
MRSA Bacteremia, recent vascular surgery

## 2019-07-15 NOTE — PROGRESS NOTE ADULT - SUBJECTIVE AND OBJECTIVE BOX
Surgery Consult Note:    Attending: Dr. Jackie Cook    HPI:  84M former smoker with MHx of HTN, HLD, DM 2, hypothyroidism, prostate CA s/p seeding, atrial flutter s/p ablation, on Eliquis, bifascicular heart block s/p pacemaker, COPD (not on home air), CAD s/p CABGx3 (3/17), CVA s/p L enarderectomy (6/19) presents from NH for AMS and increased sleepiness x1 day. Interval history from friend collected from friend and Guardian, Martha. At baseline patient is A&Ox3 and able to perform most ADLs and iADLS. Yesterday, pt was his baseline mental status and friend went to go visit today and noticed pt was laying in bed and lethargic, AAOx0 and babbling incoherent words. He was not responding appropriately to questions and not acting himself. Per friend, unsure if pt had neuro event, aspiration, recent illness, changes in medication, or other inciting event. Per Martha, patient has been doing well since his June hospitalization He has been on thickened liquids at home, but says he often refuses the  powder so she is not sure how adherent he is to it. Additionally, she noted that during his previous CVA he was briefly incoherent.     In ED pt tachycardic to 110s, temp 103, RADHA (Cr 3.23), leukocytosis to 23. Lactate 8.6. CXR with b/l infiltrates and pulmonary  edema. Pt given 2L IVF bolus, tylenol, vanc/zosyn. Pt seen in ED. AAOx0 (Unable to get interval history or ROS), in respiratory distress with accessory muscle use and abdominal breathing. Satting 88-90% on RA. HR 50-60s. SBP evidently in 80s-90s systolic. Responsive to voice but not following commands or answering questions appropriately. (14 Jul 2019 20:11)        PAST MEDICAL & SURGICAL HISTORY:  Cerebrovascular accident (CVA): 6/2019  High cholesterol  Diabetes  Prostate cancer: in remission  Hypertension  COPD (chronic obstructive pulmonary disease)  H/O carotid endarterectomy  H/O aortic valve replacement  S/P CABG x 3  Artificial pacemaker  History of cataract surgery: b/l  History of knee replacement: b/l      MEDICATIONS  (STANDING):  ALBUTerol/ipratropium for Nebulization 3 milliLiter(s) Nebulizer every 6 hours  chlorhexidine 0.12% Liquid 15 milliLiter(s) Oral Mucosa every 12 hours  chlorhexidine 2% Cloths 1 Application(s) Topical <User Schedule>  dextrose 5%. 1000 milliLiter(s) (50 mL/Hr) IV Continuous <Continuous>  dextrose 5%. 1000 milliLiter(s) (60 mL/Hr) IV Continuous <Continuous>  dextrose 50% Injectable 12.5 Gram(s) IV Push once  dextrose 50% Injectable 25 Gram(s) IV Push once  dextrose 50% Injectable 25 Gram(s) IV Push once  hydrocortisone sodium succinate Injectable 50 milliGRAM(s) IV Push every 6 hours  insulin lispro (HumaLOG) corrective regimen sliding scale   SubCutaneous Before meals and at bedtime  levothyroxine Injectable 25 MICROGram(s) IV Push at bedtime  norepinephrine Infusion 0.05 MICROgram(s)/kG/Min (4.322 mL/Hr) IV Continuous <Continuous>  pantoprazole  Injectable 40 milliGRAM(s) IV Push daily  piperacillin/tazobactam IVPB.. 2.25 Gram(s) IV Intermittent every 6 hours  propofol Infusion 10 MICROgram(s)/kG/Min (5.532 mL/Hr) IV Continuous <Continuous>  sodium bicarbonate  Injectable 50 milliEquivalent(s) IV Push every 10 minutes  vasopressin Infusion 0.04 Unit(s)/Min (2.4 mL/Hr) IV Continuous <Continuous>    MEDICATIONS  (PRN):  dextrose 40% Gel 15 Gram(s) Oral once PRN Blood Glucose LESS THAN 70 milliGRAM(s)/deciliter  glucagon  Injectable 1 milliGRAM(s) IntraMuscular once PRN Glucose LESS THAN 70 milligrams/deciliter      Allergies: No Known Allergies    Intolerances: narcotic analgesics (Nausea; Vomiting)      FAMILY HISTORY:  Family history of acute myocardial infarction (Father)    Vital Signs Last 24 Hrs  T(C): 36.6 (15 Jul 2019 06:17), Max: 39.4 (14 Jul 2019 16:19)  T(F): 97.9 (15 Jul 2019 06:17), Max: 102.9 (14 Jul 2019 16:19)  HR: 82 (15 Jul 2019 10:00) (60 - 117)  BP: 99/51 (15 Jul 2019 04:00) (66/34 - 107/77)  BP(mean): 67 (15 Jul 2019 04:00) (42 - 73)  RR: 24 (15 Jul 2019 10:00) (9 - 31)  SpO2: 100% (15 Jul 2019 10:00) (80% - 100%)    I&O's Summary    14 Jul 2019 07:01  -  15 Jul 2019 07:00  --------------------------------------------------------  IN: 5777.4 mL / OUT: 0 mL / NET: 5777.4 mL    15 Jul 2019 07:01  -  15 Jul 2019 10:50  --------------------------------------------------------  IN: 916 mL / OUT: 0 mL / NET: 916 mL      Physical Exam:  General: NAD, sedated for intubation  Pulmonary: Intubated,RR24,   Cardio: NSR,  Abdominal: soft, nondistended. No pain response elicited with deep palpation due to sedation.   xtremities: warm, well perfused. SCD in place.   : cook in place, muddy dark color urine in tubing  Rectal: no blood on PRIYA    LABS:                        11.3   29.45 )-----------( 43       ( 15 Jul 2019 05:54 )             36.8     07-15    142  |  106  |  38<H>  ----------------------------<  128<H>  3.8   |  11<L>  |  3.60<H>    Ca    6.8<L>      15 Jul 2019 05:54  Phos  4.6     07-15  Mg     1.8     07-15    TPro  5.0<L>  /  Alb  2.0<L>  /  TBili  0.6  /  DBili  0.4<H>  /  AST  85<H>  /  ALT  30  /  AlkPhos  36<L>  07-15    PT/INR - ( 15 Jul 2019 05:54 )   PT: 32.5 sec;   INR: 2.79          PTT - ( 15 Jul 2019 05:54 )  PTT:48.3 sec  Urinalysis Basic - ( 14 Jul 2019 17:24 )    Color: Yellow / Appearance: SL Cloudy / SG: >=1.030 / pH: x  Gluc: x / Ketone: Trace mg/dL  / Bili: Small / Urobili: 0.2 E.U./dL   Blood: x / Protein: 30 mg/dL / Nitrite: NEGATIVE   Leuk Esterase: NEGATIVE / RBC: < 5 /HPF / WBC < 5 /HPF   Sq Epi: x / Non Sq Epi: 5-10 /HPF / Bacteria: Present /HPF      CAPILLARY BLOOD GLUCOSE  POCT Blood Glucose.: 106 mg/dL (15 Jul 2019 05:26)  POCT Blood Glucose.: 79 mg/dL (14 Jul 2019 23:05)  POCT Blood Glucose.: 123 mg/dL (14 Jul 2019 21:46)  POCT Blood Glucose.: 42 mg/dL (14 Jul 2019 21:21)    LIVER FUNCTIONS - ( 15 Jul 2019 05:54 )  Alb: 2.0 g/dL / Pro: 5.0 g/dL / ALK PHOS: 36 U/L / ALT: 30 U/L / AST: 85 U/L / GGT: x             Cultures:  Culture Results:   Culture in progress (07-15 @ 00:31)  Culture Results:   Culture in progress (07-15 @ 00:31)

## 2019-07-15 NOTE — PROCEDURE NOTE - NSINDICATIONS_GEN_A_CORE
critical illness/emergency venous access/hypertonic/irritant infusion/venous access/volume resuscitation
monitoring purposes/critical patient

## 2019-07-16 ENCOUNTER — APPOINTMENT (OUTPATIENT)
Dept: VASCULAR SURGERY | Facility: CLINIC | Age: 84
End: 2019-07-16

## 2019-07-16 LAB
ALBUMIN SERPL ELPH-MCNC: 1.8 G/DL — LOW (ref 3.3–5)
ALBUMIN SERPL ELPH-MCNC: 2 G/DL — LOW (ref 3.3–5)
ALP SERPL-CCNC: 94 U/L — SIGNIFICANT CHANGE UP (ref 40–120)
ALP SERPL-CCNC: 95 U/L — SIGNIFICANT CHANGE UP (ref 40–120)
ALT FLD-CCNC: 126 U/L — HIGH (ref 10–45)
ALT FLD-CCNC: 88 U/L — HIGH (ref 10–45)
ANION GAP SERPL CALC-SCNC: 17 MMOL/L — SIGNIFICANT CHANGE UP (ref 5–17)
ANION GAP SERPL CALC-SCNC: 18 MMOL/L — HIGH (ref 5–17)
ANION GAP SERPL CALC-SCNC: 22 MMOL/L — HIGH (ref 5–17)
APTT BLD: 110.1 SEC — HIGH (ref 27.5–36.3)
APTT BLD: 49.2 SEC — HIGH (ref 27.5–36.3)
APTT BLD: 61.2 SEC — HIGH (ref 27.5–36.3)
AST SERPL-CCNC: 185 U/L — HIGH (ref 10–40)
AST SERPL-CCNC: 297 U/L — HIGH (ref 10–40)
BILIRUB SERPL-MCNC: 2.1 MG/DL — HIGH (ref 0.2–1.2)
BILIRUB SERPL-MCNC: 2.2 MG/DL — HIGH (ref 0.2–1.2)
BUN SERPL-MCNC: 46 MG/DL — HIGH (ref 7–23)
BUN SERPL-MCNC: 49 MG/DL — HIGH (ref 7–23)
BUN SERPL-MCNC: 52 MG/DL — HIGH (ref 7–23)
CALCIUM SERPL-MCNC: 6.4 MG/DL — CRITICAL LOW (ref 8.4–10.5)
CALCIUM SERPL-MCNC: 6.5 MG/DL — CRITICAL LOW (ref 8.4–10.5)
CALCIUM SERPL-MCNC: 6.7 MG/DL — LOW (ref 8.4–10.5)
CHLORIDE SERPL-SCNC: 100 MMOL/L — SIGNIFICANT CHANGE UP (ref 96–108)
CHLORIDE SERPL-SCNC: 101 MMOL/L — SIGNIFICANT CHANGE UP (ref 96–108)
CHLORIDE SERPL-SCNC: 102 MMOL/L — SIGNIFICANT CHANGE UP (ref 96–108)
CO2 SERPL-SCNC: 16 MMOL/L — LOW (ref 22–31)
CO2 SERPL-SCNC: 18 MMOL/L — LOW (ref 22–31)
CO2 SERPL-SCNC: 18 MMOL/L — LOW (ref 22–31)
CREAT SERPL-MCNC: 4.1 MG/DL — HIGH (ref 0.5–1.3)
CREAT SERPL-MCNC: 4.21 MG/DL — HIGH (ref 0.5–1.3)
CREAT SERPL-MCNC: 4.22 MG/DL — HIGH (ref 0.5–1.3)
D DIMER BLD IA.RAPID-MCNC: 3061 NG/ML DDU — HIGH
FIBRINOGEN PPP-MCNC: 303 MG/DL — SIGNIFICANT CHANGE UP (ref 258–438)
GLUCOSE BLDC GLUCOMTR-MCNC: 217 MG/DL — HIGH (ref 70–99)
GLUCOSE SERPL-MCNC: 135 MG/DL — HIGH (ref 70–99)
GLUCOSE SERPL-MCNC: 186 MG/DL — HIGH (ref 70–99)
GLUCOSE SERPL-MCNC: 232 MG/DL — HIGH (ref 70–99)
GRAM STN FLD: SIGNIFICANT CHANGE UP
GRAM STN FLD: SIGNIFICANT CHANGE UP
HAPTOGLOB SERPL-MCNC: 170 MG/DL — SIGNIFICANT CHANGE UP (ref 34–200)
HCT VFR BLD CALC: 32.3 % — LOW (ref 39–50)
HCT VFR BLD CALC: 33.3 % — LOW (ref 39–50)
HCT VFR BLD CALC: 34.4 % — LOW (ref 39–50)
HEPARIN-PF4 AB RESULT: 0.7 U/ML — SIGNIFICANT CHANGE UP (ref 0–0.9)
HGB BLD-MCNC: 10.5 G/DL — LOW (ref 13–17)
HGB BLD-MCNC: 10.9 G/DL — LOW (ref 13–17)
HGB BLD-MCNC: 11.1 G/DL — LOW (ref 13–17)
INR BLD: 1.98 — HIGH (ref 0.88–1.16)
LACTATE SERPL-SCNC: 3.5 MMOL/L — HIGH (ref 0.5–2)
LACTATE SERPL-SCNC: 4.8 MMOL/L — CRITICAL HIGH (ref 0.5–2)
LACTATE SERPL-SCNC: 6 MMOL/L — CRITICAL HIGH (ref 0.5–2)
LDH SERPL L TO P-CCNC: 677 U/L — HIGH (ref 50–242)
MAGNESIUM SERPL-MCNC: 1.9 MG/DL — SIGNIFICANT CHANGE UP (ref 1.6–2.6)
MCHC RBC-ENTMCNC: 31.7 PG — SIGNIFICANT CHANGE UP (ref 27–34)
MCHC RBC-ENTMCNC: 32.1 PG — SIGNIFICANT CHANGE UP (ref 27–34)
MCHC RBC-ENTMCNC: 32.3 GM/DL — SIGNIFICANT CHANGE UP (ref 32–36)
MCHC RBC-ENTMCNC: 32.3 PG — SIGNIFICANT CHANGE UP (ref 27–34)
MCHC RBC-ENTMCNC: 32.5 GM/DL — SIGNIFICANT CHANGE UP (ref 32–36)
MCHC RBC-ENTMCNC: 32.7 GM/DL — SIGNIFICANT CHANGE UP (ref 32–36)
MCV RBC AUTO: 97.6 FL — SIGNIFICANT CHANGE UP (ref 80–100)
MCV RBC AUTO: 98.8 FL — SIGNIFICANT CHANGE UP (ref 80–100)
MCV RBC AUTO: 99.4 FL — SIGNIFICANT CHANGE UP (ref 80–100)
NRBC # BLD: 0 /100 WBCS — SIGNIFICANT CHANGE UP (ref 0–0)
PF4 HEPARIN CMPLX AB SER-ACNC: NEGATIVE — SIGNIFICANT CHANGE UP
PHOSPHATE SERPL-MCNC: 4.5 MG/DL — SIGNIFICANT CHANGE UP (ref 2.5–4.5)
PLATELET # BLD AUTO: 15 K/UL — CRITICAL LOW (ref 150–400)
PLATELET # BLD AUTO: 16 K/UL — CRITICAL LOW (ref 150–400)
PLATELET # BLD AUTO: 18 K/UL — CRITICAL LOW (ref 150–400)
POTASSIUM SERPL-MCNC: 3.9 MMOL/L — SIGNIFICANT CHANGE UP (ref 3.5–5.3)
POTASSIUM SERPL-MCNC: 4 MMOL/L — SIGNIFICANT CHANGE UP (ref 3.5–5.3)
POTASSIUM SERPL-MCNC: 4.1 MMOL/L — SIGNIFICANT CHANGE UP (ref 3.5–5.3)
POTASSIUM SERPL-SCNC: 3.9 MMOL/L — SIGNIFICANT CHANGE UP (ref 3.5–5.3)
POTASSIUM SERPL-SCNC: 4 MMOL/L — SIGNIFICANT CHANGE UP (ref 3.5–5.3)
POTASSIUM SERPL-SCNC: 4.1 MMOL/L — SIGNIFICANT CHANGE UP (ref 3.5–5.3)
PROT SERPL-MCNC: 5 G/DL — LOW (ref 6–8.3)
PROT SERPL-MCNC: 5.1 G/DL — LOW (ref 6–8.3)
PROTHROM AB SERPL-ACNC: 22.9 SEC — HIGH (ref 10–12.9)
RBC # BLD: 3.31 M/UL — LOW (ref 4.2–5.8)
RBC # BLD: 3.37 M/UL — LOW (ref 4.2–5.8)
RBC # BLD: 3.46 M/UL — LOW (ref 4.2–5.8)
RBC # FLD: 16.3 % — HIGH (ref 10.3–14.5)
RBC # FLD: 16.4 % — HIGH (ref 10.3–14.5)
RBC # FLD: 16.5 % — HIGH (ref 10.3–14.5)
SODIUM SERPL-SCNC: 135 MMOL/L — SIGNIFICANT CHANGE UP (ref 135–145)
SODIUM SERPL-SCNC: 138 MMOL/L — SIGNIFICANT CHANGE UP (ref 135–145)
SODIUM SERPL-SCNC: 139 MMOL/L — SIGNIFICANT CHANGE UP (ref 135–145)
SPECIMEN SOURCE: SIGNIFICANT CHANGE UP
VANCOMYCIN FLD-MCNC: 21.7 UG/ML — SIGNIFICANT CHANGE UP
VANCOMYCIN FLD-MCNC: 25.6 UG/ML
WBC # BLD: 20.73 K/UL — HIGH (ref 3.8–10.5)
WBC # BLD: 21.45 K/UL — HIGH (ref 3.8–10.5)
WBC # BLD: 22.99 K/UL — HIGH (ref 3.8–10.5)
WBC # FLD AUTO: 20.73 K/UL — HIGH (ref 3.8–10.5)
WBC # FLD AUTO: 21.45 K/UL — HIGH (ref 3.8–10.5)
WBC # FLD AUTO: 22.99 K/UL — HIGH (ref 3.8–10.5)

## 2019-07-16 PROCEDURE — 99232 SBSQ HOSP IP/OBS MODERATE 35: CPT | Mod: GC

## 2019-07-16 PROCEDURE — 70450 CT HEAD/BRAIN W/O DYE: CPT | Mod: 26

## 2019-07-16 PROCEDURE — 74176 CT ABD & PELVIS W/O CONTRAST: CPT | Mod: 26

## 2019-07-16 PROCEDURE — 99291 CRITICAL CARE FIRST HOUR: CPT

## 2019-07-16 PROCEDURE — 71250 CT THORAX DX C-: CPT | Mod: 26

## 2019-07-16 PROCEDURE — 71045 X-RAY EXAM CHEST 1 VIEW: CPT | Mod: 26

## 2019-07-16 PROCEDURE — 70490 CT SOFT TISSUE NECK W/O DYE: CPT | Mod: 26

## 2019-07-16 RX ORDER — HEPARIN SODIUM 5000 [USP'U]/ML
600 INJECTION INTRAVENOUS; SUBCUTANEOUS
Qty: 25000 | Refills: 0 | Status: DISCONTINUED | OUTPATIENT
Start: 2019-07-16 | End: 2019-07-16

## 2019-07-16 RX ORDER — IOHEXOL 300 MG/ML
30 INJECTION, SOLUTION INTRAVENOUS ONCE
Refills: 0 | Status: COMPLETED | OUTPATIENT
Start: 2019-07-16 | End: 2019-07-16

## 2019-07-16 RX ORDER — HEPARIN SODIUM 5000 [USP'U]/ML
800 INJECTION INTRAVENOUS; SUBCUTANEOUS
Qty: 25000 | Refills: 0 | Status: DISCONTINUED | OUTPATIENT
Start: 2019-07-16 | End: 2019-07-16

## 2019-07-16 RX ORDER — FENTANYL CITRATE 50 UG/ML
25 INJECTION INTRAVENOUS
Refills: 0 | Status: DISCONTINUED | OUTPATIENT
Start: 2019-07-16 | End: 2019-07-18

## 2019-07-16 RX ORDER — FENTANYL CITRATE 50 UG/ML
50 INJECTION INTRAVENOUS
Refills: 0 | Status: DISCONTINUED | OUTPATIENT
Start: 2019-07-16 | End: 2019-07-18

## 2019-07-16 RX ORDER — VASOPRESSIN 20 [USP'U]/ML
0.04 INJECTION INTRAVENOUS
Qty: 50 | Refills: 0 | Status: DISCONTINUED | OUTPATIENT
Start: 2019-07-16 | End: 2019-07-17

## 2019-07-16 RX ADMIN — PIPERACILLIN AND TAZOBACTAM 200 GRAM(S): 4; .5 INJECTION, POWDER, LYOPHILIZED, FOR SOLUTION INTRAVENOUS at 00:04

## 2019-07-16 RX ADMIN — IOHEXOL 30 MILLILITER(S): 300 INJECTION, SOLUTION INTRAVENOUS at 12:45

## 2019-07-16 RX ADMIN — Medication 25 MICROGRAM(S): at 21:41

## 2019-07-16 RX ADMIN — HEPARIN SODIUM 8 UNIT(S)/HR: 5000 INJECTION INTRAVENOUS; SUBCUTANEOUS at 01:30

## 2019-07-16 RX ADMIN — Medication 400 MILLIGRAM(S): at 00:24

## 2019-07-16 RX ADMIN — Medication 3 MILLILITER(S): at 21:20

## 2019-07-16 RX ADMIN — Medication 4.32 MICROGRAM(S)/KG/MIN: at 00:25

## 2019-07-16 RX ADMIN — Medication 50 MILLIGRAM(S): at 16:00

## 2019-07-16 RX ADMIN — CHLORHEXIDINE GLUCONATE 15 MILLILITER(S): 213 SOLUTION TOPICAL at 18:06

## 2019-07-16 RX ADMIN — CHLORHEXIDINE GLUCONATE 15 MILLILITER(S): 213 SOLUTION TOPICAL at 06:05

## 2019-07-16 RX ADMIN — PANTOPRAZOLE SODIUM 40 MILLIGRAM(S): 20 TABLET, DELAYED RELEASE ORAL at 10:59

## 2019-07-16 RX ADMIN — Medication 4.32 MICROGRAM(S)/KG/MIN: at 21:42

## 2019-07-16 RX ADMIN — PROPOFOL 5.53 MICROGRAM(S)/KG/MIN: 10 INJECTION, EMULSION INTRAVENOUS at 00:12

## 2019-07-16 RX ADMIN — Medication 50 MILLIGRAM(S): at 21:41

## 2019-07-16 RX ADMIN — FENTANYL CITRATE 25 MICROGRAM(S): 50 INJECTION INTRAVENOUS at 18:30

## 2019-07-16 RX ADMIN — Medication 50 MILLIGRAM(S): at 10:58

## 2019-07-16 RX ADMIN — Medication 4.32 MICROGRAM(S)/KG/MIN: at 07:40

## 2019-07-16 RX ADMIN — Medication 3 MILLILITER(S): at 10:01

## 2019-07-16 RX ADMIN — Medication 3 MILLILITER(S): at 15:35

## 2019-07-16 RX ADMIN — CHLORHEXIDINE GLUCONATE 1 APPLICATION(S): 213 SOLUTION TOPICAL at 06:05

## 2019-07-16 RX ADMIN — Medication 50 MILLIGRAM(S): at 03:26

## 2019-07-16 RX ADMIN — Medication 3 MILLILITER(S): at 06:23

## 2019-07-16 RX ADMIN — Medication 1000 MILLIGRAM(S): at 01:13

## 2019-07-16 RX ADMIN — Medication 4: at 06:05

## 2019-07-16 RX ADMIN — FENTANYL CITRATE 25 MICROGRAM(S): 50 INJECTION INTRAVENOUS at 18:07

## 2019-07-16 RX ADMIN — PIPERACILLIN AND TAZOBACTAM 200 GRAM(S): 4; .5 INJECTION, POWDER, LYOPHILIZED, FOR SOLUTION INTRAVENOUS at 07:40

## 2019-07-16 NOTE — PROGRESS NOTE ADULT - ASSESSMENT
84M with PMH of HTN, HLD, DM, hypothyroidism, prostate CA A-flutter (s/p ablation, on Eliquis), COPD, CAD (s/p CABG x3), CVA s/p L CEA (6/19) presents to [facility] for AMS and increased sleepiness x1 day, admitted to the MICU for septic shock 2/2 MRSA bacteremia, being treated for infective endocarditis.    Recommendations:  - Medical team planning to order CT CAP (non-contrast), please add CT Neck to assess for possible collection around patch  - Discussed with Medical Team  - Call [phone] with questions or acute changes

## 2019-07-16 NOTE — PROGRESS NOTE ADULT - ASSESSMENT
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 TTE with mobile echogenicity on george valve to LVOT, possible vegetation. ID consulted for MRSA bacteremia management.    - Patient actively being treated for MRSA bacteremia likely 2/2 line sepsis, complicated by endocarditis of prosthetic valve. Patient started on rifampin on top of vancomycin for bacteremia, gentamicin not started for concern of worsening kidney failure (pt not amenable to HD as per living will and proxy)  - C/w rifampin 300 mg q8h  - C/w vancomycin, dosed by level  - Prognosis is very poor  - Primary team aware, ID team 1 following  - Case discussed with Dr. Crawford, ID attending physician 84M former smoker with MHx of HTN, HLD, DM 2, hypothyroidism, prostate CA s/p seeding, atrial flutter s/p ablation, bifascicular heart block s/p pacemaker, COPD (not on home air), CAD s/p CABGx3 (3/17), CVA s/p L CEA (6/19) who presents with AMS, tachycardia, fever, and leukocytosis, with septic shock (on levophed and vasopressin) 2/2 to likely line sepsis from peripheral IVs. TTE with mobile echogenicity on george valve to LVOT, possible vegetation. ID consulted for MRSA bacteremia management.    - Patient actively being treated for MRSA bacteremia likely 2/2 line sepsis, complicated by endocarditis of prosthetic valve. Patient started on rifampin on top of vancomycin for bacteremia, gentamicin not started for concern of worsening kidney failure (pt not amenable to HD as per living will and proxy). Pip-tazo was discontinued in setting of thrombocytopenia platelets 18  - C/w rifampin 300 mg q8h  - C/w vancomycin, dosed by level  - Prognosis is very poor  - Primary team aware, ID team 1 following  - Case discussed with Dr. Crawford, ID attending physician

## 2019-07-16 NOTE — PROGRESS NOTE ADULT - ASSESSMENT
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 Admitted to MICU for septic shock 2/2 MRSA requiring intubation, pressors, and antibiotics.      NEURO  #Altered Mental Status most likely 2/2 MRSA sepsis  - Treat with Vancomycin    #CVA 6/2019 s/p CEA. Per family and Neuro notes, patient has swallowing deficits from previous CVA, but otherwise no other gross deficits.   -In setting of NPO holding ASA 81mg and simvastatin 40mg  - In setting of pressor use, CT head to rule out CVA can not be performed    PULMONARY  #COPD  Not on Home O2. Satting 88-90% on admission.  - Intubated 7/15. FiO2 50%, PEEP 5, RR 24, Vt 50.   - Duoneb q6  - repeat ABG and VBG    CARDIOVASCULAR  #Septic Shock: source MRSA in blood  -See Infectious Disease below    #HTN  - Hold home blood pressure medications in setting of shock    #CHF EF 55%  -In setting of NPO holding home lisinopril 40mg, lasix 20mg, metoprolol 25mg BID    Infectious Disease  #Septic Shock: source MRSA in blood  Patient with qSOFA score 3 and met SIRS criteria on admission (HR>90, temp >100.4, WBC > 12,000, RR>19) with elevated lactate and SBP<90.   -Started on empirical Vanc/Zosyn, 1 dose tobramycin given for double Pseudomonas coverage  - Blood Cx 3 of 4 positive, gram + cocci in clusters: MRSA (+), f/u with sensitivities by 7/17  -started on BiPAP, now intubated. 7/15 0935 ABG pH 7.22 CO2 25 HCO3 10 base excess -16.1  -Heparin drip started  -Bicarb 12, 4 amps bicarb given today  -Repeat lactate 10.8 @ 0928. f/u q4h  -Levophed @ 40mL/hr , Vasopressin 2.4 mL/hr with Goal SBP > 90  -Echocardiogram r/o infective endocarditis  -f/u ID recs    RENAL  #RADHA  Although BUN:Cr ratio <20, in the setting of sepsis a pre-renal etiology is most like likely. Also consider ATN because patient is currently not making urine.  -s/p 2 L in ED  - Total intake of 5.7 L, no urine output  - abdominal ultrasound ordered, r/o infected renal cysts   - Spoke to proxy Martha, who decided that hemodialysis is not something the patient would want.     GI  #Suspected mesenteric ischemia in setting of high lactate 10.8  - Surgery consulted. As per surgery recs, once patient's pressor requirements have decreased, get CT chest/abd/pelvis with contrast  - abdominal u/s ordered, f/u results    HEME  #Thrombocytopenia  Patient had Plt count WNL on recent admission, possibly 2/2 to sepsis or dilution  -Continue to monitor/trend CBC    #Coagulopathy. PT 32.5 INR 2.79 PTT 48.3  - f/u coag studies    #Macrocytic Anemia - Spurr Cells on smear  -spurr cells likely artifact, but if persistent consider w/u  -In setting of decreased PO intake recently, patient at risk for a folate deficiency  -f/u folate    ENDOCRINE  #DM  -A1c 6.3  #Possible metformin toxicity: Elevated lactate + renal failure  -Hold home metformin and start SSI    Hypothyroidism  -In setting of NPO hold home synthroid  - TSH= 18.4, downtrending from previous visits. On last admission TSH 26.8  - patient likely getting inadequate dose or not taking home meds. F/u if he is taking. If he is increase home dose once not NPO    FEN  F: d5 @ 60 mL/hr  E: Replete K<4 Mg < 2  N: NPO    DVT PPx - Heparin drip @ 15 mL/hour  GI PPx- protonix 40mg IV daily (ICU patient with sepsis meets criteria)    LINES - Bilateral peripheral lines on wrist, Central line (R. IJ) Arterial line. Randle catheter    CODE - Discussion had over the phone with guardian who states pt has living will. Living will stating pt is DNR and DNI if status is irreversible and terminal. MOLST and Living will in chart. Guardian Mila Fernandez (in [place]) can be reached at: [phone] or [phone]. Guardian agreeing to pressor support, ABX, IVF, and noninvasive ventilation at this time.     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       NEURO  #Altered Mental Status most likely 2/2 MRSA sepsis  - Treat with Vancomycin and Rifampin    #CVA 6/2019 s/p CEA. Per family and Neuro notes, patient has swallowing deficits from previous CVA, but otherwise no other gross deficits. Possible CVA due to septic emboli to brain.  -In setting of NPO holding ASA 81mg and simvastatin 40mg  - CT head without contrast ordered  - off of propofol, attempt at Spontaneous Awakening Trial today    PULMONARY  #COPD  Not on Home O2. Satting 88-90% on admission.  - Intubated 7/15. FiO2 50%, PEEP 5, RR 24, Vt 50.   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   -Started on empirical Vanc/Zosyn, 1 dose tobramycin given for double Pseudomonas coverage. Continuing Vancomycin, started Rifampin. Zosyn dc'd.   - Blood Cx 3 of 4 positive, gram + cocci in clusters: MRSA (+), f/u with sensitivities by 7/17  -started on BiPAP, now intubated. 7/15 2206 ABG 7/15 pH 7.36 CO2 25 HCO3 14 base exccess -9.6   -Heparin drip stopped. platelet count 18  -Bicarb 14  -Repeat lactate 4.8  -Levophed @ 20mL/hr , Vasopressin stopped. Goal SBP > 90  -Echocardiogram performed 7/15: EF 50%, tricuspid regurgitation and thickening, mobile echogenicity extending from Spaien valve to LVOT.    - ID recs: vancomycin, trough 15-20, and rifampin    RENAL  #RADHA  Although BUN:Cr ratio <20, in the setting of sepsis a pre-renal etiology is most like likely. Also consider ATN because patient is currently not making urine.  -s/p 2 L in ED  - Total intake of 3.3 L, minimal urine output: 5cc/hr  - abdominal ultrasound showed bilateral renal cysts, no hydronephrosis, and cholelithiasis.   - Spoke to proxy Martha, who decided that hemodialysis is not something the patient would want.     GI  #Suspected mesenteric ischemia in setting of high lactate 10.8  - Surgery consulted. As per surgery recs, once patient's pressor requirements have decreased, get CT chest/abd/pelvis with contrast  - patient to get CT cgest/abd/pelvis with oral contrast today    HEME  #Thrombocytopenia, suspected DIC, less likely HIT  Patient had Plt count WNL on recent admission, possibly 2/2 to sepsis or dilution  -Plt 18,000. holding on platelet transfusion as no signs of bleeding.  - D-Dimer 3061. Fibrinogen 303  - Antiplatelet antibodies to be sent    #Coagulopathy. .1 INR 1.98 PT 22.9  - f/u coag studies  - heparin stopped    #Macrocytic Anemia - Spurr Cells on smear  -spurr cells likely artifact, but if persistent consider w/u  -In setting of decreased PO intake recently, patient at risk for a folate deficiency  -f/u folate      ENDOCRINE  #DM  -A1c 6.3  #Possible metformin toxicity: Elevated lactate + renal failure  -Hold home metformin and start SSI    Hypothyroidism  -In setting of NPO hold home synthroid  - TSH= 18.4, downtrending from previous visits. On last admission TSH 26.8  - patient likely getting inadequate dose or not taking home meds. F/u if he is taking. If he is increase home dose once not NPO    FEN  F: None  E: Replete K<4 Mg < 2  N: NPO    DVT PPx - None due to thrombocytopenia  GI PPx- protonix 40mg IV daily (ICU patient with sepsis meets criteria)    LINES - Bilateral peripheral lines on wrist, Central line (R. IJ). R radial Arterial line. Randle catheter    CODE - Discussion had over the phone with guardian who states pt has living will. Living will stating pt is DNR and DNI if status is irreversible and terminal. MOLST and Living will in chart. Guardian Mila Fernandez (in [place]) can be reached at: [phone] or [phone]. Guardian agreeing to pressor support, ABX, IVF, and noninvasive ventilation at this time.     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     NEURO  #Altered Mental Status most likely 2/2 MRSA sepsis  - Treat with Vancomycin and Rifampin    #CVA 6/2019 s/p CEA. Per family and Neuro notes, patient has swallowing deficits from previous CVA, but otherwise no other gross deficits. Possible CVA due to septic emboli to brain.  -In setting of NPO holding ASA 81mg and simvastatin 40mg  - CT head without contrast ordered  - off of propofol, attempt at Spontaneous Awakening Trial today    PULMONARY  #COPD  Not on Home O2. Satting 88-90% on admission.  - Intubated 7/15. FiO2 50%, PEEP 5, RR 24, Vt 50.   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   -Started on empirical Vanc/Zosyn, 1 dose tobramycin given for double Pseudomonas coverage. Continuing Vancomycin, started Rifampin. Zosyn dc'd.   -Blood Cx 3 of 4 positive, gram + cocci in clusters: MRSA (+), f/u with sensitivities by 7/17  -started on BiPAP, now intubated. 7/15 2206 ABG 7/15 pH 7.36 CO2 25 HCO3 14 base exccess -9.6   -Heparin drip stopped. platelet count 18  -Bicarb 14  -Repeat lactate 4.8  -Levophed @ 20mL/hr , Vasopressin stopped. Goal SBP > 90  -Echocardiogram performed 7/15: EF 50%, tricuspid regurgitation and thickening, mobile echogenicity extending from Spaien valve to LVOT.    -ID recs: vancomycin, trough 15-20, and rifampin, Discontinue zosyn  -f/u CT Head, neck, chest, abdomen, pelvis  -f/u AM CBC, CMP, Lactate  -f/u surveillance cultures, results, and senesitivities    RENAL  #RADHA  Although BUN:Cr ratio <20, in the setting of sepsis a pre-renal etiology is most like likely. Also consider ATN because patient is currently not making urine.  -s/p 2 L in ED  - Total intake of 3.3 L, minimal urine output: 5cc/hr  - abdominal ultrasound showed bilateral renal cysts, no hydronephrosis, and cholelithiasis.   - Spoke to proxy Martha, who decided that hemodialysis is not something the patient would want.   - f/u bladder scan    #Hypocalcemia  Ca 6.7-> 6.4  - f/u AM CMP    GI  #Suspected mesenteric ischemia in setting of high lactate 10.8  - Surgery consulted. As per surgery recs, once patient's pressor requirements have decreased, get CT chest/abd/pelvis with contrast  - f/u CT chest/abd/pelvis with oral contrast today    HEME  #Thrombocytopenia, suspected DIC, less likely HIT  Patient had Plt count WNL on recent admission, possibly 2/2 to sepsis or dilution  -Plt 18,000. holding on platelet transfusion as no signs of bleeding.  - D-Dimer 3061. Fibrinogen 303  - Antiplatelet antibodies to be sent  - Platelets if <10    #Coagulopathy. .1 INR 1.98 PT 22.9  - f/u coag studies  - heparin stopped    #Macrocytic Anemia - Spurr Cells on smear  -spurr cells likely artifact, but if persistent consider w/u  -In setting of decreased PO intake recently, patient at risk for a folate deficiency  -f/u folate    ENDOCRINE  #DM  -A1c 6.3  #Possible metformin toxicity: Elevated lactate + renal failure  -Hold home metformin and start SSI    Hypothyroidism  -In setting of NPO hold home synthroid  - TSH= 18.4, downtrending from previous visits. On last admission TSH 26.8  - patient likely getting inadequate dose or not taking home meds. F/u if he is taking. If he is increase home dose once not NPO    FEN  F: None  E: Replete K<4 Mg < 2  N: NPO    DVT PPx - None due to thrombocytopenia  GI PPx- protonix 40mg IV daily    LINES - Bilateral peripheral lines on wrist, Central line (R. IJ). R radial Arterial line.    CODE - Discussion had over the phone with guardian who states pt has living will. Living will stating pt is DNR and DNI if status is irreversible and terminal. MOLST and Living will in chart. Guardian Mila Fernandez (in [place]) can be reached at: [phone] or [phone]. Guardian agreeing to pressor support, ABX, IVF, and noninvasive ventilation at this time.     DISPO - continue intensive level of care in MICU service

## 2019-07-16 NOTE — PROGRESS NOTE ADULT - SUBJECTIVE AND OBJECTIVE BOX
Patient intubated, on ventilation (FiO2 50%, RR 24, Vt 500mL, PEEP 5). Unable to elicit ROS due to sedation and intubation. On IV ab, heparin d/c'd    OBJECTIVE:    VITAL SIGNS:  ICU Vital Signs Last 24 Hrs  T(C): 37.6 (16 Jul 2019 17:21), Max: 38.7 (15 Jul 2019 22:28)  T(F): 99.6 (16 Jul 2019 17:21), Max: 101.7 (15 Jul 2019 22:28)  HR: 60 (16 Jul 2019 16:38) (58 - 72)  BP: 79/42 (16 Jul 2019 11:00) (79/42 - 113/54)  BP(mean): 51 (16 Jul 2019 11:00) (51 - 70)  ABP: 122/56 (16 Jul 2019 14:00) (84/44 - 150/58)  ABP(mean): 72 (16 Jul 2019 14:00) (54 - 86)  RR: 24 (16 Jul 2019 16:38) (23 - 33)  SpO2: 100% (16 Jul 2019 16:38) (94% - 100%)    Mode: AC/ CMV (Assist Control/ Continuous Mandatory Ventilation), RR (machine): 24, TV (machine): 500, FiO2: 40, PEEP: 5, ITime: 0.8, MAP: 8, PIP: 14    07-15 @ 07:01  -  07-16 @ 07:00  --------------------------------------------------------  IN: 3356.4 mL / OUT: 5 mL / NET: 3351.4 mL    07-16 @ 07:01  -  07-16 @ 18:01  --------------------------------------------------------  IN: 270 mL / OUT: 0 mL / NET: 270 mL      CAPILLARY BLOOD GLUCOSE      POCT Blood Glucose.: 217 mg/dL (16 Jul 2019 05:30)      PHYSICAL EXAM:    General: Patient lying in bed sedated and intubated. Central line, peripheral IV, and R radial A line intact, no bleeding from sites.  HEENT: bitemporal wasting. Atraumatic. Pupils minimally reactive to light. No scleral icterus.   Mouth: Some bleeding from the lips, no obvious oozing from the gums.  Cardio: +S1/S2, RRR, no murmurs, rubs, or gallops appreciated  Respiratory: CTA b/l, no wheezing, rales, rhonchi. Trial of decreasing respiratory rate elicited no spontaneous respirations.   Abdomen: Soft, NT/ND, + BS x 4  Extremities: warm, +2 pulses in all extremities, +1 pitting edema b/l upper and lower extremities. b/l 1st tarsal ecchymosis under toenail. Right hand peripheral cyanosis in tips of the first two digits.    Neuro: Sedated. Corneal reflex present. no Doll's eyes. Upward going Babinski sign on left lower extremity. No response to Babinski on Right lower extremity.     MEDICATIONS:  MEDICATIONS  (STANDING):  ALBUTerol/ipratropium for Nebulization 3 milliLiter(s) Nebulizer every 6 hours  chlorhexidine 0.12% Liquid 15 milliLiter(s) Oral Mucosa every 12 hours  chlorhexidine 2% Cloths 1 Application(s) Topical <User Schedule>  dextrose 5%. 1000 milliLiter(s) (50 mL/Hr) IV Continuous <Continuous>  dextrose 50% Injectable 12.5 Gram(s) IV Push once  dextrose 50% Injectable 25 Gram(s) IV Push once  dextrose 50% Injectable 25 Gram(s) IV Push once  hydrocortisone sodium succinate Injectable 50 milliGRAM(s) IV Push every 6 hours  insulin lispro (HumaLOG) corrective regimen sliding scale   SubCutaneous every 6 hours  levothyroxine Injectable 25 MICROGram(s) IV Push at bedtime  norepinephrine Infusion 0.05 MICROgram(s)/kG/Min (4.322 mL/Hr) IV Continuous <Continuous>  pantoprazole  Injectable 40 milliGRAM(s) IV Push daily  rifampin IVPB 300 milliGRAM(s) IV Intermittent every 8 hours  vasopressin Infusion 0.04 Unit(s)/Min (2.4 mL/Hr) IV Continuous <Continuous>    MEDICATIONS  (PRN):  dextrose 40% Gel 15 Gram(s) Oral once PRN Blood Glucose LESS THAN 70 milliGRAM(s)/deciliter  fentaNYL    Injectable 25 MICROGram(s) IV Push every 3 hours PRN Moderate Pain (4 - 6)  fentaNYL    Injectable 50 MICROGram(s) IV Push every 3 hours PRN Severe Pain (7 - 10)  glucagon  Injectable 1 milliGRAM(s) IntraMuscular once PRN Glucose LESS THAN 70 milligrams/deciliter      ALLERGIES:  Allergies    No Known Allergies    Intolerances    narcotic analgesics (Nausea; Vomiting)      LABS:                        10.9   20.73 )-----------( 16       ( 16 Jul 2019 11:17 )             33.3     07-16    135  |  100  |  49<H>  ----------------------------<  186<H>  3.9   |  18<L>  |  4.21<H>    Ca    6.4<LL>      16 Jul 2019 11:17  Phos  4.5     07-16  Mg     1.9     07-16    TPro  5.0<L>  /  Alb  1.8<L>  /  TBili  2.2<H>  /  DBili  x   /  AST  297<H>  /  ALT  126<H>  /  AlkPhos  95  07-16    PT/INR - ( 16 Jul 2019 06:00 )   PT: 22.9 sec;   INR: 1.98          PTT - ( 16 Jul 2019 11:17 )  PTT:49.2 sec      RADIOLOGY & ADDITIONAL TESTS: Reviewed.

## 2019-07-16 NOTE — PROGRESS NOTE ADULT - SUBJECTIVE AND OBJECTIVE BOX
*** NOTE IN PROGRESS ***    INTERVAL HPI/OVERNIGHT EVENTS:    SUBJECTIVE: Patient seen and examined at bedside.    OBJECTIVE:    VITAL SIGNS:  ICU Vital Signs Last 24 Hrs  T(C): 37.8 (16 Jul 2019 02:01), Max: 38.7 (15 Jul 2019 22:28)  T(F): 100.1 (16 Jul 2019 02:01), Max: 101.7 (15 Jul 2019 22:28)  HR: 58 (16 Jul 2019 05:00) (58 - 90)  BP: 113/46 (15 Jul 2019 21:00) (113/46 - 143/57)  BP(mean): 65 (15 Jul 2019 21:00) (65 - 78)  ABP: 118/54 (16 Jul 2019 05:00) (92/40 - 152/60)  ABP(mean): 70 (16 Jul 2019 05:00) (56 - 88)  RR: 24 (16 Jul 2019 05:00) (23 - 33)  SpO2: 99% (16 Jul 2019 05:00) (94% - 100%)    Mode: AC/ CMV (Assist Control/ Continuous Mandatory Ventilation), RR (machine): 24, TV (machine): 500, FiO2: 50, PEEP: 5, ITime: 0.8, MAP: 11, PIP: 20    07-14 @ 07:01  -  07-15 @ 07:00  --------------------------------------------------------  IN: 5777.4 mL / OUT: 0 mL / NET: 5777.4 mL    07-15 @ 07:01 - 07-16 @ 06:03  --------------------------------------------------------  IN: 3266 mL / OUT: 5 mL / NET: 3261 mL      CAPILLARY BLOOD GLUCOSE      POCT Blood Glucose.: 217 mg/dL (16 Jul 2019 05:30)      PHYSICAL EXAM:    General: NAD  HEENT: NC/AT; PERRL, clear conjunctiva  Neck: supple  Respiratory: CTA b/l  Cardiovascular: +S1/S2; RRR  Abdomen: soft, NT/ND; +BS x4  Extremities: WWP, 2+ peripheral pulses b/l; no LE edema  Skin: normal color and turgor; no rash  Neurological:     MEDICATIONS:  MEDICATIONS  (STANDING):  ALBUTerol/ipratropium for Nebulization 3 milliLiter(s) Nebulizer every 6 hours  chlorhexidine 0.12% Liquid 15 milliLiter(s) Oral Mucosa every 12 hours  chlorhexidine 2% Cloths 1 Application(s) Topical <User Schedule>  dextrose 5%. 1000 milliLiter(s) (50 mL/Hr) IV Continuous <Continuous>  dextrose 5%. 1000 milliLiter(s) (40 mL/Hr) IV Continuous <Continuous>  dextrose 50% Injectable 12.5 Gram(s) IV Push once  dextrose 50% Injectable 25 Gram(s) IV Push once  dextrose 50% Injectable 25 Gram(s) IV Push once  heparin  Infusion 800 Unit(s)/Hr (8 mL/Hr) IV Continuous <Continuous>  hydrocortisone sodium succinate Injectable 50 milliGRAM(s) IV Push every 6 hours  insulin lispro (HumaLOG) corrective regimen sliding scale   SubCutaneous Before meals and at bedtime  levothyroxine Injectable 25 MICROGram(s) IV Push at bedtime  norepinephrine Infusion 0.05 MICROgram(s)/kG/Min (4.322 mL/Hr) IV Continuous <Continuous>  pantoprazole  Injectable 40 milliGRAM(s) IV Push daily  piperacillin/tazobactam IVPB.. 2.25 Gram(s) IV Intermittent every 8 hours  propofol Infusion 10 MICROgram(s)/kG/Min (5.532 mL/Hr) IV Continuous <Continuous>  rifampin IVPB 300 milliGRAM(s) IV Intermittent every 8 hours  vasopressin Infusion 0.04 Unit(s)/Min (2.4 mL/Hr) IV Continuous <Continuous>    MEDICATIONS  (PRN):  dextrose 40% Gel 15 Gram(s) Oral once PRN Blood Glucose LESS THAN 70 milliGRAM(s)/deciliter  glucagon  Injectable 1 milliGRAM(s) IntraMuscular once PRN Glucose LESS THAN 70 milligrams/deciliter      ALLERGIES:  Allergies    No Known Allergies    Intolerances    narcotic analgesics (Nausea; Vomiting)      LABS:                        11.3   29.45 )-----------( 43       ( 15 Jul 2019 05:54 )             36.8     07-15    140  |  101  |  41<H>  ----------------------------<  204<H>  4.1   |  13<L>  |  3.81<H>    Ca    6.7<L>      15 Jul 2019 18:41  Phos  4.8     07-15  Mg     1.8     07-15    TPro  5.0<L>  /  Alb  2.0<L>  /  TBili  0.6  /  DBili  0.4<H>  /  AST  85<H>  /  ALT  30  /  AlkPhos  36<L>  07-15    PT/INR - ( 15 Jul 2019 05:54 )   PT: 32.5 sec;   INR: 2.79          PTT - ( 16 Jul 2019 00:21 )  PTT:61.2 sec  Urinalysis Basic - ( 14 Jul 2019 17:24 )    Color: Yellow / Appearance: SL Cloudy / SG: >=1.030 / pH: x  Gluc: x / Ketone: Trace mg/dL  / Bili: Small / Urobili: 0.2 E.U./dL   Blood: x / Protein: 30 mg/dL / Nitrite: NEGATIVE   Leuk Esterase: NEGATIVE / RBC: < 5 /HPF / WBC < 5 /HPF   Sq Epi: x / Non Sq Epi: 5-10 /HPF / Bacteria: Present /HPF        RADIOLOGY & ADDITIONAL TESTS: Reviewed. *** NOTE IN PROGRESS ***    INTERVAL HPI/OVERNIGHT EVENTS:    SUBJECTIVE: Patient seen and examined at bedside.    OBJECTIVE:    Overnight Events: PTT levels returned and were 200, so heparin was held. Repeat PTT was in the 60s, and one more dose was given to a PTT of 110. Platelets continued to downtrend, latest platelet count came back to be 18,000, but patient had no signs of gum bleeding nor bleeding from lines, so was not transfused. Patient's lactate has decreased to 6.0 from 10.1. Bilirubin was high at ___, and AST and ALT odilia to ____. Propofol was reduced to 10 mg, and levophed dose was decreased to 20. Vancomycin trough was 14.8, 250 mg vancomycin given - repeat trough 25.6.     Subjective: Patient was seen at bedside. Patient intubated, on ventilation (FiO2 50%, RR 24, Vt 500mL, PEEP 5). Unable to elicit ROS due to sedation and intubation.     Objective/Physical Exam:    General: Patient lying in bed sedated and intubated. Central line, peripheral IV, and R radial A line intact, no bleeding from sites.  HEENT: bitemporal wasting. Atraumatic. Pupils minimally reactive to light. No scleral icterus.   Mouth: Some bleeding from the lips, no obvious oozing from the gums.  Cardio: +S1/S2, RRR, no murmurs, rubs, or gallops appreciated  Respiratory: CTA b/l, no wheezing, rales, rhonchi. Trial of decreasing respiratory rate elicited no spontaneous respirations.   Abdomen: Soft, NT/ND, + BS x 4  Extremities: warm, +2 pulses in all extremities, +1 pitting edema b/l upper and lower extremities. b/l 1st tarsal ecchymosis under toenail. Right handperipheral cyanosis in tips of the first two digits.    Neuro: Sedated. Corneal reflex present. no Doll's eyes. Upward going Babinski sign on left lower extremity. No response to Babinski on Right lower extremity.   MEDICATIONS:  MEDICATIONS  (STANDING):  ALBUTerol/ipratropium for Nebulization 3 milliLiter(s) Nebulizer every 6 hours  chlorhexidine 0.12% Liquid 15 milliLiter(s) Oral Mucosa every 12 hours  chlorhexidine 2% Cloths 1 Application(s) Topical <User Schedule>  dextrose 5%. 1000 milliLiter(s) (50 mL/Hr) IV Continuous <Continuous>  dextrose 5%. 1000 milliLiter(s) (40 mL/Hr) IV Continuous <Continuous>  dextrose 50% Injectable 12.5 Gram(s) IV Push once  dextrose 50% Injectable 25 Gram(s) IV Push once  dextrose 50% Injectable 25 Gram(s) IV Push once  heparin  Infusion 800 Unit(s)/Hr (8 mL/Hr) IV Continuous <Continuous>  hydrocortisone sodium succinate Injectable 50 milliGRAM(s) IV Push every 6 hours  insulin lispro (HumaLOG) corrective regimen sliding scale   SubCutaneous Before meals and at bedtime  levothyroxine Injectable 25 MICROGram(s) IV Push at bedtime  norepinephrine Infusion 0.05 MICROgram(s)/kG/Min (4.322 mL/Hr) IV Continuous <Continuous>  pantoprazole  Injectable 40 milliGRAM(s) IV Push daily  piperacillin/tazobactam IVPB.. 2.25 Gram(s) IV Intermittent every 8 hours  propofol Infusion 10 MICROgram(s)/kG/Min (5.532 mL/Hr) IV Continuous <Continuous>  rifampin IVPB 300 milliGRAM(s) IV Intermittent every 8 hours  vasopressin Infusion 0.04 Unit(s)/Min (2.4 mL/Hr) IV Continuous <Continuous>    MEDICATIONS  (PRN):  dextrose 40% Gel 15 Gram(s) Oral once PRN Blood Glucose LESS THAN 70 milliGRAM(s)/deciliter  glucagon  Injectable 1 milliGRAM(s) IntraMuscular once PRN Glucose LESS THAN 70 milligrams/deciliter      ALLERGIES:  Allergies    No Known Allergies    Intolerances    narcotic analgesics (Nausea; Vomiting)      LABS:                        11.3   29.45 )-----------( 43       ( 15 Jul 2019 05:54 )             36.8     07-15    140  |  101  |  41<H>  ----------------------------<  204<H>  4.1   |  13<L>  |  3.81<H>    Ca    6.7<L>      15 Jul 2019 18:41  Phos  4.8     07-15  Mg     1.8     07-15    TPro  5.0<L>  /  Alb  2.0<L>  /  TBili  0.6  /  DBili  0.4<H>  /  AST  85<H>  /  ALT  30  /  AlkPhos  36<L>  07-15    PT/INR - ( 15 Jul 2019 05:54 )   PT: 32.5 sec;   INR: 2.79          PTT - ( 16 Jul 2019 00:21 )  PTT:61.2 sec  Urinalysis Basic - ( 14 Jul 2019 17:24 )    Color: Yellow / Appearance: SL Cloudy / SG: >=1.030 / pH: x  Gluc: x / Ketone: Trace mg/dL  / Bili: Small / Urobili: 0.2 E.U./dL   Blood: x / Protein: 30 mg/dL / Nitrite: NEGATIVE   Leuk Esterase: NEGATIVE / RBC: < 5 /HPF / WBC < 5 /HPF   Sq Epi: x / Non Sq Epi: 5-10 /HPF / Bacteria: Present /HPF        RADIOLOGY & ADDITIONAL TESTS: Reviewed. *** NOTE IN PROGRESS ***    INTERVAL HPI/OVERNIGHT EVENTS:    SUBJECTIVE: Patient seen and examined at bedside.    OBJECTIVE:    Overnight Events: PTT levels returned and were 200, so heparin was held. Repeat PTT was in the 60s, and one more dose was given to a PTT of 110. Platelets continued to downtrend, latest platelet count came back to be 18,000, but patient had no signs of gum bleeding nor bleeding from lines, so was not transfused. Patient's lactate has decreased to 4.8 from 10.1. Bilirubin was high at 2.2, and AST and ALT odilia to 297 and 126. Propofol was reduced to 10 mg, and levophed dose was decreased to 20. Vancomycin trough was 14.8, 250 mg vancomycin given - repeat trough 25.6.     Subjective: Patient was seen at bedside. Patient intubated, on ventilation (FiO2 50%, RR 24, Vt 500mL, PEEP 5). Unable to elicit ROS due to sedation and intubation.     Objective/Physical Exam:      General: Patient lying in bed sedated and intubated. Central line, peripheral IV, and R radial A line intact, no bleeding from sites.  HEENT: bitemporal wasting. Atraumatic. Pupils minimally reactive to light. No scleral icterus.   Mouth: Some bleeding from the lips, no obvious oozing from the gums.  Cardio: +S1/S2, RRR, no murmurs, rubs, or gallops appreciated  Respiratory: CTA b/l, no wheezing, rales, rhonchi. Trial of decreasing respiratory rate elicited no spontaneous respirations.   Abdomen: Soft, NT/ND, + BS x 4  Extremities: warm, +2 pulses in all extremities, +1 pitting edema b/l upper and lower extremities. b/l 1st tarsal ecchymosis under toenail. Right hand peripheral cyanosis in tips of the first two digits.    Neuro: Sedated. Corneal reflex present. no Doll's eyes. Upward going Babinski sign on left lower extremity. No response to Babinski on Right lower extremity.       MEDICATIONS:  MEDICATIONS  (STANDING):  ALBUTerol/ipratropium for Nebulization 3 milliLiter(s) Nebulizer every 6 hours  chlorhexidine 0.12% Liquid 15 milliLiter(s) Oral Mucosa every 12 hours  chlorhexidine 2% Cloths 1 Application(s) Topical <User Schedule>  dextrose 5%. 1000 milliLiter(s) (50 mL/Hr) IV Continuous <Continuous>  dextrose 5%. 1000 milliLiter(s) (40 mL/Hr) IV Continuous <Continuous>  dextrose 50% Injectable 12.5 Gram(s) IV Push once  dextrose 50% Injectable 25 Gram(s) IV Push once  dextrose 50% Injectable 25 Gram(s) IV Push once  heparin  Infusion 800 Unit(s)/Hr (8 mL/Hr) IV Continuous <Continuous>  hydrocortisone sodium succinate Injectable 50 milliGRAM(s) IV Push every 6 hours  insulin lispro (HumaLOG) corrective regimen sliding scale   SubCutaneous Before meals and at bedtime  levothyroxine Injectable 25 MICROGram(s) IV Push at bedtime  norepinephrine Infusion 0.05 MICROgram(s)/kG/Min (4.322 mL/Hr) IV Continuous <Continuous>  pantoprazole  Injectable 40 milliGRAM(s) IV Push daily  piperacillin/tazobactam IVPB.. 2.25 Gram(s) IV Intermittent every 8 hours  propofol Infusion 10 MICROgram(s)/kG/Min (5.532 mL/Hr) IV Continuous <Continuous>  rifampin IVPB 300 milliGRAM(s) IV Intermittent every 8 hours  vasopressin Infusion 0.04 Unit(s)/Min (2.4 mL/Hr) IV Continuous <Continuous>    MEDICATIONS  (PRN):  dextrose 40% Gel 15 Gram(s) Oral once PRN Blood Glucose LESS THAN 70 milliGRAM(s)/deciliter  glucagon  Injectable 1 milliGRAM(s) IntraMuscular once PRN Glucose LESS THAN 70 milligrams/deciliter      ALLERGIES:  Allergies    No Known Allergies    Intolerances    narcotic analgesics (Nausea; Vomiting)      LABS:                        11.3   29.45 )-----------( 43       ( 15 Jul 2019 05:54 )             36.8     07-15    140  |  101  |  41<H>  ----------------------------<  204<H>  4.1   |  13<L>  |  3.81<H>    Ca    6.7<L>      15 Jul 2019 18:41  Phos  4.8     07-15  Mg     1.8     07-15    TPro  5.0<L>  /  Alb  2.0<L>  /  TBili  0.6  /  DBili  0.4<H>  /  AST  85<H>  /  ALT  30  /  AlkPhos  36<L>  07-15    PT/INR - ( 15 Jul 2019 05:54 )   PT: 32.5 sec;   INR: 2.79          PTT - ( 16 Jul 2019 00:21 )  PTT:61.2 sec  Urinalysis Basic - ( 14 Jul 2019 17:24 )    Color: Yellow / Appearance: SL Cloudy / SG: >=1.030 / pH: x  Gluc: x / Ketone: Trace mg/dL  / Bili: Small / Urobili: 0.2 E.U./dL   Blood: x / Protein: 30 mg/dL / Nitrite: NEGATIVE   Leuk Esterase: NEGATIVE / RBC: < 5 /HPF / WBC < 5 /HPF   Sq Epi: x / Non Sq Epi: 5-10 /HPF / Bacteria: Present /HPF        RADIOLOGY & ADDITIONAL TESTS: Reviewed. INTERVAL HPI/OVERNIGHT EVENTS: Overnight Events: PTT levels returned and were 200, so heparin was held. Repeat PTT was in the 60s, and one more dose was given to a PTT of 110. Platelets continued to downtrend, latest platelet count came back to be 18,000, but patient had no signs of gum bleeding nor bleeding from lines, so was not transfused. Patient's lactate has decreased to 4.8 from 10.1. Bilirubin was high at 2.2, and AST and ALT odilia to 297 and 126. Propofol was reduced to 10 mg, and levophed dose was decreased to 20. Vancomycin trough was 14.8, 250 mg vancomycin given - repeat trough 25.6.     Subjective: Patient was seen at bedside. Patient intubated, on ventilation (FiO2 50%, RR 24, Vt 500mL, PEEP 5). Unable to elicit ROS due to sedation and intubation.     OBJECTIVE:    VITAL SIGNS:  ICU Vital Signs Last 24 Hrs  T(C): 37.6 (16 Jul 2019 17:21), Max: 38.7 (15 Jul 2019 22:28)  T(F): 99.6 (16 Jul 2019 17:21), Max: 101.7 (15 Jul 2019 22:28)  HR: 60 (16 Jul 2019 16:38) (58 - 72)  BP: 79/42 (16 Jul 2019 11:00) (79/42 - 113/54)  BP(mean): 51 (16 Jul 2019 11:00) (51 - 70)  ABP: 122/56 (16 Jul 2019 14:00) (84/44 - 150/58)  ABP(mean): 72 (16 Jul 2019 14:00) (54 - 86)  RR: 24 (16 Jul 2019 16:38) (23 - 33)  SpO2: 100% (16 Jul 2019 16:38) (94% - 100%)    Mode: AC/ CMV (Assist Control/ Continuous Mandatory Ventilation), RR (machine): 24, TV (machine): 500, FiO2: 40, PEEP: 5, ITime: 0.8, MAP: 8, PIP: 14    07-15 @ 07:01  -  07-16 @ 07:00  --------------------------------------------------------  IN: 3356.4 mL / OUT: 5 mL / NET: 3351.4 mL    07-16 @ 07:01  -  07-16 @ 18:01  --------------------------------------------------------  IN: 270 mL / OUT: 0 mL / NET: 270 mL      CAPILLARY BLOOD GLUCOSE      POCT Blood Glucose.: 217 mg/dL (16 Jul 2019 05:30)      PHYSICAL EXAM:    General: Patient lying in bed sedated and intubated. Central line, peripheral IV, and R radial A line intact, no bleeding from sites.  HEENT: bitemporal wasting. Atraumatic. Pupils minimally reactive to light. No scleral icterus.   Mouth: Some bleeding from the lips, no obvious oozing from the gums.  Cardio: +S1/S2, RRR, no murmurs, rubs, or gallops appreciated  Respiratory: CTA b/l, no wheezing, rales, rhonchi. Trial of decreasing respiratory rate elicited no spontaneous respirations.   Abdomen: Soft, NT/ND, + BS x 4  Extremities: warm, +2 pulses in all extremities, +1 pitting edema b/l upper and lower extremities. b/l 1st tarsal ecchymosis under toenail. Right hand peripheral cyanosis in tips of the first two digits.    Neuro: Sedated. Corneal reflex present. no Doll's eyes. Upward going Babinski sign on left lower extremity. No response to Babinski on Right lower extremity.     MEDICATIONS:  MEDICATIONS  (STANDING):  ALBUTerol/ipratropium for Nebulization 3 milliLiter(s) Nebulizer every 6 hours  chlorhexidine 0.12% Liquid 15 milliLiter(s) Oral Mucosa every 12 hours  chlorhexidine 2% Cloths 1 Application(s) Topical <User Schedule>  dextrose 5%. 1000 milliLiter(s) (50 mL/Hr) IV Continuous <Continuous>  dextrose 50% Injectable 12.5 Gram(s) IV Push once  dextrose 50% Injectable 25 Gram(s) IV Push once  dextrose 50% Injectable 25 Gram(s) IV Push once  hydrocortisone sodium succinate Injectable 50 milliGRAM(s) IV Push every 6 hours  insulin lispro (HumaLOG) corrective regimen sliding scale   SubCutaneous every 6 hours  levothyroxine Injectable 25 MICROGram(s) IV Push at bedtime  norepinephrine Infusion 0.05 MICROgram(s)/kG/Min (4.322 mL/Hr) IV Continuous <Continuous>  pantoprazole  Injectable 40 milliGRAM(s) IV Push daily  rifampin IVPB 300 milliGRAM(s) IV Intermittent every 8 hours  vasopressin Infusion 0.04 Unit(s)/Min (2.4 mL/Hr) IV Continuous <Continuous>    MEDICATIONS  (PRN):  dextrose 40% Gel 15 Gram(s) Oral once PRN Blood Glucose LESS THAN 70 milliGRAM(s)/deciliter  fentaNYL    Injectable 25 MICROGram(s) IV Push every 3 hours PRN Moderate Pain (4 - 6)  fentaNYL    Injectable 50 MICROGram(s) IV Push every 3 hours PRN Severe Pain (7 - 10)  glucagon  Injectable 1 milliGRAM(s) IntraMuscular once PRN Glucose LESS THAN 70 milligrams/deciliter      ALLERGIES:  Allergies    No Known Allergies    Intolerances    narcotic analgesics (Nausea; Vomiting)      LABS:                        10.9   20.73 )-----------( 16       ( 16 Jul 2019 11:17 )             33.3     07-16    135  |  100  |  49<H>  ----------------------------<  186<H>  3.9   |  18<L>  |  4.21<H>    Ca    6.4<LL>      16 Jul 2019 11:17  Phos  4.5     07-16  Mg     1.9     07-16    TPro  5.0<L>  /  Alb  1.8<L>  /  TBili  2.2<H>  /  DBili  x   /  AST  297<H>  /  ALT  126<H>  /  AlkPhos  95  07-16    PT/INR - ( 16 Jul 2019 06:00 )   PT: 22.9 sec;   INR: 1.98          PTT - ( 16 Jul 2019 11:17 )  PTT:49.2 sec      RADIOLOGY & ADDITIONAL TESTS: Reviewed.

## 2019-07-16 NOTE — PROGRESS NOTE ADULT - SUBJECTIVE AND OBJECTIVE BOX
INTERVAL HPI/OVERNIGHT EVENTS:  Patient was seen and examined at bedside.    VITAL SIGNS:  T(F): 100.1 (07-16-19 @ 02:01)  HR: 59 (07-16-19 @ 06:20)  BP: 113/46 (07-15-19 @ 21:00)  RR: 24 (07-16-19 @ 05:00)  SpO2: 100% (07-16-19 @ 06:20)  Wt(kg): --    PHYSICAL EXAM:    Constitutional: WDWN, NAD  HEENT: PERRL, EOMI, sclera non-icteric, neck supple, trachea midline, no masses, no JVD, MMM, good dentition  Respiratory: CTA b/l, good air entry b/l, no wheezing, no rhonchi, no rales, without accessory muscle use and no intercostal retractions  Cardiovascular: RRR, normal S1S2, no M/R/G  Gastrointestinal: soft, NTND, no masses palpable, BS normal  Extremities: Warm, well perfused, pulses equal bilateral upper and lower extremities, no edema, no clubbing  Neurological: AAOx3, CN Grossly intact  Skin: Normal temperature, warm, dry    MEDICATIONS  (STANDING):  ALBUTerol/ipratropium for Nebulization 3 milliLiter(s) Nebulizer every 6 hours  chlorhexidine 0.12% Liquid 15 milliLiter(s) Oral Mucosa every 12 hours  chlorhexidine 2% Cloths 1 Application(s) Topical <User Schedule>  dextrose 5%. 1000 milliLiter(s) (50 mL/Hr) IV Continuous <Continuous>  dextrose 50% Injectable 12.5 Gram(s) IV Push once  dextrose 50% Injectable 25 Gram(s) IV Push once  dextrose 50% Injectable 25 Gram(s) IV Push once  hydrocortisone sodium succinate Injectable 50 milliGRAM(s) IV Push every 6 hours  insulin lispro (HumaLOG) corrective regimen sliding scale   SubCutaneous Before meals and at bedtime  levothyroxine Injectable 25 MICROGram(s) IV Push at bedtime  norepinephrine Infusion 0.05 MICROgram(s)/kG/Min (4.322 mL/Hr) IV Continuous <Continuous>  pantoprazole  Injectable 40 milliGRAM(s) IV Push daily  piperacillin/tazobactam IVPB.. 2.25 Gram(s) IV Intermittent every 8 hours  propofol Infusion 10 MICROgram(s)/kG/Min (5.532 mL/Hr) IV Continuous <Continuous>  rifampin IVPB 300 milliGRAM(s) IV Intermittent every 8 hours  vasopressin Infusion 0.04 Unit(s)/Min (2.4 mL/Hr) IV Continuous <Continuous>    MEDICATIONS  (PRN):  dextrose 40% Gel 15 Gram(s) Oral once PRN Blood Glucose LESS THAN 70 milliGRAM(s)/deciliter  glucagon  Injectable 1 milliGRAM(s) IntraMuscular once PRN Glucose LESS THAN 70 milligrams/deciliter      Allergies    No Known Allergies    Intolerances    narcotic analgesics (Nausea; Vomiting)      LABS:                        11.1   22.99 )-----------( 18       ( 16 Jul 2019 06:00 )             34.4     07-16    139  |  101  |  46<H>  ----------------------------<  232<H>  4.1   |  16<L>  |  4.10<H>    Ca    6.5<LL>      16 Jul 2019 06:00  Phos  4.5     07-16  Mg     1.9     07-16    TPro  5.1<L>  /  Alb  2.0<L>  /  TBili  2.1<H>  /  DBili  x   /  AST  185<H>  /  ALT  88<H>  /  AlkPhos  94  07-16    PT/INR - ( 15 Jul 2019 05:54 )   PT: 32.5 sec;   INR: 2.79          PTT - ( 16 Jul 2019 06:00 )  PTT:110.1 sec  Urinalysis Basic - ( 14 Jul 2019 17:24 )    Color: Yellow / Appearance: SL Cloudy / SG: >=1.030 / pH: x  Gluc: x / Ketone: Trace mg/dL  / Bili: Small / Urobili: 0.2 E.U./dL   Blood: x / Protein: 30 mg/dL / Nitrite: NEGATIVE   Leuk Esterase: NEGATIVE / RBC: < 5 /HPF / WBC < 5 /HPF   Sq Epi: x / Non Sq Epi: 5-10 /HPF / Bacteria: Present /HPF        RADIOLOGY & ADDITIONAL TESTS:  Reviewed INTERVAL HPI/OVERNIGHT EVENTS:  Patient was seen and examined at bedside. Overnight at 22:28 T 101.7, cooling blanket applied, tylenol IV given. Vanc 250 given for trough 14.8 (to 25 this AM). Platelets noted to be 18 this AM heparin stopped. Patient Cr uptrended to 4.1, and transaminitis , ALT 88.    VITAL SIGNS:  T(F): 100.1 (07-16-19 @ 02:01)  HR: 59 (07-16-19 @ 06:20)  BP: 113/46 (07-15-19 @ 21:00)  RR: 24 (07-16-19 @ 05:00)  SpO2: 100% (07-16-19 @ 06:20)  Wt(kg): --    PHYSICAL EXAM:  Constitutional: Ill-appearing male, mechanically ventilated, sedated, lying in bed  HEENT: RIJ central line, pupils 2mm minimally responsive b/l, neck supple  Respiratory: CTA B/L; no W/R/R, ETT in place, on VC-AC  Cardiac: +S1/S2; RRR  Gastrointestinal: Obese abdomen soft, NT/ND; no rebound or guarding; +BSx4  Extremities: WWP, no clubbing or cyanosis; trace peripheral pitting edema b/l LE  Vascular: 2+ radial, femoral, DP/PT pulses B/L  Neurologic: AAOx0, sedated, intubated, does not withdraw or localize to pain    MEDICATIONS  (STANDING):  ALBUTerol/ipratropium for Nebulization 3 milliLiter(s) Nebulizer every 6 hours  chlorhexidine 0.12% Liquid 15 milliLiter(s) Oral Mucosa every 12 hours  chlorhexidine 2% Cloths 1 Application(s) Topical <User Schedule>  dextrose 5%. 1000 milliLiter(s) (50 mL/Hr) IV Continuous <Continuous>  dextrose 50% Injectable 12.5 Gram(s) IV Push once  dextrose 50% Injectable 25 Gram(s) IV Push once  dextrose 50% Injectable 25 Gram(s) IV Push once  hydrocortisone sodium succinate Injectable 50 milliGRAM(s) IV Push every 6 hours  insulin lispro (HumaLOG) corrective regimen sliding scale   SubCutaneous Before meals and at bedtime  levothyroxine Injectable 25 MICROGram(s) IV Push at bedtime  norepinephrine Infusion 0.05 MICROgram(s)/kG/Min (4.322 mL/Hr) IV Continuous <Continuous>  pantoprazole  Injectable 40 milliGRAM(s) IV Push daily  piperacillin/tazobactam IVPB.. 2.25 Gram(s) IV Intermittent every 8 hours  propofol Infusion 10 MICROgram(s)/kG/Min (5.532 mL/Hr) IV Continuous <Continuous>  rifampin IVPB 300 milliGRAM(s) IV Intermittent every 8 hours  vasopressin Infusion 0.04 Unit(s)/Min (2.4 mL/Hr) IV Continuous <Continuous>    MEDICATIONS  (PRN):  dextrose 40% Gel 15 Gram(s) Oral once PRN Blood Glucose LESS THAN 70 milliGRAM(s)/deciliter  glucagon  Injectable 1 milliGRAM(s) IntraMuscular once PRN Glucose LESS THAN 70 milligrams/deciliter      Allergies    No Known Allergies    Intolerances    narcotic analgesics (Nausea; Vomiting)      LABS:                        11.1   22.99 )-----------( 18       ( 16 Jul 2019 06:00 )             34.4     07-16    139  |  101  |  46<H>  ----------------------------<  232<H>  4.1   |  16<L>  |  4.10<H>    Ca    6.5<LL>      16 Jul 2019 06:00  Phos  4.5     07-16  Mg     1.9     07-16    TPro  5.1<L>  /  Alb  2.0<L>  /  TBili  2.1<H>  /  DBili  x   /  AST  185<H>  /  ALT  88<H>  /  AlkPhos  94  07-16    PT/INR - ( 15 Jul 2019 05:54 )   PT: 32.5 sec;   INR: 2.79          PTT - ( 16 Jul 2019 06:00 )  PTT:110.1 sec  Urinalysis Basic - ( 14 Jul 2019 17:24 )    Color: Yellow / Appearance: SL Cloudy / SG: >=1.030 / pH: x  Gluc: x / Ketone: Trace mg/dL  / Bili: Small / Urobili: 0.2 E.U./dL   Blood: x / Protein: 30 mg/dL / Nitrite: NEGATIVE   Leuk Esterase: NEGATIVE / RBC: < 5 /HPF / WBC < 5 /HPF   Sq Epi: x / Non Sq Epi: 5-10 /HPF / Bacteria: Present /HPF        RADIOLOGY & ADDITIONAL TESTS:  Reviewed INTERVAL HPI/OVERNIGHT EVENTS:  Patient was seen and examined at bedside. Subjective limited as patient is intubated. Overnight at 22:28 T 101.7, cooling blanket applied, tylenol IV given. Vanc 250 given for trough 14.8 (to 25 this AM). Platelets noted to be 18 this AM heparin stopped. Patient Cr uptrended to 4.1, and transaminitis , ALT 88.    VITAL SIGNS:  T(F): 100.1 (07-16-19 @ 02:01)  HR: 59 (07-16-19 @ 06:20)  BP: 113/46 (07-15-19 @ 21:00)  RR: 24 (07-16-19 @ 05:00)  SpO2: 100% (07-16-19 @ 06:20)  Wt(kg): --    PHYSICAL EXAM:  Constitutional: Ill-appearing male, mechanically ventilated, sedated, lying in bed  HEENT: RIJ central line, pupils 2mm minimally responsive b/l, neck supple  Respiratory: CTA B/L; no W/R/R, ETT in place, on VC-AC  Cardiac: +S1/S2; RRR  Gastrointestinal: Obese abdomen soft, NT/ND; no rebound or guarding; +BSx4  Extremities: WWP, no clubbing or cyanosis; trace peripheral pitting edema b/l LE  Vascular: 2+ radial, femoral, DP/PT pulses B/L  Neurologic: AAOx0, sedated, intubated, does not withdraw or localize to pain    MEDICATIONS  (STANDING):  ALBUTerol/ipratropium for Nebulization 3 milliLiter(s) Nebulizer every 6 hours  chlorhexidine 0.12% Liquid 15 milliLiter(s) Oral Mucosa every 12 hours  chlorhexidine 2% Cloths 1 Application(s) Topical <User Schedule>  dextrose 5%. 1000 milliLiter(s) (50 mL/Hr) IV Continuous <Continuous>  dextrose 50% Injectable 12.5 Gram(s) IV Push once  dextrose 50% Injectable 25 Gram(s) IV Push once  dextrose 50% Injectable 25 Gram(s) IV Push once  hydrocortisone sodium succinate Injectable 50 milliGRAM(s) IV Push every 6 hours  insulin lispro (HumaLOG) corrective regimen sliding scale   SubCutaneous Before meals and at bedtime  levothyroxine Injectable 25 MICROGram(s) IV Push at bedtime  norepinephrine Infusion 0.05 MICROgram(s)/kG/Min (4.322 mL/Hr) IV Continuous <Continuous>  pantoprazole  Injectable 40 milliGRAM(s) IV Push daily  piperacillin/tazobactam IVPB.. 2.25 Gram(s) IV Intermittent every 8 hours  propofol Infusion 10 MICROgram(s)/kG/Min (5.532 mL/Hr) IV Continuous <Continuous>  rifampin IVPB 300 milliGRAM(s) IV Intermittent every 8 hours  vasopressin Infusion 0.04 Unit(s)/Min (2.4 mL/Hr) IV Continuous <Continuous>    MEDICATIONS  (PRN):  dextrose 40% Gel 15 Gram(s) Oral once PRN Blood Glucose LESS THAN 70 milliGRAM(s)/deciliter  glucagon  Injectable 1 milliGRAM(s) IntraMuscular once PRN Glucose LESS THAN 70 milligrams/deciliter      Allergies    No Known Allergies    Intolerances    narcotic analgesics (Nausea; Vomiting)      LABS:                        11.1   22.99 )-----------( 18       ( 16 Jul 2019 06:00 )             34.4     07-16    139  |  101  |  46<H>  ----------------------------<  232<H>  4.1   |  16<L>  |  4.10<H>    Ca    6.5<LL>      16 Jul 2019 06:00  Phos  4.5     07-16  Mg     1.9     07-16    TPro  5.1<L>  /  Alb  2.0<L>  /  TBili  2.1<H>  /  DBili  x   /  AST  185<H>  /  ALT  88<H>  /  AlkPhos  94  07-16    PT/INR - ( 15 Jul 2019 05:54 )   PT: 32.5 sec;   INR: 2.79          PTT - ( 16 Jul 2019 06:00 )  PTT:110.1 sec  Urinalysis Basic - ( 14 Jul 2019 17:24 )    Color: Yellow / Appearance: SL Cloudy / SG: >=1.030 / pH: x  Gluc: x / Ketone: Trace mg/dL  / Bili: Small / Urobili: 0.2 E.U./dL   Blood: x / Protein: 30 mg/dL / Nitrite: NEGATIVE   Leuk Esterase: NEGATIVE / RBC: < 5 /HPF / WBC < 5 /HPF   Sq Epi: x / Non Sq Epi: 5-10 /HPF / Bacteria: Present /HPF    Culture - Blood (07.15.19 @ 00:31)    Gram Stain:   Aerobic Bottle: Gram Positive Cocci in Clusters  Result called to and read back by_ Dr. MIKAL Johnson 07/15/2019 10:12:03  Anaerobic Bottle: Gram Positive Cocci in Clusters  Result called to and read back by_ LUCIA Delatorre RN (7 EA) 07/16/2019 10:42:56    Specimen Source: .Blood Blood-Peripheral    Culture Results:   Growth in aerobic bottle: Staphylococcus aureus presumptive Methicillin  resistant  Confirmation to follow within 24 hours Susceptibility to follow.  Culture in progress    Culture - Blood (07.15.19 @ 00:31)    -  Methicillin resistant Staphylococcus aureus (MRSA): Detec critTYPE:(C=Critical, N=Notification, A=Abnormal) C  TESTS: BCID  DATE/TIME CALLED: 07/15/2019 10:39  CALLED TO: Dr. EDDIE Claros  READ BACK (2 Patient Identifiers)(Y/N): Y  READ BACK VALUES (Y/N): Y  CALLED BY: [facility]    Gram Stain:   Aerobic and Anaerobic Bottle: Gram Positive Cocci in Clusters  Result called to and read back by_ Dr. MIKAL Johnson 07/15/2019 10:12:03  ***Blood Panel PCR results on this specimen are available  approximately 3 hours after the Gram stain result.***  Gram stain, PCR, and/or culture results may not always  correspond due to difference in methodologies.  ************************************************************  This PCR assay was performed using [employer].  The following targets are testedfor: Enterococcus,  vancomycin resistant enterococci, Listeria monocytogenes,  coagulase negative staphylococci, S. aureus,  methicillin resistant S. aureus, Streptococcus agalactiae  (Group B), S. pneumoniae, S. pyogenes (Group A),  Acinetobacter baumannii, Enterobacter cloacae, E. coli,  Klebsiella oxytoca, K. pneumoniae, Proteus sp.,  Serratia marcescens, Haemophilus influenzae,  Neisseria meningitidis, Pseudomonas aeruginosa, Candida  albicans, C. glabrata, C krusei, C parapsilosis,  C. tropicalis and the KPC resistance gene.  "Due to technical problems, Proteus sp. will Not be reported as part of  the BCID panel until further notice"    Specimen Source: .Blood Blood-Peripheral    Organism: Blood Culture PCR    Culture Results:   Growth in aerobic and anaerobic bottles: Staphylococcus aureus  presumptive Methicillin resistant  Confirmation to follow within 24 hours Susceptibility to follow.  Culture in progress    Organism Identification: Blood Culture PCR    Method Type: PCR    RADIOLOGY & ADDITIONAL TESTS:  < from: CT Neck Soft Tissue No Cont (07.16.19 @ 16:30) >  IMPRESSION:    No acute abnormality is identified on noncontrast CT of the neck. Limited   study to evaluate source of infection.    < from: CT Abdomen and Pelvis w/ Oral Cont (07.16.19 @ 16:29) >  IMPRESSION:     Thickened ascending colon, nonspecific but may be associated with   ischemia. No bowel findings specific to durga ischemia.    Extensive arterial calcifications including origins of the celiac and SMA.    < from: CT Head No Cont (07.16.19 @ 16:26) >  IMPRESSION:    1. Interval development of focal area of diminished attenuation within   the right primary cortex near upper extremity portion of motor homunculus   which may represent acute/subacute infarct. Brain MR could be obtained   for further characterization.  2. Chronic microangiopathic disease and age-appropriate volume loss.  3. No acute intracranial hemorrhage.  4. Oral intubation and oroenteric tubing as well as pacemaker wiring   noted on  tomogram.

## 2019-07-16 NOTE — PROVIDER CONTACT NOTE (CRITICAL VALUE NOTIFICATION) - ASSESSMENT
MAP > 65 maintained on Levo 20 mcg/min and Vaso 0.04 units/min. sinus kaitlyn HR 57. Fingerstick site to left index finger continues to ooze blood, requiring pressure dressing

## 2019-07-16 NOTE — INPATIENT CERTIFICATION FOR MEDICARE PATIENTS - RISKS OF ADVERSE EVENTS
Concern for cardiopulmonary deterioration/Concern for delay in diagnosis and treatment/Concern for worsening infectious process/Concern for renal deterioration

## 2019-07-16 NOTE — PROGRESS NOTE ADULT - SUBJECTIVE AND OBJECTIVE BOX
24 hr events: MRSA growing in 2/2 blood cultures. AM lactate 6, from 9-10 yesterday. Levophed requirements downtrending, but still on 2 pressors.      MEDICATIONS  (STANDING):  ALBUTerol/ipratropium for Nebulization 3 milliLiter(s) Nebulizer every 6 hours  chlorhexidine 0.12% Liquid 15 milliLiter(s) Oral Mucosa every 12 hours  chlorhexidine 2% Cloths 1 Application(s) Topical <User Schedule>  dextrose 5%. 1000 milliLiter(s) (50 mL/Hr) IV Continuous <Continuous>  dextrose 50% Injectable 12.5 Gram(s) IV Push once  dextrose 50% Injectable 25 Gram(s) IV Push once  dextrose 50% Injectable 25 Gram(s) IV Push once  hydrocortisone sodium succinate Injectable 50 milliGRAM(s) IV Push every 6 hours  insulin lispro (HumaLOG) corrective regimen sliding scale   SubCutaneous Before meals and at bedtime  levothyroxine Injectable 25 MICROGram(s) IV Push at bedtime  norepinephrine Infusion 0.05 MICROgram(s)/kG/Min (4.322 mL/Hr) IV Continuous <Continuous>  pantoprazole  Injectable 40 milliGRAM(s) IV Push daily  piperacillin/tazobactam IVPB.. 2.25 Gram(s) IV Intermittent every 8 hours  propofol Infusion 10 MICROgram(s)/kG/Min (5.532 mL/Hr) IV Continuous <Continuous>  rifampin IVPB 300 milliGRAM(s) IV Intermittent every 8 hours  vasopressin Infusion 0.04 Unit(s)/Min (2.4 mL/Hr) IV Continuous <Continuous>    MEDICATIONS  (PRN):  dextrose 40% Gel 15 Gram(s) Oral once PRN Blood Glucose LESS THAN 70 milliGRAM(s)/deciliter  glucagon  Injectable 1 milliGRAM(s) IntraMuscular once PRN Glucose LESS THAN 70 milligrams/deciliter      ICU Vital Signs Last 24 Hrs  T(C): 38.3 (16 Jul 2019 07:00), Max: 38.7 (15 Jul 2019 22:28)  T(F): 100.9 (16 Jul 2019 07:00), Max: 101.7 (15 Jul 2019 22:28)  HR: 72 (16 Jul 2019 07:00) (58 - 86)  BP: 113/46 (15 Jul 2019 21:00) (113/46 - 143/57)  BP(mean): 65 (15 Jul 2019 21:00) (65 - 78)  ABP: 110/58 (16 Jul 2019 07:00) (92/40 - 152/60)  ABP(mean): 74 (16 Jul 2019 07:00) (56 - 88)  RR: 24 (16 Jul 2019 07:00) (23 - 33)  SpO2: 97% (16 Jul 2019 07:00) (94% - 100%)      Physical Exam:  General: NAD  HEENT: NC/AT, NGT clamped  Pulmonary: Nonlabored breathing, intubated VC-AC  Cardiovascular: No heaves or thrills  Abdominal: Soft, nontender, slightly distended  : Randle draining yellow urine  Neuro: Doesn't respond to painful stimuli or commands    Lines/tubes/drains:    Vent settings:  Mode: AC/ CMV (Assist Control/ Continuous Mandatory Ventilation), RR (machine): 24, TV (machine): 500, FiO2: 50, PEEP: 5, ITime: 0.8, MAP: 9.5, PIP: 20    I&O's Summary    15 Jul 2019 07:01  -  16 Jul 2019 07:00  --------------------------------------------------------  IN: 3356.4 mL / OUT: 5 mL / NET: 3351.4 mL        LABS:                        11.1   22.99 )-----------( 18       ( 16 Jul 2019 06:00 )             34.4     07-16    139  |  101  |  46<H>  ----------------------------<  232<H>  4.1   |  16<L>  |  4.10<H>    Ca    6.5<LL>      16 Jul 2019 06:00  Phos  4.5     07-16  Mg     1.9     07-16    TPro  5.1<L>  /  Alb  2.0<L>  /  TBili  2.1<H>  /  DBili  x   /  AST  185<H>  /  ALT  88<H>  /  AlkPhos  94  07-16    PT/INR - ( 16 Jul 2019 06:00 )   PT: 22.9 sec;   INR: 1.98          PTT - ( 16 Jul 2019 06:00 )  PTT:110.1 sec  Urinalysis Basic - ( 14 Jul 2019 17:24 )    Color: Yellow / Appearance: SL Cloudy / SG: >=1.030 / pH: x  Gluc: x / Ketone: Trace mg/dL  / Bili: Small / Urobili: 0.2 E.U./dL   Blood: x / Protein: 30 mg/dL / Nitrite: NEGATIVE   Leuk Esterase: NEGATIVE / RBC: < 5 /HPF / WBC < 5 /HPF   Sq Epi: x / Non Sq Epi: 5-10 /HPF / Bacteria: Present /HPF      CAPILLARY BLOOD GLUCOSE      POCT Blood Glucose.: 217 mg/dL (16 Jul 2019 05:30)  POCT Blood Glucose.: 131 mg/dL (15 Jul 2019 21:28)  POCT Blood Glucose.: 162 mg/dL (15 Jul 2019 16:33)    LIVER FUNCTIONS - ( 16 Jul 2019 06:00 )  Alb: 2.0 g/dL / Pro: 5.1 g/dL / ALK PHOS: 94 U/L / ALT: 88 U/L / AST: 185 U/L / GGT: x             Cultures:Culture Results:   Culture in progress (07-15 @ 00:31)  Culture Results:   Culture in progress (07-15 @ 00:31)  Culture Results:   Less than 10,000 cols/cc; Insignificant amount of growth. (07-14 @ 18:40)      RADIOLOGY & ADDITIONAL STUDIES:

## 2019-07-16 NOTE — PROGRESS NOTE ADULT - ASSESSMENT
Assessment: 84M PMH afib, CAD s/p CABG, CVA s/p L endarterectomy (6/19) presents with AMS and increased sleepiness s/p intubation due to acute respiratory failure 2/2 metabolic acidosis; general surgery consulted for r/o mesenteric ischemia.  Patient's working diagnosis is MRSA bacteremia 2/2 line sepsis vs infected vegetation.    Recommendations:  - Hold CTA for now, given improvement in lactate and elevated Cr  - Hold heparin and order HIT panel, 2/2 profound thrombocytopenia. May use argatroban for anticoagulation.  - Continue care per primary team  - No general surgery intervention at this time  - General Surgery Team 4C will continue to follow  - Case discussed with Dr. Cook Assessment: 84M PMH afib, CAD s/p CABG, CVA s/p L endarterectomy (6/19) presents with AMS and increased sleepiness s/p intubation due to acute respiratory failure 2/2 metabolic acidosis; general surgery consulted for r/o mesenteric ischemia.  Patient's working diagnosis is MRSA bacteremia 2/2 line sepsis vs infected vegetation.    Recommendations:  - Hold CTA for now, given improvement in lactate and elevated Cr  - Hold heparin and order HIT panel, 2/2 profound thrombocytopenia. May use argatroban for anticoagulation.  - Continue care per primary team  - No general surgery intervention at this time  - Recommendations discussed with primary team  - General Surgery Team 4C will continue to follow  - Case discussed with Dr. Cook

## 2019-07-16 NOTE — PROGRESS NOTE ADULT - SUBJECTIVE AND OBJECTIVE BOX
Vascular Surgery Consult - Progress Note    Subjective/Interval Events:  Intubated/sedated. Decreasing levophed requirements. Off Vasopressin at the time of exam. Tmax 101.7 last night.    Vital Signs Last 24 Hrs  T(C): 37.9 (16 Jul 2019 09:52), Max: 38.7 (15 Jul 2019 22:28)  T(F): 100.2 (16 Jul 2019 09:52), Max: 101.7 (15 Jul 2019 22:28)  HR: 59 (16 Jul 2019 08:35) (58 - 84)  BP: 113/46 (15 Jul 2019 21:00) (113/46 - 113/46)  BP(mean): 65 (15 Jul 2019 21:00) (65 - 65)  RR: 24 (16 Jul 2019 08:35) (23 - 33)  SpO2: 100% (16 Jul 2019 08:35) (94% - 100%)    I&O's Summary  15 Jul 2019 07:01  -  16 Jul 2019 07:00  --------------------------------------------------------  IN: 3356.4 mL / OUT: 5 mL / NET: 3351.4 mL    16 Jul 2019 07:01  -  16 Jul 2019 12:42  --------------------------------------------------------  IN: 25.2 mL / OUT: 0 mL / NET: 25.2 mL    Physical Exam:  General: NAD  Neck: L CEA incision c/d/i, no induration, fluctuance, drainage, or erythema  Pulmonary: Intubated/on vent  Abdominal: soft, non-distended  Neuro: Sedated; unable to perform neuro exam    LABS:                     10.9   20.73 )-----------( 16       ( 16 Jul 2019 11:17 )             33.3     07-16  135  |  100  |  49<H>  ----------------------------<  186<H>  3.9   |  18<L>  |  4.21<H>    Ca    6.4<LL>      16 Jul 2019 11:17  Phos  4.5     07-16  Mg     1.9     07-16    TPro  5.0<L>  /  Alb  1.8<L>  /  TBili  2.2<H>  /  DBili  x   /  AST  297<H>  /  ALT  126<H>  /  AlkPhos  95  07-16    PT/INR - ( 16 Jul 2019 06:00 )   PT: 22.9 sec;   INR: 1.98     PTT - ( 16 Jul 2019 11:17 )  PTT:49.2 sec    Urinalysis Basic - ( 14 Jul 2019 17:24 )  Color: Yellow / Appearance: SL Cloudy / SG: >=1.030 / pH: x  Gluc: x / Ketone: Trace mg/dL  / Bili: Small / Urobili: 0.2 E.U./dL   Blood: x / Protein: 30 mg/dL / Nitrite: NEGATIVE   Leuk Esterase: NEGATIVE / RBC: < 5 /HPF / WBC < 5 /HPF   Sq Epi: x / Non Sq Epi: 5-10 /HPF / Bacteria: Present /HPF    LIVER FUNCTIONS - ( 16 Jul 2019 11:17 )  Alb: 1.8 g/dL / Pro: 5.0 g/dL / ALK PHOS: 95 U/L / ALT: 126 U/L / AST: 297 U/L / GGT: x           CAPILLARY BLOOD GLUCOSE  POCT Blood Glucose.: 217 mg/dL (16 Jul 2019 05:30)  POCT Blood Glucose.: 131 mg/dL (15 Jul 2019 21:28)  POCT Blood Glucose.: 162 mg/dL (15 Jul 2019 16:33)

## 2019-07-16 NOTE — PROGRESS NOTE ADULT - ASSESSMENT
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         Infectious Disease  #Septic Shock: source MRSA in blood  Patient with qSOFA score 3 and met SIRS criteria on admission (HR>90, temp >100.4, WBC > 12,000, RR>19) with elevated lactate and SBP<90.   -Started on empirical Vanc/Zosyn, 1 dose tobramycin given for double Pseudomonas coverage. Continuing Vancomycin, started Rifampin. Zosyn dc'd.   -Blood Cx 3 of 4 positive, gram + cocci in clusters: MRSA (+), f/u with sensitivities by 7/17  -started on BiPAP, now intubated. 7/15 2206 ABG 7/15 pH 7.36 CO2 25 HCO3 14 base exccess -9.6   -Heparin drip stopped. platelet count 18  -Bicarb 14  -Repeat lactate 4.8  -Levophed @ 20mL/hr , Vasopressin stopped. Goal SBP > 90  -Echocardiogram performed 7/15: EF 50%, tricuspid regurgitation and thickening, mobile echogenicity extending from Spaien valve to LVOT.    -ID recs: vancomycin, trough 15-20, and rifampin, Discontinue zosyn  -f/u CT Head, neck, chest, abdomen, pelvis  -f/u AM CBC, CMP, Lactate  -f/u surveillance cultures, results, and senesitivities    RENAL  #RADHA  Although BUN:Cr ratio <20, in the setting of sepsis a pre-renal etiology is most like likely. Also consider ATN because patient is currently not making urine.  -s/p 2 L in ED  - Total intake of 3.3 L, minimal urine output: 5cc/hr  - abdominal ultrasound showed bilateral renal cysts, no hydronephrosis, and cholelithiasis.   - Spoke to proxy Martha, who decided that hemodialysis is not something the patient would want.   - f/u bladder scan      GI  #Suspected mesenteric ischemia in setting of high lactate 10.8  - Surgery consulted. As per surgery recs, once patient's pressor requirements have decreased, get CT chest/abd/pelvis with contrast  - f/u CT chest/abd/pelvis with oral contrast today    HEME  #Thrombocytopenia, suspected DIC, less likely HIT  Patient had Plt count WNL on recent admission, possibly 2/2 to sepsis or dilution  -Plt 18,000. holding on platelet transfusion as no signs of bleeding.  - D-Dimer 3061. Fibrinogen 303  - Antiplatelet antibodies to be sent  - Platelets if <10    #Coagulopathy. .1 INR 1.98 PT 22.9  - f/u coag studies  - heparin stopped    #Macrocytic Anemia - Spurr Cells on smear  -spurr cells likely artifact, but if persistent consider w/u  -In setting of decreased PO intake recently, patient at risk for a folate deficiency  -f/u folate    ENDOCRINE  #DM  -A1c 6.3  #Possible metformin toxicity: Elevated lactate + renal failure  -Hold home metformin and start SSI    Hypothyroidism  -In setting of NPO hold home synthroid  - TSH= 18.4, downtrending from previous visits. On last admission TSH 26.8  - patient likely getting inadequate dose or not taking home meds. F/u if he is taking. If he is increase home dose once not NPO    FEN  F: None  E: Replete K<4 Mg < 2  N: NPO    DVT PPx - None due to thrombocytopenia  GI PPx- protonix 40mg IV daily    LINES - Bilateral peripheral lines on wrist, Central line (R. IJ). R radial Arterial line.    CODE - Discussion had over the phone with guardian who states pt has living will. Living will stating pt is DNR and DNI if status is irreversible and terminal. MOLST and Living will in chart. Guardian Milagabriel Fernandez (in [place]) can be reached at: [phone] or [phone]. Guardian agreeing to pressor support, ABX, IVF, and noninvasive ventilation at this time.     DISPO - continue intensive level of care in MICU service

## 2019-07-16 NOTE — PROVIDER CONTACT NOTE (CRITICAL VALUE NOTIFICATION) - ACTION/TREATMENT ORDERED:
treatment in progress
Ventilator RR increased to 24
magnesium sulfate 4g in 100mL IVPB ordered
heparin drip order discontinued
hold heparin gtt

## 2019-07-16 NOTE — PROVIDER CONTACT NOTE (CRITICAL VALUE NOTIFICATION) - BACKGROUND
Pt admitted for septic shock and acute respiratory failure
Pt presented [facility] ED [facility] from [facility] with fever and AMS. PMHx includes CVA, recent L carotid stent, COPD, DM, HTN, HLD, CABG, afib.
Pt admitted for septic shock and acute respiratory failure
Pt admitted for septic shock, acute resp failure, and PNA. PMHx includes CVA, L carotid stent placement, aortic valve replacement, pacemaker, afib, CABG, HTN, HLD, DM
Pt admitted for septic shock, acute resp failure, and PNA. PMHx includes CVA, L carotid stent placement, aortic valve replacement, pacemaker, afib, CABG, HTN, HLD, DM

## 2019-07-16 NOTE — PROGRESS NOTE ADULT - ATTENDING COMMENTS
I have reviewed the medical record, including laboratory and radiographic studies, examined the patient and discussed the plan with Dr. Day, the ID Resident.  Agree with above. Will continue to follow with you – ID Team 1.

## 2019-07-16 NOTE — PROVIDER CONTACT NOTE (CRITICAL VALUE NOTIFICATION) - RECOMMENDATIONS
treatment in progress
adjust vent settings
administer magnesium sulfate IVPB
adjust heparin gtt rate
hold heparin gtt

## 2019-07-17 LAB
-  CEFAZOLIN: SIGNIFICANT CHANGE UP
-  CEFAZOLIN: SIGNIFICANT CHANGE UP
-  CLINDAMYCIN: SIGNIFICANT CHANGE UP
-  CLINDAMYCIN: SIGNIFICANT CHANGE UP
-  DAPTOMYCIN: SIGNIFICANT CHANGE UP
-  DAPTOMYCIN: SIGNIFICANT CHANGE UP
-  ERYTHROMYCIN: SIGNIFICANT CHANGE UP
-  ERYTHROMYCIN: SIGNIFICANT CHANGE UP
-  LINEZOLID: SIGNIFICANT CHANGE UP
-  LINEZOLID: SIGNIFICANT CHANGE UP
-  OXACILLIN: SIGNIFICANT CHANGE UP
-  OXACILLIN: SIGNIFICANT CHANGE UP
-  PENICILLIN: SIGNIFICANT CHANGE UP
-  PENICILLIN: SIGNIFICANT CHANGE UP
-  RIFAMPIN: SIGNIFICANT CHANGE UP
-  RIFAMPIN: SIGNIFICANT CHANGE UP
-  TETRACYCLINE: SIGNIFICANT CHANGE UP
-  TETRACYCLINE: SIGNIFICANT CHANGE UP
-  TRIMETHOPRIM/SULFAMETHOXAZOLE: SIGNIFICANT CHANGE UP
-  TRIMETHOPRIM/SULFAMETHOXAZOLE: SIGNIFICANT CHANGE UP
-  VANCOMYCIN: SIGNIFICANT CHANGE UP
-  VANCOMYCIN: SIGNIFICANT CHANGE UP
ALBUMIN SERPL ELPH-MCNC: 1.9 G/DL — LOW (ref 3.3–5)
ALBUMIN SERPL ELPH-MCNC: 2 G/DL — LOW (ref 3.3–5)
ALP SERPL-CCNC: 103 U/L — SIGNIFICANT CHANGE UP (ref 40–120)
ALP SERPL-CCNC: 94 U/L — SIGNIFICANT CHANGE UP (ref 40–120)
ALT FLD-CCNC: 102 U/L — HIGH (ref 10–45)
ALT FLD-CCNC: 120 U/L — HIGH (ref 10–45)
ANION GAP SERPL CALC-SCNC: 17 MMOL/L — SIGNIFICANT CHANGE UP (ref 5–17)
ANION GAP SERPL CALC-SCNC: 19 MMOL/L — HIGH (ref 5–17)
APTT BLD: 39.4 SEC — HIGH (ref 27.5–36.3)
AST SERPL-CCNC: 107 U/L — HIGH (ref 10–40)
AST SERPL-CCNC: 164 U/L — HIGH (ref 10–40)
BASE EXCESS BLDA CALC-SCNC: -5.3 MMOL/L — LOW (ref -2–3)
BILIRUB SERPL-MCNC: 3.2 MG/DL — HIGH (ref 0.2–1.2)
BILIRUB SERPL-MCNC: 4 MG/DL — HIGH (ref 0.2–1.2)
BUN SERPL-MCNC: 58 MG/DL — HIGH (ref 7–23)
BUN SERPL-MCNC: 66 MG/DL — HIGH (ref 7–23)
CALCIUM SERPL-MCNC: 6.4 MG/DL — CRITICAL LOW (ref 8.4–10.5)
CALCIUM SERPL-MCNC: 6.6 MG/DL — LOW (ref 8.4–10.5)
CHLORIDE SERPL-SCNC: 100 MMOL/L — SIGNIFICANT CHANGE UP (ref 96–108)
CHLORIDE SERPL-SCNC: 102 MMOL/L — SIGNIFICANT CHANGE UP (ref 96–108)
CO2 SERPL-SCNC: 17 MMOL/L — LOW (ref 22–31)
CO2 SERPL-SCNC: 19 MMOL/L — LOW (ref 22–31)
CREAT SERPL-MCNC: 4.53 MG/DL — HIGH (ref 0.5–1.3)
CREAT SERPL-MCNC: 4.71 MG/DL — HIGH (ref 0.5–1.3)
CULTURE RESULTS: SIGNIFICANT CHANGE UP
CULTURE RESULTS: SIGNIFICANT CHANGE UP
GLUCOSE BLDC GLUCOMTR-MCNC: 106 MG/DL — HIGH (ref 70–99)
GLUCOSE BLDC GLUCOMTR-MCNC: 108 MG/DL — HIGH (ref 70–99)
GLUCOSE BLDC GLUCOMTR-MCNC: 115 MG/DL — HIGH (ref 70–99)
GLUCOSE BLDC GLUCOMTR-MCNC: 119 MG/DL — HIGH (ref 70–99)
GLUCOSE SERPL-MCNC: 117 MG/DL — HIGH (ref 70–99)
GLUCOSE SERPL-MCNC: 120 MG/DL — HIGH (ref 70–99)
HCO3 BLDA-SCNC: 17 MMOL/L — LOW (ref 21–28)
HCT VFR BLD CALC: 31.3 % — LOW (ref 39–50)
HCT VFR BLD CALC: 31.8 % — LOW (ref 39–50)
HGB BLD-MCNC: 10.4 G/DL — LOW (ref 13–17)
HGB BLD-MCNC: 10.6 G/DL — LOW (ref 13–17)
LACTATE SERPL-SCNC: 2.4 MMOL/L — HIGH (ref 0.5–2)
LACTATE SERPL-SCNC: 2.5 MMOL/L — HIGH (ref 0.5–2)
MAGNESIUM SERPL-MCNC: 1.8 MG/DL — SIGNIFICANT CHANGE UP (ref 1.6–2.6)
MCHC RBC-ENTMCNC: 31.7 PG — SIGNIFICANT CHANGE UP (ref 27–34)
MCHC RBC-ENTMCNC: 32.1 PG — SIGNIFICANT CHANGE UP (ref 27–34)
MCHC RBC-ENTMCNC: 33.2 GM/DL — SIGNIFICANT CHANGE UP (ref 32–36)
MCHC RBC-ENTMCNC: 33.3 GM/DL — SIGNIFICANT CHANGE UP (ref 32–36)
MCV RBC AUTO: 95.4 FL — SIGNIFICANT CHANGE UP (ref 80–100)
MCV RBC AUTO: 96.4 FL — SIGNIFICANT CHANGE UP (ref 80–100)
METHOD TYPE: SIGNIFICANT CHANGE UP
NRBC # BLD: 0 /100 WBCS — SIGNIFICANT CHANGE UP (ref 0–0)
NRBC # BLD: 0 /100 WBCS — SIGNIFICANT CHANGE UP (ref 0–0)
ORGANISM # SPEC MICROSCOPIC CNT: SIGNIFICANT CHANGE UP
PCO2 BLDA: 24 MMHG — LOW (ref 35–48)
PCO2 BLDA: 26 MMHG — LOW (ref 35–48)
PH BLDA: 7.47 — HIGH (ref 7.35–7.45)
PH BLDA: 7.47 — HIGH (ref 7.35–7.45)
PHOSPHATE SERPL-MCNC: 4.3 MG/DL — SIGNIFICANT CHANGE UP (ref 2.5–4.5)
PLATELET # BLD AUTO: 14 K/UL — CRITICAL LOW (ref 150–400)
PLATELET # BLD AUTO: 18 K/UL — CRITICAL LOW (ref 150–400)
PO2 BLDA: 100 MMHG — SIGNIFICANT CHANGE UP (ref 83–108)
PO2 BLDA: 93 MMHG — SIGNIFICANT CHANGE UP (ref 83–108)
POTASSIUM SERPL-MCNC: 4 MMOL/L — SIGNIFICANT CHANGE UP (ref 3.5–5.3)
POTASSIUM SERPL-MCNC: 4 MMOL/L — SIGNIFICANT CHANGE UP (ref 3.5–5.3)
POTASSIUM SERPL-SCNC: 4 MMOL/L — SIGNIFICANT CHANGE UP (ref 3.5–5.3)
POTASSIUM SERPL-SCNC: 4 MMOL/L — SIGNIFICANT CHANGE UP (ref 3.5–5.3)
PROT SERPL-MCNC: 4.9 G/DL — LOW (ref 6–8.3)
PROT SERPL-MCNC: 5.1 G/DL — LOW (ref 6–8.3)
RBC # BLD: 3.28 M/UL — LOW (ref 4.2–5.8)
RBC # BLD: 3.3 M/UL — LOW (ref 4.2–5.8)
RBC # FLD: 16.2 % — HIGH (ref 10.3–14.5)
RBC # FLD: 16.3 % — HIGH (ref 10.3–14.5)
SAO2 % BLDA: 97 % — SIGNIFICANT CHANGE UP (ref 95–100)
SAO2 % BLDA: 98 % — SIGNIFICANT CHANGE UP (ref 95–100)
SODIUM SERPL-SCNC: 136 MMOL/L — SIGNIFICANT CHANGE UP (ref 135–145)
SODIUM SERPL-SCNC: 138 MMOL/L — SIGNIFICANT CHANGE UP (ref 135–145)
SPECIMEN SOURCE: SIGNIFICANT CHANGE UP
SPECIMEN SOURCE: SIGNIFICANT CHANGE UP
VANCOMYCIN FLD-MCNC: 18.6 UG/ML — SIGNIFICANT CHANGE UP
VANCOMYCIN TROUGH SERPL-MCNC: 20.3 UG/ML — HIGH (ref 10–20)
WBC # BLD: 18.8 K/UL — HIGH (ref 3.8–10.5)
WBC # BLD: 19.23 K/UL — HIGH (ref 3.8–10.5)
WBC # FLD AUTO: 18.8 K/UL — HIGH (ref 3.8–10.5)
WBC # FLD AUTO: 19.23 K/UL — HIGH (ref 3.8–10.5)

## 2019-07-17 PROCEDURE — 99232 SBSQ HOSP IP/OBS MODERATE 35: CPT | Mod: GC

## 2019-07-17 PROCEDURE — 93010 ELECTROCARDIOGRAM REPORT: CPT

## 2019-07-17 PROCEDURE — 71045 X-RAY EXAM CHEST 1 VIEW: CPT | Mod: 26

## 2019-07-17 PROCEDURE — 99291 CRITICAL CARE FIRST HOUR: CPT

## 2019-07-17 PROCEDURE — 71045 X-RAY EXAM CHEST 1 VIEW: CPT | Mod: 26,77

## 2019-07-17 RX ORDER — VANCOMYCIN HCL 1 G
500 VIAL (EA) INTRAVENOUS ONCE
Refills: 0 | Status: COMPLETED | OUTPATIENT
Start: 2019-07-17 | End: 2019-07-17

## 2019-07-17 RX ORDER — METOPROLOL TARTRATE 50 MG
2.5 TABLET ORAL EVERY 6 HOURS
Refills: 0 | Status: DISCONTINUED | OUTPATIENT
Start: 2019-07-17 | End: 2019-07-18

## 2019-07-17 RX ORDER — FUROSEMIDE 40 MG
200 TABLET ORAL ONCE
Refills: 0 | Status: COMPLETED | OUTPATIENT
Start: 2019-07-17 | End: 2019-07-17

## 2019-07-17 RX ORDER — HYDROCORTISONE 20 MG
50 TABLET ORAL EVERY 8 HOURS
Refills: 0 | Status: DISCONTINUED | OUTPATIENT
Start: 2019-07-17 | End: 2019-07-18

## 2019-07-17 RX ORDER — ACETAMINOPHEN 500 MG
1000 TABLET ORAL ONCE
Refills: 0 | Status: COMPLETED | OUTPATIENT
Start: 2019-07-17 | End: 2019-07-17

## 2019-07-17 RX ADMIN — CHLORHEXIDINE GLUCONATE 15 MILLILITER(S): 213 SOLUTION TOPICAL at 05:40

## 2019-07-17 RX ADMIN — Medication 1000 MILLIGRAM(S): at 06:00

## 2019-07-17 RX ADMIN — Medication 400 MILLIGRAM(S): at 05:40

## 2019-07-17 RX ADMIN — Medication 25 MICROGRAM(S): at 22:23

## 2019-07-17 RX ADMIN — Medication 3 MILLILITER(S): at 10:21

## 2019-07-17 RX ADMIN — Medication 140 MILLIGRAM(S): at 11:02

## 2019-07-17 RX ADMIN — Medication 2.5 MILLIGRAM(S): at 23:55

## 2019-07-17 RX ADMIN — Medication 100 MILLIGRAM(S): at 20:50

## 2019-07-17 RX ADMIN — CHLORHEXIDINE GLUCONATE 1 APPLICATION(S): 213 SOLUTION TOPICAL at 06:00

## 2019-07-17 RX ADMIN — Medication 3 MILLILITER(S): at 16:57

## 2019-07-17 RX ADMIN — Medication 3 MILLILITER(S): at 05:20

## 2019-07-17 RX ADMIN — Medication 50 MILLIGRAM(S): at 17:35

## 2019-07-17 RX ADMIN — Medication 50 MILLIGRAM(S): at 11:02

## 2019-07-17 RX ADMIN — PANTOPRAZOLE SODIUM 40 MILLIGRAM(S): 20 TABLET, DELAYED RELEASE ORAL at 11:28

## 2019-07-17 RX ADMIN — Medication 3 MILLILITER(S): at 21:12

## 2019-07-17 RX ADMIN — Medication 2.5 MILLIGRAM(S): at 17:35

## 2019-07-17 RX ADMIN — CHLORHEXIDINE GLUCONATE 15 MILLILITER(S): 213 SOLUTION TOPICAL at 17:35

## 2019-07-17 RX ADMIN — Medication 50 MILLIGRAM(S): at 03:00

## 2019-07-17 NOTE — PROGRESS NOTE ADULT - SUBJECTIVE AND OBJECTIVE BOX
Vascular Surgery Consult - Progress Note    Subjective/Interval Events:  Intubated/sedated. Pressor requirements downtrending. Febrile this AM to 101.3, blood cultures sent. CT from yesterday showed no pathology in neck. CT Abdomen showed calcification of celiac and SMA, along with non-specific ascending colon thickening. Continues to be treated for infective endocarditis.    Vital Signs Last 24 Hrs  T(C): 37.6 (17 Jul 2019 09:00), Max: 38.5 (17 Jul 2019 05:00)  T(F): 99.6 (17 Jul 2019 09:00), Max: 101.3 (17 Jul 2019 05:00)  HR: 58 (17 Jul 2019 13:00) (58 - 64)  BP: 100/55 (16 Jul 2019 20:35) (91/54 - 100/55)  BP(mean): 67 (16 Jul 2019 20:35) (60 - 68)  RR: 23 (17 Jul 2019 13:00) (22 - 29)  SpO2: 94% (17 Jul 2019 13:00) (91% - 100%)    I&O's Summary  16 Jul 2019 07:01  -  17 Jul 2019 07:00  --------------------------------------------------------  IN: 856 mL / OUT: 0 mL / NET: 856 mL    17 Jul 2019 07:01  -  17 Jul 2019 14:43  --------------------------------------------------------  IN: 79 mL / OUT: 0 mL / NET: 79 mL    Physical Exam:  General: NAD  Neck: L CEA incision c/d/i, no induration, fluctuance, drainage, or erythema  Pulmonary: Intubated/on vent  Abdominal: soft, non-distended  Neuro: Sedated; unable to perform neuro exam    LABS:                   10.6   19.23 )-----------( 18       ( 17 Jul 2019 05:58 )             31.8     07-17  138  |  102  |  58<H>  ----------------------------<  120<H>  4.0   |  19<L>  |  4.53<H>    Ca    6.6<L>      17 Jul 2019 05:58  Phos  4.3     07-17  Mg     1.8     07-17    TPro  5.1<L>  /  Alb  1.9<L>  /  TBili  3.2<H>  /  DBili  x   /  AST  164<H>  /  ALT  120<H>  /  AlkPhos  103  07-17    PT/INR - ( 16 Jul 2019 06:00 )   PT: 22.9 sec;   INR: 1.98     PTT - ( 17 Jul 2019 05:58 )  PTT:39.4 sec    LIVER FUNCTIONS - ( 17 Jul 2019 05:58 )  Alb: 1.9 g/dL / Pro: 5.1 g/dL / ALK PHOS: 103 U/L / ALT: 120 U/L / AST: 164 U/L / GGT: x           CAPILLARY BLOOD GLUCOSE  POCT Blood Glucose.: 115 mg/dL (17 Jul 2019 11:23)  POCT Blood Glucose.: 119 mg/dL (17 Jul 2019 05:47)  POCT Blood Glucose.: 108 mg/dL (16 Jul 2019 23:53)

## 2019-07-17 NOTE — PROGRESS NOTE ADULT - ATTENDING COMMENTS
Pt remains poorly responsive and anuric. Lactate and vasopressors decreased however prognosis remains grave. continue current care. If no return of renal function will discuss withdrawal of care with family based on pts prior expressed wishes. Kiki for nutritional support.

## 2019-07-17 NOTE — PROGRESS NOTE ADULT - ASSESSMENT
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 Plan to continue with vancomycin and rifampin (holding gentamicin for nephrotoxicity, as pt does not want HD per living will/proxy). Pip-tazo for intraabdominal coverage was discontinued in setting of thrombocytopenia platelets 18    - C/w rifampin 300 mg q8h  - C/w vancomycin, dosed by level  - Prognosis remains very poor  - Surveillance blood cx 7/16 NGTD  - Primary team aware, ID team 1 following  - Case discussed with Dr. Crawford, ID attending physician

## 2019-07-17 NOTE — PROGRESS NOTE ADULT - SUBJECTIVE AND OBJECTIVE BOX
*** NOTE IN PROGRESS ***    INTERVAL HPI/OVERNIGHT EVENTS:    SUBJECTIVE: Patient seen and examined at bedside.    OBJECTIVE:    VITAL SIGNS:  ICU Vital Signs Last 24 Hrs  T(C): 38.5 (17 Jul 2019 05:00), Max: 38.5 (17 Jul 2019 05:00)  T(F): 101.3 (17 Jul 2019 05:00), Max: 101.3 (17 Jul 2019 05:00)  HR: 58 (17 Jul 2019 05:00) (58 - 72)  BP: 100/55 (16 Jul 2019 20:35) (79/42 - 113/54)  BP(mean): 67 (16 Jul 2019 20:35) (51 - 70)  ABP: 126/54 (17 Jul 2019 05:00) (84/44 - 136/62)  ABP(mean): 70 (17 Jul 2019 05:00) (54 - 86)  RR: 26 (17 Jul 2019 05:00) (23 - 28)  SpO2: 93% (17 Jul 2019 05:00) (91% - 100%)    Mode: AC/ CMV (Assist Control/ Continuous Mandatory Ventilation), RR (machine): 24, TV (machine): 500, FiO2: 50, PEEP: 5, ITime: 0.8, MAP: 9.4, PIP: 19    07-15 @ 07:01  -  07-16 @ 07:00  --------------------------------------------------------  IN: 3356.4 mL / OUT: 5 mL / NET: 3351.4 mL    07-16 @ 07:01 - 07-17 @ 05:57  --------------------------------------------------------  IN: 670 mL / OUT: 0 mL / NET: 670 mL      CAPILLARY BLOOD GLUCOSE      POCT Blood Glucose.: 119 mg/dL (17 Jul 2019 05:47)      PHYSICAL EXAM:    General: NAD  HEENT: NC/AT; PERRL, clear conjunctiva  Neck: supple  Respiratory: CTA b/l  Cardiovascular: +S1/S2; RRR  Abdomen: soft, NT/ND; +BS x4  Extremities: WWP, 2+ peripheral pulses b/l; no LE edema  Skin: normal color and turgor; no rash  Neurological:     MEDICATIONS:  MEDICATIONS  (STANDING):  ALBUTerol/ipratropium for Nebulization 3 milliLiter(s) Nebulizer every 6 hours  chlorhexidine 0.12% Liquid 15 milliLiter(s) Oral Mucosa every 12 hours  chlorhexidine 2% Cloths 1 Application(s) Topical <User Schedule>  dextrose 5%. 1000 milliLiter(s) (50 mL/Hr) IV Continuous <Continuous>  dextrose 50% Injectable 12.5 Gram(s) IV Push once  dextrose 50% Injectable 25 Gram(s) IV Push once  dextrose 50% Injectable 25 Gram(s) IV Push once  hydrocortisone sodium succinate Injectable 50 milliGRAM(s) IV Push every 6 hours  insulin lispro (HumaLOG) corrective regimen sliding scale   SubCutaneous every 6 hours  levothyroxine Injectable 25 MICROGram(s) IV Push at bedtime  norepinephrine Infusion 0.05 MICROgram(s)/kG/Min (4.322 mL/Hr) IV Continuous <Continuous>  pantoprazole  Injectable 40 milliGRAM(s) IV Push daily  rifampin IVPB 300 milliGRAM(s) IV Intermittent every 8 hours  vasopressin Infusion 0.04 Unit(s)/Min (2.4 mL/Hr) IV Continuous <Continuous>    MEDICATIONS  (PRN):  dextrose 40% Gel 15 Gram(s) Oral once PRN Blood Glucose LESS THAN 70 milliGRAM(s)/deciliter  fentaNYL    Injectable 25 MICROGram(s) IV Push every 3 hours PRN Moderate Pain (4 - 6)  fentaNYL    Injectable 50 MICROGram(s) IV Push every 3 hours PRN Severe Pain (7 - 10)  glucagon  Injectable 1 milliGRAM(s) IntraMuscular once PRN Glucose LESS THAN 70 milligrams/deciliter      ALLERGIES:  Allergies    No Known Allergies    Intolerances    narcotic analgesics (Nausea; Vomiting)      LABS:                        10.5   21.45 )-----------( 15       ( 16 Jul 2019 18:25 )             32.3     07-16    138  |  102  |  52<H>  ----------------------------<  135<H>  4.0   |  18<L>  |  4.22<H>    Ca    6.7<L>      16 Jul 2019 18:25  Phos  4.5     07-16  Mg     1.9     07-16    TPro  5.0<L>  /  Alb  1.8<L>  /  TBili  2.2<H>  /  DBili  x   /  AST  297<H>  /  ALT  126<H>  /  AlkPhos  95  07-16    PT/INR - ( 16 Jul 2019 06:00 )   PT: 22.9 sec;   INR: 1.98          PTT - ( 16 Jul 2019 11:17 )  PTT:49.2 sec      RADIOLOGY & ADDITIONAL TESTS: Reviewed. INTERVAL HPI/OVERNIGHT EVENTS: Lactate down to 2.5. Spiked fever of 101.3, APAP given, ice packs , and blood cx drawn.      SUBJECTIVE: Patient seen and examined at bedside. Patient breathing over vent, breathing 26-28. Vent respiratory rate decreased to 12. Levo rate decreased to 16 mL/hr.    OBJECTIVE:    VITAL SIGNS:  ICU Vital Signs Last 24 Hrs  T(C): 37.6 (17 Jul 2019 09:00), Max: 38.5 (17 Jul 2019 05:00)  T(F): 99.6 (17 Jul 2019 09:00), Max: 101.3 (17 Jul 2019 05:00)  HR: 58 (17 Jul 2019 12:25) (58 - 64)  BP: 100/55 (16 Jul 2019 20:35) (91/54 - 100/55)  BP(mean): 67 (16 Jul 2019 20:35) (60 - 68)  ABP: 124/50 (17 Jul 2019 12:25) (100/54 - 144/62)  ABP(mean): 68 (17 Jul 2019 12:25) (58 - 82)  RR: 22 (17 Jul 2019 12:25) (22 - 29)  SpO2: 94% (17 Jul 2019 12:25) (91% - 100%)    Mode: AC/ CMV (Assist Control/ Continuous Mandatory Ventilation), RR (machine): 24, TV (machine): 500, FiO2: 50, PEEP: 5, ITime: 0.8, MAP: 10, PIP: 20    07-16 @ 07:01  -  07-17 @ 07:00  --------------------------------------------------------  IN: 856 mL / OUT: 0 mL / NET: 856 mL    07-17 @ 07:01  -  07-17 @ 13:20  --------------------------------------------------------  IN: 14 mL / OUT: 0 mL / NET: 14 mL      CAPILLARY BLOOD GLUCOSE      POCT Blood Glucose.: 115 mg/dL (17 Jul 2019 11:23)      PHYSICAL EXAM:    General: NAD, Awakening to voice, following commands minimally  HEENT: Bitemporal Wasting, Atraumatic; PERRL, clear conjunctiva.   Neck: supple, no JVD  Respiratory: diminished breath sounds diffusely, CTA b/l, no wheezing, ronchi, rales. Bronchial breath sounds at right lower base.  Cardiovascular: +S1/S2; RRR, possible S3 heard  Abdomen: soft, NT/ND; +BS x4. Movement of extremities to palpation.   Extremities: WWP, 2+ peripheral pulses b/l; 1+ pitting edema upper and lower extremities  Skin: normal color with ecchymoses on upper extremities.   Neurological: Awake, responsive to voice intermittently. Down going Babinski reflexes. Minimal withdrawal to pain    MEDICATIONS:  MEDICATIONS  (STANDING):  ALBUTerol/ipratropium for Nebulization 3 milliLiter(s) Nebulizer every 6 hours  chlorhexidine 0.12% Liquid 15 milliLiter(s) Oral Mucosa every 12 hours  chlorhexidine 2% Cloths 1 Application(s) Topical <User Schedule>  dextrose 5%. 1000 milliLiter(s) (50 mL/Hr) IV Continuous <Continuous>  dextrose 50% Injectable 12.5 Gram(s) IV Push once  dextrose 50% Injectable 25 Gram(s) IV Push once  dextrose 50% Injectable 25 Gram(s) IV Push once  hydrocortisone sodium succinate Injectable 50 milliGRAM(s) IV Push every 8 hours  insulin lispro (HumaLOG) corrective regimen sliding scale   SubCutaneous every 6 hours  levothyroxine Injectable 25 MICROGram(s) IV Push at bedtime  norepinephrine Infusion 0.05 MICROgram(s)/kG/Min (4.322 mL/Hr) IV Continuous <Continuous>  pantoprazole  Injectable 40 milliGRAM(s) IV Push daily  rifampin IVPB 300 milliGRAM(s) IV Intermittent every 8 hours    MEDICATIONS  (PRN):  dextrose 40% Gel 15 Gram(s) Oral once PRN Blood Glucose LESS THAN 70 milliGRAM(s)/deciliter  fentaNYL    Injectable 25 MICROGram(s) IV Push every 3 hours PRN Moderate Pain (4 - 6)  fentaNYL    Injectable 50 MICROGram(s) IV Push every 3 hours PRN Severe Pain (7 - 10)  glucagon  Injectable 1 milliGRAM(s) IntraMuscular once PRN Glucose LESS THAN 70 milligrams/deciliter      ALLERGIES:  Allergies    No Known Allergies    Intolerances    narcotic analgesics (Nausea; Vomiting)      LABS:                        10.6   19.23 )-----------( 18       ( 17 Jul 2019 05:58 )             31.8     07-17    138  |  102  |  58<H>  ----------------------------<  120<H>  4.0   |  19<L>  |  4.53<H>    Ca    6.6<L>      17 Jul 2019 05:58  Phos  4.3     07-17  Mg     1.8     07-17    TPro  5.1<L>  /  Alb  1.9<L>  /  TBili  3.2<H>  /  DBili  x   /  AST  164<H>  /  ALT  120<H>  /  AlkPhos  103  07-17    PT/INR - ( 16 Jul 2019 06:00 )   PT: 22.9 sec;   INR: 1.98          PTT - ( 17 Jul 2019 05:58 )  PTT:39.4 sec      RADIOLOGY & ADDITIONAL TESTS: Reviewed.

## 2019-07-17 NOTE — PROGRESS NOTE ADULT - SUBJECTIVE AND OBJECTIVE BOX
INTERVAL HPI/OVERNIGHT EVENTS: Lactate down to 2.5. Spiked fever of 101.3, APAP given, ice packs , and blood cx drawn.      SUBJECTIVE: On Vent, pressors    OBJECTIVE:    VITAL SIGNS:  ICU Vital Signs Last 24 Hrs  T(C): 37.6 (17 Jul 2019 09:00), Max: 38.5 (17 Jul 2019 05:00)  T(F): 99.6 (17 Jul 2019 09:00), Max: 101.3 (17 Jul 2019 05:00)  HR: 58 (17 Jul 2019 12:25) (58 - 64)  BP: 100/55 (16 Jul 2019 20:35) (91/54 - 100/55)  BP(mean): 67 (16 Jul 2019 20:35) (60 - 68)  ABP: 124/50 (17 Jul 2019 12:25) (100/54 - 144/62)  ABP(mean): 68 (17 Jul 2019 12:25) (58 - 82)  RR: 22 (17 Jul 2019 12:25) (22 - 29)  SpO2: 94% (17 Jul 2019 12:25) (91% - 100%)    Mode: AC/ CMV (Assist Control/ Continuous Mandatory Ventilation), RR (machine): 24, TV (machine): 500, FiO2: 50, PEEP: 5, ITime: 0.8, MAP: 10, PIP: 20    07-16 @ 07:01  -  07-17 @ 07:00  --------------------------------------------------------  IN: 856 mL / OUT: 0 mL / NET: 856 mL    07-17 @ 07:01  -  07-17 @ 13:20  --------------------------------------------------------  IN: 14 mL / OUT: 0 mL / NET: 14 mL      CAPILLARY BLOOD GLUCOSE      POCT Blood Glucose.: 115 mg/dL (17 Jul 2019 11:23)      PHYSICAL EXAM:    General: NAD, Awakening to voice, following commands minimally  HEENT: Bitemporal Wasting, Atraumatic; PERRL, clear conjunctiva.   Neck: supple, no JVD  Respiratory: diminished breath sounds diffusely, CTA b/l, no wheezing, ronchi, rales. Bronchial breath sounds at right lower base.  Cardiovascular: +S1/S2; RRR, possible S3 heard  Abdomen: soft, NT/ND; +BS x4. Movement of extremities to palpation.   Extremities: WWP, 2+ peripheral pulses b/l; 1+ pitting edema upper and lower extremities  Skin: normal color with ecchymoses on upper extremities.   Neurological: Awake, responsive to voice intermittently. Down going Babinski reflexes. Minimal withdrawal to pain    MEDICATIONS:  MEDICATIONS  (STANDING):  ALBUTerol/ipratropium for Nebulization 3 milliLiter(s) Nebulizer every 6 hours  chlorhexidine 0.12% Liquid 15 milliLiter(s) Oral Mucosa every 12 hours  chlorhexidine 2% Cloths 1 Application(s) Topical <User Schedule>  dextrose 5%. 1000 milliLiter(s) (50 mL/Hr) IV Continuous <Continuous>  dextrose 50% Injectable 12.5 Gram(s) IV Push once  dextrose 50% Injectable 25 Gram(s) IV Push once  dextrose 50% Injectable 25 Gram(s) IV Push once  hydrocortisone sodium succinate Injectable 50 milliGRAM(s) IV Push every 8 hours  insulin lispro (HumaLOG) corrective regimen sliding scale   SubCutaneous every 6 hours  levothyroxine Injectable 25 MICROGram(s) IV Push at bedtime  norepinephrine Infusion 0.05 MICROgram(s)/kG/Min (4.322 mL/Hr) IV Continuous <Continuous>  pantoprazole  Injectable 40 milliGRAM(s) IV Push daily  rifampin IVPB 300 milliGRAM(s) IV Intermittent every 8 hours    MEDICATIONS  (PRN):  dextrose 40% Gel 15 Gram(s) Oral once PRN Blood Glucose LESS THAN 70 milliGRAM(s)/deciliter  fentaNYL    Injectable 25 MICROGram(s) IV Push every 3 hours PRN Moderate Pain (4 - 6)  fentaNYL    Injectable 50 MICROGram(s) IV Push every 3 hours PRN Severe Pain (7 - 10)  glucagon  Injectable 1 milliGRAM(s) IntraMuscular once PRN Glucose LESS THAN 70 milligrams/deciliter      ALLERGIES:  Allergies    No Known Allergies    Intolerances    narcotic analgesics (Nausea; Vomiting)      LABS:                        10.6   19.23 )-----------( 18       ( 17 Jul 2019 05:58 )             31.8     07-17    138  |  102  |  58<H>  ----------------------------<  120<H>  4.0   |  19<L>  |  4.53<H>    Ca    6.6<L>      17 Jul 2019 05:58  Phos  4.3     07-17  Mg     1.8     07-17    TPro  5.1<L>  /  Alb  1.9<L>  /  TBili  3.2<H>  /  DBili  x   /  AST  164<H>  /  ALT  120<H>  /  AlkPhos  103  07-17    PT/INR - ( 16 Jul 2019 06:00 )   PT: 22.9 sec;   INR: 1.98          PTT - ( 17 Jul 2019 05:58 )  PTT:39.4 sec      RADIOLOGY & ADDITIONAL TESTS: Reviewed.

## 2019-07-17 NOTE — PROGRESS NOTE ADULT - ASSESSMENT
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 General surgery consulted for r/o mesenteric ischemia. Patient's pressor requirements and lactate continue to downtrend, suggesting that his likelihood of mesenteric ischemia is decreased from when initially consulted.    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 General surgery consulted for r/o mesenteric ischemia. Patient's pressor requirements and lactate continue to downtrend, suggesting that his likelihood of mesenteric ischemia is decreased from when initially consulted.    Recommendations:  - Continue to trend lactate  - Continue care per primary team  - No general surgery intervention at this time  - General Surgery Team 4C will continue to follow  - Case discussed with Dr. Cook

## 2019-07-17 NOTE — PROGRESS NOTE ADULT - SUBJECTIVE AND OBJECTIVE BOX
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 General surgery consulted for r/o mesenteric ischemia.     24 Hour Events: MRSA endocarditis treating with vanc/rifampin, discontinued zosyn. Holding heparin drip 2/2 to thrombocytopenia; currently no anticoagulation. Tm 101.3 @5AM. Norepinephrine requirements downtrending, off vasopressin.  Lactate downtrending.        OBJECTIVE:    Vital Signs:  Vital Signs Last 24 Hrs  T(C): 38.5 (17 Jul 2019 05:00), Max: 38.5 (17 Jul 2019 05:00)  T(F): 101.3 (17 Jul 2019 05:00), Max: 101.3 (17 Jul 2019 05:00)  HR: 58 (17 Jul 2019 07:00) (58 - 64)  BP: 100/55 (16 Jul 2019 20:35) (79/42 - 113/54)  BP(mean): 67 (16 Jul 2019 20:35) (51 - 70)  RR: 25 (17 Jul 2019 07:00) (23 - 28)  SpO2: 94% (17 Jul 2019 07:00) (91% - 100%)    Physical Exam:  General: NAD  HEENT: NC/AT, NGT clamped  Pulmonary: Nonlabored breathing, intubated VC-AC  Cardiovascular: No heaves or thrills  Abdominal: Soft, nontender, nondistended  Neuro: Doesn't respond to painful stimuli or commands    Lines/Drains/Tubes:    Ins and Outs:  I&O's Summary    16 Jul 2019 07:01  -  17 Jul 2019 07:00  --------------------------------------------------------  IN: 856 mL / OUT: 0 mL / NET: 856 mL    17 Jul 2019 07:01  -  17 Jul 2019 07:51  --------------------------------------------------------  IN: 14 mL / OUT: 0 mL / NET: 14 mL        Labs:                        10.6   19.23 )-----------( 18       ( 17 Jul 2019 05:58 )             31.8     07-17    138  |  102  |  58<H>  ----------------------------<  120<H>  4.0   |  19<L>  |  4.53<H>    Ca    6.6<L>      17 Jul 2019 05:58  Phos  4.3     07-17  Mg     1.8     07-17    TPro  5.1<L>  /  Alb  1.9<L>  /  TBili  3.2<H>  /  DBili  x   /  AST  164<H>  /  ALT  120<H>  /  AlkPhos  103  07-17    PT/INR - ( 16 Jul 2019 06:00 )   PT: 22.9 sec;   INR: 1.98          PTT - ( 17 Jul 2019 05:58 )  PTT:39.4 sec    CAPILLARY BLOOD GLUCOSE      POCT Blood Glucose.: 119 mg/dL (17 Jul 2019 05:47)  POCT Blood Glucose.: 108 mg/dL (16 Jul 2019 23:53)    LIVER FUNCTIONS - ( 17 Jul 2019 05:58 )  Alb: 1.9 g/dL / Pro: 5.1 g/dL / ALK PHOS: 103 U/L / ALT: 120 U/L / AST: 164 U/L / GGT: x             Radiology & Additional Studies:

## 2019-07-17 NOTE — PROGRESS NOTE ADULT - SUBJECTIVE AND OBJECTIVE BOX
INTERVAL HPI/OVERNIGHT EVENTS:  Patient was seen and examined at bedside.     VITAL SIGNS:  T(F): 101.3 (07-17-19 @ 05:00)  HR: 60 (07-17-19 @ 06:33)  BP: 100/55 (07-16-19 @ 20:35)  RR: 27 (07-17-19 @ 06:33)  SpO2: 94% (07-17-19 @ 06:33)  Wt(kg): --    PHYSICAL EXAM:    Constitutional: WDWN, NAD  HEENT: PERRL, EOMI, sclera non-icteric, neck supple, trachea midline, no masses, no JVD, MMM, good dentition  Respiratory: CTA b/l, good air entry b/l, no wheezing, no rhonchi, no rales, without accessory muscle use and no intercostal retractions  Cardiovascular: RRR, normal S1S2, no M/R/G  Gastrointestinal: soft, NTND, no masses palpable, BS normal  Extremities: Warm, well perfused, pulses equal bilateral upper and lower extremities, no edema, no clubbing  Neurological: AAOx3, CN Grossly intact  Skin: Normal temperature, warm, dry    MEDICATIONS  (STANDING):  ALBUTerol/ipratropium for Nebulization 3 milliLiter(s) Nebulizer every 6 hours  chlorhexidine 0.12% Liquid 15 milliLiter(s) Oral Mucosa every 12 hours  chlorhexidine 2% Cloths 1 Application(s) Topical <User Schedule>  dextrose 5%. 1000 milliLiter(s) (50 mL/Hr) IV Continuous <Continuous>  dextrose 50% Injectable 12.5 Gram(s) IV Push once  dextrose 50% Injectable 25 Gram(s) IV Push once  dextrose 50% Injectable 25 Gram(s) IV Push once  hydrocortisone sodium succinate Injectable 50 milliGRAM(s) IV Push every 6 hours  insulin lispro (HumaLOG) corrective regimen sliding scale   SubCutaneous every 6 hours  levothyroxine Injectable 25 MICROGram(s) IV Push at bedtime  norepinephrine Infusion 0.05 MICROgram(s)/kG/Min (4.322 mL/Hr) IV Continuous <Continuous>  pantoprazole  Injectable 40 milliGRAM(s) IV Push daily  rifampin IVPB 300 milliGRAM(s) IV Intermittent every 8 hours  vasopressin Infusion 0.04 Unit(s)/Min (2.4 mL/Hr) IV Continuous <Continuous>    MEDICATIONS  (PRN):  dextrose 40% Gel 15 Gram(s) Oral once PRN Blood Glucose LESS THAN 70 milliGRAM(s)/deciliter  fentaNYL    Injectable 25 MICROGram(s) IV Push every 3 hours PRN Moderate Pain (4 - 6)  fentaNYL    Injectable 50 MICROGram(s) IV Push every 3 hours PRN Severe Pain (7 - 10)  glucagon  Injectable 1 milliGRAM(s) IntraMuscular once PRN Glucose LESS THAN 70 milligrams/deciliter      Allergies    No Known Allergies    Intolerances    narcotic analgesics (Nausea; Vomiting)      LABS:                        10.6   19.23 )-----------( 18       ( 17 Jul 2019 05:58 )             31.8     07-17    138  |  102  |  58<H>  ----------------------------<  120<H>  4.0   |  19<L>  |  4.53<H>    Ca    6.6<L>      17 Jul 2019 05:58  Phos  4.3     07-17  Mg     1.8     07-17    TPro  5.1<L>  /  Alb  1.9<L>  /  TBili  3.2<H>  /  DBili  x   /  AST  164<H>  /  ALT  120<H>  /  AlkPhos  103  07-17    PT/INR - ( 16 Jul 2019 06:00 )   PT: 22.9 sec;   INR: 1.98          PTT - ( 17 Jul 2019 05:58 )  PTT:39.4 sec      RADIOLOGY & ADDITIONAL TESTS:  Reviewed INTERVAL HPI/OVERNIGHT EVENTS:  Patient was seen and examined at bedside. Patient has been off sedation, not rousable at bedside. Does not move to painful stimuli. Febrile 101.3 at 5 AM. Subjective limited as patient not awake/alert and mechanically ventilated.    VITAL SIGNS:  T(F): 101.3 (07-17-19 @ 05:00)  HR: 60 (07-17-19 @ 06:33)  BP: 100/55 (07-16-19 @ 20:35)  RR: 27 (07-17-19 @ 06:33)  SpO2: 94% (07-17-19 @ 06:33)  Wt(kg): --    PHYSICAL EXAM:  Constitutional: Ill-appearing male, mechanically ventilated, sedated, lying in bed  HEENT: RIJ central line, pupils minimally responsive b/l, neck supple  Respiratory: CTA B/L; no W/R/R, +ETT  Cardiac: +S1/S2; RRR  Gastrointestinal: Obese abdomen soft, NT/ND; no rebound or guarding; +BSx4  Extremities: WWP, no clubbing or cyanosis; trace peripheral pitting edema b/l LE  Vascular: 2+ radial, femoral, DP/PT pulses B/L  Neurologic: AAOx0, sedated, intubated, does not withdraw or localize to pain    MEDICATIONS  (STANDING):  ALBUTerol/ipratropium for Nebulization 3 milliLiter(s) Nebulizer every 6 hours  chlorhexidine 0.12% Liquid 15 milliLiter(s) Oral Mucosa every 12 hours  chlorhexidine 2% Cloths 1 Application(s) Topical <User Schedule>  dextrose 5%. 1000 milliLiter(s) (50 mL/Hr) IV Continuous <Continuous>  dextrose 50% Injectable 12.5 Gram(s) IV Push once  dextrose 50% Injectable 25 Gram(s) IV Push once  dextrose 50% Injectable 25 Gram(s) IV Push once  hydrocortisone sodium succinate Injectable 50 milliGRAM(s) IV Push every 6 hours  insulin lispro (HumaLOG) corrective regimen sliding scale   SubCutaneous every 6 hours  levothyroxine Injectable 25 MICROGram(s) IV Push at bedtime  norepinephrine Infusion 0.05 MICROgram(s)/kG/Min (4.322 mL/Hr) IV Continuous <Continuous>  pantoprazole  Injectable 40 milliGRAM(s) IV Push daily  rifampin IVPB 300 milliGRAM(s) IV Intermittent every 8 hours  vasopressin Infusion 0.04 Unit(s)/Min (2.4 mL/Hr) IV Continuous <Continuous>    MEDICATIONS  (PRN):  dextrose 40% Gel 15 Gram(s) Oral once PRN Blood Glucose LESS THAN 70 milliGRAM(s)/deciliter  fentaNYL    Injectable 25 MICROGram(s) IV Push every 3 hours PRN Moderate Pain (4 - 6)  fentaNYL    Injectable 50 MICROGram(s) IV Push every 3 hours PRN Severe Pain (7 - 10)  glucagon  Injectable 1 milliGRAM(s) IntraMuscular once PRN Glucose LESS THAN 70 milligrams/deciliter      Allergies    No Known Allergies    Intolerances    narcotic analgesics (Nausea; Vomiting)      LABS:                        10.6   19.23 )-----------( 18       ( 17 Jul 2019 05:58 )             31.8     07-17    138  |  102  |  58<H>  ----------------------------<  120<H>  4.0   |  19<L>  |  4.53<H>    Ca    6.6<L>      17 Jul 2019 05:58  Phos  4.3     07-17  Mg     1.8     07-17    TPro  5.1<L>  /  Alb  1.9<L>  /  TBili  3.2<H>  /  DBili  x   /  AST  164<H>  /  ALT  120<H>  /  AlkPhos  103  07-17    PT/INR - ( 16 Jul 2019 06:00 )   PT: 22.9 sec;   INR: 1.98          PTT - ( 17 Jul 2019 05:58 )  PTT:39.4 sec    Culture - Blood (07.16.19 @ 13:53)    Specimen Source: .Blood Blood    Culture Results:   No growth at 1 day.    RADIOLOGY & ADDITIONAL TESTS:  < from: Xray Chest 1 View- PORTABLE-Routine (07.17.19 @ 06:13) >  Findings: Study limited by patient rotation. Again post sternotomy. Also   again post transcatheter aortic valve replacement. Endotracheal tube, NG   tube, and right-sided central line again present. There may again be mild   pulmonary vascular congestion. No change bilateral pleural effusions and   small bibasilar atelectasis.    Impression: Limited study. No significant change.

## 2019-07-18 LAB
ALBUMIN SERPL ELPH-MCNC: 2 G/DL — LOW (ref 3.3–5)
ALP SERPL-CCNC: 93 U/L — SIGNIFICANT CHANGE UP (ref 40–120)
ALT FLD-CCNC: 100 U/L — HIGH (ref 10–45)
ANION GAP SERPL CALC-SCNC: 18 MMOL/L — HIGH (ref 5–17)
APTT BLD: 31.3 SEC — SIGNIFICANT CHANGE UP (ref 27.5–36.3)
AST SERPL-CCNC: 108 U/L — HIGH (ref 10–40)
BILIRUB DIRECT SERPL-MCNC: 3.8 MG/DL — HIGH (ref 0–0.2)
BILIRUB INDIRECT FLD-MCNC: 1 MG/DL — SIGNIFICANT CHANGE UP (ref 0.2–1)
BILIRUB SERPL-MCNC: 4.3 MG/DL — HIGH (ref 0.2–1.2)
BILIRUB SERPL-MCNC: 4.8 MG/DL — HIGH (ref 0.2–1.2)
BUN SERPL-MCNC: 71 MG/DL — HIGH (ref 7–23)
CALCIUM SERPL-MCNC: 6.7 MG/DL — LOW (ref 8.4–10.5)
CHLORIDE SERPL-SCNC: 102 MMOL/L — SIGNIFICANT CHANGE UP (ref 96–108)
CO2 SERPL-SCNC: 17 MMOL/L — LOW (ref 22–31)
CREAT SERPL-MCNC: 4.8 MG/DL — HIGH (ref 0.5–1.3)
CULTURE RESULTS: SIGNIFICANT CHANGE UP
GLUCOSE BLDC GLUCOMTR-MCNC: 109 MG/DL — HIGH (ref 70–99)
GLUCOSE BLDC GLUCOMTR-MCNC: 114 MG/DL — HIGH (ref 70–99)
GLUCOSE BLDC GLUCOMTR-MCNC: 126 MG/DL — HIGH (ref 70–99)
GLUCOSE SERPL-MCNC: 119 MG/DL — HIGH (ref 70–99)
HCT VFR BLD CALC: 29.8 % — LOW (ref 39–50)
HGB BLD-MCNC: 10.1 G/DL — LOW (ref 13–17)
INR BLD: 1.11 — SIGNIFICANT CHANGE UP (ref 0.88–1.16)
MAGNESIUM SERPL-MCNC: 1.9 MG/DL — SIGNIFICANT CHANGE UP (ref 1.6–2.6)
MCHC RBC-ENTMCNC: 31.6 PG — SIGNIFICANT CHANGE UP (ref 27–34)
MCHC RBC-ENTMCNC: 33.9 GM/DL — SIGNIFICANT CHANGE UP (ref 32–36)
MCV RBC AUTO: 93.1 FL — SIGNIFICANT CHANGE UP (ref 80–100)
NRBC # BLD: 0 /100 WBCS — SIGNIFICANT CHANGE UP (ref 0–0)
PHOSPHATE SERPL-MCNC: 5.5 MG/DL — HIGH (ref 2.5–4.5)
PLATELET # BLD AUTO: 17 K/UL — CRITICAL LOW (ref 150–400)
POTASSIUM SERPL-MCNC: 4.2 MMOL/L — SIGNIFICANT CHANGE UP (ref 3.5–5.3)
POTASSIUM SERPL-SCNC: 4.2 MMOL/L — SIGNIFICANT CHANGE UP (ref 3.5–5.3)
PROT SERPL-MCNC: 5.2 G/DL — LOW (ref 6–8.3)
PROTHROM AB SERPL-ACNC: 12.6 SEC — SIGNIFICANT CHANGE UP (ref 10–12.9)
RBC # BLD: 3.2 M/UL — LOW (ref 4.2–5.8)
RBC # FLD: 15.8 % — HIGH (ref 10.3–14.5)
SODIUM SERPL-SCNC: 137 MMOL/L — SIGNIFICANT CHANGE UP (ref 135–145)
SPECIMEN SOURCE: SIGNIFICANT CHANGE UP
WBC # BLD: 17.02 K/UL — HIGH (ref 3.8–10.5)
WBC # FLD AUTO: 17.02 K/UL — HIGH (ref 3.8–10.5)

## 2019-07-18 PROCEDURE — 71045 X-RAY EXAM CHEST 1 VIEW: CPT | Mod: 26

## 2019-07-18 PROCEDURE — 99232 SBSQ HOSP IP/OBS MODERATE 35: CPT | Mod: GC

## 2019-07-18 PROCEDURE — 99291 CRITICAL CARE FIRST HOUR: CPT

## 2019-07-18 RX ORDER — MORPHINE SULFATE 50 MG/1
2 CAPSULE, EXTENDED RELEASE ORAL ONCE
Refills: 0 | Status: DISCONTINUED | OUTPATIENT
Start: 2019-07-18 | End: 2019-07-18

## 2019-07-18 RX ORDER — HYDROCORTISONE 20 MG
50 TABLET ORAL EVERY 12 HOURS
Refills: 0 | Status: DISCONTINUED | OUTPATIENT
Start: 2019-07-18 | End: 2019-07-18

## 2019-07-18 RX ORDER — MORPHINE SULFATE 50 MG/1
2 CAPSULE, EXTENDED RELEASE ORAL
Refills: 0 | Status: DISCONTINUED | OUTPATIENT
Start: 2019-07-18 | End: 2019-07-18

## 2019-07-18 RX ORDER — MORPHINE SULFATE 50 MG/1
1 CAPSULE, EXTENDED RELEASE ORAL
Qty: 100 | Refills: 0 | Status: DISCONTINUED | OUTPATIENT
Start: 2019-07-18 | End: 2019-07-18

## 2019-07-18 RX ORDER — ROBINUL 0.2 MG/ML
0.2 INJECTION INTRAMUSCULAR; INTRAVENOUS ONCE
Refills: 0 | Status: COMPLETED | OUTPATIENT
Start: 2019-07-18 | End: 2019-07-18

## 2019-07-18 RX ORDER — MORPHINE SULFATE 50 MG/1
5 CAPSULE, EXTENDED RELEASE ORAL ONCE
Refills: 0 | Status: DISCONTINUED | OUTPATIENT
Start: 2019-07-18 | End: 2019-07-18

## 2019-07-18 RX ADMIN — CHLORHEXIDINE GLUCONATE 15 MILLILITER(S): 213 SOLUTION TOPICAL at 06:01

## 2019-07-18 RX ADMIN — CHLORHEXIDINE GLUCONATE 1 APPLICATION(S): 213 SOLUTION TOPICAL at 06:01

## 2019-07-18 RX ADMIN — Medication 1 MILLIGRAM(S): at 16:54

## 2019-07-18 RX ADMIN — MORPHINE SULFATE 5 MILLIGRAM(S): 50 CAPSULE, EXTENDED RELEASE ORAL at 16:45

## 2019-07-18 RX ADMIN — Medication 50 MILLIGRAM(S): at 02:18

## 2019-07-18 RX ADMIN — Medication 2.5 MILLIGRAM(S): at 12:46

## 2019-07-18 RX ADMIN — ROBINUL 0.2 MILLIGRAM(S): 0.2 INJECTION INTRAMUSCULAR; INTRAVENOUS at 21:33

## 2019-07-18 RX ADMIN — Medication 3 MILLILITER(S): at 15:43

## 2019-07-18 RX ADMIN — Medication 3 MILLILITER(S): at 10:29

## 2019-07-18 RX ADMIN — Medication 2.5 MILLIGRAM(S): at 05:57

## 2019-07-18 RX ADMIN — MORPHINE SULFATE 1 MG/HR: 50 CAPSULE, EXTENDED RELEASE ORAL at 17:01

## 2019-07-18 RX ADMIN — PANTOPRAZOLE SODIUM 40 MILLIGRAM(S): 20 TABLET, DELAYED RELEASE ORAL at 12:46

## 2019-07-18 RX ADMIN — Medication 3 MILLILITER(S): at 03:03

## 2019-07-18 NOTE — CHART NOTE - NSCHARTNOTEFT_GEN_A_CORE
Admitting Diagnosis:   Patient is a 84y old  Male who presents with a chief complaint of severe sepsis (18 Jul 2019 07:49)      PAST MEDICAL & SURGICAL HISTORY:  Cerebrovascular accident (CVA): 6/2019  High cholesterol  Diabetes  Prostate cancer: in remission  Hypertension  COPD (chronic obstructive pulmonary disease)  H/O carotid endarterectomy  H/O aortic valve replacement  S/P CABG x 3  Artificial pacemaker  History of cataract surgery: b/l  History of knee replacement: b/l      Current Nutrition Order:  Nepro@ 20mL/hr x 24hrs via NGT (480ml TV, 864kcal, 39g pro, 349mL free H2O)      PO Intake: Good (%) [   ]  Fair (50-75%) [   ] Poor (<25%) [   ]- N/A NPO w/EN    GI Issues: Unable to assess at this time 2/2 vent; increased TF residuals overnight (~250cc). BM 7/19    Pain: Unable to assess at this time 2/2 vent; appears comfortable     Skin Integrity: Chris 11, Sacrum DTI    Labs:   07-18    137  |  102  |  71<H>  ----------------------------<  119<H>  4.2   |  17<L>  |  4.80<H>    Ca    6.7<L>      18 Jul 2019 04:47  Phos  5.5     07-18  Mg     1.9     07-18    TPro  5.2<L>  /  Alb  2.0<L>  /  TBili  4.8<H>  /  DBili  3.8<H>  /  AST  108<H>  /  ALT  100<H>  /  AlkPhos  93  07-18    CAPILLARY BLOOD GLUCOSE      POCT Blood Glucose.: 114 mg/dL (18 Jul 2019 11:41)  POCT Blood Glucose.: 126 mg/dL (18 Jul 2019 06:43)  POCT Blood Glucose.: 109 mg/dL (18 Jul 2019 00:00)  POCT Blood Glucose.: 106 mg/dL (17 Jul 2019 18:04)      Medications:  MEDICATIONS  (STANDING):  ALBUTerol/ipratropium for Nebulization 3 milliLiter(s) Nebulizer every 6 hours  chlorhexidine 0.12% Liquid 15 milliLiter(s) Oral Mucosa every 12 hours  chlorhexidine 2% Cloths 1 Application(s) Topical <User Schedule>  dextrose 5%. 1000 milliLiter(s) (50 mL/Hr) IV Continuous <Continuous>  dextrose 50% Injectable 12.5 Gram(s) IV Push once  dextrose 50% Injectable 25 Gram(s) IV Push once  dextrose 50% Injectable 25 Gram(s) IV Push once  hydrocortisone sodium succinate Injectable 50 milliGRAM(s) IV Push every 12 hours  insulin lispro (HumaLOG) corrective regimen sliding scale   SubCutaneous every 6 hours  levothyroxine Injectable 25 MICROGram(s) IV Push at bedtime  metoprolol tartrate Injectable 2.5 milliGRAM(s) IV Push every 6 hours  norepinephrine Infusion 0.05 MICROgram(s)/kG/Min (4.322 mL/Hr) IV Continuous <Continuous>  pantoprazole  Injectable 40 milliGRAM(s) IV Push daily  rifampin IVPB 300 milliGRAM(s) IV Intermittent every 8 hours    MEDICATIONS  (PRN):  dextrose 40% Gel 15 Gram(s) Oral once PRN Blood Glucose LESS THAN 70 milliGRAM(s)/deciliter  fentaNYL    Injectable 25 MICROGram(s) IV Push every 3 hours PRN Moderate Pain (4 - 6)  fentaNYL    Injectable 50 MICROGram(s) IV Push every 3 hours PRN Severe Pain (7 - 10)  glucagon  Injectable 1 milliGRAM(s) IntraMuscular once PRN Glucose LESS THAN 70 milligrams/deciliter      Weight: 92.2kg   Daily     Daily     Weight Change: No new weights recorded since admit     Nutrition Focused Physical Exam: Completed [   ]  Not Pertinent [ X  ]    Estimated energy needs: Ideal body weight (61.6kg) used for calculations as pt >120% of IBW. Needs estimated for maintenance in older adults; increased needs for vent, endocarditis  Calories: 25-30 kcal/kg = 1540-1848kcal/day  Protein: 1.4-1.6 g/kg = 86-99g protein/day  Fluids per team 2/2 RADHA    Subjective: 85 yo/male with PMHx HTN, HLD, DM, hypothyroidism, prostate cancer, Aflutter, COPD, CAD, CVA, s/p recent L. CEA, presented from NH with AMS, tachycardia, fever, and leukocytosis. Pt admitted w/septic shock possibly 2/2 pna, vs. abdominal source. Pt intubated 2/2 increased work of breathing. S/p MOHINI on 7/15, pt likely w/MRSA endocarditis. Pt w/worsening renal function, no plan for HD per MD. Pt seen in room and discussed during MICU rounds. Pt remains intubated, on CPAP trial this AM. Not sedated, fairly unresponsive. MAP 98- requiring levophed for BP support. EN off at time of visit 2/2 elevated residuals overnight (<500cc)- restarted @ 20mL/hr during rounds. GOC discussion pending, no plan for HD at this time. BUN/Cr trending up- did not respond to lasix. Will continue to follow per RD protocol.     Previous Nutrition Diagnosis: Inadequate Energy Intake RT current NPO status AEB 0% of EER being met at this time.     Active [   ]  Resolved [  X ]    If resolved, new PES: increased protein-calorie needs RT increased demand for protein-calorie intake AEB vent, endocarditis, wound healing     Goal: Nutrition will be kept aligned with GOC at all times; pt will meet % of EER via tolerated route     Recommendations:  1. Keep nutrition aligned with GOC at all times   2. Per attending MD, plan to start Suplena @ 42mL/hr x 24hrs via NGT (1008ml TV, 1814kcal, 45g pro, 744mL free H2O, .75g/kg IBW pro). Recommend starting at 20mL/hr and advancing by [id number] to goal as tolerated. Additional free H2O per team. Monitor for s/s intolerance; maintain aspiration precautions at all times.   3. Monitor lytes and replete prn. POC BG q6hrs (goal -180mg/dL)  4. Bowel regimen per team     Education: N/A- not appropriate 2/2 vent     Risk Level: High [ X ] Moderate [   ] Low [   ]

## 2019-07-18 NOTE — PROGRESS NOTE ADULT - ASSESSMENT
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 Plan to continue with vancomycin and rifampin (holding gentamicin for nephrotoxicity, as pt does not want HD per living will/proxy).     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 Patient will likely need greater than 6 weeks, especially as gentamicin not part of regimen given patient's creatinine continues to rise daily.  Recommendation as follows:  - C/w rifampin 300 mg q8h  - C/w vancomycin, dosed by level  - Prognosis remains poor  - Surveillance blood cx 7/16 NGTD  - Primary team aware, ID team 1 will sign off - please reconsult with questions  - Case discussed with Dr. Crawford, ID attending physician 84M former smoker with MHx of HTN, HLD, DM 2, hypothyroidism, prostate CA s/p seeding, atrial flutter s/p ablation, bifascicular heart block s/p pacemaker, COPD (not on home air), CAD s/p CABGx3 (3/17), CVA s/p L CEA (6/19) who presents with AMS, tachycardia, fever, and leukocytosis, with septic shock (on levophed and vasopressin) 2/2 to likely line sepsis from peripheral IVs. 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 Plan to continue with vancomycin and rifampin (holding gentamicin for nephrotoxicity, as pt does not want HD per living will/proxy).     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 Patient of note made comfort measures only as per the primary team.     Should patient antibiotics wish to be continued, recommendation as follows:  - C/w rifampin 300 mg q8h  - C/w vancomycin, dosed by level  - Prognosis remains poor  - Surveillance blood cx 7/16 NGTD  - Primary team aware, ID team 1 will sign off - please reconsult with questions  - Case discussed with Dr. Crawford, ID attending physician

## 2019-07-18 NOTE — PROGRESS NOTE ADULT - ASSESSMENT
84M with PMH of HTN, HLD, DM, hypothyroidism, prostate CA A-flutter (s/p ablation, on Eliquis), COPD, CAD (s/p CABG x3), CVA s/p L CEA (6/19) presents to [facility] for AMS and increased sleepiness x1 day, admitted to the MICU for septic shock 2/2 MRSA bacteremia, being treated for infective endocarditis.    Recommendations:  - No acute vascular surgery intervention at this time  - Continue care per medical team  - Vascular surgery signing off  - Call [phone] with questions, re-consult, or acute changes

## 2019-07-18 NOTE — PROGRESS NOTE ADULT - ATTENDING COMMENTS
Pts estranged daughter here and Family meeting planned today to discuss withdrawal of care. She appears to be in agreement with other family including Martha (HCP) that Mr. Fernandez would not want any life prolonging interventions including mechanical ventilation.

## 2019-07-18 NOTE — PROGRESS NOTE ADULT - SUBJECTIVE AND OBJECTIVE BOX
*** NOTE IN PROGRESS ***    INTERVAL HPI/OVERNIGHT EVENTS:    SUBJECTIVE: Patient seen and examined at bedside.    OBJECTIVE:    VITAL SIGNS:  ICU Vital Signs Last 24 Hrs  T(C): 37.9 (18 Jul 2019 04:05), Max: 37.9 (18 Jul 2019 04:05)  T(F): 100.2 (18 Jul 2019 04:05), Max: 100.2 (18 Jul 2019 04:05)  HR: 86 (18 Jul 2019 06:00) (58 - 86)  BP: 130/65 (17 Jul 2019 20:40) (130/65 - 130/65)  BP(mean): 86 (17 Jul 2019 20:40) (86 - 86)  ABP: 124/58 (18 Jul 2019 06:00) (104/42 - 144/62)  ABP(mean): 76 (18 Jul 2019 06:00) (58 - 82)  RR: 22 (18 Jul 2019 06:00) (22 - 224)  SpO2: 97% (18 Jul 2019 06:00) (88% - 97%)    Mode: AC/ CMV (Assist Control/ Continuous Mandatory Ventilation), RR (machine): 12, TV (machine): 500, FiO2: 40, PEEP: 5, ITime: 0.8, MAP: 7.5, PIP: 12    07-16 @ 07:01  -  07-17 @ 07:00  --------------------------------------------------------  IN: 856 mL / OUT: 0 mL / NET: 856 mL    07-17 @ 07:01  -  07-18 @ 06:14  --------------------------------------------------------  IN: 631 mL / OUT: 0 mL / NET: 631 mL      CAPILLARY BLOOD GLUCOSE      POCT Blood Glucose.: 109 mg/dL (18 Jul 2019 00:00)      PHYSICAL EXAM:    General: NAD  HEENT: NC/AT; PERRL, clear conjunctiva  Neck: supple  Respiratory: CTA b/l  Cardiovascular: +S1/S2; RRR  Abdomen: soft, NT/ND; +BS x4  Extremities: WWP, 2+ peripheral pulses b/l; no LE edema  Skin: normal color and turgor; no rash  Neurological:     MEDICATIONS:  MEDICATIONS  (STANDING):  ALBUTerol/ipratropium for Nebulization 3 milliLiter(s) Nebulizer every 6 hours  chlorhexidine 0.12% Liquid 15 milliLiter(s) Oral Mucosa every 12 hours  chlorhexidine 2% Cloths 1 Application(s) Topical <User Schedule>  dextrose 5%. 1000 milliLiter(s) (50 mL/Hr) IV Continuous <Continuous>  dextrose 50% Injectable 12.5 Gram(s) IV Push once  dextrose 50% Injectable 25 Gram(s) IV Push once  dextrose 50% Injectable 25 Gram(s) IV Push once  hydrocortisone sodium succinate Injectable 50 milliGRAM(s) IV Push every 8 hours  insulin lispro (HumaLOG) corrective regimen sliding scale   SubCutaneous every 6 hours  levothyroxine Injectable 25 MICROGram(s) IV Push at bedtime  metoprolol tartrate Injectable 2.5 milliGRAM(s) IV Push every 6 hours  norepinephrine Infusion 0.05 MICROgram(s)/kG/Min (4.322 mL/Hr) IV Continuous <Continuous>  pantoprazole  Injectable 40 milliGRAM(s) IV Push daily  rifampin IVPB 300 milliGRAM(s) IV Intermittent every 8 hours    MEDICATIONS  (PRN):  dextrose 40% Gel 15 Gram(s) Oral once PRN Blood Glucose LESS THAN 70 milliGRAM(s)/deciliter  fentaNYL    Injectable 25 MICROGram(s) IV Push every 3 hours PRN Moderate Pain (4 - 6)  fentaNYL    Injectable 50 MICROGram(s) IV Push every 3 hours PRN Severe Pain (7 - 10)  glucagon  Injectable 1 milliGRAM(s) IntraMuscular once PRN Glucose LESS THAN 70 milligrams/deciliter      ALLERGIES:  Allergies    No Known Allergies    Intolerances    narcotic analgesics (Nausea; Vomiting)      LABS:                        10.4   18.80 )-----------( 14 ( 17 Jul 2019 18:14 )             31.3     07-18    137  |  102  |  71<H>  ----------------------------<  119<H>  4.2   |  17<L>  |  4.80<H>    Ca    6.7<L>      18 Jul 2019 04:47  Phos  5.5     07-18  Mg     1.9     07-18    TPro  5.2<L>  /  Alb  2.0<L>  /  TBili  4.8<H>  /  DBili  x   /  AST  108<H>  /  ALT  100<H>  /  AlkPhos  93  07-18    PT/INR - ( 18 Jul 2019 04:47 )   PT: 12.6 sec;   INR: 1.11          PTT - ( 18 Jul 2019 04:47 )  PTT:31.3 sec      RADIOLOGY & ADDITIONAL TESTS: Reviewed. INTERVAL HPI/OVERNIGHT EVENTS: PM Vanco level was 18. 1 dose 500mg vancomycin given. Spiked low grade fever at 100.2, ice packs placed. Bladder scan showed 75 mL.     SUBJECTIVE: Patient seen and examined at bedside. No ROS illicited due to AMS.    OBJECTIVE:    VITAL SIGNS:  ICU Vital Signs Last 24 Hrs  T(C): 36.8 (18 Jul 2019 09:30), Max: 37.9 (18 Jul 2019 04:05)  T(F): 98.3 (18 Jul 2019 09:30), Max: 100.2 (18 Jul 2019 04:05)  HR: 104 (18 Jul 2019 14:00) (58 - 112)  BP: 111/68 (18 Jul 2019 14:00) (96/60 - 130/65)  BP(mean): 93 (18 Jul 2019 14:00) (70 - 98)  ABP: 124/58 (18 Jul 2019 06:00) (112/46 - 140/54)  ABP(mean): 76 (18 Jul 2019 06:00) (64 - 78)  RR: 22 (18 Jul 2019 14:00) (19 - 71)  SpO2: 96% (18 Jul 2019 14:00) (88% - 97%)    Mode: CPAP with PS, FiO2: 40, PEEP: 5, PS: 5, MAP: 7.3, PIP: 11    07-17 @ 07:01  -  07-18 @ 07:00  --------------------------------------------------------  IN: 659 mL / OUT: 0 mL / NET: 659 mL    07-18 @ 07:01 - 07-18 @ 14:17  --------------------------------------------------------  IN: 100 mL / OUT: 0 mL / NET: 100 mL      CAPILLARY BLOOD GLUCOSE      POCT Blood Glucose.: 114 mg/dL (18 Jul 2019 11:41)    General: NAD, awakening to voice, following commands minimally, more purposeful movements  HEENT: Bitemporal Wasting, atraumatic; PERRL, clear conjunctiva, moist mucus membranes, neck supple, no JVD  Respiratory: CTA b/l upper lobes, crackles in b/l lower lobes  Cardiovascular: +S1/S2; RRR, no murmurs, rubs, or gallops  Abdomen: soft, NT/ND; +BS x4  Extremities: WWP, 2+ peripheral pulses b/l; 1+ pitting edema in all extremities  Skin: normal color and turgor; no rash  Neurological: Awakens to voice. Follows commands. Babinski reflexes downward.     MEDICATIONS:  MEDICATIONS  (STANDING):  ALBUTerol/ipratropium for Nebulization 3 milliLiter(s) Nebulizer every 6 hours  chlorhexidine 0.12% Liquid 15 milliLiter(s) Oral Mucosa every 12 hours  chlorhexidine 2% Cloths 1 Application(s) Topical <User Schedule>  dextrose 5%. 1000 milliLiter(s) (50 mL/Hr) IV Continuous <Continuous>  dextrose 50% Injectable 12.5 Gram(s) IV Push once  dextrose 50% Injectable 25 Gram(s) IV Push once  dextrose 50% Injectable 25 Gram(s) IV Push once  hydrocortisone sodium succinate Injectable 50 milliGRAM(s) IV Push every 12 hours  insulin lispro (HumaLOG) corrective regimen sliding scale   SubCutaneous every 6 hours  levothyroxine Injectable 25 MICROGram(s) IV Push at bedtime  metoprolol tartrate Injectable 2.5 milliGRAM(s) IV Push every 6 hours  norepinephrine Infusion 0.05 MICROgram(s)/kG/Min (4.322 mL/Hr) IV Continuous <Continuous>  pantoprazole  Injectable 40 milliGRAM(s) IV Push daily  rifampin IVPB 300 milliGRAM(s) IV Intermittent every 8 hours    MEDICATIONS  (PRN):  dextrose 40% Gel 15 Gram(s) Oral once PRN Blood Glucose LESS THAN 70 milliGRAM(s)/deciliter  fentaNYL    Injectable 25 MICROGram(s) IV Push every 3 hours PRN Moderate Pain (4 - 6)  fentaNYL    Injectable 50 MICROGram(s) IV Push every 3 hours PRN Severe Pain (7 - 10)  glucagon  Injectable 1 milliGRAM(s) IntraMuscular once PRN Glucose LESS THAN 70 milligrams/deciliter    ALLERGIES:  Allergies    No Known Allergies    Intolerances    narcotic analgesics (Nausea; Vomiting)      LABS:                        10.1   17.02 )-----------( 17       ( 18 Jul 2019 04:47 )             29.8     07-18    137  |  102  |  71<H>  ----------------------------<  119<H>  4.2   |  17<L>  |  4.80<H>    Ca    6.7<L>      18 Jul 2019 04:47  Phos  5.5     07-18  Mg     1.9     07-18    TPro  5.2<L>  /  Alb  2.0<L>  /  TBili  4.8<H>  /  DBili  3.8<H>  /  AST  108<H>  /  ALT  100<H>  /  AlkPhos  93  07-18    PT/INR - ( 18 Jul 2019 04:47 )   PT: 12.6 sec;   INR: 1.11          PTT - ( 18 Jul 2019 04:47 )  PTT:31.3 sec      RADIOLOGY & ADDITIONAL TESTS: Reviewed.

## 2019-07-18 NOTE — PROGRESS NOTE ADULT - REASON FOR ADMISSION
severe sepsis

## 2019-07-18 NOTE — PROGRESS NOTE ADULT - ATTENDING COMMENTS
I have reviewed the medical record, including laboratory and radiographic studies, interviewed and examined the patient and discussed the plan with Dr. Day, the ID Resident.  Agree with above.  Goals of care discussions progressing.  His prognosis is extremely poor.  Please recall if further ID input is desired – ID Team 1.

## 2019-07-18 NOTE — CHART NOTE - NSCHARTNOTEFT_GEN_A_CORE
Family meeting had with HCP Mila (sister), Mona (daughter), Savanna (girlfriend), and Mila (niece) regarding patient's goals of care. Per HCP and family, patient had a wife who had a prolonged difficult death due to Parkinson's disease and made his wishes very clear during many conversations and would not want to be kept alive on machines or be dependent on dialysis. 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 He continues to require the ventilator and becomes tachypneic during spontaneous breathing trials. Given his clear wishes, documented MOLST, and family discussions, his HCP and family members would like to proceed with palliative extubation and focus on keeping him comfortable. They understand he will likely pass naturally once taken off the vasopressors and ventilator and this may take hours to days - unlikely to survive further, but in the event that he does they would like to pursue in patient hospice. We will continue to have ongoing discussions. We will begin morphine infusion and titrate his infusion to comfort after extubation. Discussed with attending (Dr. De).

## 2019-07-18 NOTE — PROGRESS NOTE ADULT - ASSESSMENT
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 General surgery consulted for r/o mesenteric ischemia. Patient's pressor requirements and lactate continue to downtrend, and is tolerating tube feeds with 0cc residuals, suggesting that the likelihood of mesenteric ischemia is low.    Recommendations:  - Continue care per primary team  - No general surgery intervention at this time  - General Surgery Team 4C will sign off  - Case discussed with Dr. Cook

## 2019-07-18 NOTE — PROGRESS NOTE ADULT - SUBJECTIVE AND OBJECTIVE BOX
INTERVAL HPI/OVERNIGHT EVENTS:  Patient was seen and examined at bedside. Tmax 100.2 o/n. WBC 18.8 -> 17.02.    VITAL SIGNS:  T(F): 100.2 (07-18-19 @ 04:05)  HR: 86 (07-18-19 @ 06:00)  BP: 130/65 (07-17-19 @ 20:40)  RR: 22 (07-18-19 @ 06:00)  SpO2: 97% (07-18-19 @ 06:00)  Wt(kg): --    PHYSICAL EXAM:  Constitutional: Ill-appearing male, mechanically ventilated, sedated, lying in bed  HEENT: RIJ central line, pupils minimally responsive b/l, neck supple  Respiratory: CTA B/L; no W/R/R, +ETT  Cardiac: +S1/S2; RRR  Gastrointestinal: Obese abdomen soft, NT/ND; no rebound or guarding; +BSx4  Extremities: WWP, no clubbing or cyanosis; trace peripheral pitting edema b/l LE  Vascular: 2+ radial, femoral, DP/PT pulses B/L  Neurologic: AAOx0, sedated, intubated, does not withdraw or localize to pain    MEDICATIONS  (STANDING):  ALBUTerol/ipratropium for Nebulization 3 milliLiter(s) Nebulizer every 6 hours  chlorhexidine 0.12% Liquid 15 milliLiter(s) Oral Mucosa every 12 hours  chlorhexidine 2% Cloths 1 Application(s) Topical <User Schedule>  dextrose 5%. 1000 milliLiter(s) (50 mL/Hr) IV Continuous <Continuous>  dextrose 50% Injectable 12.5 Gram(s) IV Push once  dextrose 50% Injectable 25 Gram(s) IV Push once  dextrose 50% Injectable 25 Gram(s) IV Push once  hydrocortisone sodium succinate Injectable 50 milliGRAM(s) IV Push every 8 hours  insulin lispro (HumaLOG) corrective regimen sliding scale   SubCutaneous every 6 hours  levothyroxine Injectable 25 MICROGram(s) IV Push at bedtime  metoprolol tartrate Injectable 2.5 milliGRAM(s) IV Push every 6 hours  norepinephrine Infusion 0.05 MICROgram(s)/kG/Min (4.322 mL/Hr) IV Continuous <Continuous>  pantoprazole  Injectable 40 milliGRAM(s) IV Push daily  rifampin IVPB 300 milliGRAM(s) IV Intermittent every 8 hours    MEDICATIONS  (PRN):  dextrose 40% Gel 15 Gram(s) Oral once PRN Blood Glucose LESS THAN 70 milliGRAM(s)/deciliter  fentaNYL    Injectable 25 MICROGram(s) IV Push every 3 hours PRN Moderate Pain (4 - 6)  fentaNYL    Injectable 50 MICROGram(s) IV Push every 3 hours PRN Severe Pain (7 - 10)  glucagon  Injectable 1 milliGRAM(s) IntraMuscular once PRN Glucose LESS THAN 70 milligrams/deciliter      Allergies    No Known Allergies    Intolerances    narcotic analgesics (Nausea; Vomiting)      LABS:                        10.1   17.02 )-----------( 17       ( 18 Jul 2019 04:47 )             29.8     07-18    137  |  102  |  71<H>  ----------------------------<  119<H>  4.2   |  17<L>  |  4.80<H>    Ca    6.7<L>      18 Jul 2019 04:47  Phos  5.5     07-18  Mg     1.9     07-18    TPro  5.2<L>  /  Alb  2.0<L>  /  TBili  4.8<H>  /  DBili  x   /  AST  108<H>  /  ALT  100<H>  /  AlkPhos  93  07-18    PT/INR - ( 18 Jul 2019 04:47 )   PT: 12.6 sec;   INR: 1.11          PTT - ( 18 Jul 2019 04:47 )  PTT:31.3 sec      RADIOLOGY & ADDITIONAL TESTS:  Reviewed INTERVAL HPI/OVERNIGHT EVENTS:  Patient was seen and examined at bedside. Tmax 100.2 o/n. WBC 18.8 -> 17.02. Patient arousable to voice this AM.    VITAL SIGNS:  T(F): 100.2 (07-18-19 @ 04:05)  HR: 86 (07-18-19 @ 06:00)  BP: 130/65 (07-17-19 @ 20:40)  RR: 22 (07-18-19 @ 06:00)  SpO2: 97% (07-18-19 @ 06:00)  Wt(kg): --    PHYSICAL EXAM:  Constitutional: Ill-appearing male, mechanically ventilated, lying in bed  HEENT: RIJ central line, pupils minimally responsive b/l, neck supple  Respiratory: CTA B/L; no W/R/R, +ETT  Cardiac: +S1/S2; RRR  Gastrointestinal: Obese abdomen soft, NT/ND; no rebound or guarding; +BSx4  Extremities: WWP, no clubbing or cyanosis; trace peripheral pitting edema b/l LE  Vascular: 2+ radial, femoral, DP/PT pulses B/L  Neurologic: AAOx0, intubated, does awaken to voice, appears more active this AM    MEDICATIONS  (STANDING):  ALBUTerol/ipratropium for Nebulization 3 milliLiter(s) Nebulizer every 6 hours  chlorhexidine 0.12% Liquid 15 milliLiter(s) Oral Mucosa every 12 hours  chlorhexidine 2% Cloths 1 Application(s) Topical <User Schedule>  dextrose 5%. 1000 milliLiter(s) (50 mL/Hr) IV Continuous <Continuous>  dextrose 50% Injectable 12.5 Gram(s) IV Push once  dextrose 50% Injectable 25 Gram(s) IV Push once  dextrose 50% Injectable 25 Gram(s) IV Push once  hydrocortisone sodium succinate Injectable 50 milliGRAM(s) IV Push every 8 hours  insulin lispro (HumaLOG) corrective regimen sliding scale   SubCutaneous every 6 hours  levothyroxine Injectable 25 MICROGram(s) IV Push at bedtime  metoprolol tartrate Injectable 2.5 milliGRAM(s) IV Push every 6 hours  norepinephrine Infusion 0.05 MICROgram(s)/kG/Min (4.322 mL/Hr) IV Continuous <Continuous>  pantoprazole  Injectable 40 milliGRAM(s) IV Push daily  rifampin IVPB 300 milliGRAM(s) IV Intermittent every 8 hours    MEDICATIONS  (PRN):  dextrose 40% Gel 15 Gram(s) Oral once PRN Blood Glucose LESS THAN 70 milliGRAM(s)/deciliter  fentaNYL    Injectable 25 MICROGram(s) IV Push every 3 hours PRN Moderate Pain (4 - 6)  fentaNYL    Injectable 50 MICROGram(s) IV Push every 3 hours PRN Severe Pain (7 - 10)  glucagon  Injectable 1 milliGRAM(s) IntraMuscular once PRN Glucose LESS THAN 70 milligrams/deciliter      Allergies    No Known Allergies    Intolerances    narcotic analgesics (Nausea; Vomiting)      LABS:                        10.1   17.02 )-----------( 17       ( 18 Jul 2019 04:47 )             29.8     07-18    137  |  102  |  71<H>  ----------------------------<  119<H>  4.2   |  17<L>  |  4.80<H>    Ca    6.7<L>      18 Jul 2019 04:47  Phos  5.5     07-18  Mg     1.9     07-18    TPro  5.2<L>  /  Alb  2.0<L>  /  TBili  4.8<H>  /  DBili  x   /  AST  108<H>  /  ALT  100<H>  /  AlkPhos  93  07-18    PT/INR - ( 18 Jul 2019 04:47 )   PT: 12.6 sec;   INR: 1.11          PTT - ( 18 Jul 2019 04:47 )  PTT:31.3 sec    Culture - Blood (07.17.19 @ 08:33)    Specimen Source: .Blood Blood    Culture Results:   No growth at 1 day.    RADIOLOGY & ADDITIONAL TESTS:  No new rad studies

## 2019-07-18 NOTE — PROGRESS NOTE ADULT - PROVIDER SPECIALTY LIST ADULT
Infectious Disease
Infectious Disease
Internal Medicine
MICU
Surgery
Vascular Surgery
Infectious Disease
Surgery
MICU

## 2019-07-18 NOTE — PROGRESS NOTE ADULT - SUBJECTIVE AND OBJECTIVE BOX
INTERVAL HPI/OVERNIGHT EVENTS: PM Vanco level was 18. 1 dose 500mg vancomycin given. Spiked low grade fever at 100.2, ice packs placed. Bladder scan showed 75 mL.     SUBJECTIVE: Patient seen and examined at bedside. No ROS illicited due to AMS.    OBJECTIVE:    VITAL SIGNS:  ICU Vital Signs Last 24 Hrs  T(C): 36.8 (18 Jul 2019 09:30), Max: 37.9 (18 Jul 2019 04:05)  T(F): 98.3 (18 Jul 2019 09:30), Max: 100.2 (18 Jul 2019 04:05)  HR: 104 (18 Jul 2019 14:00) (58 - 112)  BP: 111/68 (18 Jul 2019 14:00) (96/60 - 130/65)  BP(mean): 93 (18 Jul 2019 14:00) (70 - 98)  ABP: 124/58 (18 Jul 2019 06:00) (112/46 - 140/54)  ABP(mean): 76 (18 Jul 2019 06:00) (64 - 78)  RR: 22 (18 Jul 2019 14:00) (19 - 71)  SpO2: 96% (18 Jul 2019 14:00) (88% - 97%)    Mode: CPAP with PS, FiO2: 40, PEEP: 5, PS: 5, MAP: 7.3, PIP: 11    07-17 @ 07:01  -  07-18 @ 07:00  --------------------------------------------------------  IN: 659 mL / OUT: 0 mL / NET: 659 mL    07-18 @ 07:01 - 07-18 @ 14:17  --------------------------------------------------------  IN: 100 mL / OUT: 0 mL / NET: 100 mL      CAPILLARY BLOOD GLUCOSE      POCT Blood Glucose.: 114 mg/dL (18 Jul 2019 11:41)    General: NAD, awakening to voice, following commands minimally, more purposeful movements  HEENT: Bitemporal Wasting, atraumatic; PERRL, clear conjunctiva, moist mucus membranes, neck supple, no JVD  Respiratory: CTA b/l upper lobes, crackles in b/l lower lobes  Cardiovascular: +S1/S2; RRR, no murmurs, rubs, or gallops  Abdomen: soft, NT/ND; +BS x4  Extremities: WWP, 2+ peripheral pulses b/l; 1+ pitting edema in all extremities  Skin: normal color and turgor; no rash  Neurological: Awakens to voice. Follows commands. Babinski reflexes downward.     MEDICATIONS:  MEDICATIONS  (STANDING):  ALBUTerol/ipratropium for Nebulization 3 milliLiter(s) Nebulizer every 6 hours  chlorhexidine 0.12% Liquid 15 milliLiter(s) Oral Mucosa every 12 hours  chlorhexidine 2% Cloths 1 Application(s) Topical <User Schedule>  dextrose 5%. 1000 milliLiter(s) (50 mL/Hr) IV Continuous <Continuous>  dextrose 50% Injectable 12.5 Gram(s) IV Push once  dextrose 50% Injectable 25 Gram(s) IV Push once  dextrose 50% Injectable 25 Gram(s) IV Push once  hydrocortisone sodium succinate Injectable 50 milliGRAM(s) IV Push every 12 hours  insulin lispro (HumaLOG) corrective regimen sliding scale   SubCutaneous every 6 hours  levothyroxine Injectable 25 MICROGram(s) IV Push at bedtime  metoprolol tartrate Injectable 2.5 milliGRAM(s) IV Push every 6 hours  norepinephrine Infusion 0.05 MICROgram(s)/kG/Min (4.322 mL/Hr) IV Continuous <Continuous>  pantoprazole  Injectable 40 milliGRAM(s) IV Push daily  rifampin IVPB 300 milliGRAM(s) IV Intermittent every 8 hours    MEDICATIONS  (PRN):  dextrose 40% Gel 15 Gram(s) Oral once PRN Blood Glucose LESS THAN 70 milliGRAM(s)/deciliter  fentaNYL    Injectable 25 MICROGram(s) IV Push every 3 hours PRN Moderate Pain (4 - 6)  fentaNYL    Injectable 50 MICROGram(s) IV Push every 3 hours PRN Severe Pain (7 - 10)  glucagon  Injectable 1 milliGRAM(s) IntraMuscular once PRN Glucose LESS THAN 70 milligrams/deciliter    ALLERGIES:  Allergies    No Known Allergies    Intolerances    narcotic analgesics (Nausea; Vomiting)      LABS:                        10.1   17.02 )-----------( 17       ( 18 Jul 2019 04:47 )             29.8     07-18    137  |  102  |  71<H>  ----------------------------<  119<H>  4.2   |  17<L>  |  4.80<H>    Ca    6.7<L>      18 Jul 2019 04:47  Phos  5.5     07-18  Mg     1.9     07-18    TPro  5.2<L>  /  Alb  2.0<L>  /  TBili  4.8<H>  /  DBili  3.8<H>  /  AST  108<H>  /  ALT  100<H>  /  AlkPhos  93  07-18    PT/INR - ( 18 Jul 2019 04:47 )   PT: 12.6 sec;   INR: 1.11          PTT - ( 18 Jul 2019 04:47 )  PTT:31.3 sec      RADIOLOGY & ADDITIONAL TESTS: Reviewed.

## 2019-07-18 NOTE — PROGRESS NOTE ADULT - SUBJECTIVE AND OBJECTIVE BOX
Vascular Surgery Consult - Progress Note    Subjective/Interval Events:  Patient remains intubated, weaning sedation. Weaning pressors. Lactate downtrending. Being treated for endocarditis. Continues to be intermittent low grade temps    Vital Signs Last 24 Hrs  T(C): 36.8 (18 Jul 2019 09:30), Max: 37.9 (18 Jul 2019 04:05)  T(F): 98.3 (18 Jul 2019 09:30), Max: 100.2 (18 Jul 2019 04:05)  HR: 60 (18 Jul 2019 15:00) (58 - 112)  BP: 139/66 (18 Jul 2019 15:00) (96/60 - 139/66)  BP(mean): 96 (18 Jul 2019 15:00) (70 - 98)  RR: 22 (18 Jul 2019 15:00) (19 - 71)  SpO2: 97% (18 Jul 2019 15:00) (92% - 97%)    I&O's Summary  17 Jul 2019 07:01  -  18 Jul 2019 07:00  --------------------------------------------------------  IN: 659 mL / OUT: 0 mL / NET: 659 mL    18 Jul 2019 07:01  -  18 Jul 2019 16:45  --------------------------------------------------------  IN: 100 mL / OUT: 0 mL / NET: 100 mL    Physical Exam:  General: NAD  Neck: L CEA incision c/d/i, no induration, fluctuance, drainage, or erythema  Pulmonary: Intubated/on vent  Abdominal: soft, non-distended  Neuro: Sedated; unable to perform neuro exam    LABS:                    10.1   17.02 )-----------( 17       ( 18 Jul 2019 04:47 )             29.8     07-18  137  |  102  |  71<H>  ----------------------------<  119<H>  4.2   |  17<L>  |  4.80<H>    Ca    6.7<L>      18 Jul 2019 04:47  Phos  5.5     07-18  Mg     1.9     07-18    TPro  5.2<L>  /  Alb  2.0<L>  /  TBili  4.8<H>  /  DBili  3.8<H>  /  AST  108<H>  /  ALT  100<H>  /  AlkPhos  93  07-18    PT/INR - ( 18 Jul 2019 04:47 )   PT: 12.6 sec;   INR: 1.11     PTT - ( 18 Jul 2019 04:47 )  PTT:31.3 sec    LIVER FUNCTIONS - ( 18 Jul 2019 04:47 )  Alb: 2.0 g/dL / Pro: 5.2 g/dL / ALK PHOS: 93 U/L / ALT: 100 U/L / AST: 108 U/L / GGT: x           CAPILLARY BLOOD GLUCOSE  POCT Blood Glucose.: 114 mg/dL (18 Jul 2019 11:41)  POCT Blood Glucose.: 126 mg/dL (18 Jul 2019 06:43)  POCT Blood Glucose.: 109 mg/dL (18 Jul 2019 00:00)  POCT Blood Glucose.: 106 mg/dL (17 Jul 2019 18:04)

## 2019-07-18 NOTE — PROGRESS NOTE ADULT - ASSESSMENT
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     NEURO  #Altered Mental Status most likely 2/2 MRSA sepsis  - Treat with Vancomycin and Rifampin  - CT head w/out contrast performed 7/16.     #CVA 6/2019 s/p CEA. Per family and Neuro notes, patient has swallowing deficits from previous CVA, but otherwise no other gross deficits. Possible CVA due to septic emboli to brain.  -In setting of NPO holding ASA 81mg and simvastatin 40mg  - CT head without contrast ordered. No acute findings, MR could be obtained for further characterization  - off of propofol, attempt at Spontaneous Awakening Trial today. Patient awakening to voice.       CARDIOVASCULAR  #Septic Shock: source MRSA in blood  - Hypervolemic. Lasix challenge today.   -See Infectious Disease below    PULMONARY  #COPD  Not on Home O2. Satting 88-90% on admission.  - Intubated 7/15. 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   -Started on empirical Vanc/Zosyn, 1 dose tobramycin given for double Pseudomonas coverage. Continuing Vancomycin, started Rifampin. Zosyn dc'd.   -Hydrocortisone IV push 50mg q8h, consider tapering  -Blood Cx 3 of 4 positive, gram + cocci in clusters: MRSA (+), f/u sensitivities: GEO 1.5  -started on BiPAP, now intubated. VCAC 7/17 ABG pH 7.47 CO2 26, CPAP trials as tolerated, f/u PM ABG  -Heparin drip stopped. platelet count 18  -Bicarb 19, Anion Gap 17  -Repeat lactate 2.5, repeat AM lactate  -Levophed @ 16mL/hr , Vasopressin stopped. Goal SBP > 90  -Echocardiogram performed 7/15: EF 50%, tricuspid regurgitation and thickening, mobile echogenicity extending from Meagan valve to LVOT.    -ID recs:  GEO returned to 1.5, will continue vancomycin (trough 15-20). Rifampin, Discontinued zosyn.   - CT Head, neck, chest, abdomen, pelvis performed 7/16. No acute findings  -f/u AM CBC, CMP, Lactate  -f/u 6pm vanco trough  -f/u surveillance cultures, results, and sensitivities    RENAL  #RADHA  Although BUN:Cr ratio <20, in the setting of sepsis a pre-renal etiology is most like likely. Also consider ATN because patient is currently not making urine. 7/17  BUN/Cr worsening BUN 58 Cr 4.53  -s/p 2 L in ED  - Total intake of 800cc, minimal urine output  - abdominal ultrasound showed bilateral renal cysts, no hydronephrosis, and cholelithiasis.   - Spoke to proxy Martha, who decided that hemodialysis is not something the patient would want.   - bladder scan 7/17: 60 cc.   - Perform Lasix 200 mg challenge and repeat bladder scan 2 hours after, and repeat q2h    #Hypocalcemia  Ca 6.6. Corrected Ca: 8.2  - f/u AM CMP    GI  #Suspected mesenteric ischemia in setting of high lactate 10.8  - Surgery consulted. CT chest/abd/pelvis with oral contrast performed.    - CT revealed extensive arterial calcifications, and nonspecific thickened ascending colon.   - Consider placing NGT and started feeds ([place])    #Transaminitis in setting of shock. AST and ALT elevated, Alk Phos WNL  - AST and ALT trending down.      HEME  #Thrombocytopenia, suspected DIC, less likely HIT  Patient had Plt count WNL on recent admission, possibly 2/2 to sepsis or dilution  -Plt 18. holding on platelet transfusion as no signs of bleeding.  - D-Dimer 3061. Fibrinogen 303  - Antiplatelet antibodies to be sent  - Transfuse platelets if <10    #Coagulopathy. PTT 39.4   - f/u coag studies  - heparin stopped    #Macrocytic Anemia - Spurr Cells on smear  -spurr cells likely artifact, but if persistent consider w/u  -In setting of decreased PO intake recently, patient at risk for a folate deficiency  -f/u folate      ENDOCRINE  #DM  -A1c 6.3  #Possible metformin toxicity: Elevated lactate + renal failure  - BUN/Cr worsening BUN 58 Cr 4.53  -Hold home metformin and start SSI    Hypothyroidism  -In setting of NPO hold home synthroid  - TSH= 18.4, downtrending from previous visits. On last admission TSH 26.8  - patient likely getting inadequate dose or not taking home meds. F/u if he is taking. If he is increase home dose once not NPO    FEN  F: None  E: Replete K<4 Mg < 2  N: NPO    DVT PPx - None due to thrombocytopenia  GI PPx- protonix 40mg IV daily    LINES - Bilateral peripheral lines on wrist, Central line (R. IJ). R radial Arterial line.    CODE - Discussion had over the phone with guardian who states pt has living will. Living will stating pt is DNR and DNI if status is irreversible and terminal. MOLST and Living will in chart. Guardian Mila Fernandez (in [place]) can be reached at: [phone] or [phone]. Guardian agreeing to pressor support, ABX, IVF, and noninvasive ventilation at this time.     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     NEURO  #Metabolic encephalopathy in setting of septic shock likely 2/2 MRSA endocarditis  - Off sedation now, patient awakening to voice.  minimally following commands  - continue Vancomycin and Rifampin (see ID plan below)     #CVA 6/2019 s/p CEA. Per family and Neuro notes, patient has swallowing deficits from previous CVA, but otherwise no other gross deficits. Possible CVA due to septic emboli to brain. CT head w/out contrast performed 7/16 no acute findings, MR could be obtained for further characterization  - In setting of NPO holding ASA 81mg and simvastatin 40mg  - Attempt at Spontaneous Awakening Trial 7/17. patient awakening to voice. minimally following commands    CARDIOVASCULAR  #Septic Shock 2/2 MRSA Endocarditis with possible vegetation  - Echocardiogram 7/15 notes "mobile echogenicity is seen extending from the Meagan valve to the  LVOT, differential includes vegetation."  - Levophed titrated down to 0.046 mcg/kg/min. Goal SBP > 90  - continue Vancomycin and Rifampin (See ID plan below)  - Hypervolemic. Lasix challenge 7/17.     #HTN #HLD  - Lopressor 2.5mg q6h    #CHF   - echo 7/15 showed EF of 50%  - NPO, holding home lisinopril 40mg, lasix 20mg, metoprolol 25mg BID      PULMONARY  #COPD  Not on Home O2. Satting 88-90% on admission. started on BiPAP on admisson, now intubated.  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 Started on Vanc, tobramycin x1 (7/15). Zosyn x1, (7/14). Blood Cx 3 of 4 positive, gram + cocci in clusters: MRSA (+), f/u sensitivities: Vancomycin GEO 1.5 (7/17). Echocardiogram performed 7/15: EF 50%, tricuspid regurgitation and thickening, mobile echogenicity extending from Meagan valve to LVOT. Repeat lactate 2.4 (7/18)  - f/u 7/18 surveillance blood cultures show no growth at one day. Sputum shows multiple yeast.  - per ID recs, Continuing Vancomycin (day 5), started Rifampin (day 4)  - Heparin drip stopped. platelet count 17  - Hydrocortisone IV push 50mg q8h, tapering, changed to 50mg q12h today   - Levophed titrated down to 0.046 mcg/kg/min. Goal SBP > 90  - f/u AM CBC, CMP, Lactate  - f/u 8pm vanco trough    RENAL  # Acute RADHA likely pre-renal in setting of sepsis 2/2 MRSA endocarditis  Although BUN:Cr ratio <20, in the setting of sepsis a pre-renal etiology is most like likely. Also consider ATN because patient is currently not making urine. 7/18  worsening renal function BUN 71 Cr 4.8  -s/p 2 L in ED  - Total intake of 700cc, minimal urine output  - abdominal ultrasound showed bilateral renal cysts, no hydronephrosis, and cholelithiasis.   - Spoke to proxy Martha, who decided that hemodialysis is not something the patient would want.   - bladder scan 7/17: 60 cc.   - 7/17 performed Lasix 200 mg challenge. Bladder scan 7/17 night showed 75 mL.    #Hypocalcemia  Ca 6.6. Corrected Ca: 8.2  - f/u AM CMP    Gastrointestinal  #Suspected mesenteric ischemia in setting of high lactate 10.8  - Surgery consulted. CT chest/abd/pelvis with oral contrast performed.    - CT revealed extensive arterial calcifications, and nonspecific thickened ascending colon.   - Places NGT and started feeds (Nepro) at 20, trickle feeds    #Transaminitis in setting of shock. AST and ALT elevated, Alk Phos WNL. Possible obstruction of bile duct in setting of cholelithiasis   - Bilirubin 4.8, Direct bilirubin 3.8  - AST and ALT trending down.  .  - Consider repeat RUQ U/S    HEME  #Thrombocytopenia, suspected DIC, less likely HIT  Patient had Plt count WNL on recent admission, possibly 2/2 to sepsis or dilution  -Plt 17. holding on platelet transfusion as no signs of bleeding.  - Heparin held  - D-Dimer 3061. Fibrinogen 303  - Heparin PF4 Ab negative  - Transfuse platelets if <10    #Macrocytic Anemia - Spurr Cells on smear  -spurr cells likely artifact, but if persistent consider w/u  -In setting of decreased PO intake recently, patient at risk for a folate deficiency  -f/u folate      ENDOCRINE  #DM  -A1c 6.3  #Possible metformin toxicity: Elevated lactate + renal failure  - BUN/Cr worsening BUN 71 Cr 4.8  -Hold home metformin and start SSI    Hypothyroidism  -In setting of NPO hold home synthroid  - TSH= 18.4, downtrending from previous visits. On last admission TSH 26.8  - patient likely getting inadequate dose or not taking home meds. F/u if he is taking. If he is increase home dose once not NPO    FEN  F: None  E: Replete K<4 Mg < 2  N: NPO    DVT PPx - None due to thrombocytopenia  GI PPx- protonix 40mg IV daily    LINES - Bilateral peripheral lines on wrist, Central line (R. IJ). R radial Arterial line.    CODE - Discussion had over the phone with guardian who states pt has living will. Living will stating pt is DNR and DNI if status is irreversible and terminal. MOLST and Living will in chart. Guardian Mila Fernandez (in [place]) can be reached at: [phone] or [phone]. Guardian agreeing to pressor support, ABX, IVF, and noninvasive ventilation at this time. Family meeting today at 4pm with guardian and daughter.     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     NEURO  #Metabolic encephalopathy in setting of septic shock likely 2/2 MRSA endocarditis  - Off sedation now, patient awakening to voice.  minimally following commands  - continue Vancomycin and Rifampin (see ID plan below)     #CVA 6/2019 s/p CEA.   Per family and Neuro notes, patient has swallowing deficits from previous CVA, but otherwise no other gross deficits. Possible CVA due to septic emboli to brain. CT head w/out contrast performed 7/16 no acute findings, MR could be obtained for further characterization  - In setting of NPO holding ASA 81mg and simvastatin 40mg  - Attempt at Spontaneous Awakening Trial 7/17. patient awakening to voice. minimally following commands    CARDIOVASCULAR  #Septic Shock 2/2 MRSA Endocarditis with possible vegetation  - Echocardiogram 7/15 notes "mobile echogenicity is seen extending from the Meagan valve to the  LVOT, differential includes vegetation."  - Levophed titrated down to 0.046 mcg/kg/min. Goal SBP > 90  - continue Vancomycin and Rifampin (See ID plan below)  - Hypervolemic. Lasix challenge 7/17.     #HTN   - Lopressor 2.5mg q6h    #HLD  - Holding home simvastatin    #CHF   - echo 7/15 showed EF of 50%  - NPO, holding home lisinopril 40mg, lasix 20mg, metoprolol 25mg BID      PULMONARY  #COPD  Not on Home O2. Saturating 88-90% on admission. started on BiPAP on admission now intubated.  - ABG 7/17 pH 7.47 CO2 26 HCO3 19  - Intubated 7/15, continue mechanical ventilation  - Endotracheal tube exchanged on 7/18 due to airleak  - CPAP trials as tolerated  - Duoneb q6h     #Bilateral Pleural Effusions suspicious for pneumonia  - Lasix Challenge, not making urine, bladder scans daily  - Xray 7/17:No change bilateral pleural effusions and small bibasilar atelectasis.      Infectious Disease  #Septic Shock 2/2 MRSA endocarditis  Patient with qSOFA score 3 and met SIRS criteria on admission (HR>90, temp >100.4, WBC > 12,000, RR>19) with elevated lactate and SBP<90. Started on Vanc, tobramycin x1 (7/15). Zosyn x1, (7/14). Blood Cx 3 of 4 positive, gram + cocci in clusters: MRSA (+), f/u sensitivities: Vancomycin GEO 1.5 (7/17). Echocardiogram performed 7/15: EF 50%, tricuspid regurgitation and thickening, mobile echogenicity extending from Meagan valve to LVOT. Repeat lactate 2.4 (7/18)  - f/u 7/18 surveillance blood cultures show no growth at one day. Sputum 7/16 shows moderate yeast and gram variable rods, f/u culture and sensitivity  - per ID recs, Continuing Vancomycin (day 5), started Rifampin (day 4)  - Hydrocortisone IV push 50mg q8h, tapering, changed to 50mg q12h today   - Levophed titrated down to 0.046 mcg/kg/min. Goal SBP > 90  - f/u AM CBC, CMP, Lactate  - f/u 8pm vanco level    RENAL  # Acute RADHA likely pre-renal in setting of sepsis 2/2 MRSA endocarditis  Although BUN:Cr ratio <20, in the setting of sepsis a pre-renal etiology is most like likely. Also consider ATN because patient is currently not making urine. 7/18  worsening renal function BUN 71 Cr 4.8  -s/p 2 L in ED  - Total intake of 700cc, minimal urine output  - abdominal ultrasound showed bilateral renal cysts, no hydronephrosis, and cholelithiasis.   - Spoke to proxy Martha, who decided that hemodialysis is not something the patient would want.   - bladder scan 7/17: 60 cc.   - 7/17 performed Lasix 200 mg challenge. Bladder scan 7/17 night showed 75 mL.    #Hypocalcemia  Ca 6.6. Corrected Ca: 8.2  - f/u AM CMP    Gastrointestinal  #Suspected mesenteric ischemia in setting of high lactate 10.8  - Surgery consulted. CT chest/abd/pelvis with oral contrast performed.    - CT revealed extensive arterial calcifications, and nonspecific thickened ascending colon.   - Places NGT and started feeds (Nepro) at 20, trickle feeds    #Transaminitis in setting of shock. AST and ALT elevated, Alk Phos WNL. Possible obstruction of bile duct in setting of cholelithiasis   - Bilirubin 4.8, Direct bilirubin 3.8  - AST and ALT trending down.  .  - Consider repeat RUQ U/S    HEME  #Thrombocytopenia, suspected DIC, less likely HIT  Patient had Plt count WNL on recent admission, possibly 2/2 to sepsis or dilution  - Plt 17 today on 7/18. Holding on platelet transfusion as no signs of bleeding.  - Heparin held  - D-Dimer 3061. Fibrinogen 303  - Heparin PF4 Ab negative  - Transfuse platelets if <10    #Macrocytic Anemia - Spurr Cells on smear  -spurr cells likely artifact, but if persistent consider w/u  -In setting of decreased PO intake recently, patient at risk for a folate deficiency  -f/u folate    ENDOCRINE  #DM-A1c 6.3  Possible metformin toxicity: Elevated lactate + renal failure.  - BUN/Cr worsening BUN 71 Cr 4.8  - Hold home metformin and start SSI    Hypothyroidism  -In setting of NPO hold home synthroid  - TSH= 18.4, downtrending from previous visits. On last admission TSH 26.8  - patient likely getting inadequate dose or not taking home meds. F/u if he is taking. If he is increase home dose once not NPO    FEN  F: None  E: Replete K<4 Mg < 2  N: NPO    DVT PPx - None due to thrombocytopenia  GI PPx- protonix 40mg IV daily    LINES - Bilateral peripheral lines on wrist, Central line (R. IJ). R radial Arterial line.    CODE - Discussion had over the phone with guardian who states pt has living will. Living will stating pt is DNR and DNI if status is irreversible and terminal. MOLST and Living will in chart. Guardian Mila Fernandez (in [place]) can be reached at: [phone] or [phone]. Guardian agreeing to pressor support, ABX, IVF, and noninvasive ventilation at this time. Family meeting today at 4pm with guardian and daughter.     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     NEURO  #Metabolic encephalopathy in setting of septic shock likely 2/2 MRSA endocarditis  - Off sedation now, patient awakening to voice.  minimally following commands  - continue Vancomycin and Rifampin (see ID plan below)     #CVA 6/2019 s/p CEA.   Per family and Neuro notes, patient has swallowing deficits from previous CVA, but otherwise no other gross deficits. Possible CVA due to septic emboli to brain. CT head w/out contrast performed 7/16 no acute findings, MR could be obtained for further characterization  - In setting of NPO holding ASA 81mg and simvastatin 40mg  - Attempt at Spontaneous Awakening Trial 7/17. patient awakening to voice. minimally following commands    CARDIOVASCULAR  #Septic Shock 2/2 MRSA Endocarditis with possible vegetation  - Echocardiogram 7/15 notes "mobile echogenicity is seen extending from the Meagan valve to the  LVOT, differential includes vegetation."  - Levophed titrated down to 0.046 mcg/kg/min. Goal SBP > 90  - continue Vancomycin and Rifampin (See ID plan below)  - Hypervolemic. Lasix challenge 7/17.     #HTN   - Lopressor 2.5mg q6h    #HLD  - Holding home simvastatin    # Chronic CHF with preserved EF  - Will follow up with outside medical records regarding type likely NYHA Class I  - echo 7/15 showed EF of 50%  - NPO, holding home lisinopril 40mg, lasix 20mg, metoprolol 25mg BID      PULMONARY  #COPD  Not on Home O2. Saturating 88-90% on admission. started on BiPAP on admission now intubated.  - ABG 7/17 pH 7.47 CO2 26 HCO3 19  - Intubated 7/15, continue mechanical ventilation  - Endotracheal tube exchanged on 7/18 due to airleak  - CPAP trials as tolerated  - Duoneb q6h     #Bilateral Pleural Effusions suspicious for pneumonia  - Lasix Challenge, not making urine, bladder scans daily  - Xray 7/17:No change bilateral pleural effusions and small bibasilar atelectasis.      Infectious Disease  #Septic Shock 2/2 MRSA endocarditis  Patient with qSOFA score 3 and met SIRS criteria on admission (HR>90, temp >100.4, WBC > 12,000, RR>19) with elevated lactate and SBP<90. Started on Vanc, tobramycin x1 (7/15). Zosyn x1, (7/14). Blood Cx 3 of 4 positive, gram + cocci in clusters: MRSA (+), f/u sensitivities: Vancomycin GEO 1.5 (7/17). Echocardiogram performed 7/15: EF 50%, tricuspid regurgitation and thickening, mobile echogenicity extending from Meagan valve to LVOT. Repeat lactate 2.4 (7/18)  - f/u 7/18 surveillance blood cultures show no growth at one day. Sputum 7/16 shows moderate yeast and gram variable rods, f/u culture and sensitivity  - per ID recs, Continuing Vancomycin (day 5), started Rifampin (day 4)  - Hydrocortisone IV push 50mg q8h, tapering, changed to 50mg q12h today   - Levophed titrated down to 0.046 mcg/kg/min. Goal SBP > 90  - f/u AM CBC, CMP, Lactate  - f/u 8pm vanco level    RENAL  # Acute RADHA likely pre-renal in setting of sepsis 2/2 MRSA endocarditis  Although BUN:Cr ratio <20, in the setting of sepsis a pre-renal etiology is most like likely. Also consider ATN because patient is currently not making urine. 7/18  worsening renal function BUN 71 Cr 4.8  -s/p 2 L in ED  - Total intake of 700cc, minimal urine output  - abdominal ultrasound showed bilateral renal cysts, no hydronephrosis, and cholelithiasis.   - Spoke to proxy Martha, who decided that hemodialysis is not something the patient would want.   - bladder scan 7/17: 60 cc.   - 7/17 performed Lasix 200 mg challenge. Bladder scan 7/17 night showed 75 mL.    #Hypocalcemia  Ca 6.6. Corrected Ca: 8.2  - f/u AM CMP    Gastrointestinal  #Suspected mesenteric ischemia in setting of high lactate 10.8  - Surgery consulted. CT chest/abd/pelvis with oral contrast performed.    - CT revealed extensive arterial calcifications, and nonspecific thickened ascending colon.   - Places NGT and started feeds (Nepro) at 20, trickle feeds    #Transaminitis in setting of shock. AST and ALT elevated, Alk Phos WNL. Possible obstruction of bile duct in setting of cholelithiasis   - Bilirubin 4.8, Direct bilirubin 3.8  - AST and ALT trending down.  .  - Consider repeat RUQ U/S    HEME  #Thrombocytopenia, suspected DIC, less likely HIT  Patient had Plt count WNL on recent admission, possibly 2/2 to sepsis or dilution  - Plt 17 today on 7/18. Holding on platelet transfusion as no signs of bleeding.  - Heparin held  - D-Dimer 3061. Fibrinogen 303  - Heparin PF4 Ab negative  - Transfuse platelets if <10    #Macrocytic Anemia - Spurr Cells on smear  -spurr cells likely artifact, but if persistent consider w/u  -In setting of decreased PO intake recently, patient at risk for a folate deficiency  -f/u folate    ENDOCRINE  #DM-A1c 6.3  Possible metformin toxicity: Elevated lactate + renal failure.  - BUN/Cr worsening BUN 71 Cr 4.8  - Hold home metformin and start SSI    Hypothyroidism  -In setting of NPO hold home synthroid  - TSH= 18.4, downtrending from previous visits. On last admission TSH 26.8  - patient likely getting inadequate dose or not taking home meds. F/u if he is taking. If he is increase home dose once not NPO    FEN  F: None  E: Replete K<4 Mg < 2  N: NPO    DVT PPx - None due to thrombocytopenia  GI PPx- protonix 40mg IV daily    LINES - Bilateral peripheral lines on wrist, Central line (R. IJ). R radial Arterial line.    CODE - Discussion had over the phone with guardian who states pt has living will. Living will stating pt is DNR and DNI if status is irreversible and terminal. MOLST and Living will in chart. Guardian Mila Fernandez (in [place]) can be reached at: [phone] or [phone]. Guardian agreeing to pressor support, ABX, IVF, and noninvasive ventilation at this time. Family meeting today at 4pm with guardian and daughter.     DISPO - continue intensive level of care in MICU service

## 2019-07-18 NOTE — DISCHARGE NOTE FOR THE EXPIRED PATIENT - HOSPITAL COURSE
Patient presented in acute respiratory failure and found to have metabolic acidosis 2/2 lactic acidosis 2/2 septic shock likely aspiration PNA. He was then brought up to the MICU and placed on vanc/zosyn and pressors Though patient was DNI, his WOB increased so team decided to intubate after receiving verbal consent from family so we could continue treatment. His lactate continued to uptrend for several days, but CT imaging of his body suggestive of mesenteric ischemia, lungs without much pathology, and no intracranial hemorrhage. Blood cultures returned positive for MRSA so zosyn was discontinued and an echo was ordered to determine if that was the source (other than suspected pneumonia) of his infection. Echo revealed vegetation so Rifampin was added to vancomycin for coverage of MRSA endocarditis. US of abdomen without acute findings. During this time, he had low urine output, but his family declined HD. After several days of treatment, his lactic acid began to improve and his pressor requirement improved but he did not appear to be improving so a family meeting was held with the palliative team. At that time it was decided to place the patient on comfort care as they believed it was what he would want. That evening, the patient passed peacefully.    No spontaneous movements were present. There was not response to verbal, tactile, or painful stimuli. Pupils were mid-dilated and fixed. No breath sounds were appreciated over both lung fields. No femoral, radial, or carotid pulses were palpable. No heart sounds were auscultated over entire precordium. Patient pronounced dead at 10:43p. Family, resident, and attending physician, were notified.

## 2019-07-18 NOTE — PROGRESS NOTE ADULT - SUBJECTIVE AND OBJECTIVE BOX
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 General surgery consulted for r/o mesenteric ischemia.    24 Hour Events: Lactate continuing to downtrend. Tolerating tube feeds with 0cc residuals.        OBJECTIVE:    Vital Signs:  Vital Signs Last 24 Hrs  T(C): 37.3 (18 Jul 2019 07:00), Max: 37.9 (18 Jul 2019 04:05)  T(F): 99.1 (18 Jul 2019 07:00), Max: 100.2 (18 Jul 2019 04:05)  HR: 58 (18 Jul 2019 07:00) (58 - 86)  BP: 109/70 (18 Jul 2019 07:00) (109/70 - 130/65)  BP(mean): 86 (18 Jul 2019 07:00) (86 - 86)  RR: 22 (18 Jul 2019 07:00) (22 - 224)  SpO2: 92% (18 Jul 2019 07:00) (88% - 97%)    Physical Exam:  General: NAD  HEENT: NC/AT, NGT clamped  Pulmonary: Nonlabored breathing, intubated VC-AC  Cardiovascular: No heaves or thrills  Abdominal: Soft, nontender, nondistended  Neuro: Doesn't respond to painful stimuli or commands    Lines/Drains/Tubes:    Ins and Outs:  I&O's Summary    17 Jul 2019 07:01  -  18 Jul 2019 07:00  --------------------------------------------------------  IN: 659 mL / OUT: 0 mL / NET: 659 mL    18 Jul 2019 07:01  -  18 Jul 2019 07:49  --------------------------------------------------------  IN: 20 mL / OUT: 0 mL / NET: 20 mL        Labs:                        10.1   17.02 )-----------( 17       ( 18 Jul 2019 04:47 )             29.8     07-18    137  |  102  |  71<H>  ----------------------------<  119<H>  4.2   |  17<L>  |  4.80<H>    Ca    6.7<L>      18 Jul 2019 04:47  Phos  5.5     07-18  Mg     1.9     07-18    TPro  5.2<L>  /  Alb  2.0<L>  /  TBili  4.8<H>  /  DBili  x   /  AST  108<H>  /  ALT  100<H>  /  AlkPhos  93  07-18    PT/INR - ( 18 Jul 2019 04:47 )   PT: 12.6 sec;   INR: 1.11          PTT - ( 18 Jul 2019 04:47 )  PTT:31.3 sec    CAPILLARY BLOOD GLUCOSE      POCT Blood Glucose.: 126 mg/dL (18 Jul 2019 06:43)  POCT Blood Glucose.: 109 mg/dL (18 Jul 2019 00:00)  POCT Blood Glucose.: 106 mg/dL (17 Jul 2019 18:04)  POCT Blood Glucose.: 115 mg/dL (17 Jul 2019 11:23)    LIVER FUNCTIONS - ( 18 Jul 2019 04:47 )  Alb: 2.0 g/dL / Pro: 5.2 g/dL / ALK PHOS: 93 U/L / ALT: 100 U/L / AST: 108 U/L / GGT: x             Radiology & Additional Studies:

## 2019-07-21 LAB
CULTURE RESULTS: SIGNIFICANT CHANGE UP
SPECIMEN SOURCE: SIGNIFICANT CHANGE UP

## 2019-07-22 LAB
CULTURE RESULTS: SIGNIFICANT CHANGE UP
SPECIMEN SOURCE: SIGNIFICANT CHANGE UP

## 2019-07-25 DIAGNOSIS — I50.32 CHRONIC DIASTOLIC (CONGESTIVE) HEART FAILURE: ICD-10-CM

## 2019-07-25 DIAGNOSIS — Z95.1 PRESENCE OF AORTOCORONARY BYPASS GRAFT: ICD-10-CM

## 2019-07-25 DIAGNOSIS — I48.92 UNSPECIFIED ATRIAL FLUTTER: ICD-10-CM

## 2019-07-25 DIAGNOSIS — Z87.891 PERSONAL HISTORY OF NICOTINE DEPENDENCE: ICD-10-CM

## 2019-07-25 DIAGNOSIS — R65.21 SEVERE SEPSIS WITH SEPTIC SHOCK: ICD-10-CM

## 2019-07-25 DIAGNOSIS — D50.9 IRON DEFICIENCY ANEMIA, UNSPECIFIED: ICD-10-CM

## 2019-07-25 DIAGNOSIS — E87.2 ACIDOSIS: ICD-10-CM

## 2019-07-25 DIAGNOSIS — A41.02 SEPSIS DUE TO METHICILLIN RESISTANT STAPHYLOCOCCUS AUREUS: ICD-10-CM

## 2019-07-25 DIAGNOSIS — G93.41 METABOLIC ENCEPHALOPATHY: ICD-10-CM

## 2019-07-25 DIAGNOSIS — N17.9 ACUTE KIDNEY FAILURE, UNSPECIFIED: ICD-10-CM

## 2019-07-25 DIAGNOSIS — D69.6 THROMBOCYTOPENIA, UNSPECIFIED: ICD-10-CM

## 2019-07-25 DIAGNOSIS — E11.9 TYPE 2 DIABETES MELLITUS WITHOUT COMPLICATIONS: ICD-10-CM

## 2019-07-25 DIAGNOSIS — Z51.5 ENCOUNTER FOR PALLIATIVE CARE: ICD-10-CM

## 2019-07-25 DIAGNOSIS — E83.51 HYPOCALCEMIA: ICD-10-CM

## 2019-07-25 DIAGNOSIS — J44.9 CHRONIC OBSTRUCTIVE PULMONARY DISEASE, UNSPECIFIED: ICD-10-CM

## 2019-07-25 DIAGNOSIS — J69.0 PNEUMONITIS DUE TO INHALATION OF FOOD AND VOMIT: ICD-10-CM

## 2019-07-25 DIAGNOSIS — E78.5 HYPERLIPIDEMIA, UNSPECIFIED: ICD-10-CM

## 2019-07-25 DIAGNOSIS — E03.9 HYPOTHYROIDISM, UNSPECIFIED: ICD-10-CM

## 2019-07-25 DIAGNOSIS — T82.6XXA INFECTION AND INFLAMMATORY REACTION DUE TO CARDIAC VALVE PROSTHESIS, INITIAL ENCOUNTER: ICD-10-CM

## 2019-07-25 DIAGNOSIS — I69.398 OTHER SEQUELAE OF CEREBRAL INFARCTION: ICD-10-CM

## 2019-07-25 DIAGNOSIS — J90 PLEURAL EFFUSION, NOT ELSEWHERE CLASSIFIED: ICD-10-CM
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
## 2019-07-25 DIAGNOSIS — J96.00 ACUTE RESPIRATORY FAILURE, UNSPECIFIED WHETHER WITH HYPOXIA OR HYPERCAPNIA: ICD-10-CM

## 2019-07-25 DIAGNOSIS — Z95.0 PRESENCE OF CARDIAC PACEMAKER: ICD-10-CM

## 2019-07-25 DIAGNOSIS — I11.0 HYPERTENSIVE HEART DISEASE WITH HEART FAILURE: ICD-10-CM

## 2019-07-25 DIAGNOSIS — I25.10 ATHEROSCLEROTIC HEART DISEASE OF NATIVE CORONARY ARTERY WITHOUT ANGINA PECTORIS: ICD-10-CM

## 2019-08-02 NOTE — PATIENT PROFILE ADULT. - MENTAL HEALTH CONDITIONS/SYMPTOMS, PROFILE
Relevant Problems   No relevant active problems       Anesthetic History   No history of anesthetic complications            Review of Systems / Medical History  Patient summary reviewed, nursing notes reviewed and pertinent labs reviewed    Pulmonary  Within defined limits          Asthma        Neuro/Psych   Within defined limits           Cardiovascular  Within defined limits                     GI/Hepatic/Renal  Within defined limits              Endo/Other  Within defined limits           Other Findings              Physical Exam    Airway  Mallampati: I  TM Distance: 4 - 6 cm  Neck ROM: normal range of motion   Mouth opening: Normal     Cardiovascular  Regular rate and rhythm,  S1 and S2 normal,  no murmur, click, rub, or gallop             Dental  No notable dental hx       Pulmonary  Breath sounds clear to auscultation               Abdominal  GI exam deferred       Other Findings            Anesthetic Plan    ASA: 2  Anesthesia type: general          Induction: Inhalational  Anesthetic plan and risks discussed with: Family
none

## 2019-08-06 ENCOUNTER — APPOINTMENT (OUTPATIENT)
Dept: HEART AND VASCULAR | Facility: CLINIC | Age: 84
End: 2019-08-06

## 2019-08-27 ENCOUNTER — APPOINTMENT (OUTPATIENT)
Dept: INTERNAL MEDICINE | Facility: CLINIC | Age: 84
End: 2019-08-27

## 2019-10-01 PROCEDURE — 94002 VENT MGMT INPAT INIT DAY: CPT

## 2019-10-01 PROCEDURE — 85027 COMPLETE CBC AUTOMATED: CPT

## 2019-10-01 PROCEDURE — 86900 BLOOD TYPING SEROLOGIC ABO: CPT

## 2019-10-01 PROCEDURE — 74176 CT ABD & PELVIS W/O CONTRAST: CPT

## 2019-10-01 PROCEDURE — 81001 URINALYSIS AUTO W/SCOPE: CPT

## 2019-10-01 PROCEDURE — 70490 CT SOFT TISSUE NECK W/O DYE: CPT

## 2019-10-01 PROCEDURE — 82247 BILIRUBIN TOTAL: CPT

## 2019-10-01 PROCEDURE — 80076 HEPATIC FUNCTION PANEL: CPT

## 2019-10-01 PROCEDURE — 87040 BLOOD CULTURE FOR BACTERIA: CPT

## 2019-10-01 PROCEDURE — 36415 COLL VENOUS BLD VENIPUNCTURE: CPT

## 2019-10-01 PROCEDURE — 80053 COMPREHEN METABOLIC PANEL: CPT

## 2019-10-01 PROCEDURE — 93306 TTE W/DOPPLER COMPLETE: CPT

## 2019-10-01 PROCEDURE — 87070 CULTURE OTHR SPECIMN AEROBIC: CPT

## 2019-10-01 PROCEDURE — 71045 X-RAY EXAM CHEST 1 VIEW: CPT

## 2019-10-01 PROCEDURE — 87086 URINE CULTURE/COLONY COUNT: CPT

## 2019-10-01 PROCEDURE — 82330 ASSAY OF CALCIUM: CPT

## 2019-10-01 PROCEDURE — 94640 AIRWAY INHALATION TREATMENT: CPT

## 2019-10-01 PROCEDURE — 85384 FIBRINOGEN ACTIVITY: CPT

## 2019-10-01 PROCEDURE — 85610 PROTHROMBIN TIME: CPT

## 2019-10-01 PROCEDURE — 84132 ASSAY OF SERUM POTASSIUM: CPT

## 2019-10-01 PROCEDURE — 83605 ASSAY OF LACTIC ACID: CPT

## 2019-10-01 PROCEDURE — 80202 ASSAY OF VANCOMYCIN: CPT

## 2019-10-01 PROCEDURE — 85730 THROMBOPLASTIN TIME PARTIAL: CPT

## 2019-10-01 PROCEDURE — 83010 ASSAY OF HAPTOGLOBIN QUANT: CPT

## 2019-10-01 PROCEDURE — 87186 SC STD MICRODIL/AGAR DIL: CPT

## 2019-10-01 PROCEDURE — 86901 BLOOD TYPING SEROLOGIC RH(D): CPT

## 2019-10-01 PROCEDURE — 80048 BASIC METABOLIC PNL TOTAL CA: CPT

## 2019-10-01 PROCEDURE — 93005 ELECTROCARDIOGRAM TRACING: CPT

## 2019-10-01 PROCEDURE — 71250 CT THORAX DX C-: CPT

## 2019-10-01 PROCEDURE — 86022 PLATELET ANTIBODIES: CPT

## 2019-10-01 PROCEDURE — 84100 ASSAY OF PHOSPHORUS: CPT

## 2019-10-01 PROCEDURE — 86850 RBC ANTIBODY SCREEN: CPT

## 2019-10-01 PROCEDURE — 94660 CPAP INITIATION&MGMT: CPT

## 2019-10-01 PROCEDURE — 70450 CT HEAD/BRAIN W/O DYE: CPT

## 2019-10-01 PROCEDURE — 82803 BLOOD GASES ANY COMBINATION: CPT

## 2019-10-01 PROCEDURE — 76700 US EXAM ABDOM COMPLETE: CPT

## 2019-10-01 PROCEDURE — 84295 ASSAY OF SERUM SODIUM: CPT

## 2019-10-01 PROCEDURE — 85379 FIBRIN DEGRADATION QUANT: CPT

## 2019-10-01 PROCEDURE — 83615 LACTATE (LD) (LDH) ENZYME: CPT

## 2019-10-01 PROCEDURE — 87150 DNA/RNA AMPLIFIED PROBE: CPT

## 2019-10-01 PROCEDURE — 96374 THER/PROPH/DIAG INJ IV PUSH: CPT

## 2019-10-01 PROCEDURE — 96375 TX/PRO/DX INJ NEW DRUG ADDON: CPT

## 2019-10-01 PROCEDURE — 85025 COMPLETE CBC W/AUTO DIFF WBC: CPT

## 2019-10-01 PROCEDURE — 84443 ASSAY THYROID STIM HORMONE: CPT

## 2019-10-01 PROCEDURE — 99285 EMERGENCY DEPT VISIT HI MDM: CPT | Mod: 25

## 2019-10-01 PROCEDURE — 82962 GLUCOSE BLOOD TEST: CPT

## 2019-10-01 PROCEDURE — 83735 ASSAY OF MAGNESIUM: CPT

## 2019-10-01 PROCEDURE — 94003 VENT MGMT INPAT SUBQ DAY: CPT

## 2019-10-01 PROCEDURE — 82248 BILIRUBIN DIRECT: CPT

## 2019-10-18 NOTE — ED ADULT TRIAGE NOTE - [facility]
Patient ID: Olivia is a 22 year old female.  MRN: [id number]  Chief Complaint   Patient presents with   • Gyn Exam     Pap, Test results     HISTORY OF PRESENT ILLNESS  HPI   Olivia is a 23 yo female here for a follow up on her labs and a pap smear.     Elevated LFTs   -- CMP at last visit was remarkable for LFTs >3x upper limits of normal   -- HIV and hepatitis negative   -- liver US showing hepatic steatosis   -- drinks socially 2 times a month, about 2 drinks each time   -- no history of liver disease in her family that she is aware of     Birth Control   -- OCPs (mylan) for one year   -- history of migraines with aura   -- used to smoke for 4 years, quit a few months katerin (would smoke about a pack in 2 months)   -- would like nexplanon for birth control       Review of Systems   Constitutional: Negative for chills, fatigue and fever.   HENT: Negative for congestion, rhinorrhea and sore throat.    Respiratory: Negative for chest tightness and shortness of breath.    Cardiovascular: Negative for chest pain.   Gastrointestinal: Negative for abdominal pain, diarrhea, nausea and vomiting.   Genitourinary: Negative for dysuria.   Skin: Negative for rash.   Neurological: Positive for headaches. Negative for syncope and light-headedness.   Psychiatric/Behavioral: Negative for behavioral problems and confusion.       Patient's medications, allergies, problem list, past medical, surgical, social and family histories were reviewed and updated as appropriate.    ALLERGIES  ALLERGIES:   Allergen Reactions   • Topiramate Other (See Comments)     headaches       MEDICAL HISTORY  History reviewed. No pertinent past medical history.  Patient Active Problem List   Diagnosis   • Migraine with aura and with status migrainosus, not intractable   • Fatigue   • Medication monitoring encounter   • Elevated LFTs   • Encounter for female birth control   • Refused influenza vaccine     SURGICAL HISTORY  History reviewed. No pertinent  surgical history.  FAMILY HISTORY  History reviewed. No pertinent family history.  SOCIAL  Social History     Socioeconomic History   • Marital status: Single     Spouse name: Not on file   • Number of children: Not on file   • Years of education: Not on file   • Highest education level: Not on file   Occupational History   • Not on file   Social Needs   • Financial resource strain: Not on file   • Food insecurity:     Worry: Not on file     Inability: Not on file   • Transportation needs:     Medical: Not on file     Non-medical: Not on file   Tobacco Use   • Smoking status: Former Smoker     Packs/day: 0.00   • Smokeless tobacco: Never Used   Substance and Sexual Activity   • Alcohol use: Yes     Frequency: 2-4 times a month     Drinks per session: 1 or 2     Binge frequency: Never   • Drug use: Never   • Sexual activity: Yes     Partners: Male     Birth control/protection: Pill   Lifestyle   • Physical activity:     Days per week: 4 days     Minutes per session: Not on file   • Stress: Not at all   Relationships   • Social connections:     Talks on phone: Not on file     Gets together: Not on file     Attends [employer] service: Not on file     Active member of club or organization: Not on file     Attends meetings of clubs or organizations: Not on file     Relationship status: Not on file   • Intimate partner violence:     Fear of current or ex partner: Not on file     Emotionally abused: Not on file     Physically abused: Not on file     Forced sexual activity: Not on file   Other Topics Concern   • Not on file   Social History Narrative   • Not on file     CURRENT MEDICATIONS  Current Outpatient Medications   Medication Sig Dispense Refill   • ergocalciferol (DRISDOL) 44800 units capsule Take 1 capsule by mouth 1 day a week. 8 capsule 0   • naproxen (NAPROSYN) 375 MG tablet Take 375 mg by mouth.       No current facility-administered medications for this visit.        OBJECTIVE  Vitals:    10/18/19 1327   BP:  120/76   Pulse: 94   Resp: 16   Temp: 97.7 °F (36.5 °C)   TempSrc: Oral   SpO2: 96%   Weight: 124.2 kg (273 lb 13 oz)   Height: 5' 5.25\" (1.657 m)   PainSc:  0     Physical Exam  Constitutional:       General: She is not in acute distress.     Appearance: She is well-developed.   HENT:      Head: Normocephalic and atraumatic.   Eyes:      Conjunctiva/sclera: Conjunctivae normal.      Pupils: Pupils are equal, round, and reactive to light.   Neck:      Musculoskeletal: Normal range of motion and neck supple.   Cardiovascular:      Rate and Rhythm: Normal rate and regular rhythm.      Heart sounds: Normal heart sounds.   Pulmonary:      Effort: Pulmonary effort is normal. No respiratory distress.      Breath sounds: Normal breath sounds.   Abdominal:      General: There is no distension.      Palpations: Abdomen is soft.      Tenderness: There is no tenderness.   Genitourinary:     Cervix: No cervical motion tenderness, discharge, erythema or cervical bleeding.   Musculoskeletal: Normal range of motion.   Skin:     General: Skin is warm and dry.   Neurological:      Mental Status: She is alert.   Psychiatric:         Behavior: Behavior normal.          ASSESSMENT AND PLAN  Problem List Items Addressed This Visit        Other    Elevated LFTs     -- HIV and hepatitis negative   -- US showing hepatic steatosis, discussed with patient importance of diet and exercise and losing weight   -- plan to repeat LFTs in 3 months          Encounter for female birth control     -- currently on OCPs, has contraindication with migraine with aura   -- will switch birth control, patient interest in nexplanon   -- schedule for nexplanon placement          Refused influenza vaccine      Other Visit Diagnoses     Pap smear for cervical cancer screening    -  Primary    Relevant Orders    THINPREP PAP TEST WITH HPV REFLEX    Screening for STDs (sexually transmitted diseases)        Relevant Orders    CHLAMYDIA/GC BY NUCLEIC ACID  AMPLIFICATION    Health care maintenance        Relevant Orders    POCT URINE PREGNANCY (Completed)        Health Maintenance Summary     Varicella Vaccine (1 of 2 - 13+ 2-dose series)  Overdue since 12/7/2009    HPV (Female) Vaccine (1 - Female 3-dose series)  Overdue since 12/7/2011    DTaP/Tdap/Td Vaccine (1 - Tdap)  Overdue since 12/7/2015    Cervical Cancer Screening (Every 3 Years)  Ordered on 10/18/2019    Influenza Vaccine (1)  Overdue since 9/1/2019    Depression Screening (Yearly)  Next due on 9/27/2020    Meningococcal Vaccine   Aged Out    Pneumococcal Vaccine 0-64   Aged Out        Will think about getting HPV vaccine. Unsure right now. Refused influenza vaccine.   Prior to discharge all questions were answered and patient expressed understanding of the plan of care.    Plan of care was discussed with attending physician, Dr. Beltran.    Schedule follow up: Return in about 2 weeks (around 11/1/2019) for nexplanon placement.      Britt Panchal DO   PGY2, Family Medicine    10Y

## 2019-11-15 NOTE — H&P ADULT - NSTOBACCOSCREENHP_GEN_A_NCS
----- Message from Ashtyn Dan sent at 11/15/2019 11:53 AM CST -----  Contact: pt [phone]  Pt is returning a call from [facility]. Please call   
Documented on separate encounter.    
No

## 2020-02-26 NOTE — DIETITIAN INITIAL EVALUATION ADULT. - PERTINENT MEDS FT
Subjective   Suha Mcconnell is a 28 y.o. female.     URI    This is a new problem. The current episode started in the past 7 days (2 days). The problem has been gradually worsening. There has been no fever. Associated symptoms include headaches and sinus pain. Pertinent negatives include no congestion, coughing, ear pain, rhinorrhea, sneezing or sore throat. She has tried acetaminophen and NSAIDs (excedrin) for the symptoms. The treatment provided moderate relief.    Pt with history of TMJ and tension type headaches.  Pt also on Topamax for weight loss.    The following portions of the patient's history were reviewed and updated as appropriate: allergies, current medications, past family history, past medical history, past social history, past surgical history and problem list.    Review of Systems   Constitutional: Negative for chills, fatigue and fever.   HENT: Positive for postnasal drip and sinus pressure. Negative for congestion, ear pain, rhinorrhea, sneezing and sore throat.    Respiratory: Negative for cough.    Cardiovascular: Negative.    Gastrointestinal: Negative.    Musculoskeletal: Negative for myalgias.       Objective   Physical Exam   Constitutional: She is oriented to person, place, and time. She appears well-developed.   HENT:   Head: Normocephalic.   Right Ear: Tympanic membrane and ear canal normal.   Left Ear: Tympanic membrane and ear canal normal.   Nose: Mucosal edema and congestion present. Right sinus exhibits maxillary sinus tenderness. Right sinus exhibits no frontal sinus tenderness. Left sinus exhibits maxillary sinus tenderness. Left sinus exhibits no frontal sinus tenderness.   Mouth/Throat: Uvula is midline, oropharynx is clear and moist and mucous membranes are normal. No tonsillar exudate.   Cardiovascular: Normal rate, regular rhythm and normal heart sounds.   Pulmonary/Chest: Effort normal and breath sounds normal.   Lymphadenopathy:     She has no cervical adenopathy.    Neurological: She is alert and oriented to person, place, and time.   Skin: Skin is warm and dry.   Psychiatric: She has a normal mood and affect.     Vitals:    02/26/20 1750   BP: 132/82   Pulse: 78   Resp: 18   Temp: 98.5 °F (36.9 °C)   SpO2: 98%         Assessment/Plan   Suha was seen today for sinusitis.    Diagnoses and all orders for this visit:    Tension headache  -     methylPREDNISolone (MEDROL, ANNE-MARIE,) 4 MG tablet; Take as directed on package instructions.  -     Acetaminophen-Caffeine (EXCEDRIN TENSION HEADACHE) 500-65 MG tablet; Take 2 tablets by mouth Every 4 (Four) Hours As Needed (headache) for up to 2 days.    Tenderness over maxillary sinus  -     guaiFENesin (MUCINEX) 600 MG 12 hr tablet; Take 2 tablets by mouth 2 (Two) Times a Day for 2 days.    Pt to take 800mg of ibuprofen q6 hours for 24 hours for pain relief.  Pt also to take Mucinex for the next 24 hours to relieve the facial/sinus tenderness.  Pt to follow up with PCP if symptoms fail to improve.      Tension Headache, Adult  A tension headache is a feeling of pain, pressure, or aching in the head that is often felt over the front and sides of the head. The pain can be dull, or it can feel tight (constricting). There are two types of tension headache:  · Episodic tension headache. This is when the headaches happen fewer than 15 days a month.  · Chronic tension headache. This is when the headaches happen more than 15 days a month during a 3-month period.  A tension headache can last from 30 minutes to several days. It is the most common kind of headache. Tension headaches are not normally associated with nausea or vomiting, and they do not get worse with physical activity.  What are the causes?  The exact cause of this condition is not known. Tension headaches are often triggered by stress, anxiety, or depression. Other triggers include:  · Alcohol.  · Too much caffeine or caffeine withdrawal.  · Respiratory infections, such as colds,  flu, or sinus infections.  · Dental problems or teeth clenching.  · Tiredness (fatigue).  · Holding your head and neck in the same position for a long period of time, such as while using a computer.  · Smoking.  · Arthritis of the neck.  What are the signs or symptoms?  Symptoms of this condition include:  · A feeling of pressure or tightness around the head.  · Dull, aching head pain.  · Pain over the front and sides of the head.  · Tenderness in the muscles of the head, neck, and shoulders.  How is this diagnosed?  This condition may be diagnosed based on your symptoms, your medical history, and a physical exam. If your symptoms are severe or unusual, you may have imaging tests, such as a CT scan or an MRI of your head. Your vision may also be checked.  How is this treated?  This condition may be treated with lifestyle changes and with medicines that help relieve symptoms.  Follow these instructions at home:  Managing pain  · Take over-the-counter and prescription medicines only as told by your health care provider.  · When you have a headache, lie down in a dark, quiet room.  · If directed, apply ice to the head and neck:  ? Put ice in a plastic bag.  ? Place a towel between your skin and the bag.  ? Leave the ice on for 20 minutes, 2-3 times a day.  · If directed, apply heat to the back of your neck as often as told by your health care provider. Use the heat source that your health care provider recommends, such as a moist heat pack or a heating pad.  ? Place a towel between your skin and the heat source.  ? Leave the heat on for 20-30 minutes.  ? Remove the heat if your skin turns bright red. This is especially important if you are unable to feel pain, heat, or cold. You may have a greater risk of getting burned.  Eating and drinking  · Eat meals on a regular schedule.  · Limit alcohol intake to no more than 1 drink a day for nonpregnant women and 2 drinks a day for men. One drink equals 12 oz of beer, 5 oz of  wine, or 1½ oz of hard liquor.  · Drink enough fluid to keep your urine pale yellow.  · Decrease your caffeine intake, or stop using caffeine.  Lifestyle  · Get 7-9 hours of sleep each night, or get the amount of sleep recommended by your health care provider.  · At bedtime, remove all electronic devices from your room. Electronic devices include computers, phones, and tablets.  · Find ways to manage your stress. Some things that can help relieve stress include:  ? Exercise.  ? Deep breathing exercises.  ? Yoga.  ? Listening to music.  ? Positive mental imagery.  · Try to sit up straight and avoid tensing your muscles.  · Do not use any products that contain nicotine or tobacco, such as cigarettes and e-cigarettes. If you need help quitting, ask your health care provider.  General instructions    · Keep all follow-up visits as told by your health care provider. This is important.  · Avoid any headache triggers. Keep a headache journal to help find out what may trigger your headaches. For example, write down:  ? What you eat and drink.  ? How much sleep you get.  ? Any change to your diet or medicines.  Contact a health care provider if:  · Your headache does not get better.  · Your headache comes back.  · You are sensitive to sounds, light, or smells because of a headache.  · You have nausea or you vomit.  · Your stomach hurts.  Get help right away if:  · You suddenly develop a very severe headache along with any of the following:  ? A stiff neck.  ? Nausea and vomiting.  ? Confusion.  ? Weakness.  ? Double vision or loss of vision.  ? Shortness of breath.  ? Rash.  ? Unusual sleepiness.  ? Fever.  ? Trouble speaking.  ? Pain in your eyes or ears.  ? Trouble walking or balancing.  ? Feeling faint or passing out.  Summary  · A tension headache is a feeling of pain, pressure, or aching in the head that is often felt over the front and sides of the head.  · A tension headache can last from 30 minutes to several days.  It is the most common kind of headache.  · This condition may be diagnosed based on your symptoms, your medical history, and a physical exam.  · This condition may be treated with lifestyle changes and with medicines that help relieve symptoms.  This information is not intended to replace advice given to you by your health care provider. Make sure you discuss any questions you have with your health care provider.  Document Released: 12/18/2006 Document Revised: 03/30/2018 Document Reviewed: 03/30/2018  [email or web address] Interactive Patient Education © 2020 [employer]            Zosyn, D5/D50/SSI, levophed, propofol, vasopressin, duoneb, protonix

## 2020-05-26 NOTE — PROGRESS NOTE ADULT - SUBJECTIVE AND OBJECTIVE BOX
What Type Of Note Output Would You Prefer (Optional)?: Bullet Format How Severe Are Your Spot(S)?: mild INTERVAL HPI/OVERNIGHT EVENTS: o/n: POCT 66 -- 1/2 amp d50;   6/9: transition from propofol to precedex.     Pt seen and examined. History limited 2/2 intubation and sedation. off levophed.       MEDICATIONS  (STANDING):  ALBUTerol/ipratropium for Nebulization 3 milliLiter(s) Nebulizer every 6 hours  aspirin Suppository 300 milliGRAM(s) Rectal daily  dexmedetomidine Infusion 0.03 MICROgram(s)/kG/Hr (0.722 mL/Hr) IV Continuous <Continuous>  dextrose 50% Injectable 25 Gram(s) IV Push once  dextrose 50% Injectable 25 Gram(s) IV Push once  glycopyrrolate Injectable 0.2 milliGRAM(s) IV Push once  insulin lispro (HumaLOG) corrective regimen sliding scale   SubCutaneous Before meals and at bedtime  levothyroxine Injectable 25 MICROGram(s) IV Push <User Schedule>  norepinephrine Infusion 0.05 MICROgram(s)/kG/Min (9.019 mL/Hr) IV Continuous <Continuous>  piperacillin/tazobactam IVPB. 3.375 Gram(s) IV Intermittent every 6 hours    MEDICATIONS  (PRN):      Drug Dosing Weight  Height (cm): 167.64 (07 Feb 2018 14:00)  Weight (kg): 96.2 (05 Jun 2019 14:13)  BMI (kg/m2): 34.2 (05 Jun 2019 14:13)  BSA (m2): 2.05 (05 Jun 2019 14:13)      PAST MEDICAL & SURGICAL HISTORY:  High cholesterol  Diabetes  Prostate cancer: in remission  Hypertension  COPD (chronic obstructive pulmonary disease)  H/O aortic valve replacement  S/P CABG x 3  Artificial pacemaker  History of cataract surgery: b/l  History of knee replacement: b/l      ICU Vital Signs Last 24 Hrs  T(C): 37.2 (10 Darvin 2019 00:33), Max: 37.6 (09 Jun 2019 21:45)  T(F): 98.9 (10 Darvin 2019 00:33), Max: 99.7 (09 Jun 2019 21:45)  HR: 96 (10 Darvin 2019 06:10) (58 - 106)  BP: 133/62 (10 Darvin 2019 06:00) (98/58 - 141/83)  BP(mean): 91 (10 Darvin 2019 06:00) (69 - 98)  ABP: 142/60 (10 Darvin 2019 06:00) (104/44 - 146/62)  ABP(mean): 88 (10 Darvin 2019 06:00) (62 - 90)  RR: 19 (10 Darvin 2019 06:10) (10 - 37)  SpO2: 97% (10 Darvin 2019 06:10) (94% - 100%)      ABG - ( 08 Jun 2019 22:24 )  pH, Arterial: 7.37  pH, Blood: x     /  pCO2: 37    /  pO2: not analyzed / HCO3: 21    / Base Excess: -3.7  /  SaO2: 99                    09 Jun 2019 07:01  -  10 Darvin 2019 07:00  --------------------------------------------------------  IN:    dexmedetomidine Infusion: 58.5 mL    IV PiggyBack: 200 mL    norepinephrine Infusion: 98.8 mL    propofol Infusion: 29.8 mL    sodium chloride 0.9%: 65 mL  Total IN: 452.1 mL    OUT:    Bulb: 17 mL    Indwelling Catheter - Urethral: 970 mL  Total OUT: 987 mL    Total NET: -534.9 mL          Mode: CPAP with PS  FiO2: 50  PEEP: 5  PS: 10  MAP: 9  PIP: 16      PHYSICAL EXAM:  General: NAD  NEURO: limited 2/2 intubation and sedation. MAEx4, following commands  NECK: incision soft, CDI. no hematoma. NICOLE with SS output  CV: RRR, no MRG  PULM: CTAB, nonlabored breathing +oral secretions  ABD: soft, NT/ND  : Randle in place draining clear urine  EXTREM: WWP, no edema, no calf tenderness  VASC: No cyanosis, pallor. Palpable pulses bilaterally        LABS:  CBC Full  -  ( 10 Darvin 2019 05:07 )  WBC Count : 9.26 K/uL  RBC Count : 3.23 M/uL  Hemoglobin : 10.5 g/dL  Hematocrit : 33.3 %  Platelet Count - Automated : 121 K/uL  Mean Cell Volume : 103.1 fl  Mean Cell Hemoglobin : 32.5 pg  Mean Cell Hemoglobin Concentration : 31.5 gm/dL      06-10    139  |  107  |  13  ----------------------------<  84  4.0   |  22  |  0.97    Ca    7.9<L>      10 Jun 2019 05:07  Phos  2.4     06-10  Mg     1.9     06-10      PT/INR - ( 08 Jun 2019 15:57 )   PT: 14.6 sec;   INR: 1.28          PTT - ( 08 Jun 2019 15:57 )  PTT:37.9 sec Have Your Spot(S) Been Treated In The Past?: has not been treated Hpi Title: Evaluation of Skin Lesions

## 2021-06-18 NOTE — DIETITIAN INITIAL EVALUATION ADULT. - PROBLEM SELECTOR PLAN 6
[facility] Daily Progress     Patient Name: Ta Mir  Date: 5/22/2018  Visit #: 3  PTA visit #: 2  Referral Diagnosis: [unfilled]  Referring provider: Gracie Arteaga, *  Visit Diagnosis:     ICD-10-CM    1. Swelling of left extremity M79.89    2. Leg swelling M79.89    3. Venous insufficiency of both lower extremities I87.2    4. Scar condition and fibrosis of skin L90.5    5. Charcot's joint, right ankle and foot M14.671    6. Acquired lymphedema of leg I89.0    7. Diabetic peripheral neuropathy associated with type 2 diabetes mellitus E11.42    8. Morbid obesity with BMI of 50.0-59.9, adult E66.01     Z68.43    9. Pain in both lower extremities M79.604     M79.605          Assessment:   Increased swelling noted L calf.  No wound dressing on when pt arrived to PT. Wound weeping.  Pt took bandages off this morning just prior to his appointment.  .  Patient is benefitting from skilled physical therapy and is making steady progress toward functional goals.  Patient is appropriate to continue with skilled physical therapy intervention, as indicated by initial plan of care.    Goal Status: On going  Pt. will demonstrate/verbalize independence in self-management of condition in : 12 weeks  Pt. will be independent with home exercise program in : 12 weeks  Pt will: reduce Left Leg volume by 10% in 6 weeks  Pt will: improve walking tolerance and tolerate stairs in order to sleep in his own bed in 6 weeks    Plan / Patient Education:     Continue with initial plan of care.  Progress with home program as tolerated.   Go over lymph exercises.  Modify bandages as needed.   Pt does not see Dr. Arteaga until July. If wound does not appear to be healing I would advise him to reschedule that follow up sooner.    Subjective:   Pt reports the bandages the bandages on his L leg kept sliding down. Pt keep wraps on until Monday. (bandaged on Friday 5/18) Pt removed the bandages to shower and re  "wrapped.  Pt states he went downstairs this morning \" my knees are sore.\"   Pt reports his legs itch.  Pt states his legs feel better when moving vs sitting.  \"They feel better straight than bent.\"        Objective:   Caregiver present: NO    Observation of swelling: Increased significantly on the L lower leg especially medial calf.    Volume measurements taken:  No      Skin condition is:  Dry, wound on L posterior lower leg, some weeping noted  Compression: Took bandages off this morning at 10:00   to shower      Medication for infection:  No    Patient signed ABN for the bandaging supplies: yes.     Provided 4 rolls of comprilan, 2 rolls of Rosidal.     Treatment Today     TREATMENT MINUTES COMMENTS   Evaluation     Self-care/ Home management 55 Re dressed wound with ABD and Kerlix  Medical compression bandages B LEs:  Applied Eucerin lotion  Size A7 Tricofix R ortho stockinette on L, 1 roll Rosidal B, 1 roll 8 x 5 Comprilan B, 1 roll 12 x 5 Comprilan B, 1 roll 12 x 10 Comprilan B.  Tube K on L and comperm G on R   Pt advised to re wrap if bandages are sliding down   Manual therapy     Neuromuscular Re-education     Therapeutic Activity     Therapeutic Exercises  Reviewed lymph exercises   Gait training     Modality__________________                Total 55    Blank areas are intentional and mean the treatment did not include these items.       Ni Kurtz,SHADYT  5/22/2018      " - Hx of COPD, not currently on any nebulizer  - Duoneb PRn wheezing

## 2021-09-24 NOTE — ED ADULT NURSE NOTE - NSFALLRSKHRMRISKTYPE_ED_ALL_ED
coagulation(Bleeding disorder R/T clinical cond/anti-coags)
Risks/benefits discussed with patient/surrogate/Vaccine Information Sheet (VIS) provided-VIS date: 8/15/19

## 2022-05-17 NOTE — PATIENT PROFILE ADULT - INDICATE TYPE
Living Will/Medical Orders for Life-Sustaining Treatment (MOLST)/Do Not Resuscitate (DNR) [facility]

## 2022-05-24 NOTE — SWALLOW BEDSIDE ASSESSMENT ADULT - SLP PERTINENT HISTORY OF CURRENT PROBLEM
83 yo m with multiple medical problems adm for CVA work-up, s/p CEA on 6/7, intub on 6/8, extub 6/10
multiple medical problems, s/o CEA on 6/7 w/post-procedure neck edema & increased secretions. Cleared for PO diet of mech soft/NTL by this c on 6/11, now ?reports of coughing with PO intake
Pt is s/p CEA now with increased secretions and tongue deviation.
Sski Pregnancy And Lactation Text: This medication is Pregnancy Category D and isn't considered safe during pregnancy. It is excreted in breast milk.

## 2023-02-04 NOTE — PROCEDURAL SAFETY CHECKLIST WITH OR WITHOUT SEDATION - NSPREIMAGEREVIEW_GEN_ALL_CORE
"Lidia Farias" Carla was seen and treated in our emergency department on 2/4/2023.  She may return to work on 02/05/2023.       If you have any questions or concerns, please don't hesitate to call.       LPN    "
done
not applicable

## 2023-02-27 NOTE — ED ADULT TRIAGE NOTE - CCCP TRG CHIEF CMPLNT
medical evaluation O-T Advancement Flap Text: The defect edges were debeveled with a #15 scalpel blade.  Given the location of the defect, shape of the defect and the proximity to free margins an O-T advancement flap was deemed most appropriate.  Using a sterile surgical marker, an appropriate advancement flap was drawn incorporating the defect and placing the expected incisions within the relaxed skin tension lines where possible.    The area thus outlined was incised deep to adipose tissue with a #15 scalpel blade.  The skin margins were undermined to an appropriate distance in all directions utilizing iris scissors.

## 2023-03-09 NOTE — PATIENT PROFILE ADULT - NSPROALCOHOLUSE2_GEN_A_NUR
Completed letter for linzess appeal, v msg sent for pt to come sign release and once done will fax to [place] at [phone].  
From: Staci Jordan  To: Jonathan Zuniga  Sent: 2/24/2023 1:17 PM CST  Subject: Prescription     Hi, my pharmacy said my insurance did not cover the prescription   
yes

## 2023-09-28 NOTE — PRE-OP CHECKLIST - HEART RATE (BEATS/MIN)
88 Doxycycline Counseling:  Patient counseled regarding possible photosensitivity and increased risk for sunburn.  Patient instructed to avoid sunlight, if possible.  When exposed to sunlight, patients should wear protective clothing, sunglasses, and sunscreen.  The patient was instructed to call the office immediately if the following severe adverse effects occur:  hearing changes, easy bruising/bleeding, severe headache, or vision changes.  The patient verbalized understanding of the proper use and possible adverse effects of doxycycline.  All of the patient's questions and concerns were addressed.

## 2024-03-14 NOTE — ED PROVIDER NOTE - ST/T WAVE
[de-identified] : Physical examination of the lumbar spine: No swelling, ecchymosis, erythema appreciated.  Skin is intact.  No midline neck or paraspinal tenderness.  Mild tenderness in the left lumbar region.  Positive straight leg raise.  Calf is soft and nontender.  Antalgic gait.  Sensorimotor intact distally.  Neuro vas intact.  X-rays of bilateral hips and left lumbar spine taken in the office today:  No acute fractures, subluxations, or dislocations. TWI I, avL
